# Patient Record
Sex: MALE | Race: WHITE | NOT HISPANIC OR LATINO | Employment: FULL TIME | URBAN - NONMETROPOLITAN AREA
[De-identification: names, ages, dates, MRNs, and addresses within clinical notes are randomized per-mention and may not be internally consistent; named-entity substitution may affect disease eponyms.]

---

## 2021-10-14 ENCOUNTER — HOSPITAL ENCOUNTER (EMERGENCY)
Facility: HOSPITAL | Age: 41
Discharge: HOME | End: 2021-10-14
Attending: EMERGENCY MEDICINE
Payer: COMMERCIAL

## 2021-10-14 ENCOUNTER — APPOINTMENT (EMERGENCY)
Dept: RADIOLOGY | Facility: HOSPITAL | Age: 41
End: 2021-10-14
Payer: COMMERCIAL

## 2021-10-14 VITALS
WEIGHT: 225 LBS | OXYGEN SATURATION: 98 % | HEIGHT: 73 IN | BODY MASS INDEX: 29.82 KG/M2 | DIASTOLIC BLOOD PRESSURE: 87 MMHG | SYSTOLIC BLOOD PRESSURE: 147 MMHG | RESPIRATION RATE: 16 BRPM | HEART RATE: 74 BPM | TEMPERATURE: 98.2 F

## 2021-10-14 DIAGNOSIS — M94.0 COSTOCHONDRITIS: Primary | ICD-10-CM

## 2021-10-14 PROCEDURE — 93005 ELECTROCARDIOGRAM TRACING: CPT

## 2021-10-14 PROCEDURE — 96372 THER/PROPH/DIAG INJ SC/IM: CPT

## 2021-10-14 PROCEDURE — 99284 EMERGENCY DEPT VISIT MOD MDM: CPT | Performed by: EMERGENCY MEDICINE

## 2021-10-14 PROCEDURE — 71046 X-RAY EXAM CHEST 2 VIEWS: CPT

## 2021-10-14 PROCEDURE — 99284 EMERGENCY DEPT VISIT MOD MDM: CPT

## 2021-10-14 RX ORDER — KETOROLAC TROMETHAMINE 30 MG/ML
30 INJECTION, SOLUTION INTRAMUSCULAR; INTRAVENOUS ONCE
Status: COMPLETED | OUTPATIENT
Start: 2021-10-14 | End: 2021-10-14

## 2021-10-14 RX ADMIN — KETOROLAC TROMETHAMINE 30 MG: 30 INJECTION, SOLUTION INTRAMUSCULAR at 14:38

## 2021-10-18 LAB
ATRIAL RATE: 50 BPM
P AXIS: 48 DEGREES
PR INTERVAL: 158 MS
QRS AXIS: 34 DEGREES
QRSD INTERVAL: 116 MS
QT INTERVAL: 428 MS
QTC INTERVAL: 390 MS
T WAVE AXIS: 21 DEGREES
VENTRICULAR RATE: 50 BPM

## 2023-05-17 DIAGNOSIS — R79.89 OTHER SPECIFIED ABNORMAL FINDINGS OF BLOOD CHEMISTRY: ICD-10-CM

## 2023-05-19 ENCOUNTER — HOSPITAL ENCOUNTER (OUTPATIENT)
Dept: ULTRASOUND IMAGING | Facility: HOSPITAL | Age: 43
End: 2023-05-19

## 2023-05-19 DIAGNOSIS — R79.89 OTHER SPECIFIED ABNORMAL FINDINGS OF BLOOD CHEMISTRY: ICD-10-CM

## 2023-06-30 ENCOUNTER — HOSPITAL ENCOUNTER (EMERGENCY)
Facility: HOSPITAL | Age: 43
Discharge: HOME | End: 2023-06-30
Attending: EMERGENCY MEDICINE | Admitting: EMERGENCY MEDICINE
Payer: COMMERCIAL

## 2023-06-30 VITALS
SYSTOLIC BLOOD PRESSURE: 135 MMHG | RESPIRATION RATE: 16 BRPM | BODY MASS INDEX: 28.65 KG/M2 | WEIGHT: 217.15 LBS | DIASTOLIC BLOOD PRESSURE: 82 MMHG | TEMPERATURE: 97.3 F | OXYGEN SATURATION: 99 % | HEART RATE: 70 BPM

## 2023-06-30 DIAGNOSIS — K60.2 ANAL FISSURE: Primary | ICD-10-CM

## 2023-06-30 PROCEDURE — 99283 EMERGENCY DEPT VISIT LOW MDM: CPT | Performed by: EMERGENCY MEDICINE

## 2023-06-30 PROCEDURE — 99282 EMERGENCY DEPT VISIT SF MDM: CPT

## 2023-06-30 RX ORDER — HYDROCORTISONE 25 MG/G
CREAM TOPICAL 2 TIMES DAILY
Qty: 28 G | Refills: 0 | Status: SHIPPED | OUTPATIENT
Start: 2023-06-30 | End: 2023-07-05

## 2023-06-30 RX ORDER — DOCUSATE SODIUM 100 MG/1
100 CAPSULE, LIQUID FILLED ORAL EVERY 12 HOURS
Qty: 60 CAPSULE | Refills: 0 | Status: SHIPPED | OUTPATIENT
Start: 2023-06-30

## 2023-06-30 NOTE — ED PROVIDER NOTES
History  Chief Complaint   Patient presents with   • Rectal Pain     Believes he has a hemorrhoid that is causing him pain. Or believes he was taking iron and it bound him up a little bit and had to push harder to have a bm and strained himself. Denies any bleeding last BM was this am     Patient complains of 1 week of anal pain, worse with stooling. Patient denies bloody stools. Patient states that he recently has had a hard stools with some constipation. Denies abdominal pain or nausea or vomiting. Denies fevers or chills. No other complaints. History provided by:  Patient   used: No    Medical Problem  Location:  Anus  Quality:  Sharp pain  Severity:  Moderate  Onset quality:  Gradual  Duration:  1 week  Timing:  Constant  Progression:  Unchanged  Chronicity:  New  Relieved by:  Nothing  Worsened by:  Stooling  Associated symptoms: no abdominal pain, no chest pain, no congestion, no cough, no diarrhea, no ear pain, no fever, no headaches, no loss of consciousness, no myalgias, no nausea, no rash, no rhinorrhea, no shortness of breath, no sore throat, no vomiting and no wheezing        None       History reviewed. No pertinent past medical history. Past Surgical History:   Procedure Laterality Date   • GASTRIC BYPASS  2012       History reviewed. No pertinent family history. I have reviewed and agree with the history as documented. E-Cigarette/Vaping   • E-Cigarette Use Never User      E-Cigarette/Vaping Substances     Social History     Tobacco Use   • Smoking status: Never   • Smokeless tobacco: Current   Vaping Use   • Vaping Use: Never used   Substance Use Topics   • Alcohol use: Never   • Drug use: Never       Review of Systems   Constitutional: Negative for chills and fever. HENT: Negative for congestion, ear pain, hearing loss, rhinorrhea, sore throat, trouble swallowing and voice change. Eyes: Negative for pain and discharge.    Respiratory: Negative for cough, shortness of breath and wheezing. Cardiovascular: Negative for chest pain and palpitations. Gastrointestinal: Positive for constipation. Negative for abdominal pain, blood in stool, diarrhea, nausea and vomiting. Genitourinary: Negative for dysuria, flank pain, frequency and hematuria. Musculoskeletal: Negative for joint swelling, myalgias, neck pain and neck stiffness. Skin: Negative for rash and wound. Neurological: Negative for dizziness, seizures, loss of consciousness, syncope, facial asymmetry and headaches. Psychiatric/Behavioral: Negative for hallucinations, self-injury and suicidal ideas. All other systems reviewed and are negative. Physical Exam  Physical Exam  Vitals and nursing note reviewed. Constitutional:       General: He is not in acute distress. Appearance: He is well-developed. HENT:      Head: Normocephalic and atraumatic. Right Ear: External ear normal.      Left Ear: External ear normal.   Eyes:      General: No scleral icterus. Right eye: No discharge. Left eye: No discharge. Extraocular Movements: Extraocular movements intact. Conjunctiva/sclera: Conjunctivae normal.   Cardiovascular:      Rate and Rhythm: Normal rate and regular rhythm. Heart sounds: Normal heart sounds. No murmur heard. Pulmonary:      Effort: Pulmonary effort is normal.      Breath sounds: Normal breath sounds. No wheezing or rales. Abdominal:      General: Bowel sounds are normal. There is no distension. Palpations: Abdomen is soft. Tenderness: There is no abdominal tenderness. There is no guarding or rebound. Genitourinary:     Comments: Patient noted to have small anal fissure on rectal examination. Tender with palpation. No external hemorrhoids noted. Musculoskeletal:         General: No deformity. Normal range of motion. Cervical back: Normal range of motion and neck supple. Skin:     General: Skin is warm and dry.       Findings: No rash. Neurological:      General: No focal deficit present. Mental Status: He is alert and oriented to person, place, and time. Cranial Nerves: No cranial nerve deficit. Psychiatric:         Mood and Affect: Mood normal.         Behavior: Behavior normal.         Thought Content: Thought content normal.         Judgment: Judgment normal.         Vital Signs  ED Triage Vitals [06/30/23 1803]   Temperature Pulse Respirations Blood Pressure SpO2   (!) 97.3 °F (36.3 °C) 70 16 135/82 99 %      Temp Source Heart Rate Source Patient Position - Orthostatic VS BP Location FiO2 (%)   Temporal Monitor Sitting Left arm --      Pain Score       5           Vitals:    06/30/23 1803   BP: 135/82   Pulse: 70   Patient Position - Orthostatic VS: Sitting         Visual Acuity      ED Medications  Medications - No data to display    Diagnostic Studies  Results Reviewed     None                 No orders to display              Procedures  Procedures         ED Course                               SBIRT 20yo+    Flowsheet Row Most Recent Value   Initial Alcohol Screen: US AUDIT-C     1. How often do you have a drink containing alcohol? 0 Filed at: 06/30/2023 1806   2. How many drinks containing alcohol do you have on a typical day you are drinking? 0 Filed at: 06/30/2023 1806   3a. Male UNDER 65: How often do you have five or more drinks on one occasion? 0 Filed at: 06/30/2023 1806   Audit-C Score 0 Filed at: 06/30/2023 1806   BREANN: How many times in the past year have you. .. Used an illegal drug or used a prescription medication for non-medical reasons? Never Filed at: 06/30/2023 1806                    Medical Decision Making  Based on the history and medical screening exam performed the diagnostic considerations include but are not limited to hemorrhoids, anal fissure, proctitis.     Based on the work-up performed in the emergency room which includes physical examination, and which may include laboratory studies and imaging as warranted including advanced imaging such as CT scan or ultrasound, the differential diagnosis is narrowed to exclude limb or life-threatening process. The patient is stable for discharge. Examination consistent with anal fissure. Patient stable. Amount and/or Complexity of Data Reviewed  Labs:      Details: Not indicated  Radiology:      Details: Not indicated          Disposition  Final diagnoses:   Anal fissure     Time reflects when diagnosis was documented in both MDM as applicable and the Disposition within this note     Time User Action Codes Description Comment    6/30/2023  7:25 PM Romaine Deleon Add [K60.2] Anal fissure       ED Disposition     ED Disposition   Discharge    Condition   Stable    Date/Time   Fri Jun 30, 2023  7:25 PM    [id number] [facility] discharge to home/self care. Follow-up Information     Follow up With Specialties Details Why Contact Info    Your primary care physician   As needed           Patient's Medications   Discharge Prescriptions    DOCUSATE SODIUM (COLACE) 100 MG CAPSULE    Take 1 capsule (100 mg total) by mouth every 12 (twelve) hours       Start Date: 6/30/2023 End Date: --       Order Dose: 100 mg       Quantity: 60 capsule    Refills: 0    HYDROCORTISONE (ANUSOL-HC) 2.5 % RECTAL CREAM    Apply topically 2 (two) times a day for 5 days       Start Date: 6/30/2023 End Date: 7/5/2023       Order Dose: --       Quantity: 28 g    Refills: 0       No discharge procedures on file.     PDMP Review     None          ED Provider  Electronically Signed by           Romaine Deleon MD  06/30/23 5531

## 2023-09-03 ENCOUNTER — HOSPITAL ENCOUNTER (OUTPATIENT)
Dept: ULTRASOUND IMAGING | Facility: HOSPITAL | Age: 43
Discharge: HOME | End: 2023-09-03
Payer: COMMERCIAL

## 2023-09-03 DIAGNOSIS — R74.8 ELEVATED LIVER ENZYMES: ICD-10-CM

## 2023-09-03 PROCEDURE — 76705 ECHO EXAM OF ABDOMEN: CPT

## 2024-01-01 ENCOUNTER — ANESTHESIA (INPATIENT)
Dept: RADIOLOGY | Facility: HOSPITAL | Age: 44
DRG: 025 | End: 2024-01-01
Payer: COMMERCIAL

## 2024-01-01 ENCOUNTER — APPOINTMENT (INPATIENT)
Dept: NON INVASIVE DIAGNOSTICS | Facility: HOSPITAL | Age: 44
DRG: 025 | End: 2024-01-01
Payer: COMMERCIAL

## 2024-01-01 ENCOUNTER — APPOINTMENT (INPATIENT)
Dept: RADIOLOGY | Facility: HOSPITAL | Age: 44
DRG: 025 | End: 2024-01-01
Payer: COMMERCIAL

## 2024-01-01 ENCOUNTER — ANESTHESIA EVENT (INPATIENT)
Dept: RADIOLOGY | Facility: HOSPITAL | Age: 44
DRG: 025 | End: 2024-01-01
Payer: COMMERCIAL

## 2024-01-01 ENCOUNTER — HOSPITAL ENCOUNTER (INPATIENT)
Facility: HOSPITAL | Age: 44
LOS: 18 days | DRG: 025 | End: 2024-10-31
Attending: STUDENT IN AN ORGANIZED HEALTH CARE EDUCATION/TRAINING PROGRAM | Admitting: ANESTHESIOLOGY
Payer: COMMERCIAL

## 2024-01-01 ENCOUNTER — APPOINTMENT (OUTPATIENT)
Dept: RADIOLOGY | Facility: HOSPITAL | Age: 44
DRG: 025 | End: 2024-01-01
Payer: COMMERCIAL

## 2024-01-01 ENCOUNTER — APPOINTMENT (INPATIENT)
Dept: NEUROLOGY | Facility: CLINIC | Age: 44
DRG: 025 | End: 2024-01-01
Payer: COMMERCIAL

## 2024-01-01 ENCOUNTER — APPOINTMENT (INPATIENT)
Dept: GASTROENTEROLOGY | Facility: HOSPITAL | Age: 44
DRG: 025 | End: 2024-01-01
Payer: COMMERCIAL

## 2024-01-01 DIAGNOSIS — I62.01: ICD-10-CM

## 2024-01-01 DIAGNOSIS — I95.9 HYPOTENSION: ICD-10-CM

## 2024-01-01 DIAGNOSIS — A41.9 SEPSIS (HCC): ICD-10-CM

## 2024-01-01 DIAGNOSIS — E87.0 HYPERNATREMIA: ICD-10-CM

## 2024-01-01 DIAGNOSIS — C79.9 METASTATIC DISEASE (HCC): Primary | ICD-10-CM

## 2024-01-01 DIAGNOSIS — L98.429 SACRAL ULCER (HCC): ICD-10-CM

## 2024-01-01 DIAGNOSIS — I63.9 STROKE (CEREBRUM) (HCC): ICD-10-CM

## 2024-01-01 LAB
ABO GROUP BLD BPU: NORMAL
ABO GROUP BLD: NORMAL
ACANTHOCYTES BLD QL SMEAR: PRESENT
AFP-TM SERPL-MCNC: 1.99 NG/ML (ref 0–9)
ALBUMIN FLD-MCNC: <1.5 G/DL
ALBUMIN SERPL BCG-MCNC: 2 G/DL (ref 3.5–5)
ALBUMIN SERPL BCG-MCNC: 2 G/DL (ref 3.5–5)
ALBUMIN SERPL BCG-MCNC: 2.1 G/DL (ref 3.5–5)
ALBUMIN SERPL BCG-MCNC: 2.1 G/DL (ref 3.5–5)
ALBUMIN SERPL BCG-MCNC: 2.3 G/DL (ref 3.5–5)
ALBUMIN SERPL BCG-MCNC: 2.3 G/DL (ref 3.5–5)
ALBUMIN SERPL BCG-MCNC: 2.4 G/DL (ref 3.5–5)
ALBUMIN SERPL BCG-MCNC: 2.5 G/DL (ref 3.5–5)
ALBUMIN SERPL BCG-MCNC: 2.6 G/DL (ref 3.5–5)
ALBUMIN SERPL BCG-MCNC: 2.7 G/DL (ref 3.5–5)
ALBUMIN SERPL BCG-MCNC: 2.8 G/DL (ref 3.5–5)
ALBUMIN SERPL ELPH-MCNC: 2.34 G/DL (ref 3.2–5.1)
ALBUMIN SERPL ELPH-MCNC: 66.8 % (ref 48–70)
ALP SERPL-CCNC: 1013 U/L (ref 34–104)
ALP SERPL-CCNC: 1097 U/L (ref 34–104)
ALP SERPL-CCNC: 1107 U/L (ref 34–104)
ALP SERPL-CCNC: 1176 U/L (ref 34–104)
ALP SERPL-CCNC: 1247 U/L (ref 34–104)
ALP SERPL-CCNC: 223 U/L (ref 34–104)
ALP SERPL-CCNC: 289 U/L (ref 34–104)
ALP SERPL-CCNC: 599 U/L (ref 34–104)
ALP SERPL-CCNC: 699 U/L (ref 34–104)
ALP SERPL-CCNC: 708 U/L (ref 34–104)
ALP SERPL-CCNC: 742 U/L (ref 34–104)
ALP SERPL-CCNC: 802 U/L (ref 34–104)
ALP SERPL-CCNC: 861 U/L (ref 34–104)
ALP SERPL-CCNC: 883 U/L (ref 34–104)
ALP SERPL-CCNC: 917 U/L (ref 34–104)
ALPHA1 GLOB SERPL ELPH-MCNC: 0.26 G/DL (ref 0.15–0.47)
ALPHA1 GLOB SERPL ELPH-MCNC: 7.4 % (ref 1.8–7)
ALPHA2 GLOB SERPL ELPH-MCNC: 0.25 G/DL (ref 0.42–1.04)
ALPHA2 GLOB SERPL ELPH-MCNC: 7 % (ref 5.9–14.9)
ALT SERPL W P-5'-P-CCNC: 103 U/L (ref 7–52)
ALT SERPL W P-5'-P-CCNC: 106 U/L (ref 7–52)
ALT SERPL W P-5'-P-CCNC: 108 U/L (ref 7–52)
ALT SERPL W P-5'-P-CCNC: 113 U/L (ref 7–52)
ALT SERPL W P-5'-P-CCNC: 118 U/L (ref 7–52)
ALT SERPL W P-5'-P-CCNC: 134 U/L (ref 7–52)
ALT SERPL W P-5'-P-CCNC: 135 U/L (ref 7–52)
ALT SERPL W P-5'-P-CCNC: 139 U/L (ref 7–52)
ALT SERPL W P-5'-P-CCNC: 146 U/L (ref 7–52)
ALT SERPL W P-5'-P-CCNC: 155 U/L (ref 7–52)
ALT SERPL W P-5'-P-CCNC: 170 U/L (ref 7–52)
ALT SERPL W P-5'-P-CCNC: 176 U/L (ref 7–52)
ALT SERPL W P-5'-P-CCNC: 189 U/L (ref 7–52)
ALT SERPL W P-5'-P-CCNC: 189 U/L (ref 7–52)
ALT SERPL W P-5'-P-CCNC: 211 U/L (ref 7–52)
AMMONIA PLAS-SCNC: 47 UMOL/L (ref 18–72)
ANA SER QL IA: NEGATIVE
ANION GAP SERPL CALCULATED.3IONS-SCNC: 10 MMOL/L (ref 4–13)
ANION GAP SERPL CALCULATED.3IONS-SCNC: 11 MMOL/L (ref 4–13)
ANION GAP SERPL CALCULATED.3IONS-SCNC: 6 MMOL/L (ref 4–13)
ANION GAP SERPL CALCULATED.3IONS-SCNC: 7 MMOL/L (ref 4–13)
ANION GAP SERPL CALCULATED.3IONS-SCNC: 8 MMOL/L (ref 4–13)
ANION GAP SERPL CALCULATED.3IONS-SCNC: 9 MMOL/L (ref 4–13)
ANISOCYTOSIS BLD QL SMEAR: PRESENT
AORTIC ROOT: 3.7 CM
APICAL FOUR CHAMBER EJECTION FRACTION: 72 %
APPEARANCE FLD: ABNORMAL
APTT HEX PL PPP: 5 SEC (ref 0–11)
APTT PPP: 43 SECONDS (ref 23–34)
APTT PPP: 47 SECONDS (ref 23–34)
APTT SCREEN TO CONFIRM RATIO: 1.01 RATIO (ref 0–1.34)
APTT-LA IMM 4:1 NP PPP: 45.4 SEC (ref 0–40.5)
ARTERIAL PATENCY WRIST A: NO
ARTERIAL PATENCY WRIST A: NO
ARTERIAL PATENCY WRIST A: YES
AST SERPL W P-5'-P-CCNC: 247 U/L (ref 13–39)
AST SERPL W P-5'-P-CCNC: 253 U/L (ref 13–39)
AST SERPL W P-5'-P-CCNC: 256 U/L (ref 13–39)
AST SERPL W P-5'-P-CCNC: 259 U/L (ref 13–39)
AST SERPL W P-5'-P-CCNC: 286 U/L (ref 13–39)
AST SERPL W P-5'-P-CCNC: 289 U/L (ref 13–39)
AST SERPL W P-5'-P-CCNC: 293 U/L (ref 13–39)
AST SERPL W P-5'-P-CCNC: 340 U/L (ref 13–39)
AST SERPL W P-5'-P-CCNC: 341 U/L (ref 13–39)
AST SERPL W P-5'-P-CCNC: 356 U/L (ref 13–39)
AST SERPL W P-5'-P-CCNC: 391 U/L (ref 13–39)
AST SERPL W P-5'-P-CCNC: 392 U/L (ref 13–39)
AST SERPL W P-5'-P-CCNC: 437 U/L (ref 13–39)
AST SERPL W P-5'-P-CCNC: 449 U/L (ref 13–39)
AST SERPL W P-5'-P-CCNC: 457 U/L (ref 13–39)
ATRIAL RATE: 105 BPM
ATRIAL RATE: 121 BPM
ATRIAL RATE: 123 BPM
ATRIAL RATE: 132 BPM
ATRIAL RATE: 132 BPM
ATRIAL RATE: 139 BPM
ATRIAL RATE: 143 BPM
ATRIAL RATE: 146 BPM
ATRIAL RATE: 154 BPM
ATRIAL RATE: 44 BPM
ATRIAL RATE: 59 BPM
B-HCG SERPL-ACNC: 7.5 MIU/ML (ref 0–0.6)
B2 GLYCOPROT1 IGA SERPL IA-ACNC: 1.8
B2 GLYCOPROT1 IGG SERPL IA-ACNC: <0.8
B2 GLYCOPROT1 IGM SERPL IA-ACNC: <2.4
B2 MICROGLOB SERPL-MCNC: 6.8 MG/L (ref 0.6–2.4)
BACTERIA BLD CULT: NORMAL
BACTERIA BRONCH AEROBE CULT: ABNORMAL
BACTERIA SPEC ANAEROBE CULT: NO GROWTH
BACTERIA SPEC BFLD CULT: NO GROWTH
BACTERIA SPT RESP CULT: ABNORMAL
BACTERIA SPT RESP CULT: ABNORMAL
BACTERIA TISS AEROBE CULT: NO GROWTH
BACTERIA UR QL AUTO: ABNORMAL /HPF
BACTERIA WND AEROBE CULT: ABNORMAL
BASE EXCESS BLDA CALC-SCNC: -0.8 MMOL/L
BASE EXCESS BLDA CALC-SCNC: -1.6 MMOL/L
BASE EXCESS BLDA CALC-SCNC: -2 MMOL/L
BASE EXCESS BLDA CALC-SCNC: -2 MMOL/L (ref -2–3)
BASE EXCESS BLDA CALC-SCNC: -4 MMOL/L (ref -2–3)
BASE EXCESS BLDA CALC-SCNC: -4.5 MMOL/L
BASE EXCESS BLDA CALC-SCNC: 0 MMOL/L
BASE EXCESS BLDA CALC-SCNC: 1.3 MMOL/L
BASOPHILS # BLD MANUAL: 0 THOUSAND/UL (ref 0–0.1)
BASOPHILS # BLD MANUAL: 0.08 THOUSAND/UL (ref 0–0.1)
BASOPHILS # BLD MANUAL: 0.09 THOUSAND/UL (ref 0–0.1)
BASOPHILS NFR FLD MANUAL: 4 %
BASOPHILS NFR MAR MANUAL: 0 % (ref 0–1)
BASOPHILS NFR MAR MANUAL: 1 % (ref 0–1)
BASOPHILS NFR MAR MANUAL: 1 % (ref 0–1)
BETA GLOB ABNORMAL SERPL ELPH-MCNC: 0.13 G/DL (ref 0.31–0.57)
BETA1 GLOB SERPL ELPH-MCNC: 3.7 % (ref 4.7–7.7)
BETA2 GLOB SERPL ELPH-MCNC: 5.1 % (ref 3.1–7.9)
BETA2+GAMMA GLOB SERPL ELPH-MCNC: 0.18 G/DL (ref 0.2–0.58)
BILIRUB DIRECT SERPL-MCNC: 1.69 MG/DL (ref 0–0.2)
BILIRUB DIRECT SERPL-MCNC: 3.86 MG/DL (ref 0–0.2)
BILIRUB SERPL-MCNC: 2.13 MG/DL (ref 0.2–1)
BILIRUB SERPL-MCNC: 2.23 MG/DL (ref 0.2–1)
BILIRUB SERPL-MCNC: 2.31 MG/DL (ref 0.2–1)
BILIRUB SERPL-MCNC: 2.45 MG/DL (ref 0.2–1)
BILIRUB SERPL-MCNC: 2.64 MG/DL (ref 0.2–1)
BILIRUB SERPL-MCNC: 2.67 MG/DL (ref 0.2–1)
BILIRUB SERPL-MCNC: 2.98 MG/DL (ref 0.2–1)
BILIRUB SERPL-MCNC: 3.42 MG/DL (ref 0.2–1)
BILIRUB SERPL-MCNC: 3.51 MG/DL (ref 0.2–1)
BILIRUB SERPL-MCNC: 4.09 MG/DL (ref 0.2–1)
BILIRUB SERPL-MCNC: 4.85 MG/DL (ref 0.2–1)
BILIRUB SERPL-MCNC: 4.9 MG/DL (ref 0.2–1)
BILIRUB SERPL-MCNC: 6.21 MG/DL (ref 0.2–1)
BILIRUB SERPL-MCNC: 6.44 MG/DL (ref 0.2–1)
BILIRUB SERPL-MCNC: 6.54 MG/DL (ref 0.2–1)
BILIRUB UR QL STRIP: NEGATIVE
BLD GP AB SCN SERPL QL: NEGATIVE
BLD SMEAR FINDING POSITIVE INTERP: NO
BLD SMEAR FINDING POSITIVE INTERP: NO
BODY TEMPERATURE: 96.3 DEGREES FEHRENHEIT
BODY TEMPERATURE: 97.9 DEGREES FEHRENHEIT
BPU ID: NORMAL
BSA FOR ECHO PROCEDURE: 2.28 M2
BUN SERPL-MCNC: 21 MG/DL (ref 5–25)
BUN SERPL-MCNC: 21 MG/DL (ref 5–25)
BUN SERPL-MCNC: 25 MG/DL (ref 5–25)
BUN SERPL-MCNC: 25 MG/DL (ref 5–25)
BUN SERPL-MCNC: 29 MG/DL (ref 5–25)
BUN SERPL-MCNC: 30 MG/DL (ref 5–25)
BUN SERPL-MCNC: 30 MG/DL (ref 5–25)
BUN SERPL-MCNC: 31 MG/DL (ref 5–25)
BUN SERPL-MCNC: 31 MG/DL (ref 5–25)
BUN SERPL-MCNC: 35 MG/DL (ref 5–25)
BUN SERPL-MCNC: 37 MG/DL (ref 5–25)
BUN SERPL-MCNC: 40 MG/DL (ref 5–25)
BUN SERPL-MCNC: 41 MG/DL (ref 5–25)
BUN SERPL-MCNC: 44 MG/DL (ref 5–25)
BUN SERPL-MCNC: 44 MG/DL (ref 5–25)
BUN SERPL-MCNC: 45 MG/DL (ref 5–25)
BUN SERPL-MCNC: 45 MG/DL (ref 5–25)
BUN SERPL-MCNC: 47 MG/DL (ref 5–25)
BUN SERPL-MCNC: 48 MG/DL (ref 5–25)
BUN SERPL-MCNC: 53 MG/DL (ref 5–25)
BUN SERPL-MCNC: 54 MG/DL (ref 5–25)
BUN SERPL-MCNC: 55 MG/DL (ref 5–25)
BUN SERPL-MCNC: 59 MG/DL (ref 5–25)
BURR CELLS BLD QL SMEAR: PRESENT
CA-I BLD-SCNC: 0.92 MMOL/L (ref 1.12–1.32)
CA-I BLD-SCNC: 1.04 MMOL/L (ref 1.12–1.32)
CA-I BLD-SCNC: 1.07 MMOL/L (ref 1.12–1.32)
CA-I BLD-SCNC: 1.09 MMOL/L (ref 1.12–1.32)
CA-I BLD-SCNC: 1.11 MMOL/L (ref 1.12–1.32)
CA-I BLD-SCNC: 1.11 MMOL/L (ref 1.12–1.32)
CA-I BLD-SCNC: 1.12 MMOL/L (ref 1.12–1.32)
CA-I BLD-SCNC: 1.12 MMOL/L (ref 1.12–1.32)
CA-I BLD-SCNC: 1.16 MMOL/L (ref 1.12–1.32)
CA-I BLD-SCNC: 1.18 MMOL/L (ref 1.12–1.32)
CALCIUM ALBUM COR SERPL-MCNC: 8.4 MG/DL (ref 8.3–10.1)
CALCIUM ALBUM COR SERPL-MCNC: 8.4 MG/DL (ref 8.3–10.1)
CALCIUM ALBUM COR SERPL-MCNC: 8.8 MG/DL (ref 8.3–10.1)
CALCIUM ALBUM COR SERPL-MCNC: 8.9 MG/DL (ref 8.3–10.1)
CALCIUM ALBUM COR SERPL-MCNC: 9.1 MG/DL (ref 8.3–10.1)
CALCIUM ALBUM COR SERPL-MCNC: 9.1 MG/DL (ref 8.3–10.1)
CALCIUM ALBUM COR SERPL-MCNC: 9.3 MG/DL (ref 8.3–10.1)
CALCIUM ALBUM COR SERPL-MCNC: 9.4 MG/DL (ref 8.3–10.1)
CALCIUM ALBUM COR SERPL-MCNC: 9.5 MG/DL (ref 8.3–10.1)
CALCIUM ALBUM COR SERPL-MCNC: 9.7 MG/DL (ref 8.3–10.1)
CALCIUM SERPL-MCNC: 6.9 MG/DL (ref 8.4–10.2)
CALCIUM SERPL-MCNC: 7.1 MG/DL (ref 8.4–10.2)
CALCIUM SERPL-MCNC: 7.1 MG/DL (ref 8.4–10.2)
CALCIUM SERPL-MCNC: 7.2 MG/DL (ref 8.4–10.2)
CALCIUM SERPL-MCNC: 7.4 MG/DL (ref 8.4–10.2)
CALCIUM SERPL-MCNC: 7.4 MG/DL (ref 8.4–10.2)
CALCIUM SERPL-MCNC: 7.5 MG/DL (ref 8.4–10.2)
CALCIUM SERPL-MCNC: 7.5 MG/DL (ref 8.4–10.2)
CALCIUM SERPL-MCNC: 7.6 MG/DL (ref 8.4–10.2)
CALCIUM SERPL-MCNC: 7.7 MG/DL (ref 8.4–10.2)
CALCIUM SERPL-MCNC: 7.9 MG/DL (ref 8.4–10.2)
CALCIUM SERPL-MCNC: 7.9 MG/DL (ref 8.4–10.2)
CALCIUM SERPL-MCNC: 8 MG/DL (ref 8.4–10.2)
CALCIUM SERPL-MCNC: 8.2 MG/DL (ref 8.4–10.2)
CALCIUM SERPL-MCNC: 8.3 MG/DL (ref 8.4–10.2)
CALCIUM SERPL-MCNC: 8.4 MG/DL (ref 8.4–10.2)
CALCIUM SERPL-MCNC: 8.5 MG/DL (ref 8.4–10.2)
CALCIUM SERPL-MCNC: 8.5 MG/DL (ref 8.4–10.2)
CANCER AG19-9 SERPL-ACNC: 697 U/ML (ref 0–35)
CARDIOLIPIN IGA SER IA-ACNC: 3
CARDIOLIPIN IGG SER IA-ACNC: 1.6
CARDIOLIPIN IGM SER IA-ACNC: <0.9
CEA SERPL-MCNC: 2.4 NG/ML (ref 0–3)
CEA SERPL-MCNC: 2.7 NG/ML (ref 0–3)
CFFMA (FUNCTIONAL FIBRINOGEN MAX AMPLITUDE): 4.1 MM (ref 15–32)
CFFMA (FUNCTIONAL FIBRINOGEN MAX AMPLITUDE): <4 MM (ref 15–32)
CHLORIDE SERPL-SCNC: 101 MMOL/L (ref 96–108)
CHLORIDE SERPL-SCNC: 102 MMOL/L (ref 96–108)
CHLORIDE SERPL-SCNC: 104 MMOL/L (ref 96–108)
CHLORIDE SERPL-SCNC: 105 MMOL/L (ref 96–108)
CHLORIDE SERPL-SCNC: 108 MMOL/L (ref 96–108)
CHLORIDE SERPL-SCNC: 109 MMOL/L (ref 96–108)
CHLORIDE SERPL-SCNC: 111 MMOL/L (ref 96–108)
CHLORIDE SERPL-SCNC: 112 MMOL/L (ref 96–108)
CHLORIDE SERPL-SCNC: 112 MMOL/L (ref 96–108)
CHLORIDE SERPL-SCNC: 113 MMOL/L (ref 96–108)
CHLORIDE SERPL-SCNC: 116 MMOL/L (ref 96–108)
CHLORIDE SERPL-SCNC: 117 MMOL/L (ref 96–108)
CHLORIDE SERPL-SCNC: 118 MMOL/L (ref 96–108)
CHLORIDE SERPL-SCNC: 119 MMOL/L (ref 96–108)
CKLY30: 0 % (ref 0–2.6)
CKLY30: 0.1 % (ref 0–2.6)
CKR(REACTION TIME): 8.3 MIN (ref 4.6–9.1)
CKR(REACTION TIME): 9.5 MIN (ref 4.6–9.1)
CLARITY UR: ABNORMAL
CO2 SERPL-SCNC: 19 MMOL/L (ref 21–32)
CO2 SERPL-SCNC: 21 MMOL/L (ref 21–32)
CO2 SERPL-SCNC: 22 MMOL/L (ref 21–32)
CO2 SERPL-SCNC: 22 MMOL/L (ref 21–32)
CO2 SERPL-SCNC: 23 MMOL/L (ref 21–32)
CO2 SERPL-SCNC: 24 MMOL/L (ref 21–32)
CO2 SERPL-SCNC: 25 MMOL/L (ref 21–32)
CO2 SERPL-SCNC: 25 MMOL/L (ref 21–32)
CO2 SERPL-SCNC: 26 MMOL/L (ref 21–32)
COLOR FLD: ABNORMAL
COLOR UR: YELLOW
CONFIRM APTT/NORMAL: 38 SEC (ref 0–47.6)
CREAT SERPL-MCNC: 0.56 MG/DL (ref 0.6–1.3)
CREAT SERPL-MCNC: 0.58 MG/DL (ref 0.6–1.3)
CREAT SERPL-MCNC: 0.6 MG/DL (ref 0.6–1.3)
CREAT SERPL-MCNC: 0.61 MG/DL (ref 0.6–1.3)
CREAT SERPL-MCNC: 0.62 MG/DL (ref 0.6–1.3)
CREAT SERPL-MCNC: 0.65 MG/DL (ref 0.6–1.3)
CREAT SERPL-MCNC: 0.69 MG/DL (ref 0.6–1.3)
CREAT SERPL-MCNC: 0.69 MG/DL (ref 0.6–1.3)
CREAT SERPL-MCNC: 0.7 MG/DL (ref 0.6–1.3)
CREAT SERPL-MCNC: 0.72 MG/DL (ref 0.6–1.3)
CREAT SERPL-MCNC: 0.74 MG/DL (ref 0.6–1.3)
CREAT SERPL-MCNC: 0.75 MG/DL (ref 0.6–1.3)
CREAT SERPL-MCNC: 0.77 MG/DL (ref 0.6–1.3)
CREAT SERPL-MCNC: 0.78 MG/DL (ref 0.6–1.3)
CREAT SERPL-MCNC: 0.78 MG/DL (ref 0.6–1.3)
CREAT SERPL-MCNC: 0.79 MG/DL (ref 0.6–1.3)
CREAT SERPL-MCNC: 0.79 MG/DL (ref 0.6–1.3)
CREAT SERPL-MCNC: 0.82 MG/DL (ref 0.6–1.3)
CREAT SERPL-MCNC: 0.88 MG/DL (ref 0.6–1.3)
CREAT SERPL-MCNC: 0.98 MG/DL (ref 0.6–1.3)
CREAT SERPL-MCNC: 1 MG/DL (ref 0.6–1.3)
CREAT SERPL-MCNC: 1.02 MG/DL (ref 0.6–1.3)
CREAT SERPL-MCNC: 1.16 MG/DL (ref 0.6–1.3)
CROSSMATCH: NORMAL
CRP SERPL QL: 82.3 MG/L
CRTMA(RAPIDTEG MAX AMPLITUDE): 42.6 MM (ref 52–70)
CRTMA(RAPIDTEG MAX AMPLITUDE): 46.6 MM (ref 52–70)
DEPRECATED AT III PPP: 46 % OF NORMAL (ref 92–136)
EOSINOPHIL # BLD AUTO: 0.11 THOUSAND/UL (ref 0–0.61)
EOSINOPHIL # BLD MANUAL: 0 THOUSAND/UL (ref 0–0.4)
EOSINOPHIL # BLD MANUAL: 0.08 THOUSAND/UL (ref 0–0.4)
EOSINOPHIL # BLD MANUAL: 0.09 THOUSAND/UL (ref 0–0.4)
EOSINOPHIL NFR BLD MANUAL: 0 % (ref 0–6)
EOSINOPHIL NFR BLD MANUAL: 1 % (ref 0–6)
ERYTHROCYTE [DISTWIDTH] IN BLOOD BY AUTOMATED COUNT: 17.3 % (ref 11.6–15.1)
ERYTHROCYTE [DISTWIDTH] IN BLOOD BY AUTOMATED COUNT: 18.1 % (ref 11.6–15.1)
ERYTHROCYTE [DISTWIDTH] IN BLOOD BY AUTOMATED COUNT: 18.3 % (ref 11.6–15.1)
ERYTHROCYTE [DISTWIDTH] IN BLOOD BY AUTOMATED COUNT: 18.6 % (ref 11.6–15.1)
ERYTHROCYTE [DISTWIDTH] IN BLOOD BY AUTOMATED COUNT: 19.1 % (ref 11.6–15.1)
ERYTHROCYTE [DISTWIDTH] IN BLOOD BY AUTOMATED COUNT: 19.2 % (ref 11.6–15.1)
ERYTHROCYTE [DISTWIDTH] IN BLOOD BY AUTOMATED COUNT: 19.5 % (ref 11.6–15.1)
ERYTHROCYTE [DISTWIDTH] IN BLOOD BY AUTOMATED COUNT: 19.5 % (ref 11.6–15.1)
ERYTHROCYTE [DISTWIDTH] IN BLOOD BY AUTOMATED COUNT: 19.9 % (ref 11.6–15.1)
ERYTHROCYTE [DISTWIDTH] IN BLOOD BY AUTOMATED COUNT: 20.4 % (ref 11.6–15.1)
ERYTHROCYTE [DISTWIDTH] IN BLOOD BY AUTOMATED COUNT: 20.4 % (ref 11.6–15.1)
ERYTHROCYTE [DISTWIDTH] IN BLOOD BY AUTOMATED COUNT: 21 % (ref 11.6–15.1)
ERYTHROCYTE [DISTWIDTH] IN BLOOD BY AUTOMATED COUNT: 25.1 % (ref 11.6–15.1)
ERYTHROCYTE [DISTWIDTH] IN BLOOD BY AUTOMATED COUNT: 26.5 % (ref 11.6–15.1)
ERYTHROCYTE [DISTWIDTH] IN BLOOD BY AUTOMATED COUNT: 27.2 % (ref 11.6–15.1)
ERYTHROCYTE [DISTWIDTH] IN BLOOD BY AUTOMATED COUNT: 28 % (ref 11.6–15.1)
ERYTHROCYTE [DISTWIDTH] IN BLOOD BY AUTOMATED COUNT: 28.1 % (ref 11.6–15.1)
ERYTHROCYTE [DISTWIDTH] IN BLOOD BY AUTOMATED COUNT: 28.3 % (ref 11.6–15.1)
ERYTHROCYTE [SEDIMENTATION RATE] IN BLOOD: <1 MM/HOUR (ref 0–14)
FDP BLD QL AGGL: <10
FERRITIN SERPL-MCNC: >7500 NG/ML (ref 24–336)
FIBRINOGEN PPP-MCNC: 113 MG/DL (ref 206–523)
FIBRINOGEN PPP-MCNC: 114 MG/DL (ref 206–523)
FIBRINOGEN PPP-MCNC: 122 MG/DL (ref 206–523)
FIBRINOGEN PPP-MCNC: 161 MG/DL (ref 206–523)
FIBRINOGEN PPP-MCNC: 78 MG/DL (ref 206–523)
FIBRINOGEN PPP-MCNC: 90 MG/DL (ref 206–523)
FLUAV RNA RESP QL NAA+PROBE: NEGATIVE
FLUBV RNA RESP QL NAA+PROBE: NEGATIVE
FOLATE SERPL-MCNC: 13.4 NG/ML
FOLATE SERPL-MCNC: 14.1 NG/ML
FRACTIONAL SHORTENING: 30 (ref 28–44)
FUNGAL BLOOD CULTURE: NORMAL
FUNGAL BLOOD CULTURE: NORMAL
FUNGUS SPEC CULT: NORMAL
FUNGUS SPEC CULT: NORMAL
GAMMA GLOB ABNORMAL SERPL ELPH-MCNC: 0.35 G/DL (ref 0.4–1.66)
GAMMA GLOB SERPL ELPH-MCNC: 10 % (ref 6.9–22.3)
GAMMA INTERFERON BACKGROUND BLD IA-ACNC: 2.93 IU/ML
GFR SERPL CREATININE-BSD FRML MDRD: 103 ML/MIN/1.73SQ M
GFR SERPL CREATININE-BSD FRML MDRD: 106 ML/MIN/1.73SQ M
GFR SERPL CREATININE-BSD FRML MDRD: 108 ML/MIN/1.73SQ M
GFR SERPL CREATININE-BSD FRML MDRD: 108 ML/MIN/1.73SQ M
GFR SERPL CREATININE-BSD FRML MDRD: 109 ML/MIN/1.73SQ M
GFR SERPL CREATININE-BSD FRML MDRD: 110 ML/MIN/1.73SQ M
GFR SERPL CREATININE-BSD FRML MDRD: 111 ML/MIN/1.73SQ M
GFR SERPL CREATININE-BSD FRML MDRD: 112 ML/MIN/1.73SQ M
GFR SERPL CREATININE-BSD FRML MDRD: 113 ML/MIN/1.73SQ M
GFR SERPL CREATININE-BSD FRML MDRD: 114 ML/MIN/1.73SQ M
GFR SERPL CREATININE-BSD FRML MDRD: 114 ML/MIN/1.73SQ M
GFR SERPL CREATININE-BSD FRML MDRD: 117 ML/MIN/1.73SQ M
GFR SERPL CREATININE-BSD FRML MDRD: 119 ML/MIN/1.73SQ M
GFR SERPL CREATININE-BSD FRML MDRD: 120 ML/MIN/1.73SQ M
GFR SERPL CREATININE-BSD FRML MDRD: 121 ML/MIN/1.73SQ M
GFR SERPL CREATININE-BSD FRML MDRD: 123 ML/MIN/1.73SQ M
GFR SERPL CREATININE-BSD FRML MDRD: 124 ML/MIN/1.73SQ M
GFR SERPL CREATININE-BSD FRML MDRD: 75 ML/MIN/1.73SQ M
GFR SERPL CREATININE-BSD FRML MDRD: 88 ML/MIN/1.73SQ M
GFR SERPL CREATININE-BSD FRML MDRD: 90 ML/MIN/1.73SQ M
GFR SERPL CREATININE-BSD FRML MDRD: 92 ML/MIN/1.73SQ M
GGT SERPL-CCNC: 114 U/L (ref 9–64)
GLUCOSE FLD-MCNC: 85 MG/DL
GLUCOSE SERPL-MCNC: 101 MG/DL (ref 65–140)
GLUCOSE SERPL-MCNC: 101 MG/DL (ref 65–140)
GLUCOSE SERPL-MCNC: 102 MG/DL (ref 65–140)
GLUCOSE SERPL-MCNC: 102 MG/DL (ref 65–140)
GLUCOSE SERPL-MCNC: 103 MG/DL (ref 65–140)
GLUCOSE SERPL-MCNC: 103 MG/DL (ref 65–140)
GLUCOSE SERPL-MCNC: 104 MG/DL (ref 65–140)
GLUCOSE SERPL-MCNC: 106 MG/DL (ref 65–140)
GLUCOSE SERPL-MCNC: 106 MG/DL (ref 65–140)
GLUCOSE SERPL-MCNC: 107 MG/DL (ref 65–140)
GLUCOSE SERPL-MCNC: 107 MG/DL (ref 65–140)
GLUCOSE SERPL-MCNC: 109 MG/DL (ref 65–140)
GLUCOSE SERPL-MCNC: 110 MG/DL (ref 65–140)
GLUCOSE SERPL-MCNC: 113 MG/DL (ref 65–140)
GLUCOSE SERPL-MCNC: 114 MG/DL (ref 65–140)
GLUCOSE SERPL-MCNC: 115 MG/DL (ref 65–140)
GLUCOSE SERPL-MCNC: 116 MG/DL (ref 65–140)
GLUCOSE SERPL-MCNC: 116 MG/DL (ref 65–140)
GLUCOSE SERPL-MCNC: 118 MG/DL (ref 65–140)
GLUCOSE SERPL-MCNC: 118 MG/DL (ref 65–140)
GLUCOSE SERPL-MCNC: 121 MG/DL (ref 65–140)
GLUCOSE SERPL-MCNC: 122 MG/DL (ref 65–140)
GLUCOSE SERPL-MCNC: 123 MG/DL (ref 65–140)
GLUCOSE SERPL-MCNC: 124 MG/DL (ref 65–140)
GLUCOSE SERPL-MCNC: 127 MG/DL (ref 65–140)
GLUCOSE SERPL-MCNC: 128 MG/DL (ref 65–140)
GLUCOSE SERPL-MCNC: 130 MG/DL (ref 65–140)
GLUCOSE SERPL-MCNC: 133 MG/DL (ref 65–140)
GLUCOSE SERPL-MCNC: 184 MG/DL (ref 65–140)
GLUCOSE SERPL-MCNC: 45 MG/DL (ref 65–140)
GLUCOSE SERPL-MCNC: 51 MG/DL (ref 65–140)
GLUCOSE SERPL-MCNC: 54 MG/DL (ref 65–140)
GLUCOSE SERPL-MCNC: 55 MG/DL (ref 65–140)
GLUCOSE SERPL-MCNC: 56 MG/DL (ref 65–140)
GLUCOSE SERPL-MCNC: 60 MG/DL (ref 65–140)
GLUCOSE SERPL-MCNC: 61 MG/DL (ref 65–140)
GLUCOSE SERPL-MCNC: 63 MG/DL (ref 65–140)
GLUCOSE SERPL-MCNC: 64 MG/DL (ref 65–140)
GLUCOSE SERPL-MCNC: 66 MG/DL (ref 65–140)
GLUCOSE SERPL-MCNC: 69 MG/DL (ref 65–140)
GLUCOSE SERPL-MCNC: 70 MG/DL (ref 65–140)
GLUCOSE SERPL-MCNC: 72 MG/DL (ref 65–140)
GLUCOSE SERPL-MCNC: 74 MG/DL (ref 65–140)
GLUCOSE SERPL-MCNC: 75 MG/DL (ref 65–140)
GLUCOSE SERPL-MCNC: 75 MG/DL (ref 65–140)
GLUCOSE SERPL-MCNC: 77 MG/DL (ref 65–140)
GLUCOSE SERPL-MCNC: 78 MG/DL (ref 65–140)
GLUCOSE SERPL-MCNC: 80 MG/DL (ref 65–140)
GLUCOSE SERPL-MCNC: 80 MG/DL (ref 65–140)
GLUCOSE SERPL-MCNC: 81 MG/DL (ref 65–140)
GLUCOSE SERPL-MCNC: 82 MG/DL (ref 65–140)
GLUCOSE SERPL-MCNC: 83 MG/DL (ref 65–140)
GLUCOSE SERPL-MCNC: 84 MG/DL (ref 65–140)
GLUCOSE SERPL-MCNC: 85 MG/DL (ref 65–140)
GLUCOSE SERPL-MCNC: 86 MG/DL (ref 65–140)
GLUCOSE SERPL-MCNC: 87 MG/DL (ref 65–140)
GLUCOSE SERPL-MCNC: 88 MG/DL (ref 65–140)
GLUCOSE SERPL-MCNC: 89 MG/DL (ref 65–140)
GLUCOSE SERPL-MCNC: 90 MG/DL (ref 65–140)
GLUCOSE SERPL-MCNC: 91 MG/DL (ref 65–140)
GLUCOSE SERPL-MCNC: 91 MG/DL (ref 65–140)
GLUCOSE SERPL-MCNC: 92 MG/DL (ref 65–140)
GLUCOSE SERPL-MCNC: 93 MG/DL (ref 65–140)
GLUCOSE SERPL-MCNC: 94 MG/DL (ref 65–140)
GLUCOSE SERPL-MCNC: 95 MG/DL (ref 65–140)
GLUCOSE SERPL-MCNC: 95 MG/DL (ref 65–140)
GLUCOSE SERPL-MCNC: 96 MG/DL (ref 65–140)
GLUCOSE SERPL-MCNC: 97 MG/DL (ref 65–140)
GLUCOSE SERPL-MCNC: 98 MG/DL (ref 65–140)
GLUCOSE UR STRIP-MCNC: NEGATIVE MG/DL
GRAM STN SPEC: ABNORMAL
GRAM STN SPEC: NORMAL
H CAPSUL AG UR IA-ACNC: NEGATIVE
H CAPSUL AG UR IA-ACNC: NEGATIVE
HCO3 BLDA-SCNC: 20.5 MMOL/L (ref 22–28)
HCO3 BLDA-SCNC: 20.7 MMOL/L (ref 22–28)
HCO3 BLDA-SCNC: 20.9 MMOL/L (ref 22–28)
HCO3 BLDA-SCNC: 21.8 MMOL/L (ref 22–28)
HCO3 BLDA-SCNC: 22.8 MMOL/L (ref 22–28)
HCO3 BLDA-SCNC: 23 MMOL/L (ref 22–28)
HCO3 BLDA-SCNC: 23.7 MMOL/L (ref 22–28)
HCO3 BLDA-SCNC: 24.1 MMOL/L (ref 22–28)
HCT VFR BLD AUTO: 21.1 % (ref 36.5–49.3)
HCT VFR BLD AUTO: 21.8 % (ref 36.5–49.3)
HCT VFR BLD AUTO: 22 % (ref 36.5–49.3)
HCT VFR BLD AUTO: 22.2 % (ref 36.5–49.3)
HCT VFR BLD AUTO: 22.4 % (ref 36.5–49.3)
HCT VFR BLD AUTO: 22.6 % (ref 36.5–49.3)
HCT VFR BLD AUTO: 24 % (ref 36.5–49.3)
HCT VFR BLD AUTO: 24.2 % (ref 36.5–49.3)
HCT VFR BLD AUTO: 24.4 % (ref 36.5–49.3)
HCT VFR BLD AUTO: 24.4 % (ref 36.5–49.3)
HCT VFR BLD AUTO: 24.8 % (ref 36.5–49.3)
HCT VFR BLD AUTO: 25 % (ref 36.5–49.3)
HCT VFR BLD AUTO: 25.1 % (ref 36.5–49.3)
HCT VFR BLD AUTO: 25.4 % (ref 36.5–49.3)
HCT VFR BLD AUTO: 25.4 % (ref 36.5–49.3)
HCT VFR BLD AUTO: 26.4 % (ref 36.5–49.3)
HCT VFR BLD AUTO: 26.8 % (ref 36.5–49.3)
HCT VFR BLD AUTO: 27.1 % (ref 36.5–49.3)
HCT VFR BLD AUTO: 27.2 % (ref 36.5–49.3)
HCT VFR BLD AUTO: 27.5 % (ref 36.5–49.3)
HCT VFR BLD AUTO: 28.1 % (ref 36.5–49.3)
HCT VFR BLD AUTO: 28.9 % (ref 36.5–49.3)
HCT VFR BLD AUTO: 29.2 % (ref 36.5–49.3)
HCT VFR BLD AUTO: 29.8 % (ref 36.5–49.3)
HCT VFR BLD AUTO: 29.9 % (ref 36.5–49.3)
HCT VFR BLD AUTO: 30 % (ref 36.5–49.3)
HCT VFR BLD AUTO: 31 % (ref 36.5–49.3)
HCT VFR BLD AUTO: 31.4 % (ref 36.5–49.3)
HCT VFR BLD AUTO: 31.5 % (ref 36.5–49.3)
HCT VFR BLD AUTO: 32 % (ref 36.5–49.3)
HCT VFR BLD CALC: 20 % (ref 36.5–49.3)
HCT VFR BLD CALC: 22 % (ref 36.5–49.3)
HFNC FLOW LPM: 50
HGB BLD-MCNC: 10 G/DL (ref 12–17)
HGB BLD-MCNC: 10.4 G/DL (ref 12–17)
HGB BLD-MCNC: 10.8 G/DL (ref 12–17)
HGB BLD-MCNC: 6.3 G/DL (ref 12–17)
HGB BLD-MCNC: 6.9 G/DL (ref 12–17)
HGB BLD-MCNC: 7.2 G/DL (ref 12–17)
HGB BLD-MCNC: 7.2 G/DL (ref 12–17)
HGB BLD-MCNC: 7.3 G/DL (ref 12–17)
HGB BLD-MCNC: 7.4 G/DL (ref 12–17)
HGB BLD-MCNC: 7.5 G/DL (ref 12–17)
HGB BLD-MCNC: 7.5 G/DL (ref 12–17)
HGB BLD-MCNC: 7.6 G/DL (ref 12–17)
HGB BLD-MCNC: 7.7 G/DL (ref 12–17)
HGB BLD-MCNC: 7.8 G/DL (ref 12–17)
HGB BLD-MCNC: 7.8 G/DL (ref 12–17)
HGB BLD-MCNC: 8.1 G/DL (ref 12–17)
HGB BLD-MCNC: 8.2 G/DL (ref 12–17)
HGB BLD-MCNC: 8.3 G/DL (ref 12–17)
HGB BLD-MCNC: 8.4 G/DL (ref 12–17)
HGB BLD-MCNC: 8.9 G/DL (ref 12–17)
HGB BLD-MCNC: 9.1 G/DL (ref 12–17)
HGB BLD-MCNC: 9.1 G/DL (ref 12–17)
HGB BLD-MCNC: 9.2 G/DL (ref 12–17)
HGB BLD-MCNC: 9.3 G/DL (ref 12–17)
HGB BLD-MCNC: 9.4 G/DL (ref 12–17)
HGB BLD-MCNC: 9.6 G/DL (ref 12–17)
HGB BLD-MCNC: 9.7 G/DL (ref 12–17)
HGB BLD-MCNC: 9.8 G/DL (ref 12–17)
HGB BLDA-MCNC: 6.8 G/DL (ref 12–17)
HGB BLDA-MCNC: 7.5 G/DL (ref 12–17)
HGB UR QL STRIP.AUTO: ABNORMAL
HISTIOCYTES NFR FLD: 22 %
HIV 1+2 AB+HIV1 P24 AG SERPL QL IA: NORMAL
HIV1 P24 AG SER QL: NORMAL
HOROWITZ INDEX BLDA+IHG-RTO: 50 MM[HG]
HYPERCHROMIA BLD QL SMEAR: PRESENT
IGG/ALB SER: 2.01 {RATIO} (ref 1.1–1.8)
INR PPP: 1.27 (ref 0.85–1.19)
INR PPP: 1.35 (ref 0.85–1.19)
INR PPP: 1.35 (ref 0.85–1.19)
INR PPP: 1.47 (ref 0.85–1.19)
INR PPP: 1.56 (ref 0.85–1.19)
INR PPP: 1.73 (ref 0.85–1.19)
INR PPP: 1.77 (ref 0.85–1.19)
INR PPP: 1.87 (ref 0.85–1.19)
INTERPRETATION UR IFE-IMP: NORMAL
INTERVENTRICULAR SEPTUM IN DIASTOLE (PARASTERNAL SHORT AXIS VIEW): 0.7 CM
INTERVENTRICULAR SEPTUM: 0.7 CM (ref 0.6–1.1)
IRON SERPL-MCNC: 67 UG/DL (ref 50–212)
KETONES UR STRIP-MCNC: NEGATIVE MG/DL
L PNEUMO1 AG UR QL IA.RAPID: NEGATIVE
LA PPP-IMP: ABNORMAL
LACTATE SERPL-SCNC: 3 MMOL/L (ref 0.5–2)
LACTATE SERPL-SCNC: 3.2 MMOL/L (ref 0.5–2)
LACTATE SERPL-SCNC: 3.5 MMOL/L (ref 0.5–2)
LDH FLD L TO P-CCNC: 222 U/L
LDH SERPL-CCNC: 556 U/L (ref 140–271)
LDH SERPL-CCNC: 638 U/L (ref 140–271)
LEFT ATRIUM SIZE: 3.6 CM
LEFT INTERNAL DIMENSION IN SYSTOLE: 3.7 CM (ref 2.1–4)
LEFT VENTRICULAR INTERNAL DIMENSION IN DIASTOLE: 5.3 CM (ref 3.5–6)
LEFT VENTRICULAR POSTERIOR WALL IN END DIASTOLE: 1.4 CM
LEFT VENTRICULAR STROKE VOLUME: 79 ML
LEUKOCYTE ESTERASE UR QL STRIP: NEGATIVE
LIPASE SERPL-CCNC: 31 U/L (ref 11–82)
LVSV (TEICH): 79 ML
LYMPHOCYTES # BLD AUTO: 0 % (ref 14–44)
LYMPHOCYTES # BLD AUTO: 0 THOUSAND/UL (ref 0.6–4.47)
LYMPHOCYTES # BLD AUTO: 0.08 THOUSAND/UL (ref 0.6–4.47)
LYMPHOCYTES # BLD AUTO: 0.17 THOUSAND/UL (ref 0.6–4.47)
LYMPHOCYTES # BLD AUTO: 0.18 THOUSAND/UL (ref 0.6–4.47)
LYMPHOCYTES # BLD AUTO: 0.53 THOUSAND/UL (ref 0.6–4.47)
LYMPHOCYTES # BLD AUTO: 1 % (ref 14–44)
LYMPHOCYTES # BLD AUTO: 1 % (ref 14–44)
LYMPHOCYTES # BLD AUTO: 4 %
LYMPHOCYTES NFR BLD AUTO: 62 %
M TB IFN-G BLD-IMP: ABNORMAL
M TB IFN-G CD4+ BCKGRND COR BLD-ACNC: 0.02 IU/ML
M TB IFN-G CD4+ BCKGRND COR BLD-ACNC: 0.05 IU/ML
MACROCYTES BLD QL AUTO: PRESENT
MAGNESIUM SERPL-MCNC: 1.7 MG/DL (ref 1.9–2.7)
MAGNESIUM SERPL-MCNC: 1.8 MG/DL (ref 1.9–2.7)
MAGNESIUM SERPL-MCNC: 1.9 MG/DL (ref 1.9–2.7)
MAGNESIUM SERPL-MCNC: 2 MG/DL (ref 1.9–2.7)
MAGNESIUM SERPL-MCNC: 2.2 MG/DL (ref 1.9–2.7)
MAGNESIUM SERPL-MCNC: 2.4 MG/DL (ref 1.9–2.7)
MAGNESIUM SERPL-MCNC: 2.7 MG/DL (ref 1.9–2.7)
MCH RBC QN AUTO: 29.1 PG (ref 26.8–34.3)
MCH RBC QN AUTO: 29.1 PG (ref 26.8–34.3)
MCH RBC QN AUTO: 29.2 PG (ref 26.8–34.3)
MCH RBC QN AUTO: 29.4 PG (ref 26.8–34.3)
MCH RBC QN AUTO: 29.5 PG (ref 26.8–34.3)
MCH RBC QN AUTO: 29.7 PG (ref 26.8–34.3)
MCH RBC QN AUTO: 29.8 PG (ref 26.8–34.3)
MCH RBC QN AUTO: 29.9 PG (ref 26.8–34.3)
MCH RBC QN AUTO: 30 PG (ref 26.8–34.3)
MCH RBC QN AUTO: 30.2 PG (ref 26.8–34.3)
MCH RBC QN AUTO: 30.2 PG (ref 26.8–34.3)
MCH RBC QN AUTO: 30.3 PG (ref 26.8–34.3)
MCH RBC QN AUTO: 30.6 PG (ref 26.8–34.3)
MCH RBC QN AUTO: 30.7 PG (ref 26.8–34.3)
MCH RBC QN AUTO: 31.2 PG (ref 26.8–34.3)
MCH RBC QN AUTO: 31.8 PG (ref 26.8–34.3)
MCHC RBC AUTO-ENTMCNC: 28.8 G/DL (ref 31.4–37.4)
MCHC RBC AUTO-ENTMCNC: 29 G/DL (ref 31.4–37.4)
MCHC RBC AUTO-ENTMCNC: 29.4 G/DL (ref 31.4–37.4)
MCHC RBC AUTO-ENTMCNC: 29.6 G/DL (ref 31.4–37.4)
MCHC RBC AUTO-ENTMCNC: 29.9 G/DL (ref 31.4–37.4)
MCHC RBC AUTO-ENTMCNC: 30.3 G/DL (ref 31.4–37.4)
MCHC RBC AUTO-ENTMCNC: 30.7 G/DL (ref 31.4–37.4)
MCHC RBC AUTO-ENTMCNC: 30.8 G/DL (ref 31.4–37.4)
MCHC RBC AUTO-ENTMCNC: 31 G/DL (ref 31.4–37.4)
MCHC RBC AUTO-ENTMCNC: 31 G/DL (ref 31.4–37.4)
MCHC RBC AUTO-ENTMCNC: 31.4 G/DL (ref 31.4–37.4)
MCHC RBC AUTO-ENTMCNC: 31.7 G/DL (ref 31.4–37.4)
MCHC RBC AUTO-ENTMCNC: 31.7 G/DL (ref 31.4–37.4)
MCHC RBC AUTO-ENTMCNC: 32 G/DL (ref 31.4–37.4)
MCHC RBC AUTO-ENTMCNC: 32.7 G/DL (ref 31.4–37.4)
MCHC RBC AUTO-ENTMCNC: 33.1 G/DL (ref 31.4–37.4)
MCHC RBC AUTO-ENTMCNC: 33.2 G/DL (ref 31.4–37.4)
MCHC RBC AUTO-ENTMCNC: 33.2 G/DL (ref 31.4–37.4)
MCV RBC AUTO: 100 FL (ref 82–98)
MCV RBC AUTO: 101 FL (ref 82–98)
MCV RBC AUTO: 89 FL (ref 82–98)
MCV RBC AUTO: 90 FL (ref 82–98)
MCV RBC AUTO: 90 FL (ref 82–98)
MCV RBC AUTO: 91 FL (ref 82–98)
MCV RBC AUTO: 94 FL (ref 82–98)
MCV RBC AUTO: 94 FL (ref 82–98)
MCV RBC AUTO: 95 FL (ref 82–98)
MCV RBC AUTO: 95 FL (ref 82–98)
MCV RBC AUTO: 96 FL (ref 82–98)
MCV RBC AUTO: 97 FL (ref 82–98)
MCV RBC AUTO: 98 FL (ref 82–98)
MCV RBC AUTO: 98 FL (ref 82–98)
MCV RBC AUTO: 99 FL (ref 82–98)
METAMYELOCYTE ABSOLUTE CT: 0.07 THOUSAND/UL (ref 0–0.1)
METAMYELOCYTE ABSOLUTE CT: 0.08 THOUSAND/UL (ref 0–0.1)
METAMYELOCYTE ABSOLUTE CT: 0.25 THOUSAND/UL (ref 0–0.1)
METAMYELOCYTE ABSOLUTE CT: 0.28 THOUSAND/UL (ref 0–0.1)
METAMYELOCYTES # BLD MANUAL: 0.11 THOUSAND/UL (ref 0–0.1)
METAMYELOCYTES NFR BLD MANUAL: 1 % (ref 0–1)
METAMYELOCYTES NFR BLD MANUAL: 3 % (ref 0–1)
METAMYELOCYTES NFR BLD MANUAL: 3 % (ref 0–1)
MICROCYTES BLD QL AUTO: PRESENT
MISCELLANEOUS LAB TEST RESULT: NORMAL
MITOGEN IGNF BCKGRD COR BLD-ACNC: 0.37 IU/ML
MONOCYTES # BLD AUTO: 0 THOUSAND/UL (ref 0–1.22)
MONOCYTES # BLD AUTO: 0 THOUSAND/UL (ref 0–1.22)
MONOCYTES # BLD AUTO: 0.08 THOUSAND/UL (ref 0–1.22)
MONOCYTES # BLD AUTO: 0.11 THOUSAND/UL (ref 0–1.22)
MONOCYTES # BLD AUTO: 0.34 THOUSAND/UL (ref 0–1.22)
MONOCYTES # BLD AUTO: 0.42 THOUSAND/UL (ref 0–1.22)
MONOCYTES # BLD AUTO: 0.46 THOUSAND/UL (ref 0–1.22)
MONOCYTES NFR BLD AUTO: 4 % (ref 4–12)
MONOCYTES NFR BLD: 0 % (ref 4–12)
MONOCYTES NFR BLD: 0 % (ref 4–12)
MONOCYTES NFR BLD: 1 % (ref 4–12)
MONOCYTES NFR BLD: 1 % (ref 4–12)
MONOCYTES NFR BLD: 4 % (ref 4–12)
MONOCYTES NFR BLD: 5 % (ref 4–12)
MRSA NOSE QL CULT: NORMAL
MUCOUS THREADS UR QL AUTO: ABNORMAL
MYCOBACTERIUM SPEC CULT: NORMAL
MYCOBACTERIUM SPEC CULT: NORMAL
MYELOCYTE ABSOLUTE CT: 0.08 THOUSAND/UL (ref 0–0.1)
MYELOCYTE ABSOLUTE CT: 0.08 THOUSAND/UL (ref 0–0.1)
MYELOCYTE ABSOLUTE CT: 0.09 THOUSAND/UL (ref 0–0.1)
MYELOCYTES # BLD MANUAL: 0.11 THOUSAND/UL (ref 0–0.1)
MYELOCYTES NFR BLD MANUAL: 1 % (ref 0–1)
NASAL CANNULA: 6
NEUTROPHILS # BLD MANUAL: 11.28 THOUSAND/UL (ref 1.85–7.62)
NEUTROPHILS # BLD MANUAL: 6.99 THOUSAND/UL (ref 1.85–7.62)
NEUTROPHILS # BLD MANUAL: 7.4 THOUSAND/UL (ref 1.85–7.62)
NEUTROPHILS # BLD MANUAL: 7.86 THOUSAND/UL (ref 1.85–7.62)
NEUTROPHILS # BLD MANUAL: 7.97 THOUSAND/UL (ref 1.85–7.62)
NEUTROPHILS # BLD MANUAL: 8.03 THOUSAND/UL (ref 1.85–7.62)
NEUTS BAND NFR BLD MANUAL: 10 % (ref 0–8)
NEUTS BAND NFR BLD MANUAL: 11 % (ref 0–8)
NEUTS BAND NFR BLD MANUAL: 11 % (ref 0–8)
NEUTS BAND NFR BLD MANUAL: 14 % (ref 0–8)
NEUTS BAND NFR BLD MANUAL: 17 % (ref 0–8)
NEUTS BAND NFR BLD MANUAL: 18 % (ref 0–8)
NEUTS BAND NFR BLD MANUAL: 7 % (ref 0–8)
NEUTS SEG # BLD: 9.25 THOUSAND/UL (ref 1.81–6.82)
NEUTS SEG NFR BLD AUTO: 12 %
NEUTS SEG NFR BLD AUTO: 71 %
NEUTS SEG NFR BLD AUTO: 76 % (ref 43–75)
NEUTS SEG NFR BLD AUTO: 80 % (ref 43–75)
NEUTS SEG NFR BLD AUTO: 81 % (ref 43–75)
NEUTS SEG NFR BLD AUTO: 83 % (ref 43–75)
NEUTS SEG NFR BLD AUTO: 89 % (ref 43–75)
NEUTS SEG NFR BLD AUTO: 89 % (ref 43–75)
NITRITE UR QL STRIP: NEGATIVE
NON VENT HFNC FIO2: 30
NON VENT TYPE HFNC: ABNORMAL
NON-SQ EPI CELLS URNS QL MICRO: ABNORMAL /HPF
NRBC BLD AUTO-RTO: 1 /100 WBC (ref 0–2)
NRBC BLD AUTO-RTO: 1 /100 WBC (ref 0–2)
NRBC BLD AUTO-RTO: 1 /100 WBCS
NRBC BLD AUTO-RTO: 2 /100 WBC (ref 0–2)
NRBC BLD AUTO-RTO: 2 /100 WBCS
NRBC BLD AUTO-RTO: 4 /100 WBC (ref 0–2)
NRBC BLD AUTO-RTO: 4 /100 WBC (ref 0–2)
NRBC BLD AUTO-RTO: 9 /100 WBC (ref 0–2)
O2 CT BLDA-SCNC: 10.3 ML/DL (ref 16–23)
O2 CT BLDA-SCNC: 10.7 ML/DL (ref 16–23)
O2 CT BLDA-SCNC: 11.8 ML/DL (ref 16–23)
O2 CT BLDA-SCNC: 12 ML/DL (ref 16–23)
O2 CT BLDA-SCNC: 13.8 ML/DL (ref 16–23)
O2 CT BLDA-SCNC: 9.7 ML/DL (ref 16–23)
OSMOLALITY UR/SERPL-RTO: 282 MMOL/KG (ref 282–298)
OSMOLALITY UR: 917 MMOL/KG
OVALOCYTES BLD QL SMEAR: PRESENT
OXYHGB MFR BLDA: 91 % (ref 94–97)
OXYHGB MFR BLDA: 95.4 % (ref 94–97)
OXYHGB MFR BLDA: 95.5 % (ref 94–97)
OXYHGB MFR BLDA: 95.9 % (ref 94–97)
OXYHGB MFR BLDA: 96.6 % (ref 94–97)
OXYHGB MFR BLDA: 96.8 % (ref 94–97)
P AXIS: 19 DEGREES
P AXIS: 23 DEGREES
P AXIS: 29 DEGREES
P AXIS: 31 DEGREES
P AXIS: 33 DEGREES
P AXIS: 40 DEGREES
P AXIS: 43 DEGREES
P AXIS: 44 DEGREES
P AXIS: 53 DEGREES
PARASITE BLD: NO
PARASITE INFECTED RBC BLD-NFR: 0 %
PARASITE INFECTED RBC BLD-NFR: 0 %
PATHOLOGY REVIEW: YES
PCO2 BLD: 22 MMOL/L (ref 21–32)
PCO2 BLD: 24 MMOL/L (ref 21–32)
PCO2 BLD: 35.7 MM HG (ref 36–44)
PCO2 BLD: 40.1 MM HG (ref 36–44)
PCO2 BLDA: 26.4 MM HG (ref 36–44)
PCO2 BLDA: 30.2 MM HG (ref 36–44)
PCO2 BLDA: 31.1 MM HG (ref 36–44)
PCO2 BLDA: 34.7 MM HG (ref 36–44)
PCO2 BLDA: 34.7 MM HG (ref 36–44)
PCO2 BLDA: 39.9 MM HG (ref 36–44)
PCO2 TEMP ADJ BLDA: 34.1 MM HG (ref 36–44)
PCO2 TEMP ADJ BLDA: 37.7 MM HG (ref 36–44)
PEEP RESPIRATORY: 6 CM[H2O]
PH BLD: 7.36 [PH] (ref 7.35–7.45)
PH BLD: 7.36 [PH] (ref 7.35–7.45)
PH BLD: 7.37 [PH] (ref 7.35–7.45)
PH BLD: 7.45 [PH] (ref 7.35–7.45)
PH BLDA: 7.34 [PH] (ref 7.35–7.45)
PH BLDA: 7.44 [PH] (ref 7.35–7.45)
PH BLDA: 7.45 [PH] (ref 7.35–7.45)
PH BLDA: 7.46 [PH] (ref 7.35–7.45)
PH BLDA: 7.51 [PH] (ref 7.35–7.45)
PH BLDA: 7.52 [PH] (ref 7.35–7.45)
PH UR STRIP.AUTO: 6 [PH]
PHOSPHATE SERPL-MCNC: 2.2 MG/DL (ref 2.7–4.5)
PHOSPHATE SERPL-MCNC: 2.3 MG/DL (ref 2.7–4.5)
PHOSPHATE SERPL-MCNC: 2.4 MG/DL (ref 2.7–4.5)
PHOSPHATE SERPL-MCNC: 2.5 MG/DL (ref 2.7–4.5)
PHOSPHATE SERPL-MCNC: 2.6 MG/DL (ref 2.7–4.5)
PHOSPHATE SERPL-MCNC: 2.7 MG/DL (ref 2.7–4.5)
PHOSPHATE SERPL-MCNC: 2.9 MG/DL (ref 2.7–4.5)
PHOSPHATE SERPL-MCNC: 3.2 MG/DL (ref 2.7–4.5)
PHOSPHATE SERPL-MCNC: 3.2 MG/DL (ref 2.7–4.5)
PLATELET # BLD AUTO: 102 THOUSANDS/UL (ref 149–390)
PLATELET # BLD AUTO: 113 THOUSANDS/UL (ref 149–390)
PLATELET # BLD AUTO: 116 THOUSANDS/UL (ref 149–390)
PLATELET # BLD AUTO: 120 THOUSANDS/UL (ref 149–390)
PLATELET # BLD AUTO: 120 THOUSANDS/UL (ref 149–390)
PLATELET # BLD AUTO: 123 THOUSANDS/UL (ref 149–390)
PLATELET # BLD AUTO: 126 THOUSANDS/UL (ref 149–390)
PLATELET # BLD AUTO: 127 THOUSANDS/UL (ref 149–390)
PLATELET # BLD AUTO: 135 THOUSANDS/UL (ref 149–390)
PLATELET # BLD AUTO: 137 THOUSANDS/UL (ref 149–390)
PLATELET # BLD AUTO: 140 THOUSANDS/UL (ref 149–390)
PLATELET # BLD AUTO: 142 THOUSANDS/UL (ref 149–390)
PLATELET # BLD AUTO: 145 THOUSANDS/UL (ref 149–390)
PLATELET # BLD AUTO: 149 THOUSANDS/UL (ref 149–390)
PLATELET # BLD AUTO: 149 THOUSANDS/UL (ref 149–390)
PLATELET # BLD AUTO: 156 THOUSANDS/UL (ref 149–390)
PLATELET # BLD AUTO: 164 THOUSANDS/UL (ref 149–390)
PLATELET # BLD AUTO: 178 THOUSANDS/UL (ref 149–390)
PLATELET # BLD AUTO: 215 THOUSANDS/UL (ref 149–390)
PLATELET # BLD AUTO: 224 THOUSANDS/UL (ref 149–390)
PLATELET BLD QL SMEAR: ABNORMAL
PLATELET BLD QL SMEAR: ADEQUATE
PMV BLD AUTO: 11.3 FL (ref 8.9–12.7)
PMV BLD AUTO: 11.7 FL (ref 8.9–12.7)
PMV BLD AUTO: 11.8 FL (ref 8.9–12.7)
PMV BLD AUTO: 11.9 FL (ref 8.9–12.7)
PMV BLD AUTO: 11.9 FL (ref 8.9–12.7)
PMV BLD AUTO: 12.1 FL (ref 8.9–12.7)
PMV BLD AUTO: 12.2 FL (ref 8.9–12.7)
PMV BLD AUTO: 12.3 FL (ref 8.9–12.7)
PMV BLD AUTO: 12.5 FL (ref 8.9–12.7)
PMV BLD AUTO: 12.6 FL (ref 8.9–12.7)
PMV BLD AUTO: 12.7 FL (ref 8.9–12.7)
PMV BLD AUTO: 12.8 FL (ref 8.9–12.7)
PMV BLD AUTO: 12.8 FL (ref 8.9–12.7)
PMV BLD AUTO: 12.9 FL (ref 8.9–12.7)
PMV BLD AUTO: 13 FL (ref 8.9–12.7)
PMV BLD AUTO: 13 FL (ref 8.9–12.7)
PMV BLD AUTO: 13.2 FL (ref 8.9–12.7)
PMV BLD AUTO: 13.5 FL (ref 8.9–12.7)
PO2 BLD: 116.3 MM HG (ref 75–129)
PO2 BLD: 149 MM HG (ref 75–129)
PO2 BLD: 200 MM HG (ref 75–129)
PO2 BLD: 262 MM HG (ref 75–129)
PO2 BLDA: 101.5 MM HG (ref 75–129)
PO2 BLDA: 103.7 MM HG (ref 75–129)
PO2 BLDA: 124.1 MM HG (ref 75–129)
PO2 BLDA: 148.7 MM HG (ref 75–129)
PO2 BLDA: 151.4 MM HG (ref 75–129)
PO2 BLDA: 63.1 MM HG (ref 75–129)
POIKILOCYTOSIS BLD QL SMEAR: PRESENT
POLYCHROMASIA BLD QL SMEAR: PRESENT
POTASSIUM BLD-SCNC: 3.9 MMOL/L (ref 3.5–5.3)
POTASSIUM BLD-SCNC: 4 MMOL/L (ref 3.5–5.3)
POTASSIUM SERPL-SCNC: 3.4 MMOL/L (ref 3.5–5.3)
POTASSIUM SERPL-SCNC: 3.6 MMOL/L (ref 3.5–5.3)
POTASSIUM SERPL-SCNC: 3.7 MMOL/L (ref 3.5–5.3)
POTASSIUM SERPL-SCNC: 3.8 MMOL/L (ref 3.5–5.3)
POTASSIUM SERPL-SCNC: 3.9 MMOL/L (ref 3.5–5.3)
POTASSIUM SERPL-SCNC: 4 MMOL/L (ref 3.5–5.3)
POTASSIUM SERPL-SCNC: 4.1 MMOL/L (ref 3.5–5.3)
POTASSIUM SERPL-SCNC: 4.2 MMOL/L (ref 3.5–5.3)
POTASSIUM SERPL-SCNC: 4.2 MMOL/L (ref 3.5–5.3)
POTASSIUM SERPL-SCNC: 4.3 MMOL/L (ref 3.5–5.3)
POTASSIUM SERPL-SCNC: 4.4 MMOL/L (ref 3.5–5.3)
PR INTERVAL: 0 MS
PR INTERVAL: 121 MS
PR INTERVAL: 129 MS
PR INTERVAL: 133 MS
PR INTERVAL: 133 MS
PR INTERVAL: 138 MS
PR INTERVAL: 138 MS
PR INTERVAL: 150 MS
PR INTERVAL: 167 MS
PR INTERVAL: 179 MS
PR INTERVAL: 70 MS
PROCALCITONIN SERPL-MCNC: 1.13 NG/ML
PROCALCITONIN SERPL-MCNC: 1.14 NG/ML
PROCALCITONIN SERPL-MCNC: 1.21 NG/ML
PROCALCITONIN SERPL-MCNC: 2.18 NG/ML
PROT C AG ACT/NOR PPP IA: 35 % OF NORMAL (ref 60–150)
PROT FLD-MCNC: <3 G/DL
PROT PATTERN SERPL ELPH-IMP: ABNORMAL
PROT S ACT/NOR PPP: 30 % (ref 71–117)
PROT S ACT/NOR PPP: 51 % (ref 61–136)
PROT S PPP-ACNC: 48 % (ref 60–150)
PROT SERPL-MCNC: 3.3 G/DL (ref 6.4–8.4)
PROT SERPL-MCNC: 3.4 G/DL (ref 6.4–8.4)
PROT SERPL-MCNC: 3.5 G/DL (ref 6.4–8.2)
PROT SERPL-MCNC: 3.5 G/DL (ref 6.4–8.4)
PROT SERPL-MCNC: 3.6 G/DL (ref 6.4–8.4)
PROT SERPL-MCNC: 3.7 G/DL (ref 6.4–8.4)
PROT SERPL-MCNC: 3.7 G/DL (ref 6.4–8.4)
PROT SERPL-MCNC: 4 G/DL (ref 6.4–8.4)
PROT UR STRIP-MCNC: ABNORMAL MG/DL
PROTHROMBIN TIME: 16.2 SECONDS (ref 12.3–15)
PROTHROMBIN TIME: 16.9 SECONDS (ref 12.3–15)
PROTHROMBIN TIME: 16.9 SECONDS (ref 12.3–15)
PROTHROMBIN TIME: 18 SECONDS (ref 12.3–15)
PROTHROMBIN TIME: 18.8 SECONDS (ref 12.3–15)
PROTHROMBIN TIME: 20.4 SECONDS (ref 12.3–15)
PROTHROMBIN TIME: 20.7 SECONDS (ref 12.3–15)
PROTHROMBIN TIME: 21.6 SECONDS (ref 12.3–15)
QRS AXIS: 1 DEGREES
QRS AXIS: 11 DEGREES
QRS AXIS: 20 DEGREES
QRS AXIS: 21 DEGREES
QRS AXIS: 36 DEGREES
QRS AXIS: 4 DEGREES
QRS AXIS: 41 DEGREES
QRS AXIS: 46 DEGREES
QRS AXIS: 47 DEGREES
QRS AXIS: 6 DEGREES
QRS AXIS: 9 DEGREES
QRSD INTERVAL: 104 MS
QRSD INTERVAL: 117 MS
QRSD INTERVAL: 75 MS
QRSD INTERVAL: 79 MS
QRSD INTERVAL: 83 MS
QRSD INTERVAL: 83 MS
QRSD INTERVAL: 88 MS
QRSD INTERVAL: 92 MS
QRSD INTERVAL: 96 MS
QT INTERVAL: 271 MS
QT INTERVAL: 279 MS
QT INTERVAL: 283 MS
QT INTERVAL: 283 MS
QT INTERVAL: 296 MS
QT INTERVAL: 308 MS
QT INTERVAL: 308 MS
QT INTERVAL: 429 MS
QT INTERVAL: 492 MS
QTC INTERVAL: 407 MS
QTC INTERVAL: 414 MS
QTC INTERVAL: 414 MS
QTC INTERVAL: 421 MS
QTC INTERVAL: 423 MS
QTC INTERVAL: 424 MS
QTC INTERVAL: 425 MS
QTC INTERVAL: 431 MS
QTC INTERVAL: 431 MS
QTC INTERVAL: 437 MS
QTC INTERVAL: 453 MS
RBC # BLD AUTO: 2.08 MILLION/UL (ref 3.88–5.62)
RBC # BLD AUTO: 2.17 MILLION/UL (ref 3.88–5.62)
RBC # BLD AUTO: 2.45 MILLION/UL (ref 3.88–5.62)
RBC # BLD AUTO: 2.47 MILLION/UL (ref 3.88–5.62)
RBC # BLD AUTO: 2.48 MILLION/UL (ref 3.88–5.62)
RBC # BLD AUTO: 2.48 MILLION/UL (ref 3.88–5.62)
RBC # BLD AUTO: 2.5 MILLION/UL (ref 3.88–5.62)
RBC # BLD AUTO: 2.52 MILLION/UL (ref 3.88–5.62)
RBC # BLD AUTO: 2.53 MILLION/UL (ref 3.88–5.62)
RBC # BLD AUTO: 2.61 MILLION/UL (ref 3.88–5.62)
RBC # BLD AUTO: 2.63 MILLION/UL (ref 3.88–5.62)
RBC # BLD AUTO: 2.76 MILLION/UL (ref 3.88–5.62)
RBC # BLD AUTO: 2.78 MILLION/UL (ref 3.88–5.62)
RBC # BLD AUTO: 3 MILLION/UL (ref 3.88–5.62)
RBC # BLD AUTO: 3.02 MILLION/UL (ref 3.88–5.62)
RBC # BLD AUTO: 3.14 MILLION/UL (ref 3.88–5.62)
RBC # BLD AUTO: 3.2 MILLION/UL (ref 3.88–5.62)
RBC # BLD AUTO: 3.23 MILLION/UL (ref 3.88–5.62)
RBC # BLD AUTO: 3.25 MILLION/UL (ref 3.88–5.62)
RBC # BLD AUTO: 3.33 MILLION/UL (ref 3.88–5.62)
RBC #/AREA URNS AUTO: ABNORMAL /HPF
RBC MORPH BLD: PRESENT
RETICS # AUTO: ABNORMAL 10*3/UL (ref 14356–105094)
RETICS # AUTO: ABNORMAL 10*3/UL (ref 14356–105094)
RETICS # CALC: 3.64 % (ref 0.37–1.87)
RETICS # CALC: 7.45 % (ref 0.37–1.87)
RH BLD: POSITIVE
RHODAMINE-AURAMINE STN SPEC: NORMAL
RIGHT VENTRICLE ID DIMENSION: 2.7 CM
RSV RNA RESP QL NAA+PROBE: NEGATIVE
SAO2 % BLD FROM PO2: 100 % (ref 60–85)
SAO2 % BLD FROM PO2: 100 % (ref 60–85)
SARS-COV-2 RNA RESP QL NAA+PROBE: NEGATIVE
SCAN RESULT: NORMAL
SCAN RESULT: NORMAL
SCREEN APTT: 54.1 SEC (ref 0–43.5)
SCREEN DRVVT: 42.8 SEC (ref 0–47)
SITE: ABNORMAL
SL CV LV EF: 60
SL CV PED ECHO LEFT VENTRICLE DIASTOLIC VOLUME (MOD BIPLANE) 2D: 137 ML
SL CV PED ECHO LEFT VENTRICLE SYSTOLIC VOLUME (MOD BIPLANE) 2D: 59 ML
SODIUM 24H UR-SCNC: 10 MOL/L
SODIUM BLD-SCNC: 128 MMOL/L (ref 136–145)
SODIUM BLD-SCNC: 128 MMOL/L (ref 136–145)
SODIUM SERPL-SCNC: 131 MMOL/L (ref 135–147)
SODIUM SERPL-SCNC: 131 MMOL/L (ref 135–147)
SODIUM SERPL-SCNC: 132 MMOL/L (ref 135–147)
SODIUM SERPL-SCNC: 134 MMOL/L (ref 135–147)
SODIUM SERPL-SCNC: 136 MMOL/L (ref 135–147)
SODIUM SERPL-SCNC: 137 MMOL/L (ref 135–147)
SODIUM SERPL-SCNC: 139 MMOL/L (ref 135–147)
SODIUM SERPL-SCNC: 140 MMOL/L (ref 135–147)
SODIUM SERPL-SCNC: 143 MMOL/L (ref 135–147)
SODIUM SERPL-SCNC: 144 MMOL/L (ref 135–147)
SODIUM SERPL-SCNC: 144 MMOL/L (ref 135–147)
SODIUM SERPL-SCNC: 146 MMOL/L (ref 135–147)
SODIUM SERPL-SCNC: 146 MMOL/L (ref 135–147)
SODIUM SERPL-SCNC: 148 MMOL/L (ref 135–147)
SODIUM SERPL-SCNC: 148 MMOL/L (ref 135–147)
SODIUM SERPL-SCNC: 149 MMOL/L (ref 135–147)
SODIUM SERPL-SCNC: 150 MMOL/L (ref 135–147)
SODIUM SERPL-SCNC: 151 MMOL/L (ref 135–147)
SODIUM SERPL-SCNC: 151 MMOL/L (ref 135–147)
SP GR UR STRIP.AUTO: 1.03 (ref 1–1.03)
SPECIMEN EXPIRATION DATE: NORMAL
SPECIMEN SOURCE: ABNORMAL
T WAVE AXIS: -7 DEGREES
T WAVE AXIS: 16 DEGREES
T WAVE AXIS: 169 DEGREES
T WAVE AXIS: 24 DEGREES
T WAVE AXIS: 3 DEGREES
T WAVE AXIS: 33 DEGREES
T WAVE AXIS: 49 DEGREES
T WAVE AXIS: 59 DEGREES
T WAVE AXIS: 81 DEGREES
T WAVE AXIS: 83 DEGREES
T WAVE AXIS: 88 DEGREES
TARGETS BLD QL SMEAR: PRESENT
THROMBIN TIME: 28.1 SEC (ref 0–23)
TOTAL CELLS COUNTED SPEC: 100
TOTAL CELLS COUNTED SPEC: 100
TOTAL PROTEIN FLUID: 1.2 G/DL
UIBC SERPL-MCNC: <55 UG/DL (ref 155–355)
UNIT DISPENSE STATUS: NORMAL
UNIT PRODUCT CODE: NORMAL
UNIT PRODUCT VOLUME: 125 ML
UNIT PRODUCT VOLUME: 250 ML
UNIT PRODUCT VOLUME: 280 ML
UNIT PRODUCT VOLUME: 300 ML
UNIT PRODUCT VOLUME: 350 ML
UNIT RH: NORMAL
URATE SERPL-MCNC: 3.9 MG/DL (ref 3.5–8.5)
UROBILINOGEN UR STRIP-ACNC: <2 MG/DL
VANCOMYCIN SERPL-MCNC: 11.5 UG/ML (ref 10–20)
VANCOMYCIN SERPL-MCNC: 12.9 UG/ML (ref 10–20)
VARIANT LYMPHS # BLD AUTO: 1 %
VARIANT LYMPHS # BLD AUTO: 1 % (ref 0–0)
VARIANT LYMPHS # BLD AUTO: 2 %
VENT AC: 14
VENT- AC: AC
VENTRICULAR RATE: 105 BPM
VENTRICULAR RATE: 121 BPM
VENTRICULAR RATE: 123 BPM
VENTRICULAR RATE: 132 BPM
VENTRICULAR RATE: 132 BPM
VENTRICULAR RATE: 139 BPM
VENTRICULAR RATE: 143 BPM
VENTRICULAR RATE: 146 BPM
VENTRICULAR RATE: 154 BPM
VENTRICULAR RATE: 44 BPM
VENTRICULAR RATE: 59 BPM
VIT B12 SERPL-MCNC: 2907 PG/ML (ref 180–914)
VIT B12 SERPL-MCNC: 4085 PG/ML (ref 180–914)
VT SETTING VENT: 550 ML
WBC # BLD AUTO: 10.51 THOUSAND/UL (ref 4.31–10.16)
WBC # BLD AUTO: 11.09 THOUSAND/UL (ref 4.31–10.16)
WBC # BLD AUTO: 11.39 THOUSAND/UL (ref 4.31–10.16)
WBC # BLD AUTO: 13.36 THOUSAND/UL (ref 4.31–10.16)
WBC # BLD AUTO: 13.82 THOUSAND/UL (ref 4.31–10.16)
WBC # BLD AUTO: 13.97 THOUSAND/UL (ref 4.31–10.16)
WBC # BLD AUTO: 6.33 THOUSAND/UL (ref 4.31–10.16)
WBC # BLD AUTO: 7.06 THOUSAND/UL (ref 4.31–10.16)
WBC # BLD AUTO: 7.2 THOUSAND/UL (ref 4.31–10.16)
WBC # BLD AUTO: 7.61 THOUSAND/UL (ref 4.31–10.16)
WBC # BLD AUTO: 7.71 THOUSAND/UL (ref 4.31–10.16)
WBC # BLD AUTO: 7.92 THOUSAND/UL (ref 4.31–10.16)
WBC # BLD AUTO: 8.08 THOUSAND/UL (ref 4.31–10.16)
WBC # BLD AUTO: 8.36 THOUSAND/UL (ref 4.31–10.16)
WBC # BLD AUTO: 8.48 THOUSAND/UL (ref 4.31–10.16)
WBC # BLD AUTO: 9.03 THOUSAND/UL (ref 4.31–10.16)
WBC # BLD AUTO: 9.22 THOUSAND/UL (ref 4.31–10.16)
WBC # BLD AUTO: 9.23 THOUSAND/UL (ref 4.31–10.16)
WBC # BLD AUTO: 9.94 THOUSAND/UL (ref 4.31–10.16)
WBC # BLD AUTO: 9.96 THOUSAND/UL (ref 4.31–10.16)
WBC # FLD MANUAL: 112 /UL
WBC #/AREA URNS AUTO: ABNORMAL /HPF

## 2024-01-01 PROCEDURE — 94760 N-INVAS EAR/PLS OXIMETRY 1: CPT

## 2024-01-01 PROCEDURE — 88112 CYTOPATH CELL ENHANCE TECH: CPT | Performed by: STUDENT IN AN ORGANIZED HEALTH CARE EDUCATION/TRAINING PROGRAM

## 2024-01-01 PROCEDURE — 70553 MRI BRAIN STEM W/O & W/DYE: CPT

## 2024-01-01 PROCEDURE — 99291 CRITICAL CARE FIRST HOUR: CPT | Performed by: INTERNAL MEDICINE

## 2024-01-01 PROCEDURE — 99232 SBSQ HOSP IP/OBS MODERATE 35: CPT | Performed by: NURSE PRACTITIONER

## 2024-01-01 PROCEDURE — 84100 ASSAY OF PHOSPHORUS: CPT | Performed by: PHYSICIAN ASSISTANT

## 2024-01-01 PROCEDURE — 80053 COMPREHEN METABOLIC PANEL: CPT

## 2024-01-01 PROCEDURE — 87116 MYCOBACTERIA CULTURE: CPT

## 2024-01-01 PROCEDURE — 85384 FIBRINOGEN ACTIVITY: CPT

## 2024-01-01 PROCEDURE — 83935 ASSAY OF URINE OSMOLALITY: CPT

## 2024-01-01 PROCEDURE — 85018 HEMOGLOBIN: CPT

## 2024-01-01 PROCEDURE — 85014 HEMATOCRIT: CPT

## 2024-01-01 PROCEDURE — 99291 CRITICAL CARE FIRST HOUR: CPT | Performed by: EMERGENCY MEDICINE

## 2024-01-01 PROCEDURE — 87806 HIV AG W/HIV1&2 ANTB W/OPTIC: CPT | Performed by: EMERGENCY MEDICINE

## 2024-01-01 PROCEDURE — 0B958ZX DRAINAGE OF RIGHT MIDDLE LOBE BRONCHUS, VIA NATURAL OR ARTIFICIAL OPENING ENDOSCOPIC, DIAGNOSTIC: ICD-10-PCS

## 2024-01-01 PROCEDURE — 87207 SMEAR SPECIAL STAIN: CPT | Performed by: INTERNAL MEDICINE

## 2024-01-01 PROCEDURE — 82805 BLOOD GASES W/O2 SATURATION: CPT

## 2024-01-01 PROCEDURE — 85027 COMPLETE CBC AUTOMATED: CPT | Performed by: REGISTERED NURSE

## 2024-01-01 PROCEDURE — 84132 ASSAY OF SERUM POTASSIUM: CPT

## 2024-01-01 PROCEDURE — P9016 RBC LEUKOCYTES REDUCED: HCPCS

## 2024-01-01 PROCEDURE — 83550 IRON BINDING TEST: CPT

## 2024-01-01 PROCEDURE — 86140 C-REACTIVE PROTEIN: CPT | Performed by: EMERGENCY MEDICINE

## 2024-01-01 PROCEDURE — 87070 CULTURE OTHR SPECIMN AEROBIC: CPT | Performed by: INTERNAL MEDICINE

## 2024-01-01 PROCEDURE — 85027 COMPLETE CBC AUTOMATED: CPT

## 2024-01-01 PROCEDURE — 87205 SMEAR GRAM STAIN: CPT | Performed by: INTERNAL MEDICINE

## 2024-01-01 PROCEDURE — 85730 THROMBOPLASTIN TIME PARTIAL: CPT

## 2024-01-01 PROCEDURE — 84100 ASSAY OF PHOSPHORUS: CPT

## 2024-01-01 PROCEDURE — 85397 CLOTTING FUNCT ACTIVITY: CPT | Performed by: STUDENT IN AN ORGANIZED HEALTH CARE EDUCATION/TRAINING PROGRAM

## 2024-01-01 PROCEDURE — 85027 COMPLETE CBC AUTOMATED: CPT | Performed by: PHYSICIAN ASSISTANT

## 2024-01-01 PROCEDURE — 84550 ASSAY OF BLOOD/URIC ACID: CPT | Performed by: PSYCHIATRY & NEUROLOGY

## 2024-01-01 PROCEDURE — 82948 REAGENT STRIP/BLOOD GLUCOSE: CPT

## 2024-01-01 PROCEDURE — 94640 AIRWAY INHALATION TREATMENT: CPT

## 2024-01-01 PROCEDURE — 87070 CULTURE OTHR SPECIMN AEROBIC: CPT

## 2024-01-01 PROCEDURE — 84145 PROCALCITONIN (PCT): CPT

## 2024-01-01 PROCEDURE — 83735 ASSAY OF MAGNESIUM: CPT

## 2024-01-01 PROCEDURE — 99223 1ST HOSP IP/OBS HIGH 75: CPT | Performed by: INTERNAL MEDICINE

## 2024-01-01 PROCEDURE — 99223 1ST HOSP IP/OBS HIGH 75: CPT | Performed by: PHYSICIAN ASSISTANT

## 2024-01-01 PROCEDURE — 87102 FUNGUS ISOLATION CULTURE: CPT

## 2024-01-01 PROCEDURE — 93325 DOPPLER ECHO COLOR FLOW MAPG: CPT

## 2024-01-01 PROCEDURE — 94003 VENT MGMT INPAT SUBQ DAY: CPT

## 2024-01-01 PROCEDURE — 99233 SBSQ HOSP IP/OBS HIGH 50: CPT | Performed by: INTERNAL MEDICINE

## 2024-01-01 PROCEDURE — 85027 COMPLETE CBC AUTOMATED: CPT | Performed by: NURSE PRACTITIONER

## 2024-01-01 PROCEDURE — 94664 DEMO&/EVAL PT USE INHALER: CPT

## 2024-01-01 PROCEDURE — 99232 SBSQ HOSP IP/OBS MODERATE 35: CPT | Performed by: INTERNAL MEDICINE

## 2024-01-01 PROCEDURE — 49083 ABD PARACENTESIS W/IMAGING: CPT

## 2024-01-01 PROCEDURE — 92610 EVALUATE SWALLOWING FUNCTION: CPT

## 2024-01-01 PROCEDURE — 85060 BLOOD SMEAR INTERPRETATION: CPT | Performed by: STUDENT IN AN ORGANIZED HEALTH CARE EDUCATION/TRAINING PROGRAM

## 2024-01-01 PROCEDURE — 86901 BLOOD TYPING SEROLOGIC RH(D): CPT | Performed by: STUDENT IN AN ORGANIZED HEALTH CARE EDUCATION/TRAINING PROGRAM

## 2024-01-01 PROCEDURE — 86901 BLOOD TYPING SEROLOGIC RH(D): CPT

## 2024-01-01 PROCEDURE — 83605 ASSAY OF LACTIC ACID: CPT | Performed by: PHYSICIAN ASSISTANT

## 2024-01-01 PROCEDURE — 84165 PROTEIN E-PHORESIS SERUM: CPT

## 2024-01-01 PROCEDURE — 83615 LACTATE (LD) (LDH) ENZYME: CPT | Performed by: EMERGENCY MEDICINE

## 2024-01-01 PROCEDURE — 82607 VITAMIN B-12: CPT | Performed by: INTERNAL MEDICINE

## 2024-01-01 PROCEDURE — 85306 CLOT INHIBIT PROT S FREE: CPT

## 2024-01-01 PROCEDURE — 03HY32Z INSERTION OF MONITORING DEVICE INTO UPPER ARTERY, PERCUTANEOUS APPROACH: ICD-10-PCS | Performed by: EMERGENCY MEDICINE

## 2024-01-01 PROCEDURE — 71045 X-RAY EXAM CHEST 1 VIEW: CPT

## 2024-01-01 PROCEDURE — 80048 BASIC METABOLIC PNL TOTAL CA: CPT | Performed by: PHYSICIAN ASSISTANT

## 2024-01-01 PROCEDURE — 70450 CT HEAD/BRAIN W/O DYE: CPT

## 2024-01-01 PROCEDURE — 0KBN0ZZ EXCISION OF RIGHT HIP MUSCLE, OPEN APPROACH: ICD-10-PCS | Performed by: SURGERY

## 2024-01-01 PROCEDURE — 30233M1 TRANSFUSION OF NONAUTOLOGOUS PLASMA CRYOPRECIPITATE INTO PERIPHERAL VEIN, PERCUTANEOUS APPROACH: ICD-10-PCS | Performed by: STUDENT IN AN ORGANIZED HEALTH CARE EDUCATION/TRAINING PROGRAM

## 2024-01-01 PROCEDURE — 80202 ASSAY OF VANCOMYCIN: CPT | Performed by: PSYCHIATRY & NEUROLOGY

## 2024-01-01 PROCEDURE — 94669 MECHANICAL CHEST WALL OSCILL: CPT

## 2024-01-01 PROCEDURE — 99024 POSTOP FOLLOW-UP VISIT: CPT | Performed by: STUDENT IN AN ORGANIZED HEALTH CARE EDUCATION/TRAINING PROGRAM

## 2024-01-01 PROCEDURE — 86920 COMPATIBILITY TEST SPIN: CPT

## 2024-01-01 PROCEDURE — 4A133B1 MONITORING OF ARTERIAL PRESSURE, PERIPHERAL, PERCUTANEOUS APPROACH: ICD-10-PCS | Performed by: EMERGENCY MEDICINE

## 2024-01-01 PROCEDURE — 30233K1 TRANSFUSION OF NONAUTOLOGOUS FROZEN PLASMA INTO PERIPHERAL VEIN, PERCUTANEOUS APPROACH: ICD-10-PCS | Performed by: STUDENT IN AN ORGANIZED HEALTH CARE EDUCATION/TRAINING PROGRAM

## 2024-01-01 PROCEDURE — 87070 CULTURE OTHR SPECIMN AEROBIC: CPT | Performed by: SURGERY

## 2024-01-01 PROCEDURE — 83930 ASSAY OF BLOOD OSMOLALITY: CPT

## 2024-01-01 PROCEDURE — 87040 BLOOD CULTURE FOR BACTERIA: CPT | Performed by: PHYSICIAN ASSISTANT

## 2024-01-01 PROCEDURE — 93010 ELECTROCARDIOGRAM REPORT: CPT | Performed by: INTERNAL MEDICINE

## 2024-01-01 PROCEDURE — 82803 BLOOD GASES ANY COMBINATION: CPT

## 2024-01-01 PROCEDURE — 99233 SBSQ HOSP IP/OBS HIGH 50: CPT

## 2024-01-01 PROCEDURE — 0W3L3ZZ CONTROL BLEEDING IN LOWER BACK, PERCUTANEOUS APPROACH: ICD-10-PCS | Performed by: SURGERY

## 2024-01-01 PROCEDURE — 87385 HISTOPLASMA CAPSUL AG IA: CPT | Performed by: INTERNAL MEDICINE

## 2024-01-01 PROCEDURE — 99223 1ST HOSP IP/OBS HIGH 75: CPT | Performed by: STUDENT IN AN ORGANIZED HEALTH CARE EDUCATION/TRAINING PROGRAM

## 2024-01-01 PROCEDURE — 80048 BASIC METABOLIC PNL TOTAL CA: CPT | Performed by: NURSE PRACTITIONER

## 2024-01-01 PROCEDURE — 36569 INSJ PICC 5 YR+ W/O IMAGING: CPT

## 2024-01-01 PROCEDURE — 85705 THROMBOPLASTIN INHIBITION: CPT

## 2024-01-01 PROCEDURE — 93005 ELECTROCARDIOGRAM TRACING: CPT

## 2024-01-01 PROCEDURE — 85732 THROMBOPLASTIN TIME PARTIAL: CPT

## 2024-01-01 PROCEDURE — 99291 CRITICAL CARE FIRST HOUR: CPT | Performed by: ANESTHESIOLOGY

## 2024-01-01 PROCEDURE — 4A133J1 MONITORING OF ARTERIAL PULSE, PERIPHERAL, PERCUTANEOUS APPROACH: ICD-10-PCS | Performed by: STUDENT IN AN ORGANIZED HEALTH CARE EDUCATION/TRAINING PROGRAM

## 2024-01-01 PROCEDURE — NC001 PR NO CHARGE: Performed by: INTERNAL MEDICINE

## 2024-01-01 PROCEDURE — NC001 PR NO CHARGE: Performed by: EMERGENCY MEDICINE

## 2024-01-01 PROCEDURE — 95700 EEG CONT REC W/VID EEG TECH: CPT

## 2024-01-01 PROCEDURE — 82805 BLOOD GASES W/O2 SATURATION: CPT | Performed by: NURSE PRACTITIONER

## 2024-01-01 PROCEDURE — 82945 GLUCOSE OTHER FLUID: CPT

## 2024-01-01 PROCEDURE — 85007 BL SMEAR W/DIFF WBC COUNT: CPT | Performed by: NURSE PRACTITIONER

## 2024-01-01 PROCEDURE — 93308 TTE F-UP OR LMTD: CPT | Performed by: INTERNAL MEDICINE

## 2024-01-01 PROCEDURE — 99233 SBSQ HOSP IP/OBS HIGH 50: CPT | Performed by: STUDENT IN AN ORGANIZED HEALTH CARE EDUCATION/TRAINING PROGRAM

## 2024-01-01 PROCEDURE — 80053 COMPREHEN METABOLIC PANEL: CPT | Performed by: PHYSICIAN ASSISTANT

## 2024-01-01 PROCEDURE — 93971 EXTREMITY STUDY: CPT | Performed by: SURGERY

## 2024-01-01 PROCEDURE — 85610 PROTHROMBIN TIME: CPT

## 2024-01-01 PROCEDURE — 85670 THROMBIN TIME PLASMA: CPT

## 2024-01-01 PROCEDURE — 82248 BILIRUBIN DIRECT: CPT

## 2024-01-01 PROCEDURE — 80053 COMPREHEN METABOLIC PANEL: CPT | Performed by: NURSE PRACTITIONER

## 2024-01-01 PROCEDURE — 87176 TISSUE HOMOGENIZATION CULTR: CPT | Performed by: INTERNAL MEDICINE

## 2024-01-01 PROCEDURE — 74177 CT ABD & PELVIS W/CONTRAST: CPT

## 2024-01-01 PROCEDURE — 93308 TTE F-UP OR LMTD: CPT

## 2024-01-01 PROCEDURE — 99291 CRITICAL CARE FIRST HOUR: CPT | Performed by: PSYCHIATRY & NEUROLOGY

## 2024-01-01 PROCEDURE — 99232 SBSQ HOSP IP/OBS MODERATE 35: CPT

## 2024-01-01 PROCEDURE — NC001 PR NO CHARGE: Performed by: SURGERY

## 2024-01-01 PROCEDURE — 85305 CLOT INHIBIT PROT S TOTAL: CPT

## 2024-01-01 PROCEDURE — 0QB33ZX EXCISION OF LEFT PELVIC BONE, PERCUTANEOUS APPROACH, DIAGNOSTIC: ICD-10-PCS | Performed by: RADIOLOGY

## 2024-01-01 PROCEDURE — 36620 INSERTION CATHETER ARTERY: CPT | Performed by: NURSE PRACTITIONER

## 2024-01-01 PROCEDURE — 82378 CARCINOEMBRYONIC ANTIGEN: CPT | Performed by: PHYSICIAN ASSISTANT

## 2024-01-01 PROCEDURE — 82947 ASSAY GLUCOSE BLOOD QUANT: CPT

## 2024-01-01 PROCEDURE — 11046 DBRDMT MUSC&/FSCA EA ADDL: CPT | Performed by: PHYSICIAN ASSISTANT

## 2024-01-01 PROCEDURE — 85576 BLOOD PLATELET AGGREGATION: CPT | Performed by: STUDENT IN AN ORGANIZED HEALTH CARE EDUCATION/TRAINING PROGRAM

## 2024-01-01 PROCEDURE — 82140 ASSAY OF AMMONIA: CPT | Performed by: PHYSICIAN ASSISTANT

## 2024-01-01 PROCEDURE — 97167 OT EVAL HIGH COMPLEX 60 MIN: CPT

## 2024-01-01 PROCEDURE — 30233N1 TRANSFUSION OF NONAUTOLOGOUS RED BLOOD CELLS INTO PERIPHERAL VEIN, PERCUTANEOUS APPROACH: ICD-10-PCS | Performed by: STUDENT IN AN ORGANIZED HEALTH CARE EDUCATION/TRAINING PROGRAM

## 2024-01-01 PROCEDURE — 82330 ASSAY OF CALCIUM: CPT

## 2024-01-01 PROCEDURE — 84145 PROCALCITONIN (PCT): CPT | Performed by: PHYSICIAN ASSISTANT

## 2024-01-01 PROCEDURE — 93970 EXTREMITY STUDY: CPT

## 2024-01-01 PROCEDURE — 83615 LACTATE (LD) (LDH) ENZYME: CPT

## 2024-01-01 PROCEDURE — 86850 RBC ANTIBODY SCREEN: CPT | Performed by: STUDENT IN AN ORGANIZED HEALTH CARE EDUCATION/TRAINING PROGRAM

## 2024-01-01 PROCEDURE — 80048 BASIC METABOLIC PNL TOTAL CA: CPT

## 2024-01-01 PROCEDURE — 86923 COMPATIBILITY TEST ELECTRIC: CPT

## 2024-01-01 PROCEDURE — 87449 NOS EACH ORGANISM AG IA: CPT | Performed by: PSYCHIATRY & NEUROLOGY

## 2024-01-01 PROCEDURE — 93321 DOPPLER ECHO F-UP/LMTD STD: CPT | Performed by: INTERNAL MEDICINE

## 2024-01-01 PROCEDURE — 83735 ASSAY OF MAGNESIUM: CPT | Performed by: REGISTERED NURSE

## 2024-01-01 PROCEDURE — 85303 CLOT INHIBIT PROT C ACTIVITY: CPT

## 2024-01-01 PROCEDURE — 85362 FIBRIN DEGRADATION PRODUCTS: CPT

## 2024-01-01 PROCEDURE — 99222 1ST HOSP IP/OBS MODERATE 55: CPT | Performed by: INTERNAL MEDICINE

## 2024-01-01 PROCEDURE — 94002 VENT MGMT INPAT INIT DAY: CPT

## 2024-01-01 PROCEDURE — 0B938ZX DRAINAGE OF RIGHT MAIN BRONCHUS, VIA NATURAL OR ARTIFICIAL OPENING ENDOSCOPIC, DIAGNOSTIC: ICD-10-PCS

## 2024-01-01 PROCEDURE — 74018 RADEX ABDOMEN 1 VIEW: CPT

## 2024-01-01 PROCEDURE — 11043 DBRDMT MUSC&/FSCA 1ST 20/<: CPT | Performed by: PHYSICIAN ASSISTANT

## 2024-01-01 PROCEDURE — 0W9G3ZX DRAINAGE OF PERITONEAL CAVITY, PERCUTANEOUS APPROACH, DIAGNOSTIC: ICD-10-PCS | Performed by: RADIOLOGY

## 2024-01-01 PROCEDURE — 93321 DOPPLER ECHO F-UP/LMTD STD: CPT

## 2024-01-01 PROCEDURE — 83540 ASSAY OF IRON: CPT

## 2024-01-01 PROCEDURE — 97163 PT EVAL HIGH COMPLEX 45 MIN: CPT

## 2024-01-01 PROCEDURE — 86900 BLOOD TYPING SEROLOGIC ABO: CPT | Performed by: PHYSICIAN ASSISTANT

## 2024-01-01 PROCEDURE — 85598 HEXAGNAL PHOSPH PLTLT NEUTRL: CPT

## 2024-01-01 PROCEDURE — 0B968ZX DRAINAGE OF RIGHT LOWER LOBE BRONCHUS, VIA NATURAL OR ARTIFICIAL OPENING ENDOSCOPIC, DIAGNOSTIC: ICD-10-PCS

## 2024-01-01 PROCEDURE — 86901 BLOOD TYPING SEROLOGIC RH(D): CPT | Performed by: PHYSICIAN ASSISTANT

## 2024-01-01 PROCEDURE — 87205 SMEAR GRAM STAIN: CPT | Performed by: SURGERY

## 2024-01-01 PROCEDURE — 87103 BLOOD FUNGUS CULTURE: CPT

## 2024-01-01 PROCEDURE — 86850 RBC ANTIBODY SCREEN: CPT | Performed by: PHYSICIAN ASSISTANT

## 2024-01-01 PROCEDURE — 85347 COAGULATION TIME ACTIVATED: CPT | Performed by: STUDENT IN AN ORGANIZED HEALTH CARE EDUCATION/TRAINING PROGRAM

## 2024-01-01 PROCEDURE — 85347 COAGULATION TIME ACTIVATED: CPT

## 2024-01-01 PROCEDURE — 85018 HEMOGLOBIN: CPT | Performed by: PHYSICIAN ASSISTANT

## 2024-01-01 PROCEDURE — 93880 EXTRACRANIAL BILAT STUDY: CPT | Performed by: STUDENT IN AN ORGANIZED HEALTH CARE EDUCATION/TRAINING PROGRAM

## 2024-01-01 PROCEDURE — 0J9B3ZZ DRAINAGE OF PERINEUM SUBCUTANEOUS TISSUE AND FASCIA, PERCUTANEOUS APPROACH: ICD-10-PCS | Performed by: SURGERY

## 2024-01-01 PROCEDURE — 5A1945Z RESPIRATORY VENTILATION, 24-96 CONSECUTIVE HOURS: ICD-10-PCS | Performed by: EMERGENCY MEDICINE

## 2024-01-01 PROCEDURE — P9037 PLATE PHERES LEUKOREDU IRRAD: HCPCS

## 2024-01-01 PROCEDURE — 4A133B1 MONITORING OF ARTERIAL PRESSURE, PERIPHERAL, PERCUTANEOUS APPROACH: ICD-10-PCS | Performed by: STUDENT IN AN ORGANIZED HEALTH CARE EDUCATION/TRAINING PROGRAM

## 2024-01-01 PROCEDURE — 99232 SBSQ HOSP IP/OBS MODERATE 35: CPT | Performed by: RADIOLOGY

## 2024-01-01 PROCEDURE — NC001 PR NO CHARGE

## 2024-01-01 PROCEDURE — 77012 CT SCAN FOR NEEDLE BIOPSY: CPT

## 2024-01-01 PROCEDURE — 85014 HEMATOCRIT: CPT | Performed by: NURSE PRACTITIONER

## 2024-01-01 PROCEDURE — 82378 CARCINOEMBRYONIC ANTIGEN: CPT | Performed by: INTERNAL MEDICINE

## 2024-01-01 PROCEDURE — 93971 EXTREMITY STUDY: CPT

## 2024-01-01 PROCEDURE — 87116 MYCOBACTERIA CULTURE: CPT | Performed by: RADIOLOGY

## 2024-01-01 PROCEDURE — 86146 BETA-2 GLYCOPROTEIN ANTIBODY: CPT

## 2024-01-01 PROCEDURE — C1781 MESH (IMPLANTABLE): HCPCS | Performed by: STUDENT IN AN ORGANIZED HEALTH CARE EDUCATION/TRAINING PROGRAM

## 2024-01-01 PROCEDURE — 86334 IMMUNOFIX E-PHORESIS SERUM: CPT

## 2024-01-01 PROCEDURE — 82805 BLOOD GASES W/O2 SATURATION: CPT | Performed by: INTERNAL MEDICINE

## 2024-01-01 PROCEDURE — 93325 DOPPLER ECHO COLOR FLOW MAPG: CPT | Performed by: INTERNAL MEDICINE

## 2024-01-01 PROCEDURE — 82042 OTHER SOURCE ALBUMIN QUAN EA: CPT

## 2024-01-01 PROCEDURE — C1830 POWER BONE MARROW BX NEEDLE: HCPCS

## 2024-01-01 PROCEDURE — 85610 PROTHROMBIN TIME: CPT | Performed by: STUDENT IN AN ORGANIZED HEALTH CARE EDUCATION/TRAINING PROGRAM

## 2024-01-01 PROCEDURE — 93010 ELECTROCARDIOGRAM REPORT: CPT | Performed by: STUDENT IN AN ORGANIZED HEALTH CARE EDUCATION/TRAINING PROGRAM

## 2024-01-01 PROCEDURE — 0241U HB NFCT DS VIR RESP RNA 4 TRGT: CPT

## 2024-01-01 PROCEDURE — 84165 PROTEIN E-PHORESIS SERUM: CPT | Performed by: STUDENT IN AN ORGANIZED HEALTH CARE EDUCATION/TRAINING PROGRAM

## 2024-01-01 PROCEDURE — 95720 EEG PHY/QHP EA INCR W/VEEG: CPT | Performed by: PSYCHIATRY & NEUROLOGY

## 2024-01-01 PROCEDURE — 85384 FIBRINOGEN ACTIVITY: CPT | Performed by: STUDENT IN AN ORGANIZED HEALTH CARE EDUCATION/TRAINING PROGRAM

## 2024-01-01 PROCEDURE — 87385 HISTOPLASMA CAPSUL AG IA: CPT | Performed by: EMERGENCY MEDICINE

## 2024-01-01 PROCEDURE — 85025 COMPLETE CBC W/AUTO DIFF WBC: CPT

## 2024-01-01 PROCEDURE — 99239 HOSP IP/OBS DSCHRG MGMT >30: CPT | Performed by: INTERNAL MEDICINE

## 2024-01-01 PROCEDURE — 83735 ASSAY OF MAGNESIUM: CPT | Performed by: NURSE PRACTITIONER

## 2024-01-01 PROCEDURE — 85007 BL SMEAR W/DIFF WBC COUNT: CPT

## 2024-01-01 PROCEDURE — 99233 SBSQ HOSP IP/OBS HIGH 50: CPT | Performed by: EMERGENCY MEDICINE

## 2024-01-01 PROCEDURE — 00C40ZZ EXTIRPATION OF MATTER FROM INTRACRANIAL SUBDURAL SPACE, OPEN APPROACH: ICD-10-PCS | Performed by: STUDENT IN AN ORGANIZED HEALTH CARE EDUCATION/TRAINING PROGRAM

## 2024-01-01 PROCEDURE — 84300 ASSAY OF URINE SODIUM: CPT

## 2024-01-01 PROCEDURE — P9012 CRYOPRECIPITATE EACH UNIT: HCPCS

## 2024-01-01 PROCEDURE — 83615 LACTATE (LD) (LDH) ENZYME: CPT | Performed by: INTERNAL MEDICINE

## 2024-01-01 PROCEDURE — 82728 ASSAY OF FERRITIN: CPT

## 2024-01-01 PROCEDURE — 82977 ASSAY OF GGT: CPT

## 2024-01-01 PROCEDURE — 86301 IMMUNOASSAY TUMOR CA 19-9: CPT | Performed by: INTERNAL MEDICINE

## 2024-01-01 PROCEDURE — 03HY32Z INSERTION OF MONITORING DEVICE INTO UPPER ARTERY, PERCUTANEOUS APPROACH: ICD-10-PCS | Performed by: STUDENT IN AN ORGANIZED HEALTH CARE EDUCATION/TRAINING PROGRAM

## 2024-01-01 PROCEDURE — 85007 BL SMEAR W/DIFF WBC COUNT: CPT | Performed by: PSYCHIATRY & NEUROLOGY

## 2024-01-01 PROCEDURE — C1751 CATH, INF, PER/CENT/MIDLINE: HCPCS

## 2024-01-01 PROCEDURE — 88305 TISSUE EXAM BY PATHOLOGIST: CPT | Performed by: STUDENT IN AN ORGANIZED HEALTH CARE EDUCATION/TRAINING PROGRAM

## 2024-01-01 PROCEDURE — P9040 RBC LEUKOREDUCED IRRADIATED: HCPCS

## 2024-01-01 PROCEDURE — 87207 SMEAR SPECIAL STAIN: CPT | Performed by: STUDENT IN AN ORGANIZED HEALTH CARE EDUCATION/TRAINING PROGRAM

## 2024-01-01 PROCEDURE — 0HQ0XZZ REPAIR SCALP SKIN, EXTERNAL APPROACH: ICD-10-PCS | Performed by: PSYCHIATRY & NEUROLOGY

## 2024-01-01 PROCEDURE — 82746 ASSAY OF FOLIC ACID SERUM: CPT | Performed by: INTERNAL MEDICINE

## 2024-01-01 PROCEDURE — 86147 CARDIOLIPIN ANTIBODY EA IG: CPT

## 2024-01-01 PROCEDURE — 82330 ASSAY OF CALCIUM: CPT | Performed by: REGISTERED NURSE

## 2024-01-01 PROCEDURE — 85018 HEMOGLOBIN: CPT | Performed by: NURSE PRACTITIONER

## 2024-01-01 PROCEDURE — 87040 BLOOD CULTURE FOR BACTERIA: CPT

## 2024-01-01 PROCEDURE — 61312 CRNEC/CRNOT STTL XDRL/SDRL: CPT | Performed by: STUDENT IN AN ORGANIZED HEALTH CARE EDUCATION/TRAINING PROGRAM

## 2024-01-01 PROCEDURE — 87206 SMEAR FLUORESCENT/ACID STAI: CPT

## 2024-01-01 PROCEDURE — 71275 CT ANGIOGRAPHY CHEST: CPT

## 2024-01-01 PROCEDURE — 84295 ASSAY OF SERUM SODIUM: CPT

## 2024-01-01 PROCEDURE — 93880 EXTRACRANIAL BILAT STUDY: CPT

## 2024-01-01 PROCEDURE — 85613 RUSSELL VIPER VENOM DILUTED: CPT

## 2024-01-01 PROCEDURE — A9585 GADOBUTROL INJECTION: HCPCS | Performed by: PSYCHIATRY & NEUROLOGY

## 2024-01-01 PROCEDURE — 87252 VIRUS INOCULATION TISSUE: CPT

## 2024-01-01 PROCEDURE — 87205 SMEAR GRAM STAIN: CPT

## 2024-01-01 PROCEDURE — 87798 DETECT AGENT NOS DNA AMP: CPT | Performed by: INTERNAL MEDICINE

## 2024-01-01 PROCEDURE — 80048 BASIC METABOLIC PNL TOTAL CA: CPT | Performed by: REGISTERED NURSE

## 2024-01-01 PROCEDURE — 85397 CLOTTING FUNCT ACTIVITY: CPT

## 2024-01-01 PROCEDURE — 85014 HEMATOCRIT: CPT | Performed by: PHYSICIAN ASSISTANT

## 2024-01-01 PROCEDURE — 82330 ASSAY OF CALCIUM: CPT | Performed by: NURSE PRACTITIONER

## 2024-01-01 PROCEDURE — 87186 SC STD MICRODIL/AGAR DIL: CPT | Performed by: SURGERY

## 2024-01-01 PROCEDURE — 85576 BLOOD PLATELET AGGREGATION: CPT

## 2024-01-01 PROCEDURE — 86900 BLOOD TYPING SEROLOGIC ABO: CPT | Performed by: STUDENT IN AN ORGANIZED HEALTH CARE EDUCATION/TRAINING PROGRAM

## 2024-01-01 PROCEDURE — 36600 WITHDRAWAL OF ARTERIAL BLOOD: CPT

## 2024-01-01 PROCEDURE — 84157 ASSAY OF PROTEIN OTHER: CPT

## 2024-01-01 PROCEDURE — 02HV33Z INSERTION OF INFUSION DEVICE INTO SUPERIOR VENA CAVA, PERCUTANEOUS APPROACH: ICD-10-PCS

## 2024-01-01 PROCEDURE — 84100 ASSAY OF PHOSPHORUS: CPT | Performed by: REGISTERED NURSE

## 2024-01-01 PROCEDURE — 92526 ORAL FUNCTION THERAPY: CPT

## 2024-01-01 PROCEDURE — 86038 ANTINUCLEAR ANTIBODIES: CPT | Performed by: EMERGENCY MEDICINE

## 2024-01-01 PROCEDURE — 87102 FUNGUS ISOLATION CULTURE: CPT | Performed by: RADIOLOGY

## 2024-01-01 PROCEDURE — 4A133J1 MONITORING OF ARTERIAL PULSE, PERIPHERAL, PERCUTANEOUS APPROACH: ICD-10-PCS | Performed by: EMERGENCY MEDICINE

## 2024-01-01 PROCEDURE — 86900 BLOOD TYPING SEROLOGIC ABO: CPT

## 2024-01-01 PROCEDURE — 85007 BL SMEAR W/DIFF WBC COUNT: CPT | Performed by: REGISTERED NURSE

## 2024-01-01 PROCEDURE — 02HV33Z INSERTION OF INFUSION DEVICE INTO SUPERIOR VENA CAVA, PERCUTANEOUS APPROACH: ICD-10-PCS | Performed by: RADIOLOGY

## 2024-01-01 PROCEDURE — NC001 PR NO CHARGE: Performed by: PSYCHIATRY & NEUROLOGY

## 2024-01-01 PROCEDURE — 85384 FIBRINOGEN ACTIVITY: CPT | Performed by: PHYSICIAN ASSISTANT

## 2024-01-01 PROCEDURE — 20225 BONE BIOPSY TROCAR/NDL DEEP: CPT

## 2024-01-01 PROCEDURE — 85027 COMPLETE CBC AUTOMATED: CPT | Performed by: PSYCHIATRY & NEUROLOGY

## 2024-01-01 PROCEDURE — 5A1945Z RESPIRATORY VENTILATION, 24-96 CONSECUTIVE HOURS: ICD-10-PCS | Performed by: ANESTHESIOLOGY

## 2024-01-01 PROCEDURE — 93970 EXTREMITY STUDY: CPT | Performed by: SURGERY

## 2024-01-01 PROCEDURE — 85045 AUTOMATED RETICULOCYTE COUNT: CPT

## 2024-01-01 PROCEDURE — 85027 COMPLETE CBC AUTOMATED: CPT | Performed by: STUDENT IN AN ORGANIZED HEALTH CARE EDUCATION/TRAINING PROGRAM

## 2024-01-01 PROCEDURE — 87206 SMEAR FLUORESCENT/ACID STAI: CPT | Performed by: RADIOLOGY

## 2024-01-01 PROCEDURE — P9017 PLASMA 1 DONOR FRZ W/IN 8 HR: HCPCS

## 2024-01-01 PROCEDURE — 82105 ALPHA-FETOPROTEIN SERUM: CPT | Performed by: PHYSICIAN ASSISTANT

## 2024-01-01 PROCEDURE — 86334 IMMUNOFIX E-PHORESIS SERUM: CPT | Performed by: STUDENT IN AN ORGANIZED HEALTH CARE EDUCATION/TRAINING PROGRAM

## 2024-01-01 PROCEDURE — 85300 ANTITHROMBIN III ACTIVITY: CPT

## 2024-01-01 PROCEDURE — 85610 PROTHROMBIN TIME: CPT | Performed by: PHYSICIAN ASSISTANT

## 2024-01-01 PROCEDURE — 30233R1 TRANSFUSION OF NONAUTOLOGOUS PLATELETS INTO PERIPHERAL VEIN, PERCUTANEOUS APPROACH: ICD-10-PCS | Performed by: STUDENT IN AN ORGANIZED HEALTH CARE EDUCATION/TRAINING PROGRAM

## 2024-01-01 PROCEDURE — 86480 TB TEST CELL IMMUN MEASURE: CPT

## 2024-01-01 PROCEDURE — 99497 ADVNCD CARE PLAN 30 MIN: CPT

## 2024-01-01 PROCEDURE — 84702 CHORIONIC GONADOTROPIN TEST: CPT | Performed by: INTERNAL MEDICINE

## 2024-01-01 PROCEDURE — 84100 ASSAY OF PHOSPHORUS: CPT | Performed by: NURSE PRACTITIONER

## 2024-01-01 PROCEDURE — 87207 SMEAR SPECIAL STAIN: CPT

## 2024-01-01 PROCEDURE — 70551 MRI BRAIN STEM W/O DYE: CPT

## 2024-01-01 PROCEDURE — 82746 ASSAY OF FOLIC ACID SERUM: CPT | Performed by: EMERGENCY MEDICINE

## 2024-01-01 PROCEDURE — 83735 ASSAY OF MAGNESIUM: CPT | Performed by: PHYSICIAN ASSISTANT

## 2024-01-01 PROCEDURE — 87081 CULTURE SCREEN ONLY: CPT | Performed by: PSYCHIATRY & NEUROLOGY

## 2024-01-01 PROCEDURE — 81001 URINALYSIS AUTO W/SCOPE: CPT | Performed by: PHYSICIAN ASSISTANT

## 2024-01-01 PROCEDURE — 85652 RBC SED RATE AUTOMATED: CPT | Performed by: EMERGENCY MEDICINE

## 2024-01-01 PROCEDURE — 89051 BODY FLUID CELL COUNT: CPT

## 2024-01-01 PROCEDURE — 82607 VITAMIN B-12: CPT | Performed by: EMERGENCY MEDICINE

## 2024-01-01 PROCEDURE — 82330 ASSAY OF CALCIUM: CPT | Performed by: PHYSICIAN ASSISTANT

## 2024-01-01 PROCEDURE — 86850 RBC ANTIBODY SCREEN: CPT

## 2024-01-01 PROCEDURE — 99232 SBSQ HOSP IP/OBS MODERATE 35: CPT | Performed by: SURGERY

## 2024-01-01 PROCEDURE — 85045 AUTOMATED RETICULOCYTE COUNT: CPT | Performed by: INTERNAL MEDICINE

## 2024-01-01 PROCEDURE — 80202 ASSAY OF VANCOMYCIN: CPT | Performed by: INTERNAL MEDICINE

## 2024-01-01 PROCEDURE — NC001 PR NO CHARGE: Performed by: NURSE PRACTITIONER

## 2024-01-01 PROCEDURE — 87075 CULTR BACTERIA EXCEPT BLOOD: CPT | Performed by: RADIOLOGY

## 2024-01-01 PROCEDURE — 83690 ASSAY OF LIPASE: CPT | Performed by: PHYSICIAN ASSISTANT

## 2024-01-01 PROCEDURE — 0QB10ZZ EXCISION OF SACRUM, OPEN APPROACH: ICD-10-PCS | Performed by: SURGERY

## 2024-01-01 PROCEDURE — 95715 VEEG EA 12-26HR INTMT MNTR: CPT

## 2024-01-01 PROCEDURE — 82232 ASSAY OF BETA-2 PROTEIN: CPT | Performed by: EMERGENCY MEDICINE

## 2024-01-01 PROCEDURE — 0KBP0ZZ EXCISION OF LEFT HIP MUSCLE, OPEN APPROACH: ICD-10-PCS | Performed by: SURGERY

## 2024-01-01 DEVICE — DURAGEN® PLUS DURAL REGENERATION MATRIX, 5 IN X 7 IN (12.5 CM X 17.5 CM)
Type: IMPLANTABLE DEVICE | Site: CRANIAL | Status: FUNCTIONAL
Brand: DURAGEN® PLUS

## 2024-01-01 RX ORDER — SODIUM CHLORIDE FOR INHALATION 3 %
4 VIAL, NEBULIZER (ML) INHALATION
Status: DISCONTINUED | OUTPATIENT
Start: 2024-01-01 | End: 2024-01-01

## 2024-01-01 RX ORDER — MAGNESIUM SULFATE HEPTAHYDRATE 40 MG/ML
2 INJECTION, SOLUTION INTRAVENOUS ONCE
Status: COMPLETED | OUTPATIENT
Start: 2024-01-01 | End: 2024-01-01

## 2024-01-01 RX ORDER — ACETYLCYSTEINE 200 MG/ML
3 SOLUTION ORAL; RESPIRATORY (INHALATION)
Status: DISCONTINUED | OUTPATIENT
Start: 2024-01-01 | End: 2024-01-01

## 2024-01-01 RX ORDER — CALCIUM GLUCONATE 20 MG/ML
2 INJECTION, SOLUTION INTRAVENOUS ONCE
Status: COMPLETED | OUTPATIENT
Start: 2024-01-01 | End: 2024-01-01

## 2024-01-01 RX ORDER — MAGNESIUM SULFATE 1 G/100ML
1 INJECTION INTRAVENOUS ONCE
Status: COMPLETED | OUTPATIENT
Start: 2024-01-01 | End: 2024-01-01

## 2024-01-01 RX ORDER — DEXTROSE MONOHYDRATE 25 G/50ML
50 INJECTION, SOLUTION INTRAVENOUS ONCE
Status: COMPLETED | OUTPATIENT
Start: 2024-01-01 | End: 2024-01-01

## 2024-01-01 RX ORDER — NOREPINEPHRINE BITARTRATE 1 MG/ML
INJECTION, SOLUTION INTRAVENOUS
Status: DISPENSED
Start: 2024-01-01 | End: 2024-01-01

## 2024-01-01 RX ORDER — FENTANYL CITRATE 50 UG/ML
50 INJECTION, SOLUTION INTRAMUSCULAR; INTRAVENOUS ONCE
Status: COMPLETED | OUTPATIENT
Start: 2024-01-01 | End: 2024-01-01

## 2024-01-01 RX ORDER — GLYCOPYRROLATE 0.2 MG/ML
0.2 INJECTION INTRAMUSCULAR; INTRAVENOUS EVERY 4 HOURS PRN
Status: DISCONTINUED | OUTPATIENT
Start: 2024-01-01 | End: 2024-01-01 | Stop reason: HOSPADM

## 2024-01-01 RX ORDER — POTASSIUM CHLORIDE 29.8 MG/ML
40 INJECTION INTRAVENOUS ONCE
Status: COMPLETED | OUTPATIENT
Start: 2024-01-01 | End: 2024-01-01

## 2024-01-01 RX ORDER — LIDOCAINE HYDROCHLORIDE 10 MG/ML
INJECTION, SOLUTION EPIDURAL; INFILTRATION; INTRACAUDAL; PERINEURAL
Status: DISCONTINUED
Start: 2024-01-01 | End: 2024-01-01 | Stop reason: WASHOUT

## 2024-01-01 RX ORDER — DEXTROSE MONOHYDRATE 25 G/50ML
INJECTION, SOLUTION INTRAVENOUS
Status: COMPLETED
Start: 2024-01-01 | End: 2024-01-01

## 2024-01-01 RX ORDER — PHYTONADIONE 5 MG/1
10 TABLET ORAL ONCE
Status: COMPLETED | OUTPATIENT
Start: 2024-01-01 | End: 2024-01-01

## 2024-01-01 RX ORDER — DEXTROSE MONOHYDRATE 25 G/50ML
25 INJECTION, SOLUTION INTRAVENOUS ONCE
Status: COMPLETED | OUTPATIENT
Start: 2024-01-01 | End: 2024-01-01

## 2024-01-01 RX ORDER — CHLORHEXIDINE GLUCONATE ORAL RINSE 1.2 MG/ML
15 SOLUTION DENTAL EVERY 12 HOURS SCHEDULED
Status: DISCONTINUED | OUTPATIENT
Start: 2024-01-01 | End: 2024-01-01

## 2024-01-01 RX ORDER — SODIUM CHLORIDE, SODIUM LACTATE, POTASSIUM CHLORIDE, CALCIUM CHLORIDE 600; 310; 30; 20 MG/100ML; MG/100ML; MG/100ML; MG/100ML
100 INJECTION, SOLUTION INTRAVENOUS CONTINUOUS
Status: DISCONTINUED | OUTPATIENT
Start: 2024-01-01 | End: 2024-01-01

## 2024-01-01 RX ORDER — LIDOCAINE WITH 8.4% SOD BICARB 0.9%(10ML)
SYRINGE (ML) INJECTION AS NEEDED
Status: COMPLETED | OUTPATIENT
Start: 2024-01-01 | End: 2024-01-01

## 2024-01-01 RX ORDER — SODIUM CHLORIDE, SODIUM GLUCONATE, SODIUM ACETATE, POTASSIUM CHLORIDE, MAGNESIUM CHLORIDE, SODIUM PHOSPHATE, DIBASIC, AND POTASSIUM PHOSPHATE .53; .5; .37; .037; .03; .012; .00082 G/100ML; G/100ML; G/100ML; G/100ML; G/100ML; G/100ML; G/100ML
1000 INJECTION, SOLUTION INTRAVENOUS ONCE
Status: COMPLETED | OUTPATIENT
Start: 2024-01-01 | End: 2024-01-01

## 2024-01-01 RX ORDER — SODIUM CHLORIDE 9 MG/ML
100 INJECTION, SOLUTION INTRAVENOUS CONTINUOUS
Status: DISCONTINUED | OUTPATIENT
Start: 2024-01-01 | End: 2024-01-01

## 2024-01-01 RX ORDER — HEPARIN SODIUM 5000 [USP'U]/ML
5000 INJECTION, SOLUTION INTRAVENOUS; SUBCUTANEOUS EVERY 8 HOURS SCHEDULED
Status: DISCONTINUED | OUTPATIENT
Start: 2024-01-01 | End: 2024-01-01

## 2024-01-01 RX ORDER — FENTANYL CITRATE 50 UG/ML
INJECTION, SOLUTION INTRAMUSCULAR; INTRAVENOUS AS NEEDED
Status: DISCONTINUED | OUTPATIENT
Start: 2024-01-01 | End: 2024-01-01

## 2024-01-01 RX ORDER — SODIUM CHLORIDE, SODIUM GLUCONATE, SODIUM ACETATE, POTASSIUM CHLORIDE, MAGNESIUM CHLORIDE, SODIUM PHOSPHATE, DIBASIC, AND POTASSIUM PHOSPHATE .53; .5; .37; .037; .03; .012; .00082 G/100ML; G/100ML; G/100ML; G/100ML; G/100ML; G/100ML; G/100ML
500 INJECTION, SOLUTION INTRAVENOUS ONCE
Status: COMPLETED | OUTPATIENT
Start: 2024-01-01 | End: 2024-01-01

## 2024-01-01 RX ORDER — LIDOCAINE HYDROCHLORIDE 10 MG/ML
20 INJECTION, SOLUTION EPIDURAL; INFILTRATION; INTRACAUDAL; PERINEURAL ONCE
Status: COMPLETED | OUTPATIENT
Start: 2024-01-01 | End: 2024-01-01

## 2024-01-01 RX ORDER — ACETAMINOPHEN 10 MG/ML
1000 INJECTION, SOLUTION INTRAVENOUS EVERY 6 HOURS SCHEDULED
Status: DISPENSED | OUTPATIENT
Start: 2024-01-01 | End: 2024-01-01

## 2024-01-01 RX ORDER — ACETAMINOPHEN 325 MG/1
650 TABLET ORAL EVERY 8 HOURS PRN
Status: DISCONTINUED | OUTPATIENT
Start: 2024-01-01 | End: 2024-01-01

## 2024-01-01 RX ORDER — HYDRALAZINE HYDROCHLORIDE 20 MG/ML
10 INJECTION INTRAMUSCULAR; INTRAVENOUS EVERY 6 HOURS PRN
Status: DISCONTINUED | OUTPATIENT
Start: 2024-01-01 | End: 2024-01-01

## 2024-01-01 RX ORDER — ACETAMINOPHEN 10 MG/ML
1000 INJECTION, SOLUTION INTRAVENOUS EVERY 6 HOURS PRN
Status: DISCONTINUED | OUTPATIENT
Start: 2024-01-01 | End: 2024-01-01

## 2024-01-01 RX ORDER — LABETALOL HYDROCHLORIDE 5 MG/ML
INJECTION, SOLUTION INTRAVENOUS
Status: DISPENSED
Start: 2024-01-01 | End: 2024-01-01

## 2024-01-01 RX ORDER — HYDROMORPHONE HCL/PF 1 MG/ML
1 SYRINGE (ML) INJECTION
Status: DISCONTINUED | OUTPATIENT
Start: 2024-01-01 | End: 2024-01-01 | Stop reason: HOSPADM

## 2024-01-01 RX ORDER — PROPOFOL 10 MG/ML
INJECTION, EMULSION INTRAVENOUS AS NEEDED
Status: DISCONTINUED | OUTPATIENT
Start: 2024-01-01 | End: 2024-01-01

## 2024-01-01 RX ORDER — HYDRALAZINE HYDROCHLORIDE 20 MG/ML
INJECTION INTRAMUSCULAR; INTRAVENOUS
Status: DISPENSED
Start: 2024-01-01 | End: 2024-01-01

## 2024-01-01 RX ORDER — HYDRALAZINE HYDROCHLORIDE 20 MG/ML
10 INJECTION INTRAMUSCULAR; INTRAVENOUS EVERY 4 HOURS PRN
Status: DISCONTINUED | OUTPATIENT
Start: 2024-01-01 | End: 2024-01-01

## 2024-01-01 RX ORDER — POTASSIUM CHLORIDE 14.9 MG/ML
20 INJECTION INTRAVENOUS ONCE
Status: COMPLETED | OUTPATIENT
Start: 2024-01-01 | End: 2024-01-01

## 2024-01-01 RX ORDER — GADOBUTROL 604.72 MG/ML
10 INJECTION INTRAVENOUS
Status: COMPLETED | OUTPATIENT
Start: 2024-01-01 | End: 2024-01-01

## 2024-01-01 RX ORDER — SODIUM CHLORIDE 9 MG/ML
75 INJECTION, SOLUTION INTRAVENOUS CONTINUOUS
Status: DISCONTINUED | OUTPATIENT
Start: 2024-01-01 | End: 2024-01-01

## 2024-01-01 RX ORDER — SODIUM CHLORIDE 9 MG/ML
INJECTION, SOLUTION INTRAVENOUS CONTINUOUS PRN
Status: DISCONTINUED | OUTPATIENT
Start: 2024-01-01 | End: 2024-01-01

## 2024-01-01 RX ORDER — DEXMEDETOMIDINE HYDROCHLORIDE 4 UG/ML
.1-.7 INJECTION, SOLUTION INTRAVENOUS
Status: DISCONTINUED | OUTPATIENT
Start: 2024-01-01 | End: 2024-01-01

## 2024-01-01 RX ORDER — ALBUMIN, HUMAN INJ 5% 5 %
25 SOLUTION INTRAVENOUS ONCE
Status: COMPLETED | OUTPATIENT
Start: 2024-01-01 | End: 2024-01-01

## 2024-01-01 RX ORDER — LEVETIRACETAM 500 MG/5ML
500 INJECTION, SOLUTION, CONCENTRATE INTRAVENOUS EVERY 12 HOURS SCHEDULED
Status: DISCONTINUED | OUTPATIENT
Start: 2024-01-01 | End: 2024-01-01

## 2024-01-01 RX ORDER — ALBUMIN (HUMAN) 12.5 G/50ML
25 SOLUTION INTRAVENOUS ONCE
Status: COMPLETED | OUTPATIENT
Start: 2024-01-01 | End: 2024-01-01

## 2024-01-01 RX ORDER — ATOVAQUONE 750 MG/5ML
750 SUSPENSION ORAL 2 TIMES DAILY
Status: DISCONTINUED | OUTPATIENT
Start: 2024-01-01 | End: 2024-01-01

## 2024-01-01 RX ORDER — ACETAMINOPHEN 10 MG/ML
1000 INJECTION, SOLUTION INTRAVENOUS EVERY 6 HOURS SCHEDULED
Status: DISCONTINUED | OUTPATIENT
Start: 2024-01-01 | End: 2024-01-01

## 2024-01-01 RX ORDER — ALBUMIN (HUMAN) 12.5 G/50ML
25 SOLUTION INTRAVENOUS 3 TIMES DAILY
Status: COMPLETED | OUTPATIENT
Start: 2024-01-01 | End: 2024-01-01

## 2024-01-01 RX ORDER — DEXTROSE MONOHYDRATE 50 MG/ML
100 INJECTION, SOLUTION INTRAVENOUS CONTINUOUS
Status: DISCONTINUED | OUTPATIENT
Start: 2024-01-01 | End: 2024-01-01

## 2024-01-01 RX ORDER — ALBUMIN (HUMAN) 12.5 G/50ML
12.5 SOLUTION INTRAVENOUS ONCE
Status: DISCONTINUED | OUTPATIENT
Start: 2024-01-01 | End: 2024-01-01

## 2024-01-01 RX ORDER — LANOLIN ALCOHOL/MO/W.PET/CERES
100 CREAM (GRAM) TOPICAL DAILY
Status: DISCONTINUED | OUTPATIENT
Start: 2024-01-01 | End: 2024-01-01

## 2024-01-01 RX ORDER — LIDOCAINE HYDROCHLORIDE 10 MG/ML
INJECTION, SOLUTION EPIDURAL; INFILTRATION; INTRACAUDAL; PERINEURAL
Status: DISPENSED
Start: 2024-01-01 | End: 2024-01-01

## 2024-01-01 RX ORDER — LORAZEPAM 2 MG/ML
1 INJECTION INTRAMUSCULAR EVERY 2 HOUR PRN
Status: DISCONTINUED | OUTPATIENT
Start: 2024-01-01 | End: 2024-01-01

## 2024-01-01 RX ORDER — FAMOTIDINE 20 MG/1
20 TABLET, FILM COATED ORAL 2 TIMES DAILY
Status: DISCONTINUED | OUTPATIENT
Start: 2024-01-01 | End: 2024-01-01

## 2024-01-01 RX ORDER — MAGNESIUM SULFATE HEPTAHYDRATE 40 MG/ML
2 INJECTION, SOLUTION INTRAVENOUS
Status: DISCONTINUED | OUTPATIENT
Start: 2024-01-01 | End: 2024-01-01

## 2024-01-01 RX ORDER — LIDOCAINE HYDROCHLORIDE AND EPINEPHRINE 10; 10 MG/ML; UG/ML
INJECTION, SOLUTION INFILTRATION; PERINEURAL AS NEEDED
Status: DISCONTINUED | OUTPATIENT
Start: 2024-01-01 | End: 2024-01-01 | Stop reason: HOSPADM

## 2024-01-01 RX ORDER — AZITHROMYCIN 200 MG/5ML
500 POWDER, FOR SUSPENSION ORAL EVERY 24 HOURS
Status: DISCONTINUED | OUTPATIENT
Start: 2024-01-01 | End: 2024-01-01

## 2024-01-01 RX ORDER — MAGNESIUM SULFATE HEPTAHYDRATE 40 MG/ML
4 INJECTION, SOLUTION INTRAVENOUS ONCE
Status: COMPLETED | OUTPATIENT
Start: 2024-01-01 | End: 2024-01-01

## 2024-01-01 RX ORDER — CALCIUM GLUCONATE 20 MG/ML
1 INJECTION, SOLUTION INTRAVENOUS ONCE
Status: COMPLETED | OUTPATIENT
Start: 2024-01-01 | End: 2024-01-01

## 2024-01-01 RX ORDER — CEFAZOLIN SODIUM 1 G/50ML
1000 SOLUTION INTRAVENOUS EVERY 8 HOURS
Status: COMPLETED | OUTPATIENT
Start: 2024-01-01 | End: 2024-01-01

## 2024-01-01 RX ORDER — ACETAMINOPHEN 10 MG/ML
1000 INJECTION, SOLUTION INTRAVENOUS ONCE
Status: COMPLETED | OUTPATIENT
Start: 2024-01-01 | End: 2024-01-01

## 2024-01-01 RX ORDER — FERROUS SULFATE 325(65) MG
325 TABLET ORAL
Status: DISCONTINUED | OUTPATIENT
Start: 2024-01-01 | End: 2024-01-01

## 2024-01-01 RX ORDER — FENTANYL CITRATE 50 UG/ML
100 INJECTION, SOLUTION INTRAMUSCULAR; INTRAVENOUS ONCE
Status: COMPLETED | OUTPATIENT
Start: 2024-01-01 | End: 2024-01-01

## 2024-01-01 RX ORDER — HYDROMORPHONE HCL/PF 1 MG/ML
1 SYRINGE (ML) INJECTION
Status: DISCONTINUED | OUTPATIENT
Start: 2024-01-01 | End: 2024-01-01

## 2024-01-01 RX ORDER — ROCURONIUM BROMIDE 10 MG/ML
INJECTION, SOLUTION INTRAVENOUS AS NEEDED
Status: DISCONTINUED | OUTPATIENT
Start: 2024-01-01 | End: 2024-01-01

## 2024-01-01 RX ORDER — ACETAMINOPHEN 10 MG/ML
1000 INJECTION, SOLUTION INTRAVENOUS EVERY 12 HOURS
Status: DISCONTINUED | OUTPATIENT
Start: 2024-01-01 | End: 2024-01-01

## 2024-01-01 RX ORDER — ALBUMIN, HUMAN INJ 5% 5 %
12.5 SOLUTION INTRAVENOUS ONCE
Status: COMPLETED | OUTPATIENT
Start: 2024-01-01 | End: 2024-01-01

## 2024-01-01 RX ORDER — LABETALOL HYDROCHLORIDE 5 MG/ML
10 INJECTION, SOLUTION INTRAVENOUS EVERY 6 HOURS PRN
Status: DISCONTINUED | OUTPATIENT
Start: 2024-01-01 | End: 2024-01-01

## 2024-01-01 RX ORDER — PHYTONADIONE 5 MG/1
10 TABLET ORAL DAILY
Status: COMPLETED | OUTPATIENT
Start: 2024-01-01 | End: 2024-01-01

## 2024-01-01 RX ORDER — ACETAMINOPHEN 10 MG/ML
1000 INJECTION, SOLUTION INTRAVENOUS EVERY 8 HOURS
Status: DISCONTINUED | OUTPATIENT
Start: 2024-01-01 | End: 2024-01-01

## 2024-01-01 RX ORDER — FUROSEMIDE 10 MG/ML
20 INJECTION INTRAMUSCULAR; INTRAVENOUS ONCE
Status: COMPLETED | OUTPATIENT
Start: 2024-01-01 | End: 2024-01-01

## 2024-01-01 RX ORDER — FENTANYL CITRATE 50 UG/ML
50 INJECTION, SOLUTION INTRAMUSCULAR; INTRAVENOUS
Status: DISCONTINUED | OUTPATIENT
Start: 2024-01-01 | End: 2024-01-01

## 2024-01-01 RX ORDER — POTASSIUM CHLORIDE 20MEQ/15ML
40 LIQUID (ML) ORAL ONCE
Status: COMPLETED | OUTPATIENT
Start: 2024-01-01 | End: 2024-01-01

## 2024-01-01 RX ORDER — LORAZEPAM 2 MG/ML
0.5 INJECTION INTRAMUSCULAR EVERY 2 HOUR PRN
Status: DISCONTINUED | OUTPATIENT
Start: 2024-01-01 | End: 2024-01-01

## 2024-01-01 RX ORDER — ONDANSETRON 2 MG/ML
4 INJECTION INTRAMUSCULAR; INTRAVENOUS EVERY 8 HOURS PRN
Status: DISCONTINUED | OUTPATIENT
Start: 2024-01-01 | End: 2024-01-01 | Stop reason: HOSPADM

## 2024-01-01 RX ORDER — HALOPERIDOL 5 MG/ML
0.5 INJECTION INTRAMUSCULAR EVERY 2 HOUR PRN
Status: DISCONTINUED | OUTPATIENT
Start: 2024-01-01 | End: 2024-01-01 | Stop reason: HOSPADM

## 2024-01-01 RX ORDER — ALBUMIN, HUMAN INJ 5% 5 %
SOLUTION INTRAVENOUS CONTINUOUS PRN
Status: DISCONTINUED | OUTPATIENT
Start: 2024-01-01 | End: 2024-01-01

## 2024-01-01 RX ORDER — PROPOFOL 10 MG/ML
5-50 INJECTION, EMULSION INTRAVENOUS
Status: DISCONTINUED | OUTPATIENT
Start: 2024-01-01 | End: 2024-01-01

## 2024-01-01 RX ORDER — LEVALBUTEROL INHALATION SOLUTION 1.25 MG/3ML
1.25 SOLUTION RESPIRATORY (INHALATION)
Status: DISCONTINUED | OUTPATIENT
Start: 2024-01-01 | End: 2024-01-01

## 2024-01-01 RX ORDER — LEVETIRACETAM 500 MG/5ML
750 INJECTION, SOLUTION, CONCENTRATE INTRAVENOUS EVERY 12 HOURS SCHEDULED
Status: COMPLETED | OUTPATIENT
Start: 2024-01-01 | End: 2024-01-01

## 2024-01-01 RX ORDER — IPRATROPIUM BROMIDE AND ALBUTEROL SULFATE 2.5; .5 MG/3ML; MG/3ML
3 SOLUTION RESPIRATORY (INHALATION)
Status: DISCONTINUED | OUTPATIENT
Start: 2024-01-01 | End: 2024-01-01

## 2024-01-01 RX ORDER — LIDOCAINE HYDROCHLORIDE 10 MG/ML
INJECTION, SOLUTION EPIDURAL; INFILTRATION; INTRACAUDAL; PERINEURAL AS NEEDED
Status: COMPLETED | OUTPATIENT
Start: 2024-01-01 | End: 2024-01-01

## 2024-01-01 RX ORDER — ALBUMIN, HUMAN INJ 5% 5 %
SOLUTION INTRAVENOUS
Status: DISPENSED
Start: 2024-01-01 | End: 2024-01-01

## 2024-01-01 RX ORDER — IBUPROFEN 100 MG/5ML
400 SUSPENSION ORAL ONCE
Status: COMPLETED | OUTPATIENT
Start: 2024-01-01 | End: 2024-01-01

## 2024-01-01 RX ORDER — ACETAMINOPHEN 10 MG/ML
1000 INJECTION, SOLUTION INTRAVENOUS EVERY 6 HOURS PRN
Status: DISCONTINUED | OUTPATIENT
Start: 2024-01-01 | End: 2024-01-01 | Stop reason: HOSPADM

## 2024-01-01 RX ORDER — LEVALBUTEROL INHALATION SOLUTION 1.25 MG/3ML
SOLUTION RESPIRATORY (INHALATION)
Status: COMPLETED
Start: 2024-01-01 | End: 2024-01-01

## 2024-01-01 RX ORDER — LORAZEPAM 2 MG/ML
1 INJECTION INTRAMUSCULAR
Status: DISCONTINUED | OUTPATIENT
Start: 2024-01-01 | End: 2024-01-01 | Stop reason: HOSPADM

## 2024-01-01 RX ORDER — ALBUMIN (HUMAN) 12.5 G/50ML
25 SOLUTION INTRAVENOUS EVERY 8 HOURS
Status: DISCONTINUED | OUTPATIENT
Start: 2024-01-01 | End: 2024-01-01

## 2024-01-01 RX ORDER — ALBUMIN (HUMAN) 12.5 G/50ML
50 SOLUTION INTRAVENOUS ONCE
Status: COMPLETED | OUTPATIENT
Start: 2024-01-01 | End: 2024-01-01

## 2024-01-01 RX ORDER — FOLIC ACID 1 MG/1
1 TABLET ORAL DAILY
Status: DISCONTINUED | OUTPATIENT
Start: 2024-01-01 | End: 2024-01-01

## 2024-01-01 RX ORDER — MODAFINIL 100 MG/1
200 TABLET ORAL DAILY
Status: DISCONTINUED | OUTPATIENT
Start: 2024-01-01 | End: 2024-01-01

## 2024-01-01 RX ORDER — FUROSEMIDE 10 MG/ML
40 INJECTION INTRAMUSCULAR; INTRAVENOUS ONCE
Status: COMPLETED | OUTPATIENT
Start: 2024-01-01 | End: 2024-01-01

## 2024-01-01 RX ORDER — BENZONATATE 100 MG/1
100 CAPSULE ORAL 3 TIMES DAILY PRN
Status: DISCONTINUED | OUTPATIENT
Start: 2024-01-01 | End: 2024-01-01

## 2024-01-01 RX ORDER — IBUPROFEN 100 MG/5ML
200 SUSPENSION ORAL ONCE
Status: COMPLETED | OUTPATIENT
Start: 2024-01-01 | End: 2024-01-01

## 2024-01-01 RX ORDER — LIDOCAINE HYDROCHLORIDE 20 MG/ML
5 INJECTION, SOLUTION EPIDURAL; INFILTRATION; INTRACAUDAL; PERINEURAL ONCE
Status: COMPLETED | OUTPATIENT
Start: 2024-01-01 | End: 2024-01-01

## 2024-01-01 RX ORDER — FUROSEMIDE 10 MG/ML
INJECTION INTRAMUSCULAR; INTRAVENOUS
Status: COMPLETED
Start: 2024-01-01 | End: 2024-01-01

## 2024-01-01 RX ORDER — HYDROMORPHONE HCL/PF 1 MG/ML
1 SYRINGE (ML) INJECTION EVERY 2 HOUR PRN
Status: DISCONTINUED | OUTPATIENT
Start: 2024-01-01 | End: 2024-01-01

## 2024-01-01 RX ORDER — FENTANYL CITRATE/PF 50 MCG/ML
SYRINGE (ML) INJECTION
Status: COMPLETED
Start: 2024-01-01 | End: 2024-01-01

## 2024-01-01 RX ORDER — HYDROMORPHONE HCL/PF 1 MG/ML
0.5 SYRINGE (ML) INJECTION EVERY 2 HOUR PRN
Status: DISCONTINUED | OUTPATIENT
Start: 2024-01-01 | End: 2024-01-01

## 2024-01-01 RX ORDER — POLYETHYLENE GLYCOL 3350 17 G/17G
17 POWDER, FOR SOLUTION ORAL DAILY PRN
Status: DISCONTINUED | OUTPATIENT
Start: 2024-01-01 | End: 2024-01-01 | Stop reason: HOSPADM

## 2024-01-01 RX ORDER — HEPARIN SODIUM 5000 [USP'U]/ML
5000 INJECTION, SOLUTION INTRAVENOUS; SUBCUTANEOUS EVERY 8 HOURS SCHEDULED
Status: COMPLETED | OUTPATIENT
Start: 2024-01-01 | End: 2024-01-01

## 2024-01-01 RX ORDER — ACETAMINOPHEN 325 MG/1
325 TABLET ORAL ONCE
Status: COMPLETED | OUTPATIENT
Start: 2024-01-01 | End: 2024-01-01

## 2024-01-01 RX ORDER — LEVOTHYROXINE SODIUM 100 UG/1
200 TABLET ORAL
Status: DISCONTINUED | OUTPATIENT
Start: 2024-01-01 | End: 2024-01-01

## 2024-01-01 RX ORDER — LIDOCAINE HYDROCHLORIDE 10 MG/ML
INJECTION, SOLUTION EPIDURAL; INFILTRATION; INTRACAUDAL; PERINEURAL
Status: COMPLETED
Start: 2024-01-01 | End: 2024-01-01

## 2024-01-01 RX ADMIN — SODIUM CHLORIDE SOLN NEBU 3% 4 ML: 3 NEBU SOLN at 13:40

## 2024-01-01 RX ADMIN — HYDROMORPHONE HYDROCHLORIDE 0.5 MG: 1 INJECTION, SOLUTION INTRAMUSCULAR; INTRAVENOUS; SUBCUTANEOUS at 15:37

## 2024-01-01 RX ADMIN — FOLIC ACID 1 MG: 1 TABLET ORAL at 08:25

## 2024-01-01 RX ADMIN — FOLIC ACID 1 MG: 1 TABLET ORAL at 08:19

## 2024-01-01 RX ADMIN — DEXTROSE MONOHYDRATE 25 ML: 25 INJECTION, SOLUTION INTRAVENOUS at 00:15

## 2024-01-01 RX ADMIN — LABETALOL HYDROCHLORIDE 10 MG: 5 INJECTION, SOLUTION INTRAVENOUS at 12:57

## 2024-01-01 RX ADMIN — LEVOTHYROXINE SODIUM 200 MCG: 100 TABLET ORAL at 05:44

## 2024-01-01 RX ADMIN — CHLORHEXIDINE GLUCONATE 0.12% ORAL RINSE 15 ML: 1.2 LIQUID ORAL at 21:25

## 2024-01-01 RX ADMIN — PIPERACILLIN SODIUM AND TAZOBACTAM SODIUM 4.5 G: 36; 4.5 INJECTION, POWDER, LYOPHILIZED, FOR SOLUTION INTRAVENOUS at 05:42

## 2024-01-01 RX ADMIN — POTASSIUM PHOSPHATE, MONOBASIC POTASSIUM PHOSPHATE, DIBASIC 30 MMOL: 224; 236 INJECTION, SOLUTION, CONCENTRATE INTRAVENOUS at 07:50

## 2024-01-01 RX ADMIN — PIPERACILLIN SODIUM AND TAZOBACTAM SODIUM 4.5 G: 36; 4.5 INJECTION, POWDER, LYOPHILIZED, FOR SOLUTION INTRAVENOUS at 13:02

## 2024-01-01 RX ADMIN — Medication 5000 UNITS: at 08:29

## 2024-01-01 RX ADMIN — PIPERACILLIN SODIUM AND TAZOBACTAM SODIUM 4.5 G: 36; 4.5 INJECTION, POWDER, LYOPHILIZED, FOR SOLUTION INTRAVENOUS at 05:14

## 2024-01-01 RX ADMIN — HEPARIN SODIUM 5000 UNITS: 5000 INJECTION INTRAVENOUS; SUBCUTANEOUS at 05:21

## 2024-01-01 RX ADMIN — ACETYLCYSTEINE 600 MG: 200 SOLUTION ORAL; RESPIRATORY (INHALATION) at 02:00

## 2024-01-01 RX ADMIN — SODIUM CHLORIDE, SODIUM LACTATE, POTASSIUM CHLORIDE, AND CALCIUM CHLORIDE 100 ML/HR: .6; .31; .03; .02 INJECTION, SOLUTION INTRAVENOUS at 21:28

## 2024-01-01 RX ADMIN — FOLIC ACID 1 MG: 1 TABLET ORAL at 08:38

## 2024-01-01 RX ADMIN — LIDOCAINE HYDROCHLORIDE 10 ML: 10 INJECTION, SOLUTION EPIDURAL; INFILTRATION; INTRACAUDAL; PERINEURAL at 14:16

## 2024-01-01 RX ADMIN — DEXTROSE MONOHYDRATE 50 ML: 25 INJECTION, SOLUTION INTRAVENOUS at 00:13

## 2024-01-01 RX ADMIN — Medication 5000 UNITS: at 08:37

## 2024-01-01 RX ADMIN — VANCOMYCIN HYDROCHLORIDE 1750 MG: 1 INJECTION, POWDER, LYOPHILIZED, FOR SOLUTION INTRAVENOUS at 20:57

## 2024-01-01 RX ADMIN — Medication 5000 UNITS: at 09:05

## 2024-01-01 RX ADMIN — LEVETIRACETAM 500 MG: 100 INJECTION, SOLUTION INTRAVENOUS at 20:36

## 2024-01-01 RX ADMIN — LEVOTHYROXINE SODIUM 200 MCG: 100 TABLET ORAL at 05:21

## 2024-01-01 RX ADMIN — ACETYLCYSTEINE 600 MG: 200 SOLUTION ORAL; RESPIRATORY (INHALATION) at 13:38

## 2024-01-01 RX ADMIN — ACETAMINOPHEN 650 MG: 325 TABLET, FILM COATED ORAL at 12:19

## 2024-01-01 RX ADMIN — IPRATROPIUM BROMIDE 0.5 MG: 0.5 SOLUTION RESPIRATORY (INHALATION) at 02:27

## 2024-01-01 RX ADMIN — SODIUM CHLORIDE SOLN NEBU 3% 4 ML: 3 NEBU SOLN at 19:31

## 2024-01-01 RX ADMIN — THIAMINE HCL TAB 100 MG 100 MG: 100 TAB at 09:37

## 2024-01-01 RX ADMIN — THIAMINE HCL TAB 100 MG 100 MG: 100 TAB at 08:38

## 2024-01-01 RX ADMIN — SODIUM CHLORIDE 75 ML/HR: 0.9 INJECTION, SOLUTION INTRAVENOUS at 05:13

## 2024-01-01 RX ADMIN — LIDOCAINE HYDROCHLORIDE 20 ML: 10 INJECTION, SOLUTION EPIDURAL; INFILTRATION; INTRACAUDAL; PERINEURAL at 11:14

## 2024-01-01 RX ADMIN — ACETYLCYSTEINE 600 MG: 200 SOLUTION ORAL; RESPIRATORY (INHALATION) at 07:22

## 2024-01-01 RX ADMIN — LEVETIRACETAM 750 MG: 100 INJECTION, SOLUTION INTRAVENOUS at 08:00

## 2024-01-01 RX ADMIN — HEPARIN SODIUM 5000 UNITS: 5000 INJECTION INTRAVENOUS; SUBCUTANEOUS at 05:52

## 2024-01-01 RX ADMIN — CHLORHEXIDINE GLUCONATE 0.12% ORAL RINSE 15 ML: 1.2 LIQUID ORAL at 08:09

## 2024-01-01 RX ADMIN — SODIUM CHLORIDE 500 ML: 0.9 INJECTION, SOLUTION INTRAVENOUS at 19:39

## 2024-01-01 RX ADMIN — VANCOMYCIN HYDROCHLORIDE 1750 MG: 1 INJECTION, POWDER, LYOPHILIZED, FOR SOLUTION INTRAVENOUS at 08:14

## 2024-01-01 RX ADMIN — HEPARIN SODIUM 5000 UNITS: 5000 INJECTION INTRAVENOUS; SUBCUTANEOUS at 21:30

## 2024-01-01 RX ADMIN — ACETYLCYSTEINE 600 MG: 200 SOLUTION ORAL; RESPIRATORY (INHALATION) at 08:00

## 2024-01-01 RX ADMIN — CHLORHEXIDINE GLUCONATE 0.12% ORAL RINSE 15 ML: 1.2 LIQUID ORAL at 08:38

## 2024-01-01 RX ADMIN — CEFEPIME 2000 MG: 2 INJECTION, POWDER, FOR SOLUTION INTRAVENOUS at 02:01

## 2024-01-01 RX ADMIN — VANCOMYCIN HYDROCHLORIDE 1250 MG: 10 INJECTION, POWDER, LYOPHILIZED, FOR SOLUTION INTRAVENOUS at 10:16

## 2024-01-01 RX ADMIN — FENTANYL CITRATE 50 MCG: 50 INJECTION INTRAMUSCULAR; INTRAVENOUS at 18:07

## 2024-01-01 RX ADMIN — PIPERACILLIN SODIUM AND TAZOBACTAM SODIUM 4.5 G: 36; 4.5 INJECTION, POWDER, LYOPHILIZED, FOR SOLUTION INTRAVENOUS at 04:17

## 2024-01-01 RX ADMIN — CHLORHEXIDINE GLUCONATE 0.12% ORAL RINSE 15 ML: 1.2 LIQUID ORAL at 20:46

## 2024-01-01 RX ADMIN — HYDROMORPHONE HYDROCHLORIDE 1 MG: 1 INJECTION, SOLUTION INTRAMUSCULAR; INTRAVENOUS; SUBCUTANEOUS at 15:25

## 2024-01-01 RX ADMIN — CEFTRIAXONE SODIUM 2000 MG: 10 INJECTION, POWDER, FOR SOLUTION INTRAVENOUS at 05:18

## 2024-01-01 RX ADMIN — ACETYLCYSTEINE 600 MG: 200 SOLUTION ORAL; RESPIRATORY (INHALATION) at 13:35

## 2024-01-01 RX ADMIN — ALBUMIN (HUMAN) 25 G: 0.25 INJECTION, SOLUTION INTRAVENOUS at 08:10

## 2024-01-01 RX ADMIN — CEFAZOLIN SODIUM 1000 MG: 1 SOLUTION INTRAVENOUS at 06:01

## 2024-01-01 RX ADMIN — ACETAMINOPHEN 650 MG: 325 TABLET, FILM COATED ORAL at 23:26

## 2024-01-01 RX ADMIN — IPRATROPIUM BROMIDE 0.5 MG: 0.5 SOLUTION RESPIRATORY (INHALATION) at 13:13

## 2024-01-01 RX ADMIN — ACETAMINOPHEN 650 MG: 325 TABLET, FILM COATED ORAL at 12:40

## 2024-01-01 RX ADMIN — ATOVAQUONE 750 MG: 750 SUSPENSION ORAL at 12:36

## 2024-01-01 RX ADMIN — SODIUM PHOSPHATE, MONOBASIC, MONOHYDRATE AND SODIUM PHOSPHATE, DIBASIC, ANHYDROUS 30 MMOL: 142; 276 INJECTION, SOLUTION INTRAVENOUS at 08:01

## 2024-01-01 RX ADMIN — ACETAMINOPHEN 1000 MG: 10 INJECTION INTRAVENOUS at 00:57

## 2024-01-01 RX ADMIN — FUROSEMIDE 20 MG: 10 INJECTION, SOLUTION INTRAMUSCULAR; INTRAVENOUS at 11:43

## 2024-01-01 RX ADMIN — SODIUM CHLORIDE SOLN NEBU 3% 4 ML: 3 NEBU SOLN at 07:43

## 2024-01-01 RX ADMIN — ALBUMIN (HUMAN): 12.5 INJECTION, SOLUTION INTRAVENOUS at 11:21

## 2024-01-01 RX ADMIN — CHLORHEXIDINE GLUCONATE 0.12% ORAL RINSE 15 ML: 1.2 LIQUID ORAL at 08:45

## 2024-01-01 RX ADMIN — HYDROMORPHONE HYDROCHLORIDE 1 MG: 1 INJECTION, SOLUTION INTRAMUSCULAR; INTRAVENOUS; SUBCUTANEOUS at 12:52

## 2024-01-01 RX ADMIN — HEPARIN SODIUM 5000 UNITS: 5000 INJECTION INTRAVENOUS; SUBCUTANEOUS at 13:26

## 2024-01-01 RX ADMIN — FOLIC ACID 1 MG: 1 TABLET ORAL at 12:57

## 2024-01-01 RX ADMIN — SODIUM CHLORIDE SOLN NEBU 3% 4 ML: 3 NEBU SOLN at 19:17

## 2024-01-01 RX ADMIN — ACETYLCYSTEINE 600 MG: 200 SOLUTION ORAL; RESPIRATORY (INHALATION) at 01:50

## 2024-01-01 RX ADMIN — ALBUMIN (HUMAN) 25 G: 0.25 INJECTION, SOLUTION INTRAVENOUS at 10:26

## 2024-01-01 RX ADMIN — PIPERACILLIN SODIUM AND TAZOBACTAM SODIUM 4.5 G: 36; 4.5 INJECTION, POWDER, LYOPHILIZED, FOR SOLUTION INTRAVENOUS at 22:03

## 2024-01-01 RX ADMIN — LEVOTHYROXINE SODIUM 200 MCG: 100 TABLET ORAL at 05:39

## 2024-01-01 RX ADMIN — LEVOTHYROXINE SODIUM 200 MCG: 100 TABLET ORAL at 06:08

## 2024-01-01 RX ADMIN — VANCOMYCIN HYDROCHLORIDE 1500 MG: 10 INJECTION, POWDER, LYOPHILIZED, FOR SOLUTION INTRAVENOUS at 21:22

## 2024-01-01 RX ADMIN — PIPERACILLIN SODIUM AND TAZOBACTAM SODIUM 4.5 G: 36; 4.5 INJECTION, POWDER, LYOPHILIZED, FOR SOLUTION INTRAVENOUS at 05:22

## 2024-01-01 RX ADMIN — THIAMINE HCL TAB 100 MG 100 MG: 100 TAB at 08:25

## 2024-01-01 RX ADMIN — CHLORHEXIDINE GLUCONATE 0.12% ORAL RINSE 15 ML: 1.2 LIQUID ORAL at 20:17

## 2024-01-01 RX ADMIN — ALBUMIN (HUMAN) 25 G: 12.5 INJECTION, SOLUTION INTRAVENOUS at 12:29

## 2024-01-01 RX ADMIN — HEPARIN SODIUM 5000 UNITS: 5000 INJECTION INTRAVENOUS; SUBCUTANEOUS at 14:09

## 2024-01-01 RX ADMIN — ACETAMINOPHEN 1000 MG: 10 INJECTION INTRAVENOUS at 14:39

## 2024-01-01 RX ADMIN — ALBUMIN (HUMAN) 25 G: 0.25 INJECTION, SOLUTION INTRAVENOUS at 13:20

## 2024-01-01 RX ADMIN — HEPARIN SODIUM 5000 UNITS: 5000 INJECTION INTRAVENOUS; SUBCUTANEOUS at 22:20

## 2024-01-01 RX ADMIN — FAMOTIDINE 20 MG: 20 TABLET, FILM COATED ORAL at 08:25

## 2024-01-01 RX ADMIN — ACETYLCYSTEINE 600 MG: 200 SOLUTION ORAL; RESPIRATORY (INHALATION) at 02:27

## 2024-01-01 RX ADMIN — SODIUM CHLORIDE SOLN NEBU 3% 4 ML: 3 NEBU SOLN at 07:12

## 2024-01-01 RX ADMIN — LEVETIRACETAM 750 MG: 100 INJECTION, SOLUTION INTRAVENOUS at 08:30

## 2024-01-01 RX ADMIN — HYDROMORPHONE HYDROCHLORIDE 1 MG: 1 INJECTION, SOLUTION INTRAMUSCULAR; INTRAVENOUS; SUBCUTANEOUS at 19:25

## 2024-01-01 RX ADMIN — FAMOTIDINE 20 MG: 20 TABLET, FILM COATED ORAL at 17:36

## 2024-01-01 RX ADMIN — DEXTROSE MONOHYDRATE 25 ML: 25 INJECTION, SOLUTION INTRAVENOUS at 17:50

## 2024-01-01 RX ADMIN — CHLORHEXIDINE GLUCONATE 0.12% ORAL RINSE 15 ML: 1.2 LIQUID ORAL at 22:16

## 2024-01-01 RX ADMIN — DEXTROSE 100 ML/HR: 5 SOLUTION INTRAVENOUS at 10:49

## 2024-01-01 RX ADMIN — MAGNESIUM SULFATE HEPTAHYDRATE 2 G: 40 INJECTION, SOLUTION INTRAVENOUS at 09:33

## 2024-01-01 RX ADMIN — CALCIUM GLUCONATE 2 G: 20 INJECTION, SOLUTION INTRAVENOUS at 08:23

## 2024-01-01 RX ADMIN — CHLORHEXIDINE GLUCONATE 0.12% ORAL RINSE 15 ML: 1.2 LIQUID ORAL at 08:22

## 2024-01-01 RX ADMIN — CHLORHEXIDINE GLUCONATE 0.12% ORAL RINSE 15 ML: 1.2 LIQUID ORAL at 08:14

## 2024-01-01 RX ADMIN — LEVALBUTEROL HYDROCHLORIDE 1.25 MG: 1.25 SOLUTION RESPIRATORY (INHALATION) at 02:27

## 2024-01-01 RX ADMIN — HEPARIN SODIUM 5000 UNITS: 5000 INJECTION INTRAVENOUS; SUBCUTANEOUS at 13:27

## 2024-01-01 RX ADMIN — ACETAMINOPHEN 1000 MG: 10 INJECTION INTRAVENOUS at 05:40

## 2024-01-01 RX ADMIN — LEVOTHYROXINE SODIUM 200 MCG: 100 TABLET ORAL at 05:34

## 2024-01-01 RX ADMIN — PIPERACILLIN SODIUM AND TAZOBACTAM SODIUM 4.5 G: 36; 4.5 INJECTION, POWDER, LYOPHILIZED, FOR SOLUTION INTRAVENOUS at 20:22

## 2024-01-01 RX ADMIN — DEXTROSE MONOHYDRATE 50 ML: 25 INJECTION, SOLUTION INTRAVENOUS at 18:58

## 2024-01-01 RX ADMIN — FOLIC ACID 1 MG: 1 TABLET ORAL at 08:29

## 2024-01-01 RX ADMIN — HEPARIN SODIUM 5000 UNITS: 5000 INJECTION INTRAVENOUS; SUBCUTANEOUS at 05:42

## 2024-01-01 RX ADMIN — SODIUM PHOSPHATE, MONOBASIC, MONOHYDRATE AND SODIUM PHOSPHATE, DIBASIC, ANHYDROUS 15 MMOL: 142; 276 INJECTION, SOLUTION INTRAVENOUS at 12:34

## 2024-01-01 RX ADMIN — LEVALBUTEROL HYDROCHLORIDE 1.25 MG: 1.25 SOLUTION RESPIRATORY (INHALATION) at 19:34

## 2024-01-01 RX ADMIN — PIPERACILLIN SODIUM AND TAZOBACTAM SODIUM 4.5 G: 36; 4.5 INJECTION, POWDER, LYOPHILIZED, FOR SOLUTION INTRAVENOUS at 12:48

## 2024-01-01 RX ADMIN — FAMOTIDINE 20 MG: 20 TABLET, FILM COATED ORAL at 18:13

## 2024-01-01 RX ADMIN — HEPARIN SODIUM 5000 UNITS: 5000 INJECTION INTRAVENOUS; SUBCUTANEOUS at 14:22

## 2024-01-01 RX ADMIN — ACETAMINOPHEN 1000 MG: 10 INJECTION INTRAVENOUS at 17:50

## 2024-01-01 RX ADMIN — VANCOMYCIN HYDROCHLORIDE 1750 MG: 1 INJECTION, POWDER, LYOPHILIZED, FOR SOLUTION INTRAVENOUS at 21:56

## 2024-01-01 RX ADMIN — FOLIC ACID 1 MG: 1 TABLET ORAL at 08:10

## 2024-01-01 RX ADMIN — LEVOTHYROXINE SODIUM 200 MCG: 100 TABLET ORAL at 05:06

## 2024-01-01 RX ADMIN — ALBUMIN (HUMAN) 25 G: 0.25 INJECTION, SOLUTION INTRAVENOUS at 21:59

## 2024-01-01 RX ADMIN — CHLORHEXIDINE GLUCONATE 0.12% ORAL RINSE 15 ML: 1.2 LIQUID ORAL at 08:06

## 2024-01-01 RX ADMIN — ALBUMIN (HUMAN) 25 G: 0.25 INJECTION, SOLUTION INTRAVENOUS at 21:57

## 2024-01-01 RX ADMIN — ACETAMINOPHEN 1000 MG: 10 INJECTION INTRAVENOUS at 01:38

## 2024-01-01 RX ADMIN — CHLORHEXIDINE GLUCONATE 0.12% ORAL RINSE 15 ML: 1.2 LIQUID ORAL at 09:37

## 2024-01-01 RX ADMIN — CHLORHEXIDINE GLUCONATE 0.12% ORAL RINSE 15 ML: 1.2 LIQUID ORAL at 20:36

## 2024-01-01 RX ADMIN — FAMOTIDINE 20 MG: 20 TABLET, FILM COATED ORAL at 17:13

## 2024-01-01 RX ADMIN — IPRATROPIUM BROMIDE 0.5 MG: 0.5 SOLUTION RESPIRATORY (INHALATION) at 07:48

## 2024-01-01 RX ADMIN — SODIUM CHLORIDE SOLN NEBU 3% 4 ML: 3 NEBU SOLN at 14:49

## 2024-01-01 RX ADMIN — HEPARIN SODIUM 5000 UNITS: 5000 INJECTION INTRAVENOUS; SUBCUTANEOUS at 14:00

## 2024-01-01 RX ADMIN — PHYTONADIONE 10 MG: 5 TABLET ORAL at 10:25

## 2024-01-01 RX ADMIN — ACETAMINOPHEN 1000 MG: 10 INJECTION INTRAVENOUS at 08:03

## 2024-01-01 RX ADMIN — POTASSIUM PHOSPHATE, MONOBASIC POTASSIUM PHOSPHATE, DIBASIC 12 MMOL: 224; 236 INJECTION, SOLUTION, CONCENTRATE INTRAVENOUS at 18:10

## 2024-01-01 RX ADMIN — IPRATROPIUM BROMIDE 0.5 MG: 0.5 SOLUTION RESPIRATORY (INHALATION) at 13:38

## 2024-01-01 RX ADMIN — THIAMINE HCL TAB 100 MG 100 MG: 100 TAB at 08:10

## 2024-01-01 RX ADMIN — NOREPINEPHRINE BITARTRATE 5 MCG/MIN: 1 SOLUTION INTRAVENOUS at 14:50

## 2024-01-01 RX ADMIN — FOLIC ACID 1 MG: 1 TABLET ORAL at 08:45

## 2024-01-01 RX ADMIN — ACETAMINOPHEN 1000 MG: 10 INJECTION INTRAVENOUS at 09:30

## 2024-01-01 RX ADMIN — LORAZEPAM 1 MG: 2 INJECTION INTRAMUSCULAR; INTRAVENOUS at 18:56

## 2024-01-01 RX ADMIN — FAMOTIDINE 20 MG: 20 TABLET, FILM COATED ORAL at 08:06

## 2024-01-01 RX ADMIN — FOLIC ACID 1 MG: 1 TABLET ORAL at 08:06

## 2024-01-01 RX ADMIN — MAGNESIUM SULFATE HEPTAHYDRATE 2 G: 40 INJECTION, SOLUTION INTRAVENOUS at 08:15

## 2024-01-01 RX ADMIN — PIPERACILLIN SODIUM AND TAZOBACTAM SODIUM 4.5 G: 36; 4.5 INJECTION, POWDER, LYOPHILIZED, FOR SOLUTION INTRAVENOUS at 21:07

## 2024-01-01 RX ADMIN — LEVALBUTEROL HYDROCHLORIDE 1.25 MG: 1.25 SOLUTION RESPIRATORY (INHALATION) at 01:50

## 2024-01-01 RX ADMIN — LEVETIRACETAM 500 MG: 100 INJECTION, SOLUTION INTRAVENOUS at 13:02

## 2024-01-01 RX ADMIN — Medication 5000 UNITS: at 08:10

## 2024-01-01 RX ADMIN — FENTANYL CITRATE 50 MCG: 50 INJECTION INTRAMUSCULAR; INTRAVENOUS at 03:39

## 2024-01-01 RX ADMIN — CHLORHEXIDINE GLUCONATE 0.12% ORAL RINSE 15 ML: 1.2 LIQUID ORAL at 20:22

## 2024-01-01 RX ADMIN — SODIUM CHLORIDE 125 ML/HR: 0.9 INJECTION, SOLUTION INTRAVENOUS at 12:23

## 2024-01-01 RX ADMIN — LEVALBUTEROL HYDROCHLORIDE 1.25 MG: 1.25 SOLUTION RESPIRATORY (INHALATION) at 19:14

## 2024-01-01 RX ADMIN — MAGNESIUM SULFATE HEPTAHYDRATE 2 G: 40 INJECTION, SOLUTION INTRAVENOUS at 08:05

## 2024-01-01 RX ADMIN — SODIUM CHLORIDE SOLN NEBU 3% 4 ML: 3 NEBU SOLN at 19:04

## 2024-01-01 RX ADMIN — MAGNESIUM SULFATE HEPTAHYDRATE 2 G: 40 INJECTION, SOLUTION INTRAVENOUS at 08:46

## 2024-01-01 RX ADMIN — THIAMINE HCL TAB 100 MG 100 MG: 100 TAB at 09:35

## 2024-01-01 RX ADMIN — LEVOTHYROXINE SODIUM 200 MCG: 100 TABLET ORAL at 05:07

## 2024-01-01 RX ADMIN — Medication 5000 UNITS: at 09:34

## 2024-01-01 RX ADMIN — DEXMEDETOMIDINE HYDROCHLORIDE 0.2 MCG/KG/HR: 400 INJECTION INTRAVENOUS at 22:09

## 2024-01-01 RX ADMIN — LEVETIRACETAM 500 MG: 100 INJECTION, SOLUTION INTRAVENOUS at 21:41

## 2024-01-01 RX ADMIN — MAGNESIUM SULFATE HEPTAHYDRATE 2 G: 2 INJECTION, SOLUTION INTRAVENOUS at 13:11

## 2024-01-01 RX ADMIN — HYDROMORPHONE HYDROCHLORIDE 1 MG: 1 INJECTION, SOLUTION INTRAMUSCULAR; INTRAVENOUS; SUBCUTANEOUS at 07:24

## 2024-01-01 RX ADMIN — SODIUM CHLORIDE 500 ML: 0.9 INJECTION, SOLUTION INTRAVENOUS at 02:48

## 2024-01-01 RX ADMIN — ROCURONIUM BROMIDE 50 MG: 10 INJECTION, SOLUTION INTRAVENOUS at 10:28

## 2024-01-01 RX ADMIN — HEPARIN SODIUM 5000 UNITS: 5000 INJECTION INTRAVENOUS; SUBCUTANEOUS at 21:10

## 2024-01-01 RX ADMIN — PHYTONADIONE 10 MG: 5 TABLET ORAL at 20:21

## 2024-01-01 RX ADMIN — IPRATROPIUM BROMIDE 0.5 MG: 0.5 SOLUTION RESPIRATORY (INHALATION) at 01:50

## 2024-01-01 RX ADMIN — HEPARIN SODIUM 5000 UNITS: 5000 INJECTION INTRAVENOUS; SUBCUTANEOUS at 22:16

## 2024-01-01 RX ADMIN — ACETYLCYSTEINE 600 MG: 200 SOLUTION ORAL; RESPIRATORY (INHALATION) at 19:58

## 2024-01-01 RX ADMIN — NOREPINEPHRINE BITARTRATE 4 MCG/MIN: 1 INJECTION, SOLUTION, CONCENTRATE INTRAVENOUS at 10:33

## 2024-01-01 RX ADMIN — LEVALBUTEROL HYDROCHLORIDE 1.25 MG: 1.25 SOLUTION RESPIRATORY (INHALATION) at 13:57

## 2024-01-01 RX ADMIN — ACETAMINOPHEN 1000 MG: 10 INJECTION INTRAVENOUS at 02:29

## 2024-01-01 RX ADMIN — Medication 5000 UNITS: at 08:00

## 2024-01-01 RX ADMIN — THIAMINE HCL TAB 100 MG 100 MG: 100 TAB at 08:29

## 2024-01-01 RX ADMIN — Medication 5000 UNITS: at 08:25

## 2024-01-01 RX ADMIN — CHLORHEXIDINE GLUCONATE 0.12% ORAL RINSE 15 ML: 1.2 LIQUID ORAL at 11:23

## 2024-01-01 RX ADMIN — MAGNESIUM SULFATE HEPTAHYDRATE 2 G: 2 INJECTION, SOLUTION INTRAVENOUS at 07:36

## 2024-01-01 RX ADMIN — CHLORHEXIDINE GLUCONATE 0.12% ORAL RINSE 15 ML: 1.2 LIQUID ORAL at 21:18

## 2024-01-01 RX ADMIN — LORAZEPAM 0.5 MG: 2 INJECTION INTRAMUSCULAR; INTRAVENOUS at 14:09

## 2024-01-01 RX ADMIN — LEVETIRACETAM 750 MG: 100 INJECTION, SOLUTION INTRAVENOUS at 21:36

## 2024-01-01 RX ADMIN — IPRATROPIUM BROMIDE 0.5 MG: 0.5 SOLUTION RESPIRATORY (INHALATION) at 19:20

## 2024-01-01 RX ADMIN — HYDROMORPHONE HYDROCHLORIDE 1 MG: 1 INJECTION, SOLUTION INTRAMUSCULAR; INTRAVENOUS; SUBCUTANEOUS at 22:01

## 2024-01-01 RX ADMIN — CHLORHEXIDINE GLUCONATE 0.12% ORAL RINSE 15 ML: 1.2 LIQUID ORAL at 09:36

## 2024-01-01 RX ADMIN — SODIUM CHLORIDE, SODIUM GLUCONATE, SODIUM ACETATE, POTASSIUM CHLORIDE, MAGNESIUM CHLORIDE, SODIUM PHOSPHATE, DIBASIC, AND POTASSIUM PHOSPHATE 1000 ML: .53; .5; .37; .037; .03; .012; .00082 INJECTION, SOLUTION INTRAVENOUS at 14:26

## 2024-01-01 RX ADMIN — NOREPINEPHRINE BITARTRATE 16 MCG: 1 INJECTION, SOLUTION, CONCENTRATE INTRAVENOUS at 11:45

## 2024-01-01 RX ADMIN — Medication 5000 UNITS: at 09:37

## 2024-01-01 RX ADMIN — PIPERACILLIN SODIUM AND TAZOBACTAM SODIUM 4.5 G: 36; 4.5 INJECTION, POWDER, LYOPHILIZED, FOR SOLUTION INTRAVENOUS at 05:44

## 2024-01-01 RX ADMIN — LEVALBUTEROL HYDROCHLORIDE 1.25 MG: 1.25 SOLUTION RESPIRATORY (INHALATION) at 16:04

## 2024-01-01 RX ADMIN — LEVETIRACETAM 750 MG: 100 INJECTION, SOLUTION INTRAVENOUS at 20:46

## 2024-01-01 RX ADMIN — SILVER NITRATE APPLICATORS 1 APPLICATOR: 25; 75 STICK TOPICAL at 23:45

## 2024-01-01 RX ADMIN — ACETAMINOPHEN 650 MG: 325 TABLET, FILM COATED ORAL at 18:31

## 2024-01-01 RX ADMIN — IOHEXOL 75 ML: 350 INJECTION, SOLUTION INTRAVENOUS at 13:40

## 2024-01-01 RX ADMIN — PIPERACILLIN SODIUM AND TAZOBACTAM SODIUM 4.5 G: 36; 4.5 INJECTION, POWDER, LYOPHILIZED, FOR SOLUTION INTRAVENOUS at 05:06

## 2024-01-01 RX ADMIN — SODIUM CHLORIDE, SODIUM LACTATE, POTASSIUM CHLORIDE, AND CALCIUM CHLORIDE 1000 ML: .6; .31; .03; .02 INJECTION, SOLUTION INTRAVENOUS at 02:02

## 2024-01-01 RX ADMIN — DESMOPRESSIN ACETATE 36 MCG: 4 SOLUTION INTRAVENOUS at 13:01

## 2024-01-01 RX ADMIN — MAGNESIUM SULFATE HEPTAHYDRATE 4 G: 40 INJECTION, SOLUTION INTRAVENOUS at 11:42

## 2024-01-01 RX ADMIN — CHLORHEXIDINE GLUCONATE 0.12% ORAL RINSE 15 ML: 1.2 LIQUID ORAL at 21:56

## 2024-01-01 RX ADMIN — DEXTROSE MONOHYDRATE 50 ML: 25 INJECTION, SOLUTION INTRAVENOUS at 11:41

## 2024-01-01 RX ADMIN — LEVALBUTEROL HYDROCHLORIDE 1.25 MG: 1.25 SOLUTION RESPIRATORY (INHALATION) at 07:17

## 2024-01-01 RX ADMIN — CALCIUM GLUCONATE 2 G: 20 INJECTION, SOLUTION INTRAVENOUS at 07:51

## 2024-01-01 RX ADMIN — VANCOMYCIN HYDROCHLORIDE 1500 MG: 10 INJECTION, POWDER, LYOPHILIZED, FOR SOLUTION INTRAVENOUS at 08:00

## 2024-01-01 RX ADMIN — ROCURONIUM BROMIDE 20 MG: 10 INJECTION, SOLUTION INTRAVENOUS at 11:19

## 2024-01-01 RX ADMIN — CEFTRIAXONE SODIUM 2000 MG: 10 INJECTION, POWDER, FOR SOLUTION INTRAVENOUS at 15:02

## 2024-01-01 RX ADMIN — MODAFINIL 200 MG: 100 TABLET ORAL at 08:46

## 2024-01-01 RX ADMIN — ACETAMINOPHEN 1000 MG: 10 INJECTION INTRAVENOUS at 08:36

## 2024-01-01 RX ADMIN — CALCIUM GLUCONATE 2 G: 20 INJECTION, SOLUTION INTRAVENOUS at 08:29

## 2024-01-01 RX ADMIN — IBUPROFEN 200 MG: 100 SUSPENSION ORAL at 00:55

## 2024-01-01 RX ADMIN — IPRATROPIUM BROMIDE 0.5 MG: 0.5 SOLUTION RESPIRATORY (INHALATION) at 16:03

## 2024-01-01 RX ADMIN — HEPARIN SODIUM 5000 UNITS: 5000 INJECTION INTRAVENOUS; SUBCUTANEOUS at 21:25

## 2024-01-01 RX ADMIN — ACETYLCYSTEINE 600 MG: 200 SOLUTION ORAL; RESPIRATORY (INHALATION) at 19:20

## 2024-01-01 RX ADMIN — LEVETIRACETAM 500 MG: 100 INJECTION, SOLUTION INTRAVENOUS at 08:06

## 2024-01-01 RX ADMIN — FAMOTIDINE 20 MG: 20 TABLET, FILM COATED ORAL at 18:19

## 2024-01-01 RX ADMIN — LEVOTHYROXINE SODIUM 200 MCG: 100 TABLET ORAL at 05:15

## 2024-01-01 RX ADMIN — CHLORHEXIDINE GLUCONATE 0.12% ORAL RINSE 15 ML: 1.2 LIQUID ORAL at 08:28

## 2024-01-01 RX ADMIN — LEVALBUTEROL HYDROCHLORIDE 1.25 MG: 1.25 SOLUTION RESPIRATORY (INHALATION) at 13:35

## 2024-01-01 RX ADMIN — THIAMINE HCL TAB 100 MG 100 MG: 100 TAB at 08:15

## 2024-01-01 RX ADMIN — MODAFINIL 200 MG: 100 TABLET ORAL at 09:35

## 2024-01-01 RX ADMIN — MAGNESIUM SULFATE HEPTAHYDRATE 1 G: 1 INJECTION, SOLUTION INTRAVENOUS at 08:30

## 2024-01-01 RX ADMIN — CALCIUM GLUCONATE 2 G: 20 INJECTION, SOLUTION INTRAVENOUS at 07:52

## 2024-01-01 RX ADMIN — ACETAMINOPHEN 1000 MG: 10 INJECTION INTRAVENOUS at 02:13

## 2024-01-01 RX ADMIN — FENTANYL CITRATE 50 MCG: 50 INJECTION, SOLUTION INTRAMUSCULAR; INTRAVENOUS at 22:53

## 2024-01-01 RX ADMIN — CEFTRIAXONE SODIUM 1000 MG: 10 INJECTION, POWDER, FOR SOLUTION INTRAVENOUS at 14:04

## 2024-01-01 RX ADMIN — ATOVAQUONE 750 MG: 750 SUSPENSION ORAL at 10:19

## 2024-01-01 RX ADMIN — IPRATROPIUM BROMIDE 0.5 MG: 0.5 SOLUTION RESPIRATORY (INHALATION) at 02:00

## 2024-01-01 RX ADMIN — HEPARIN SODIUM 5000 UNITS: 5000 INJECTION INTRAVENOUS; SUBCUTANEOUS at 21:55

## 2024-01-01 RX ADMIN — ACETAMINOPHEN 325 MG: 325 TABLET, FILM COATED ORAL at 23:01

## 2024-01-01 RX ADMIN — VANCOMYCIN HYDROCHLORIDE 1250 MG: 10 INJECTION, POWDER, LYOPHILIZED, FOR SOLUTION INTRAVENOUS at 16:39

## 2024-01-01 RX ADMIN — HEPARIN SODIUM 5000 UNITS: 5000 INJECTION INTRAVENOUS; SUBCUTANEOUS at 14:33

## 2024-01-01 RX ADMIN — POTASSIUM CHLORIDE 20 MEQ: 14.9 INJECTION, SOLUTION INTRAVENOUS at 08:06

## 2024-01-01 RX ADMIN — FAMOTIDINE 20 MG: 20 TABLET, FILM COATED ORAL at 09:05

## 2024-01-01 RX ADMIN — SODIUM CHLORIDE 500 ML: 0.9 INJECTION, SOLUTION INTRAVENOUS at 22:56

## 2024-01-01 RX ADMIN — THIAMINE HCL TAB 100 MG 100 MG: 100 TAB at 08:36

## 2024-01-01 RX ADMIN — THIAMINE HCL TAB 100 MG 100 MG: 100 TAB at 08:46

## 2024-01-01 RX ADMIN — HEPARIN SODIUM 5000 UNITS: 5000 INJECTION INTRAVENOUS; SUBCUTANEOUS at 05:44

## 2024-01-01 RX ADMIN — FAMOTIDINE 20 MG: 20 TABLET, FILM COATED ORAL at 18:07

## 2024-01-01 RX ADMIN — ALBUMIN (HUMAN) 25 G: 0.25 INJECTION, SOLUTION INTRAVENOUS at 16:46

## 2024-01-01 RX ADMIN — PIPERACILLIN SODIUM AND TAZOBACTAM SODIUM 4.5 G: 36; 4.5 INJECTION, POWDER, LYOPHILIZED, FOR SOLUTION INTRAVENOUS at 22:14

## 2024-01-01 RX ADMIN — LEVALBUTEROL HYDROCHLORIDE 1.25 MG: 1.25 SOLUTION RESPIRATORY (INHALATION) at 01:45

## 2024-01-01 RX ADMIN — HYDROMORPHONE HYDROCHLORIDE 1 MG: 1 INJECTION, SOLUTION INTRAMUSCULAR; INTRAVENOUS; SUBCUTANEOUS at 01:55

## 2024-01-01 RX ADMIN — AZITHROMYCIN 500 MG: 200 POWDER, FOR SUSPENSION PARENTERAL at 12:05

## 2024-01-01 RX ADMIN — POTASSIUM CHLORIDE 40 MEQ: 1.5 SOLUTION ORAL at 08:18

## 2024-01-01 RX ADMIN — CHLORHEXIDINE GLUCONATE 0.12% ORAL RINSE 15 ML: 1.2 LIQUID ORAL at 21:10

## 2024-01-01 RX ADMIN — SODIUM CHLORIDE SOLN NEBU 3% 4 ML: 3 NEBU SOLN at 13:41

## 2024-01-01 RX ADMIN — HYDROMORPHONE HYDROCHLORIDE 1 MG: 1 INJECTION, SOLUTION INTRAMUSCULAR; INTRAVENOUS; SUBCUTANEOUS at 00:47

## 2024-01-01 RX ADMIN — FAMOTIDINE 20 MG: 20 TABLET, FILM COATED ORAL at 08:10

## 2024-01-01 RX ADMIN — IPRATROPIUM BROMIDE 0.5 MG: 0.5 SOLUTION RESPIRATORY (INHALATION) at 08:00

## 2024-01-01 RX ADMIN — SODIUM CHLORIDE, SODIUM LACTATE, POTASSIUM CHLORIDE, AND CALCIUM CHLORIDE 500 ML: .6; .31; .03; .02 INJECTION, SOLUTION INTRAVENOUS at 12:13

## 2024-01-01 RX ADMIN — AZITHROMYCIN 500 MG: 200 POWDER, FOR SUSPENSION PARENTERAL at 12:36

## 2024-01-01 RX ADMIN — VANCOMYCIN HYDROCHLORIDE 2000 MG: 10 INJECTION, POWDER, LYOPHILIZED, FOR SOLUTION INTRAVENOUS at 12:26

## 2024-01-01 RX ADMIN — CHLORHEXIDINE GLUCONATE 0.12% ORAL RINSE 15 ML: 1.2 LIQUID ORAL at 21:26

## 2024-01-01 RX ADMIN — NOREPINEPHRINE BITARTRATE 16 MCG: 1 INJECTION, SOLUTION, CONCENTRATE INTRAVENOUS at 10:28

## 2024-01-01 RX ADMIN — FOLIC ACID 1 MG: 1 TABLET ORAL at 09:35

## 2024-01-01 RX ADMIN — CHLORHEXIDINE GLUCONATE 0.12% ORAL RINSE 15 ML: 1.2 LIQUID ORAL at 09:05

## 2024-01-01 RX ADMIN — FOLIC ACID 1 MG: 1 TABLET ORAL at 08:15

## 2024-01-01 RX ADMIN — LEVOTHYROXINE SODIUM 200 MCG: 100 TABLET ORAL at 05:26

## 2024-01-01 RX ADMIN — IPRATROPIUM BROMIDE 0.5 MG: 0.5 SOLUTION RESPIRATORY (INHALATION) at 07:22

## 2024-01-01 RX ADMIN — ALBUMIN (HUMAN) 50 G: 0.25 INJECTION, SOLUTION INTRAVENOUS at 12:39

## 2024-01-01 RX ADMIN — CEFAZOLIN SODIUM 1000 MG: 1 SOLUTION INTRAVENOUS at 21:42

## 2024-01-01 RX ADMIN — Medication 5000 UNITS: at 08:45

## 2024-01-01 RX ADMIN — LORAZEPAM 0.5 MG: 2 INJECTION INTRAMUSCULAR; INTRAVENOUS at 17:05

## 2024-01-01 RX ADMIN — Medication 5000 UNITS: at 08:38

## 2024-01-01 RX ADMIN — LEVOTHYROXINE SODIUM 200 MCG: 100 TABLET ORAL at 05:43

## 2024-01-01 RX ADMIN — FAMOTIDINE 20 MG: 20 TABLET, FILM COATED ORAL at 08:29

## 2024-01-01 RX ADMIN — Medication 5000 UNITS: at 08:06

## 2024-01-01 RX ADMIN — ACETAMINOPHEN 1000 MG: 10 INJECTION INTRAVENOUS at 17:07

## 2024-01-01 RX ADMIN — ACETAMINOPHEN 1000 MG: 10 INJECTION INTRAVENOUS at 09:46

## 2024-01-01 RX ADMIN — LEVETIRACETAM 750 MG: 100 INJECTION, SOLUTION INTRAVENOUS at 22:16

## 2024-01-01 RX ADMIN — SODIUM CHLORIDE, SODIUM LACTATE, POTASSIUM CHLORIDE, AND CALCIUM CHLORIDE 100 ML/HR: .6; .31; .03; .02 INJECTION, SOLUTION INTRAVENOUS at 00:22

## 2024-01-01 RX ADMIN — PIPERACILLIN SODIUM AND TAZOBACTAM SODIUM 4.5 G: 36; 4.5 INJECTION, POWDER, LYOPHILIZED, FOR SOLUTION INTRAVENOUS at 21:25

## 2024-01-01 RX ADMIN — PIPERACILLIN SODIUM AND TAZOBACTAM SODIUM 4.5 G: 36; 4.5 INJECTION, POWDER, LYOPHILIZED, FOR SOLUTION INTRAVENOUS at 21:10

## 2024-01-01 RX ADMIN — LEVETIRACETAM 750 MG: 100 INJECTION, SOLUTION INTRAVENOUS at 21:10

## 2024-01-01 RX ADMIN — CHLORHEXIDINE GLUCONATE 0.12% ORAL RINSE 15 ML: 1.2 LIQUID ORAL at 21:09

## 2024-01-01 RX ADMIN — HEPARIN SODIUM 5000 UNITS: 5000 INJECTION INTRAVENOUS; SUBCUTANEOUS at 13:02

## 2024-01-01 RX ADMIN — MODAFINIL 200 MG: 100 TABLET ORAL at 08:15

## 2024-01-01 RX ADMIN — LEVOTHYROXINE SODIUM 200 MCG: 100 TABLET ORAL at 06:01

## 2024-01-01 RX ADMIN — LEVETIRACETAM 500 MG: 100 INJECTION, SOLUTION INTRAVENOUS at 09:05

## 2024-01-01 RX ADMIN — LEVOTHYROXINE SODIUM 200 MCG: 100 TABLET ORAL at 05:52

## 2024-01-01 RX ADMIN — SODIUM CHLORIDE: 0.9 INJECTION, SOLUTION INTRAVENOUS at 10:10

## 2024-01-01 RX ADMIN — ALBUMIN (HUMAN): 12.5 INJECTION, SOLUTION INTRAVENOUS at 10:19

## 2024-01-01 RX ADMIN — ACETYLCYSTEINE 600 MG: 200 SOLUTION ORAL; RESPIRATORY (INHALATION) at 19:33

## 2024-01-01 RX ADMIN — SODIUM CHLORIDE, SODIUM LACTATE, POTASSIUM CHLORIDE, AND CALCIUM CHLORIDE 1000 ML: .6; .31; .03; .02 INJECTION, SOLUTION INTRAVENOUS at 02:39

## 2024-01-01 RX ADMIN — DEXTROSE MONOHYDRATE 25 ML: 25 INJECTION, SOLUTION INTRAVENOUS at 14:43

## 2024-01-01 RX ADMIN — FENTANYL CITRATE 50 MCG: 50 INJECTION INTRAMUSCULAR; INTRAVENOUS at 22:53

## 2024-01-01 RX ADMIN — FAMOTIDINE 20 MG: 20 TABLET, FILM COATED ORAL at 08:00

## 2024-01-01 RX ADMIN — FAMOTIDINE 20 MG: 20 TABLET, FILM COATED ORAL at 09:37

## 2024-01-01 RX ADMIN — ACETAMINOPHEN 1000 MG: 10 INJECTION INTRAVENOUS at 09:11

## 2024-01-01 RX ADMIN — SILVER NITRATE APPLICATORS 1 APPLICATOR: 25; 75 STICK TOPICAL at 10:20

## 2024-01-01 RX ADMIN — HYDROMORPHONE HYDROCHLORIDE 1 MG: 1 INJECTION, SOLUTION INTRAMUSCULAR; INTRAVENOUS; SUBCUTANEOUS at 03:06

## 2024-01-01 RX ADMIN — CHLORHEXIDINE GLUCONATE 0.12% ORAL RINSE 15 ML: 1.2 LIQUID ORAL at 08:29

## 2024-01-01 RX ADMIN — FAMOTIDINE 20 MG: 20 TABLET, FILM COATED ORAL at 08:38

## 2024-01-01 RX ADMIN — PIPERACILLIN SODIUM AND TAZOBACTAM SODIUM 4.5 G: 36; 4.5 INJECTION, POWDER, LYOPHILIZED, FOR SOLUTION INTRAVENOUS at 01:47

## 2024-01-01 RX ADMIN — SODIUM CHLORIDE, SODIUM GLUCONATE, SODIUM ACETATE, POTASSIUM CHLORIDE, MAGNESIUM CHLORIDE, SODIUM PHOSPHATE, DIBASIC, AND POTASSIUM PHOSPHATE 1000 ML: .53; .5; .37; .037; .03; .012; .00082 INJECTION, SOLUTION INTRAVENOUS at 05:38

## 2024-01-01 RX ADMIN — FENTANYL CITRATE 50 MCG: 50 INJECTION INTRAMUSCULAR; INTRAVENOUS at 09:30

## 2024-01-01 RX ADMIN — ACETAMINOPHEN 650 MG: 325 TABLET, FILM COATED ORAL at 21:13

## 2024-01-01 RX ADMIN — HEPARIN SODIUM 5000 UNITS: 5000 INJECTION INTRAVENOUS; SUBCUTANEOUS at 05:40

## 2024-01-01 RX ADMIN — LEVALBUTEROL HYDROCHLORIDE 1.25 MG: 1.25 SOLUTION RESPIRATORY (INHALATION) at 08:00

## 2024-01-01 RX ADMIN — FERROUS SULFATE TAB 325 MG (65 MG ELEMENTAL FE) 325 MG: 325 (65 FE) TAB at 08:06

## 2024-01-01 RX ADMIN — PIPERACILLIN SODIUM AND TAZOBACTAM SODIUM 4.5 G: 36; 4.5 INJECTION, POWDER, LYOPHILIZED, FOR SOLUTION INTRAVENOUS at 14:25

## 2024-01-01 RX ADMIN — Medication 5000 UNITS: at 08:15

## 2024-01-01 RX ADMIN — LIDOCAINE HYDROCHLORIDE 5 ML: 20 INJECTION, SOLUTION EPIDURAL; INFILTRATION; INTRACAUDAL at 16:35

## 2024-01-01 RX ADMIN — LORAZEPAM 1 MG: 2 INJECTION INTRAMUSCULAR; INTRAVENOUS at 05:24

## 2024-01-01 RX ADMIN — CHLORHEXIDINE GLUCONATE 0.12% ORAL RINSE 15 ML: 1.2 LIQUID ORAL at 08:37

## 2024-01-01 RX ADMIN — ACETAMINOPHEN 1000 MG: 10 INJECTION INTRAVENOUS at 12:31

## 2024-01-01 RX ADMIN — ALBUMIN (HUMAN) 12.5 G: 12.5 INJECTION, SOLUTION INTRAVENOUS at 23:28

## 2024-01-01 RX ADMIN — CHLORHEXIDINE GLUCONATE 0.12% ORAL RINSE 15 ML: 1.2 LIQUID ORAL at 08:15

## 2024-01-01 RX ADMIN — HEPARIN SODIUM 5000 UNITS: 5000 INJECTION INTRAVENOUS; SUBCUTANEOUS at 14:26

## 2024-01-01 RX ADMIN — Medication 5000 UNITS: at 08:19

## 2024-01-01 RX ADMIN — FOLIC ACID 1 MG: 1 TABLET ORAL at 09:37

## 2024-01-01 RX ADMIN — MAGNESIUM SULFATE HEPTAHYDRATE 2 G: 40 INJECTION, SOLUTION INTRAVENOUS at 21:30

## 2024-01-01 RX ADMIN — IPRATROPIUM BROMIDE 0.5 MG: 0.5 SOLUTION RESPIRATORY (INHALATION) at 07:17

## 2024-01-01 RX ADMIN — LEVALBUTEROL HYDROCHLORIDE 1.25 MG: 1.25 SOLUTION RESPIRATORY (INHALATION) at 02:00

## 2024-01-01 RX ADMIN — HEPARIN SODIUM 5000 UNITS: 5000 INJECTION INTRAVENOUS; SUBCUTANEOUS at 22:14

## 2024-01-01 RX ADMIN — ALBUMIN (HUMAN) 25 G: 12.5 INJECTION, SOLUTION INTRAVENOUS at 03:11

## 2024-01-01 RX ADMIN — HEPARIN SODIUM 5000 UNITS: 5000 INJECTION INTRAVENOUS; SUBCUTANEOUS at 13:39

## 2024-01-01 RX ADMIN — HEPARIN SODIUM 5000 UNITS: 5000 INJECTION INTRAVENOUS; SUBCUTANEOUS at 15:57

## 2024-01-01 RX ADMIN — LEVALBUTEROL HYDROCHLORIDE 1.25 MG: 1.25 SOLUTION RESPIRATORY (INHALATION) at 07:48

## 2024-01-01 RX ADMIN — ACETAMINOPHEN 1000 MG: 10 INJECTION INTRAVENOUS at 17:13

## 2024-01-01 RX ADMIN — ACETYLCYSTEINE 600 MG: 200 SOLUTION ORAL; RESPIRATORY (INHALATION) at 19:14

## 2024-01-01 RX ADMIN — CHLORHEXIDINE GLUCONATE 0.12% ORAL RINSE 15 ML: 1.2 LIQUID ORAL at 21:21

## 2024-01-01 RX ADMIN — FENTANYL CITRATE 50 MCG: 50 INJECTION INTRAMUSCULAR; INTRAVENOUS at 00:16

## 2024-01-01 RX ADMIN — VANCOMYCIN HYDROCHLORIDE 1750 MG: 1 INJECTION, POWDER, LYOPHILIZED, FOR SOLUTION INTRAVENOUS at 09:26

## 2024-01-01 RX ADMIN — PHYTONADIONE 10 MG: 5 TABLET ORAL at 08:10

## 2024-01-01 RX ADMIN — IPRATROPIUM BROMIDE 0.5 MG: 0.5 SOLUTION RESPIRATORY (INHALATION) at 01:45

## 2024-01-01 RX ADMIN — ACETAMINOPHEN 1000 MG: 10 INJECTION INTRAVENOUS at 11:21

## 2024-01-01 RX ADMIN — LEVALBUTEROL HYDROCHLORIDE 1.25 MG: 1.25 SOLUTION RESPIRATORY (INHALATION) at 07:22

## 2024-01-01 RX ADMIN — FENTANYL CITRATE 50 MCG: 50 INJECTION INTRAMUSCULAR; INTRAVENOUS at 11:58

## 2024-01-01 RX ADMIN — IBUPROFEN 400 MG: 100 SUSPENSION ORAL at 15:19

## 2024-01-01 RX ADMIN — LEVETIRACETAM 750 MG: 100 INJECTION, SOLUTION INTRAVENOUS at 08:21

## 2024-01-01 RX ADMIN — LEVALBUTEROL HYDROCHLORIDE 1.25 MG: 1.25 SOLUTION RESPIRATORY (INHALATION) at 13:38

## 2024-01-01 RX ADMIN — CEFTRIAXONE SODIUM 1000 MG: 10 INJECTION, POWDER, FOR SOLUTION INTRAVENOUS at 13:45

## 2024-01-01 RX ADMIN — FAMOTIDINE 20 MG: 20 TABLET, FILM COATED ORAL at 13:01

## 2024-01-01 RX ADMIN — LORAZEPAM 1 MG: 2 INJECTION INTRAMUSCULAR; INTRAVENOUS at 23:58

## 2024-01-01 RX ADMIN — SODIUM CHLORIDE, SODIUM GLUCONATE, SODIUM ACETATE, POTASSIUM CHLORIDE, MAGNESIUM CHLORIDE, SODIUM PHOSPHATE, DIBASIC, AND POTASSIUM PHOSPHATE 500 ML: .53; .5; .37; .037; .03; .012; .00082 INJECTION, SOLUTION INTRAVENOUS at 20:44

## 2024-01-01 RX ADMIN — CHLORHEXIDINE GLUCONATE 0.12% ORAL RINSE 15 ML: 1.2 LIQUID ORAL at 20:57

## 2024-01-01 RX ADMIN — ACETAMINOPHEN 650 MG: 325 TABLET, FILM COATED ORAL at 02:12

## 2024-01-01 RX ADMIN — SODIUM CHLORIDE, SODIUM GLUCONATE, SODIUM ACETATE, POTASSIUM CHLORIDE, MAGNESIUM CHLORIDE, SODIUM PHOSPHATE, DIBASIC, AND POTASSIUM PHOSPHATE 500 ML: .53; .5; .37; .037; .03; .012; .00082 INJECTION, SOLUTION INTRAVENOUS at 02:28

## 2024-01-01 RX ADMIN — HEPARIN SODIUM 5000 UNITS: 5000 INJECTION INTRAVENOUS; SUBCUTANEOUS at 21:26

## 2024-01-01 RX ADMIN — FOLIC ACID 1 MG: 1 TABLET ORAL at 08:36

## 2024-01-01 RX ADMIN — LEVOTHYROXINE SODIUM 200 MCG: 100 TABLET ORAL at 05:42

## 2024-01-01 RX ADMIN — LEVOTHYROXINE SODIUM 200 MCG: 100 TABLET ORAL at 05:22

## 2024-01-01 RX ADMIN — PIPERACILLIN SODIUM AND TAZOBACTAM SODIUM 4.5 G: 36; 4.5 INJECTION, POWDER, LYOPHILIZED, FOR SOLUTION INTRAVENOUS at 20:40

## 2024-01-01 RX ADMIN — FAMOTIDINE 20 MG: 20 TABLET, FILM COATED ORAL at 18:33

## 2024-01-01 RX ADMIN — ACETYLCYSTEINE 600 MG: 200 SOLUTION ORAL; RESPIRATORY (INHALATION) at 13:13

## 2024-01-01 RX ADMIN — LEVETIRACETAM 750 MG: 100 INJECTION, SOLUTION INTRAVENOUS at 08:25

## 2024-01-01 RX ADMIN — MAGNESIUM SULFATE HEPTAHYDRATE 2 G: 40 INJECTION, SOLUTION INTRAVENOUS at 11:15

## 2024-01-01 RX ADMIN — PROPOFOL 50 MCG/KG/MIN: 10 INJECTION, EMULSION INTRAVENOUS at 13:03

## 2024-01-01 RX ADMIN — IPRATROPIUM BROMIDE 0.5 MG: 0.5 SOLUTION RESPIRATORY (INHALATION) at 19:58

## 2024-01-01 RX ADMIN — HEPARIN SODIUM 5000 UNITS: 5000 INJECTION INTRAVENOUS; SUBCUTANEOUS at 05:26

## 2024-01-01 RX ADMIN — CHLORHEXIDINE GLUCONATE 0.12% ORAL RINSE 15 ML: 1.2 LIQUID ORAL at 21:55

## 2024-01-01 RX ADMIN — PIPERACILLIN SODIUM AND TAZOBACTAM SODIUM 4.5 G: 36; 4.5 INJECTION, POWDER, LYOPHILIZED, FOR SOLUTION INTRAVENOUS at 12:57

## 2024-01-01 RX ADMIN — FENTANYL CITRATE 50 MCG: 50 INJECTION INTRAMUSCULAR; INTRAVENOUS at 15:23

## 2024-01-01 RX ADMIN — ACETAMINOPHEN 650 MG: 325 TABLET, FILM COATED ORAL at 10:23

## 2024-01-01 RX ADMIN — VANCOMYCIN HYDROCHLORIDE 2000 MG: 10 INJECTION, POWDER, LYOPHILIZED, FOR SOLUTION INTRAVENOUS at 03:05

## 2024-01-01 RX ADMIN — ALBUMIN (HUMAN) 25 G: 0.25 INJECTION, SOLUTION INTRAVENOUS at 16:07

## 2024-01-01 RX ADMIN — HEPARIN SODIUM 5000 UNITS: 5000 INJECTION INTRAVENOUS; SUBCUTANEOUS at 13:16

## 2024-01-01 RX ADMIN — FAMOTIDINE 20 MG: 20 TABLET, FILM COATED ORAL at 08:36

## 2024-01-01 RX ADMIN — PROPOFOL 30 MCG/KG/MIN: 10 INJECTION, EMULSION INTRAVENOUS at 00:17

## 2024-01-01 RX ADMIN — SODIUM CHLORIDE SOLN NEBU 3% 4 ML: 3 NEBU SOLN at 01:19

## 2024-01-01 RX ADMIN — HEPARIN SODIUM 5000 UNITS: 5000 INJECTION INTRAVENOUS; SUBCUTANEOUS at 21:09

## 2024-01-01 RX ADMIN — SODIUM CHLORIDE SOLN NEBU 3% 4 ML: 3 NEBU SOLN at 07:32

## 2024-01-01 RX ADMIN — THIAMINE HCL TAB 100 MG 100 MG: 100 TAB at 12:57

## 2024-01-01 RX ADMIN — HYDROMORPHONE HYDROCHLORIDE 1 MG: 1 INJECTION, SOLUTION INTRAMUSCULAR; INTRAVENOUS; SUBCUTANEOUS at 10:08

## 2024-01-01 RX ADMIN — ACETAMINOPHEN 650 MG: 325 TABLET, FILM COATED ORAL at 12:18

## 2024-01-01 RX ADMIN — SODIUM CHLORIDE SOLN NEBU 3% 4 ML: 3 NEBU SOLN at 19:38

## 2024-01-01 RX ADMIN — LEVETIRACETAM 750 MG: 100 INJECTION, SOLUTION INTRAVENOUS at 21:55

## 2024-01-01 RX ADMIN — IPRATROPIUM BROMIDE 0.5 MG: 0.5 SOLUTION RESPIRATORY (INHALATION) at 13:35

## 2024-01-01 RX ADMIN — CHLORHEXIDINE GLUCONATE 0.12% ORAL RINSE 15 ML: 1.2 LIQUID ORAL at 08:00

## 2024-01-01 RX ADMIN — CHLORHEXIDINE GLUCONATE 0.12% ORAL RINSE 15 ML: 1.2 LIQUID ORAL at 21:07

## 2024-01-01 RX ADMIN — FENTANYL CITRATE 100 MCG: 50 INJECTION INTRAMUSCULAR; INTRAVENOUS at 10:28

## 2024-01-01 RX ADMIN — LEVALBUTEROL HYDROCHLORIDE 1.25 MG: 1.25 SOLUTION RESPIRATORY (INHALATION) at 19:20

## 2024-01-01 RX ADMIN — POTASSIUM CHLORIDE 40 MEQ: 29.8 INJECTION, SOLUTION INTRAVENOUS at 08:36

## 2024-01-01 RX ADMIN — SODIUM CHLORIDE 75 ML/HR: 0.9 INJECTION, SOLUTION INTRAVENOUS at 00:09

## 2024-01-01 RX ADMIN — VANCOMYCIN HYDROCHLORIDE 2000 MG: 10 INJECTION, POWDER, LYOPHILIZED, FOR SOLUTION INTRAVENOUS at 10:19

## 2024-01-01 RX ADMIN — FAMOTIDINE 20 MG: 20 TABLET, FILM COATED ORAL at 08:19

## 2024-01-01 RX ADMIN — ACETAMINOPHEN 1000 MG: 10 INJECTION INTRAVENOUS at 20:42

## 2024-01-01 RX ADMIN — ACETAMINOPHEN 1000 MG: 10 INJECTION INTRAVENOUS at 11:57

## 2024-01-01 RX ADMIN — HEPARIN SODIUM 5000 UNITS: 5000 INJECTION INTRAVENOUS; SUBCUTANEOUS at 06:08

## 2024-01-01 RX ADMIN — PIPERACILLIN SODIUM AND TAZOBACTAM SODIUM 4.5 G: 36; 4.5 INJECTION, POWDER, LYOPHILIZED, FOR SOLUTION INTRAVENOUS at 05:39

## 2024-01-01 RX ADMIN — HEPARIN SODIUM 5000 UNITS: 5000 INJECTION INTRAVENOUS; SUBCUTANEOUS at 21:07

## 2024-01-01 RX ADMIN — CHLORHEXIDINE GLUCONATE 0.12% ORAL RINSE 15 ML: 1.2 LIQUID ORAL at 08:32

## 2024-01-01 RX ADMIN — Medication 5000 UNITS: at 08:14

## 2024-01-01 RX ADMIN — THIAMINE HCL TAB 100 MG 100 MG: 100 TAB at 08:19

## 2024-01-01 RX ADMIN — THIAMINE HCL TAB 100 MG 100 MG: 100 TAB at 08:14

## 2024-01-01 RX ADMIN — ALBUMIN (HUMAN) 25 G: 0.25 INJECTION, SOLUTION INTRAVENOUS at 17:22

## 2024-01-01 RX ADMIN — FAMOTIDINE 20 MG: 20 TABLET, FILM COATED ORAL at 17:34

## 2024-01-01 RX ADMIN — LEVOTHYROXINE SODIUM 200 MCG: 100 TABLET ORAL at 05:49

## 2024-01-01 RX ADMIN — HEPARIN SODIUM 5000 UNITS: 5000 INJECTION INTRAVENOUS; SUBCUTANEOUS at 21:18

## 2024-01-01 RX ADMIN — CHLORHEXIDINE GLUCONATE 0.12% ORAL RINSE 15 ML: 1.2 LIQUID ORAL at 21:36

## 2024-01-01 RX ADMIN — PIPERACILLIN SODIUM AND TAZOBACTAM SODIUM 4.5 G: 36; 4.5 INJECTION, POWDER, LYOPHILIZED, FOR SOLUTION INTRAVENOUS at 14:06

## 2024-01-01 RX ADMIN — HYDROMORPHONE HYDROCHLORIDE 1 MG: 1 INJECTION, SOLUTION INTRAMUSCULAR; INTRAVENOUS; SUBCUTANEOUS at 01:57

## 2024-01-01 RX ADMIN — NOREPINEPHRINE BITARTRATE 1 MCG/MIN: 1 SOLUTION INTRAVENOUS at 12:24

## 2024-01-01 RX ADMIN — IOHEXOL 85 ML: 350 INJECTION, SOLUTION INTRAVENOUS at 13:11

## 2024-01-01 RX ADMIN — HEPARIN SODIUM 5000 UNITS: 5000 INJECTION INTRAVENOUS; SUBCUTANEOUS at 14:53

## 2024-01-01 RX ADMIN — LEVETIRACETAM 750 MG: 100 INJECTION, SOLUTION INTRAVENOUS at 08:09

## 2024-01-01 RX ADMIN — PROPOFOL 150 MG: 10 INJECTION, EMULSION INTRAVENOUS at 10:28

## 2024-01-01 RX ADMIN — FAMOTIDINE 20 MG: 20 TABLET, FILM COATED ORAL at 19:58

## 2024-01-01 RX ADMIN — FENTANYL CITRATE 50 MCG: 50 INJECTION INTRAMUSCULAR; INTRAVENOUS at 23:24

## 2024-01-01 RX ADMIN — MODAFINIL 200 MG: 100 TABLET ORAL at 08:14

## 2024-01-01 RX ADMIN — ACETAMINOPHEN 650 MG: 325 TABLET, FILM COATED ORAL at 00:33

## 2024-01-01 RX ADMIN — MAGNESIUM SULFATE HEPTAHYDRATE 2 G: 40 INJECTION, SOLUTION INTRAVENOUS at 18:10

## 2024-01-01 RX ADMIN — ATOVAQUONE 750 MG: 750 SUSPENSION ORAL at 21:10

## 2024-01-01 RX ADMIN — DEXTROSE MONOHYDRATE 100 ML: 25 INJECTION, SOLUTION INTRAVENOUS at 01:56

## 2024-01-01 RX ADMIN — CALCIUM GLUCONATE 1 G: 20 INJECTION, SOLUTION INTRAVENOUS at 12:36

## 2024-01-01 RX ADMIN — ACETAMINOPHEN 1000 MG: 10 INJECTION INTRAVENOUS at 20:36

## 2024-01-01 RX ADMIN — PROPOFOL 20 MCG/KG/MIN: 10 INJECTION, EMULSION INTRAVENOUS at 06:01

## 2024-01-01 RX ADMIN — NOREPINEPHRINE BITARTRATE 16 MCG: 1 INJECTION, SOLUTION, CONCENTRATE INTRAVENOUS at 10:37

## 2024-01-01 RX ADMIN — FUROSEMIDE 40 MG: 10 INJECTION, SOLUTION INTRAMUSCULAR; INTRAVENOUS at 14:38

## 2024-01-01 RX ADMIN — THIAMINE HCL TAB 100 MG 100 MG: 100 TAB at 08:06

## 2024-01-01 RX ADMIN — ACETAMINOPHEN 1000 MG: 10 INJECTION INTRAVENOUS at 12:23

## 2024-01-01 RX ADMIN — SODIUM CHLORIDE, SODIUM LACTATE, POTASSIUM CHLORIDE, AND CALCIUM CHLORIDE 1000 ML: .6; .31; .03; .02 INJECTION, SOLUTION INTRAVENOUS at 01:25

## 2024-01-01 RX ADMIN — ACETYLCYSTEINE 600 MG: 200 SOLUTION ORAL; RESPIRATORY (INHALATION) at 07:49

## 2024-01-01 RX ADMIN — ACETAMINOPHEN 1000 MG: 10 INJECTION INTRAVENOUS at 11:24

## 2024-01-01 RX ADMIN — VANCOMYCIN HYDROCHLORIDE 1750 MG: 1 INJECTION, POWDER, LYOPHILIZED, FOR SOLUTION INTRAVENOUS at 08:52

## 2024-01-01 RX ADMIN — HEPARIN SODIUM 5000 UNITS: 5000 INJECTION INTRAVENOUS; SUBCUTANEOUS at 21:01

## 2024-01-01 RX ADMIN — SODIUM CHLORIDE, SODIUM LACTATE, POTASSIUM CHLORIDE, AND CALCIUM CHLORIDE 1000 ML: .6; .31; .03; .02 INJECTION, SOLUTION INTRAVENOUS at 02:18

## 2024-01-01 RX ADMIN — PIPERACILLIN SODIUM AND TAZOBACTAM SODIUM 4.5 G: 36; 4.5 INJECTION, POWDER, LYOPHILIZED, FOR SOLUTION INTRAVENOUS at 14:00

## 2024-01-01 RX ADMIN — FAMOTIDINE 20 MG: 20 TABLET, FILM COATED ORAL at 17:53

## 2024-01-01 RX ADMIN — IPRATROPIUM BROMIDE 0.5 MG: 0.5 SOLUTION RESPIRATORY (INHALATION) at 19:34

## 2024-01-01 RX ADMIN — HEPARIN SODIUM 5000 UNITS: 5000 INJECTION INTRAVENOUS; SUBCUTANEOUS at 05:22

## 2024-01-01 RX ADMIN — ACETYLCYSTEINE 600 MG: 200 SOLUTION ORAL; RESPIRATORY (INHALATION) at 01:45

## 2024-01-01 RX ADMIN — Medication 10 ML: at 10:31

## 2024-01-01 RX ADMIN — SODIUM CHLORIDE SOLN NEBU 3% 4 ML: 3 NEBU SOLN at 06:56

## 2024-01-01 RX ADMIN — HEPARIN SODIUM 5000 UNITS: 5000 INJECTION INTRAVENOUS; SUBCUTANEOUS at 05:06

## 2024-01-01 RX ADMIN — VANCOMYCIN HYDROCHLORIDE 1750 MG: 1 INJECTION, POWDER, LYOPHILIZED, FOR SOLUTION INTRAVENOUS at 21:20

## 2024-01-01 RX ADMIN — HYDROMORPHONE HYDROCHLORIDE 1 MG: 1 INJECTION, SOLUTION INTRAMUSCULAR; INTRAVENOUS; SUBCUTANEOUS at 06:11

## 2024-01-01 RX ADMIN — FENTANYL CITRATE 50 MCG: 50 INJECTION INTRAMUSCULAR; INTRAVENOUS at 20:36

## 2024-01-01 RX ADMIN — FENTANYL CITRATE 100 MCG: 50 INJECTION INTRAMUSCULAR; INTRAVENOUS at 15:29

## 2024-01-01 RX ADMIN — FENTANYL CITRATE 50 MCG: 50 INJECTION INTRAMUSCULAR; INTRAVENOUS at 11:14

## 2024-01-01 RX ADMIN — GADOBUTROL 10 ML: 604.72 INJECTION INTRAVENOUS at 23:51

## 2024-01-01 RX ADMIN — LEVOTHYROXINE SODIUM 200 MCG: 100 TABLET ORAL at 05:40

## 2024-01-01 RX ADMIN — ACETYLCYSTEINE 600 MG: 200 SOLUTION ORAL; RESPIRATORY (INHALATION) at 07:17

## 2024-01-01 RX ADMIN — LEVALBUTEROL HYDROCHLORIDE 1.25 MG: 1.25 SOLUTION RESPIRATORY (INHALATION) at 19:58

## 2024-01-01 RX ADMIN — VANCOMYCIN HYDROCHLORIDE 1250 MG: 10 INJECTION, POWDER, LYOPHILIZED, FOR SOLUTION INTRAVENOUS at 00:04

## 2024-01-01 RX ADMIN — PROPOFOL 30 MCG/KG/MIN: 10 INJECTION, EMULSION INTRAVENOUS at 18:09

## 2024-01-01 RX ADMIN — CALCIUM GLUCONATE 1 G: 20 INJECTION, SOLUTION INTRAVENOUS at 08:16

## 2024-01-01 RX ADMIN — IPRATROPIUM BROMIDE 0.5 MG: 0.5 SOLUTION RESPIRATORY (INHALATION) at 19:14

## 2024-01-01 RX ADMIN — LEVALBUTEROL HYDROCHLORIDE 1.25 MG: 1.25 SOLUTION RESPIRATORY (INHALATION) at 13:13

## 2024-02-28 ENCOUNTER — HOSPITAL ENCOUNTER (OUTPATIENT)
Dept: CT IMAGING | Facility: HOSPITAL | Age: 44
Discharge: HOME | End: 2024-02-28
Payer: COMMERCIAL

## 2024-02-28 DIAGNOSIS — M79.89 OTHER SPECIFIED SOFT TISSUE DISORDERS: ICD-10-CM

## 2024-02-28 PROCEDURE — 71250 CT THORAX DX C-: CPT

## 2024-03-15 ENCOUNTER — APPOINTMENT (EMERGENCY)
Dept: RADIOLOGY | Facility: HOSPITAL | Age: 44
End: 2024-03-15
Payer: COMMERCIAL

## 2024-03-15 ENCOUNTER — HOSPITAL ENCOUNTER (EMERGENCY)
Facility: HOSPITAL | Age: 44
Discharge: HOME | End: 2024-03-16
Attending: EMERGENCY MEDICINE
Payer: COMMERCIAL

## 2024-03-15 ENCOUNTER — APPOINTMENT (EMERGENCY)
Dept: CT IMAGING | Facility: HOSPITAL | Age: 44
End: 2024-03-15
Payer: COMMERCIAL

## 2024-03-15 DIAGNOSIS — R07.89 ATYPICAL CHEST PAIN: Primary | ICD-10-CM

## 2024-03-15 LAB
2HR DELTA HS TROPONIN: 0 NG/L
ALBUMIN SERPL BCP-MCNC: 3.5 G/DL (ref 3.5–5)
ALP SERPL-CCNC: 146 U/L (ref 34–104)
ALT SERPL W P-5'-P-CCNC: 38 U/L (ref 7–52)
ANION GAP SERPL CALCULATED.3IONS-SCNC: 6 MMOL/L (ref 4–13)
APTT PPP: 36 SECONDS (ref 23–37)
AST SERPL W P-5'-P-CCNC: 49 U/L (ref 13–39)
BASOPHILS # BLD AUTO: 0.04 THOUSANDS/ÂΜL (ref 0–0.1)
BASOPHILS NFR BLD AUTO: 1 % (ref 0–1)
BILIRUB SERPL-MCNC: 1.23 MG/DL (ref 0.2–1)
BNP SERPL-MCNC: 59 PG/ML (ref 0–100)
BUN SERPL-MCNC: 18 MG/DL (ref 5–25)
CALCIUM SERPL-MCNC: 8.5 MG/DL (ref 8.4–10.2)
CARDIAC TROPONIN I PNL SERPL HS: 4 NG/L
CARDIAC TROPONIN I PNL SERPL HS: 4 NG/L
CHLORIDE SERPL-SCNC: 100 MMOL/L (ref 96–108)
CO2 SERPL-SCNC: 30 MMOL/L (ref 21–32)
CREAT SERPL-MCNC: 0.99 MG/DL (ref 0.6–1.3)
D DIMER PPP FEU-MCNC: 3.52 UG/ML FEU
EOSINOPHIL # BLD AUTO: 0 THOUSAND/ÂΜL (ref 0–0.61)
EOSINOPHIL NFR BLD AUTO: 0 % (ref 0–6)
ERYTHROCYTE [DISTWIDTH] IN BLOOD BY AUTOMATED COUNT: 15.9 % (ref 11.6–15.1)
ERYTHROCYTE [SEDIMENTATION RATE] IN BLOOD: 3 MM/HOUR (ref 0–14)
GFR SERPL CREATININE-BSD FRML MDRD: 92 ML/MIN/1.73SQ M
GLUCOSE SERPL-MCNC: 83 MG/DL (ref 65–140)
HCT VFR BLD AUTO: 35.8 % (ref 36.5–49.3)
HGB BLD-MCNC: 11.4 G/DL (ref 12–17)
IMM GRANULOCYTES # BLD AUTO: 0.05 THOUSAND/UL (ref 0–0.2)
IMM GRANULOCYTES NFR BLD AUTO: 1 % (ref 0–2)
INR PPP: 1.08 (ref 0.84–1.19)
LYMPHOCYTES # BLD AUTO: 0.66 THOUSANDS/ÂΜL (ref 0.6–4.47)
LYMPHOCYTES NFR BLD AUTO: 8 % (ref 14–44)
MAGNESIUM SERPL-MCNC: 1.8 MG/DL (ref 1.9–2.7)
MCH RBC QN AUTO: 29 PG (ref 26.8–34.3)
MCHC RBC AUTO-ENTMCNC: 31.8 G/DL (ref 31.4–37.4)
MCV RBC AUTO: 91 FL (ref 82–98)
MONOCYTES # BLD AUTO: 0.69 THOUSAND/ÂΜL (ref 0.17–1.22)
MONOCYTES NFR BLD AUTO: 8 % (ref 4–12)
NEUTROPHILS # BLD AUTO: 7.03 THOUSANDS/ÂΜL (ref 1.85–7.62)
NEUTS SEG NFR BLD AUTO: 82 % (ref 43–75)
NRBC BLD AUTO-RTO: 0 /100 WBCS
PLATELET # BLD AUTO: 358 THOUSANDS/UL (ref 149–390)
PMV BLD AUTO: 10.1 FL (ref 8.9–12.7)
POTASSIUM SERPL-SCNC: 4.3 MMOL/L (ref 3.5–5.3)
PROT SERPL-MCNC: 5.8 G/DL (ref 6.4–8.4)
PROTHROMBIN TIME: 14.3 SECONDS (ref 11.6–14.5)
RBC # BLD AUTO: 3.93 MILLION/UL (ref 3.88–5.62)
SODIUM SERPL-SCNC: 136 MMOL/L (ref 135–147)
TSH SERPL DL<=0.05 MIU/L-ACNC: 20.67 UIU/ML (ref 0.45–4.5)
WBC # BLD AUTO: 8.47 THOUSAND/UL (ref 4.31–10.16)

## 2024-03-15 PROCEDURE — 80053 COMPREHEN METABOLIC PANEL: CPT | Performed by: EMERGENCY MEDICINE

## 2024-03-15 PROCEDURE — 99285 EMERGENCY DEPT VISIT HI MDM: CPT

## 2024-03-15 PROCEDURE — 85730 THROMBOPLASTIN TIME PARTIAL: CPT | Performed by: EMERGENCY MEDICINE

## 2024-03-15 PROCEDURE — 84439 ASSAY OF FREE THYROXINE: CPT | Performed by: EMERGENCY MEDICINE

## 2024-03-15 PROCEDURE — 71046 X-RAY EXAM CHEST 2 VIEWS: CPT

## 2024-03-15 PROCEDURE — 83880 ASSAY OF NATRIURETIC PEPTIDE: CPT | Performed by: EMERGENCY MEDICINE

## 2024-03-15 PROCEDURE — 85652 RBC SED RATE AUTOMATED: CPT | Performed by: EMERGENCY MEDICINE

## 2024-03-15 PROCEDURE — 84443 ASSAY THYROID STIM HORMONE: CPT | Performed by: EMERGENCY MEDICINE

## 2024-03-15 PROCEDURE — 83735 ASSAY OF MAGNESIUM: CPT | Performed by: EMERGENCY MEDICINE

## 2024-03-15 PROCEDURE — 36415 COLL VENOUS BLD VENIPUNCTURE: CPT | Performed by: EMERGENCY MEDICINE

## 2024-03-15 PROCEDURE — 85610 PROTHROMBIN TIME: CPT | Performed by: EMERGENCY MEDICINE

## 2024-03-15 PROCEDURE — 93005 ELECTROCARDIOGRAM TRACING: CPT

## 2024-03-15 PROCEDURE — 85025 COMPLETE CBC W/AUTO DIFF WBC: CPT | Performed by: EMERGENCY MEDICINE

## 2024-03-15 PROCEDURE — 84484 ASSAY OF TROPONIN QUANT: CPT | Performed by: EMERGENCY MEDICINE

## 2024-03-15 PROCEDURE — 71275 CT ANGIOGRAPHY CHEST: CPT

## 2024-03-15 PROCEDURE — 99285 EMERGENCY DEPT VISIT HI MDM: CPT | Performed by: EMERGENCY MEDICINE

## 2024-03-15 PROCEDURE — 85379 FIBRIN DEGRADATION QUANT: CPT | Performed by: EMERGENCY MEDICINE

## 2024-03-15 RX ORDER — FERROUS SULFATE 325(65) MG
325 TABLET ORAL
COMMUNITY
Start: 2024-02-26

## 2024-03-15 RX ORDER — LEVOTHYROXINE SODIUM 0.15 MG/1
150 TABLET ORAL DAILY
COMMUNITY
Start: 2024-03-14

## 2024-03-15 RX ORDER — FUROSEMIDE 20 MG/1
20 TABLET ORAL DAILY
COMMUNITY
Start: 2024-03-12

## 2024-03-15 RX ADMIN — IOHEXOL 85 ML: 350 INJECTION, SOLUTION INTRAVENOUS at 22:59

## 2024-03-15 NOTE — Clinical Note
Clayton Duval was seen and treated in our emergency department on 3/15/2024.                Diagnosis:     Clayton  is off the rest of the shift today.    He may return on this date:          If you have any questions or concerns, please don't hesitate to call.      Rona Esquivel, DO    ______________________________           _______________          _______________  Hospital Representative                              Date                                Time

## 2024-03-16 VITALS
WEIGHT: 237.66 LBS | RESPIRATION RATE: 16 BRPM | DIASTOLIC BLOOD PRESSURE: 72 MMHG | TEMPERATURE: 96.6 F | BODY MASS INDEX: 31.35 KG/M2 | SYSTOLIC BLOOD PRESSURE: 114 MMHG | OXYGEN SATURATION: 96 % | HEART RATE: 77 BPM

## 2024-03-16 LAB — T4 FREE SERPL-MCNC: 0.59 NG/DL (ref 0.61–1.12)

## 2024-03-16 NOTE — ED PROVIDER NOTES
History  Chief Complaint   Patient presents with    Chest Pain     Patient presents to the ED with complaints of chest pain that began at work this evening. He states he also felt shaky.      Patient is a 43-year-old male presenting to the emergency department complaining of lightheadedness, feeling weak and shaky in the legs, with fleeting episode of stabbing chest pain occurring while he was at work today, he works at as a  at [employer], he does perform a degree of physical labor, he denies feeling ill earlier in the day, has been eating and drinking well, he does have a history of hypothyroidism and his medication is being adjusted slowly, he also has a history of gastric bypass surgery previously        Prior to Admission Medications   Prescriptions Last Dose Informant Patient Reported? Taking?   docusate sodium (COLACE) 100 mg capsule   No No   Sig: Take 1 capsule (100 mg total) by mouth every 12 (twelve) hours   ferrous sulfate 325 (65 Fe) mg tablet   Yes Yes   Sig: Take 325 mg by mouth daily with breakfast   furosemide (LASIX) 20 mg tablet   Yes Yes   Sig: Take 20 mg by mouth daily   hydrocortisone (ANUSOL-HC) 2.5 % rectal cream   No No   Sig: Apply topically 2 (two) times a day for 5 days   levothyroxine 150 mcg tablet   Yes Yes   Sig: Take 150 mcg by mouth daily      Facility-Administered Medications: None       History reviewed. No pertinent past medical history.    Past Surgical History:   Procedure Laterality Date    GASTRIC BYPASS  2012    GASTRIC BYPASS         History reviewed. No pertinent family history.  I have reviewed and agree with the history as documented.    E-Cigarette/Vaping    E-Cigarette Use Never User      E-Cigarette/Vaping Substances     Social History     Tobacco Use    Smoking status: Never    Smokeless tobacco: Current   Vaping Use    Vaping status: Never Used   Substance Use Topics    Alcohol use: Never    Drug use: Never       Review of Systems   Constitutional: Negative.     HENT: Negative.     Eyes: Negative.    Respiratory: Negative.     Cardiovascular:  Positive for chest pain.   Gastrointestinal: Negative.    Endocrine: Negative.    Genitourinary: Negative.    Musculoskeletal: Negative.    Skin: Negative.    Allergic/Immunologic: Negative.    Neurological:  Positive for light-headedness.   Hematological: Negative.    Psychiatric/Behavioral: Negative.         Physical Exam  Physical Exam  Constitutional:       Appearance: He is well-developed.   HENT:      Head: Normocephalic and atraumatic.   Eyes:      Conjunctiva/sclera: Conjunctivae normal.      Pupils: Pupils are equal, round, and reactive to light.   Cardiovascular:      Rate and Rhythm: Normal rate and regular rhythm.      Heart sounds: Normal heart sounds.   Pulmonary:      Effort: Pulmonary effort is normal.      Breath sounds: Normal breath sounds.   Abdominal:      Palpations: Abdomen is soft.   Musculoskeletal:         General: Normal range of motion.      Cervical back: Normal range of motion and neck supple.   Skin:     General: Skin is warm and dry.      Coloration: Skin is pale.   Neurological:      Mental Status: He is alert and oriented to person, place, and time.         Vital Signs  ED Triage Vitals [03/15/24 2115]   Temperature Pulse Respirations Blood Pressure SpO2   (!) 96.6 °F (35.9 °C) 75 18 132/81 98 %      Temp Source Heart Rate Source Patient Position - Orthostatic VS BP Location FiO2 (%)   Temporal Monitor Sitting Left arm --      Pain Score       3           Vitals:    03/15/24 2345 03/16/24 0000 03/16/24 0015 03/16/24 0030   BP: 109/70 123/82 118/82 114/72   Pulse: 68 78 80 77   Patient Position - Orthostatic VS: Lying Lying Lying Lying         Visual Acuity      ED Medications  Medications   iohexol (OMNIPAQUE) 350 MG/ML injection (SINGLE-DOSE) 85 mL (85 mL Intravenous Given 3/15/24 5748)       Diagnostic Studies  Results Reviewed       Procedure Component Value Units Date/Time    T4, free  [[id number]] Collected: 03/15/24 2138    Lab Status: In process Specimen: Blood from Arm, Right Updated: 03/15/24 2346    HS Troponin I 2hr [[id number]]  (Normal) Collected: 03/15/24 2316    Lab Status: Final result Specimen: Blood from Arm, Right Updated: 03/15/24 2344     hs TnI 2hr 4 ng/L      Delta 2hr hsTnI 0 ng/L     D-Dimer [[id number]]  (Abnormal) Collected: 03/15/24 2138    Lab Status: Final result Specimen: Blood from Arm, Right Updated: 03/15/24 2223     D-Dimer, Quant 3.52 ug/ml FEU     TSH, 3rd generation with Free T4 reflex [[id number]]  (Abnormal) Collected: 03/15/24 2138    Lab Status: Final result Specimen: Blood from Arm, Right Updated: 03/15/24 2217     TSH 3RD GENERATON 20.669 uIU/mL     B-Type Natriuretic Peptide(BNP) [[id number]]  (Normal) Collected: 03/15/24 2138    Lab Status: Final result Specimen: Blood from Arm, Right Updated: 03/15/24 2213     BNP 59 pg/mL     HS Troponin 0hr (reflex protocol) [[id number]]  (Normal) Collected: 03/15/24 2138    Lab Status: Final result Specimen: Blood from Arm, Right Updated: 03/15/24 2208     hs TnI 0hr 4 ng/L     Comprehensive metabolic panel [[id number]]  (Abnormal) Collected: 03/15/24 2138    Lab Status: Final result Specimen: Blood from Arm, Right Updated: 03/15/24 2201     Sodium 136 mmol/L      Potassium 4.3 mmol/L      Chloride 100 mmol/L      CO2 30 mmol/L      ANION GAP 6 mmol/L      BUN 18 mg/dL      Creatinine 0.99 mg/dL      Glucose 83 mg/dL      Calcium 8.5 mg/dL      AST 49 U/L      ALT 38 U/L      Alkaline Phosphatase 146 U/L      Total Protein 5.8 g/dL      Albumin 3.5 g/dL      Total Bilirubin 1.23 mg/dL      eGFR 92 ml/min/1.73sq m     Narrative:      National Kidney Disease Foundation guidelines for Chronic Kidney Disease (CKD):     Stage 1 with normal or high GFR (GFR > 90 mL/min/1.73 square meters)    Stage 2 Mild CKD (GFR = 60-89 mL/min/1.73 square meters)    Stage 3A Moderate CKD (GFR = 45-59 mL/min/1.73 square meters)    Stage 3B Moderate  CKD (GFR = 30-44 mL/min/1.73 square meters)    Stage 4 Severe CKD (GFR = 15-29 mL/min/1.73 square meters)    Stage 5 End Stage CKD (GFR <15 mL/min/1.73 square meters)  Note: GFR calculation is accurate only with a steady state creatinine    Magnesium [[id number]]  (Abnormal) Collected: 03/15/24 2138    Lab Status: Final result Specimen: Blood from Arm, Right Updated: 03/15/24 2201     Magnesium 1.8 mg/dL     Protime-INR [[id number]]  (Normal) Collected: 03/15/24 2138    Lab Status: Final result Specimen: Blood from Arm, Right Updated: 03/15/24 2157     Protime 14.3 seconds      INR 1.08    APTT [[id number]]  (Normal) Collected: 03/15/24 2138    Lab Status: Final result Specimen: Blood from Arm, Right Updated: 03/15/24 2157     PTT 36 seconds     Sedimentation rate, automated [[id number]]  (Normal) Collected: 03/15/24 2138    Lab Status: Final result Specimen: Blood from Arm, Right Updated: 03/15/24 2147     Sed Rate 3 mm/hour     CBC and differential [[id number]]  (Abnormal) Collected: 03/15/24 2138    Lab Status: Final result Specimen: Blood from Arm, Right Updated: 03/15/24 2144     WBC 8.47 Thousand/uL      RBC 3.93 Million/uL      Hemoglobin 11.4 g/dL      Hematocrit 35.8 %      MCV 91 fL      MCH 29.0 pg      MCHC 31.8 g/dL      RDW 15.9 %      MPV 10.1 fL      Platelets 358 Thousands/uL      nRBC 0 /100 WBCs      Neutrophils Relative 82 %      Immature Grans % 1 %      Lymphocytes Relative 8 %      Monocytes Relative 8 %      Eosinophils Relative 0 %      Basophils Relative 1 %      Neutrophils Absolute 7.03 Thousands/µL      Absolute Immature Grans 0.05 Thousand/uL      Absolute Lymphocytes 0.66 Thousands/µL      Absolute Monocytes 0.69 Thousand/µL      Eosinophils Absolute 0.00 Thousand/µL      Basophils Absolute 0.04 Thousands/µL                    CTA ED chest PE study   Final Result by Wai Waller MD (03/16 0022)      1.  No acute pulmonary embolism or thoracic aortic aneurysm/dissection.   2.  Stable right  middle lobe atelectasis and a parenchymal scarring. No lobar airspace consolidation/pneumonia, pneumothorax, or pleural effusions.   3.  Abnormal sclerotic appearance of the axial skeleton with multiple areas of bony erosions and associated soft tissue thickening and pleural thickening especially involving the right anterior third rib and left anterior fourth rib. These findings are    not significantly changed and concerning for metastatic disease.   4.  Abnormal heterogeneous appearing thyroid gland containing 1.8 cm left thyroid hypodense nodule again noted, recommend nonemergent thyroid ultrasound per current guidelines. Additional 1.5 cm nodule just inferior from the left thyroid lobe may    represent parathyroid nodule, recommend clinical correlation and additional medical work-up.   5.  A few slightly prominent right axillary lymph nodes and pathologically enlarged left axillary lymph nodes noted measuring up to 3.1 x 1.8 cm on the left.   6.  Hepatosplenomegaly, sequelae of prior sleeve gastrectomy, cholecystectomy, and mild colonic constipation noted in the visualized upper abdomen.   7.  Additional ancillary findings detailed above.      Workstation performed: [id number]         XR chest 2 views   ED Interpretation by Radha Pride DO (03/15 2211)   No acute findings                 Procedures  ECG 12 Lead Documentation Only    Date/Time: 3/15/2024 11:12 PM    Performed by: Radha Pride DO  Authorized by: Radha Pride DO    Indications / Diagnosis:  Chest pain  ECG reviewed by me, the ED Provider: yes    Patient location:  ED  Previous ECG:     Comparison to cardiac monitor: Yes    Interpretation:     Interpretation: normal    Rate:     ECG rate:  81  Ectopy:     Ectopy: none    QRS:     QRS intervals:  Normal  Conduction:     Conduction: normal    ST segments:     ST segments:  Normal  T waves:     T waves: inverted      Inverted:  III           ED Course             HEART Risk Score      Flowsheet  Row Most Recent Value   Heart Score Risk Calculator    History 0 Filed at: 03/15/2024 2313   ECG 0 Filed at: 03/15/2024 2313   Age 0 Filed at: 03/15/2024 2313   Risk Factors 1 Filed at: 03/15/2024 2313   Troponin 0 Filed at: 03/15/2024 2313   HEART Score 1 Filed at: 03/15/2024 2313                          SBIRT 22yo+      Flowsheet Row Most Recent Value   Initial Alcohol Screen: US AUDIT-C     1. How often do you have a drink containing alcohol? 0 Filed at: 03/15/2024 2115   2. How many drinks containing alcohol do you have on a typical day you are drinking?  0 Filed at: 03/15/2024 2115   3a. Male UNDER 65: How often do you have five or more drinks on one occasion? 0 Filed at: 03/15/2024 2115   Audit-C Score 0 Filed at: 03/15/2024 2115   BREANN: How many times in the past year have you...    Used an illegal drug or used a prescription medication for non-medical reasons? Never Filed at: 03/15/2024 2115                      Medical Decision Making  Exam without evidence of volume overload so doubt heart failure.  EKG without signs of active ischemia.  Given the timing of pain to ER presentation single/delta troponin negative so doubt NSTEMI.  Presentation not consistent with acute PE, pneumothorax (CXR negative), thoracic aortic dissection, pericarditis, tamponade, pneumonia (no signs of infection, CXR negative), myocarditis.  Heart score low so plan to discharge patient home with PMD follow-up. (no recent illness, Trop negative)  CT scan findings were discussed with patient at bedside concerning for metastatic disease, advise close follow-up, states he had an outpatient CT done with his PCP recently and he is awaiting follow-up, advised patient to return if any additional concerns      Problems Addressed:  Atypical chest pain: acute illness or injury    Amount and/or Complexity of Data Reviewed  Labs: ordered. Decision-making details documented in ED Course.  Radiology: ordered and independent interpretation  performed. Decision-making details documented in ED Course.  ECG/medicine tests: ordered and independent interpretation performed. Decision-making details documented in ED Course.    Risk  Prescription drug management.             Disposition  Final diagnoses:   Atypical chest pain     Time reflects when diagnosis was documented in both MDM as applicable and the Disposition within this note       Time User Action Codes Description Comment    3/16/2024 12:07 AM Radha Pride Add [R07.89] Atypical chest pain           ED Disposition       ED Disposition   Discharge    Condition   Stable    Date/Time   Sat Mar 16, 2024 0030    Comment   Clayton Duval discharge to home/self care.                   Follow-up Information       Follow up With Specialties Details Why Contact Info    Marcio Ann DO Internal Medicine In 2 days As needed [address] [facility] [address] [phone]              Discharge Medication List as of 3/16/2024 12:07 AM        CONTINUE these medications which have NOT CHANGED    Details   ferrous sulfate 325 (65 Fe) mg tablet Take 325 mg by mouth daily with breakfast, Starting Mon 2/26/2024, Historical Med      furosemide (LASIX) 20 mg tablet Take 20 mg by mouth daily, Starting Tue 3/12/2024, Historical Med      levothyroxine 150 mcg tablet Take 150 mcg by mouth daily, Starting Thu 3/14/2024, Historical Med      docusate sodium (COLACE) 100 mg capsule Take 1 capsule (100 mg total) by mouth every 12 (twelve) hours, Starting Fri 6/30/2023, Normal      hydrocortisone (ANUSOL-HC) 2.5 % rectal cream Apply topically 2 (two) times a day for 5 days, Starting Fri 6/30/2023, Until Wed 7/5/2023, Normal             No discharge procedures on file.    PDMP Review       None            ED Provider  Electronically Signed by             Radha Pride DO  03/16/24 0041

## 2024-03-18 LAB
ATRIAL RATE: 81 BPM
P AXIS: 37 DEGREES
PR INTERVAL: 144 MS
QRS AXIS: 1 DEGREES
QRSD INTERVAL: 106 MS
QT INTERVAL: 366 MS
QTC INTERVAL: 425 MS
T WAVE AXIS: 12 DEGREES
VENTRICULAR RATE: 81 BPM

## 2024-03-22 ENCOUNTER — HOSPITAL ENCOUNTER (OUTPATIENT)
Dept: ULTRASOUND IMAGING | Facility: HOSPITAL | Age: 44
End: 2024-03-22
Payer: COMMERCIAL

## 2024-03-22 DIAGNOSIS — E04.1 NONTOXIC SINGLE THYROID NODULE: ICD-10-CM

## 2024-03-22 DIAGNOSIS — E21.1 SECONDARY HYPERPARATHYROIDISM, NOT ELSEWHERE CLASSIFIED (HCC): ICD-10-CM

## 2024-03-22 PROCEDURE — 76536 US EXAM OF HEAD AND NECK: CPT

## 2024-04-15 ENCOUNTER — TELEPHONE (OUTPATIENT)
Age: 44
End: 2024-04-15

## 2024-04-24 ENCOUNTER — HOSPITAL ENCOUNTER (OUTPATIENT)
Dept: NON INVASIVE DIAGNOSTICS | Facility: HOSPITAL | Age: 44
Discharge: HOME | End: 2024-04-24
Payer: COMMERCIAL

## 2024-04-24 VITALS
DIASTOLIC BLOOD PRESSURE: 72 MMHG | SYSTOLIC BLOOD PRESSURE: 114 MMHG | HEART RATE: 80 BPM | HEIGHT: 73 IN | BODY MASS INDEX: 31.41 KG/M2 | WEIGHT: 237 LBS

## 2024-04-24 DIAGNOSIS — R60.0 LOCALIZED EDEMA: ICD-10-CM

## 2024-04-24 LAB
AORTIC ROOT: 3.6 CM
APICAL FOUR CHAMBER EJECTION FRACTION: 67 %
BSA FOR ECHO PROCEDURE: 2.31 M2
E WAVE DECELERATION TIME: 147 MS
E/A RATIO: 0.7
FRACTIONAL SHORTENING: 42 (ref 28–44)
INTERVENTRICULAR SEPTUM IN DIASTOLE (PARASTERNAL SHORT AXIS VIEW): 1 CM
INTERVENTRICULAR SEPTUM: 1 CM (ref 0.6–1.1)
LAAS-AP2: 19 CM2
LAAS-AP4: 16.5 CM2
LEFT ATRIUM SIZE: 4.2 CM
LEFT ATRIUM VOLUME (MOD BIPLANE): 53 ML
LEFT ATRIUM VOLUME INDEX (MOD BIPLANE): 22.8 ML/M2
LEFT INTERNAL DIMENSION IN SYSTOLE: 3.4 CM (ref 2.1–4)
LEFT VENTRICLE DIASTOLIC VOLUME (MOD BIPLANE): 98 ML
LEFT VENTRICLE DIASTOLIC VOLUME INDEX (MOD BIPLANE): 42.4 ML/M2
LEFT VENTRICLE SYSTOLIC VOLUME (MOD BIPLANE): 35 ML
LEFT VENTRICLE SYSTOLIC VOLUME INDEX (MOD BIPLANE): 15.2 ML/M2
LEFT VENTRICULAR INTERNAL DIMENSION IN DIASTOLE: 5.9 CM (ref 3.5–6)
LEFT VENTRICULAR POSTERIOR WALL IN END DIASTOLE: 1.1 CM
LEFT VENTRICULAR STROKE VOLUME: 122 ML
LV EF: 65 %
LVSV (TEICH): 122 ML
MV E'TISSUE VEL-LAT: 11 CM/S
MV E'TISSUE VEL-SEP: 9 CM/S
MV PEAK A VEL: 1.02 M/S
MV PEAK E VEL: 71 CM/S
MV STENOSIS PRESSURE HALF TIME: 43 MS
MV VALVE AREA P 1/2 METHOD: 5.12
RIGHT ATRIUM AREA SYSTOLE A4C: 10.7 CM2
RIGHT VENTRICLE ID DIMENSION: 4.3 CM
SL CV LEFT ATRIUM LENGTH A2C: 4.9 CM
SL CV PED ECHO LEFT VENTRICLE DIASTOLIC VOLUME (MOD BIPLANE) 2D: 171 ML
SL CV PED ECHO LEFT VENTRICLE SYSTOLIC VOLUME (MOD BIPLANE) 2D: 49 ML
TR MAX PG: 18 MMHG
TR PEAK VELOCITY: 2.2 M/S
TRICUSPID ANNULAR PLANE SYSTOLIC EXCURSION: 3 CM
TRICUSPID VALVE PEAK REGURGITATION VELOCITY: 2.15 M/S

## 2024-04-24 PROCEDURE — 93306 TTE W/DOPPLER COMPLETE: CPT

## 2024-04-24 PROCEDURE — 93306 TTE W/DOPPLER COMPLETE: CPT | Performed by: INTERNAL MEDICINE

## 2024-10-01 ENCOUNTER — HOSPITAL ENCOUNTER (OUTPATIENT)
Dept: RADIOLOGY | Facility: HOSPITAL | Age: 44
Discharge: HOME | End: 2024-10-01
Payer: COMMERCIAL

## 2024-10-01 DIAGNOSIS — R05.1 ACUTE COUGH: ICD-10-CM

## 2024-10-01 DIAGNOSIS — R50.9 FEVER, UNKNOWN ORIGIN: ICD-10-CM

## 2024-10-01 PROCEDURE — 71046 X-RAY EXAM CHEST 2 VIEWS: CPT

## 2024-10-03 ENCOUNTER — TELEPHONE (OUTPATIENT)
Dept: ENDOCRINOLOGY | Facility: CLINIC | Age: 44
End: 2024-10-03

## 2024-10-03 NOTE — TELEPHONE ENCOUNTER
LVM for PT 10/3/24 after receiving referral for patient to see . Provided office number so patient may call back to schedule. Referral loaded in chart.

## 2024-10-10 ENCOUNTER — APPOINTMENT (EMERGENCY)
Dept: CT IMAGING | Facility: HOSPITAL | Age: 44
DRG: 435 | End: 2024-10-10
Payer: COMMERCIAL

## 2024-10-10 ENCOUNTER — HOSPITAL ENCOUNTER (INPATIENT)
Facility: HOSPITAL | Age: 44
LOS: 2 days | DRG: 435 | End: 2024-10-13
Attending: EMERGENCY MEDICINE | Admitting: FAMILY MEDICINE
Payer: COMMERCIAL

## 2024-10-10 DIAGNOSIS — E87.1 HYPONATREMIA: ICD-10-CM

## 2024-10-10 DIAGNOSIS — R17 ELEVATED BILIRUBIN: ICD-10-CM

## 2024-10-10 DIAGNOSIS — E87.20 LACTIC ACIDOSIS: ICD-10-CM

## 2024-10-10 DIAGNOSIS — J90 PLEURAL EFFUSION, BILATERAL: ICD-10-CM

## 2024-10-10 DIAGNOSIS — S06.5XAA SUBDURAL HEMATOMA (HCC): Primary | ICD-10-CM

## 2024-10-10 DIAGNOSIS — R60.1 ANASARCA: ICD-10-CM

## 2024-10-10 DIAGNOSIS — D73.89 SPLENIC LESION: ICD-10-CM

## 2024-10-10 DIAGNOSIS — K76.9 HEPATIC LESION: ICD-10-CM

## 2024-10-10 DIAGNOSIS — R53.1 GENERALIZED WEAKNESS: ICD-10-CM

## 2024-10-10 DIAGNOSIS — M89.8X5 LYTIC BONE LESION OF HIP: ICD-10-CM

## 2024-10-10 DIAGNOSIS — R06.02 SHORTNESS OF BREATH: ICD-10-CM

## 2024-10-10 DIAGNOSIS — E83.42 HYPOMAGNESEMIA: ICD-10-CM

## 2024-10-10 DIAGNOSIS — R79.89 ELEVATED LFTS: ICD-10-CM

## 2024-10-10 LAB
ABO GROUP BLD: NORMAL
ALBUMIN SERPL BCG-MCNC: 2.4 G/DL (ref 3.5–5)
ALP SERPL-CCNC: 538 U/L (ref 34–104)
ALT SERPL W P-5'-P-CCNC: 330 U/L (ref 7–52)
ANION GAP SERPL CALCULATED.3IONS-SCNC: 7 MMOL/L (ref 4–13)
ANISOCYTOSIS BLD QL SMEAR: PRESENT
APTT PPP: 54 SECONDS (ref 23–34)
AST SERPL W P-5'-P-CCNC: 599 U/L (ref 13–39)
BASOPHILS # BLD MANUAL: 0 THOUSAND/UL (ref 0–0.1)
BASOPHILS NFR MAR MANUAL: 0 % (ref 0–1)
BILIRUB DIRECT SERPL-MCNC: 1.13 MG/DL (ref 0–0.2)
BILIRUB SERPL-MCNC: 2.39 MG/DL (ref 0.2–1)
BLD GP AB SCN SERPL QL: NEGATIVE
BNP SERPL-MCNC: 30 PG/ML (ref 0–100)
BUN SERPL-MCNC: 26 MG/DL (ref 5–25)
CALCIUM ALBUM COR SERPL-MCNC: 8.8 MG/DL (ref 8.3–10.1)
CALCIUM SERPL-MCNC: 7.5 MG/DL (ref 8.4–10.2)
CARDIAC TROPONIN I PNL SERPL HS: 23 NG/L
CHLORIDE SERPL-SCNC: 96 MMOL/L (ref 96–108)
CO2 SERPL-SCNC: 25 MMOL/L (ref 21–32)
CREAT SERPL-MCNC: 1.08 MG/DL (ref 0.6–1.3)
EOSINOPHIL # BLD MANUAL: 0 THOUSAND/UL (ref 0–0.4)
EOSINOPHIL NFR BLD MANUAL: 0 % (ref 0–6)
ERYTHROCYTE [DISTWIDTH] IN BLOOD BY AUTOMATED COUNT: 20 % (ref 11.6–15.1)
FLUAV AG UPPER RESP QL IA.RAPID: NEGATIVE
FLUBV AG UPPER RESP QL IA.RAPID: NEGATIVE
GFR SERPL CREATININE-BSD FRML MDRD: 82 ML/MIN/1.73SQ M
GLUCOSE SERPL-MCNC: 100 MG/DL (ref 65–140)
HCT VFR BLD AUTO: 33.1 % (ref 36.5–49.3)
HGB BLD-MCNC: 10.8 G/DL (ref 12–17)
INR PPP: 1.74 (ref 0.85–1.19)
LACTATE SERPL-SCNC: 3.4 MMOL/L (ref 0.5–2)
LG PLATELETS BLD QL SMEAR: PRESENT
LIPASE SERPL-CCNC: 53 U/L (ref 11–82)
LYMPHOCYTES # BLD AUTO: 0.19 THOUSAND/UL (ref 0.6–4.47)
LYMPHOCYTES # BLD AUTO: 3 % (ref 14–44)
MAGNESIUM SERPL-MCNC: 1.7 MG/DL (ref 1.9–2.7)
MCH RBC QN AUTO: 29 PG (ref 26.8–34.3)
MCHC RBC AUTO-ENTMCNC: 32.6 G/DL (ref 31.4–37.4)
MCV RBC AUTO: 89 FL (ref 82–98)
MONOCYTES # BLD AUTO: 0.26 THOUSAND/UL (ref 0–1.22)
MONOCYTES NFR BLD: 4 % (ref 4–12)
NEUTROPHILS # BLD MANUAL: 5.97 THOUSAND/UL (ref 1.85–7.62)
NEUTS BAND NFR BLD MANUAL: 4 % (ref 0–8)
NEUTS SEG NFR BLD AUTO: 89 % (ref 43–75)
PLATELET # BLD AUTO: 189 THOUSANDS/UL (ref 149–390)
PLATELET BLD QL SMEAR: ADEQUATE
PMV BLD AUTO: 11.5 FL (ref 8.9–12.7)
POTASSIUM SERPL-SCNC: 4.3 MMOL/L (ref 3.5–5.3)
PROT SERPL-MCNC: 3.9 G/DL (ref 6.4–8.4)
PROTHROMBIN TIME: 20.6 SECONDS (ref 12.3–15)
RBC # BLD AUTO: 3.73 MILLION/UL (ref 3.88–5.62)
RBC MORPH BLD: PRESENT
RH BLD: POSITIVE
SARS-COV+SARS-COV-2 AG RESP QL IA.RAPID: NEGATIVE
SODIUM SERPL-SCNC: 128 MMOL/L (ref 135–147)
SPECIMEN EXPIRATION DATE: NORMAL
STOMATOCYTES BLD QL SMEAR: PRESENT
TSH SERPL DL<=0.05 MIU/L-ACNC: 36.08 UIU/ML (ref 0.45–4.5)
WBC # BLD AUTO: 6.42 THOUSAND/UL (ref 4.31–10.16)

## 2024-10-10 PROCEDURE — 93005 ELECTROCARDIOGRAM TRACING: CPT

## 2024-10-10 PROCEDURE — 96365 THER/PROPH/DIAG IV INF INIT: CPT

## 2024-10-10 PROCEDURE — 87804 INFLUENZA ASSAY W/OPTIC: CPT | Performed by: EMERGENCY MEDICINE

## 2024-10-10 PROCEDURE — 36415 COLL VENOUS BLD VENIPUNCTURE: CPT | Performed by: EMERGENCY MEDICINE

## 2024-10-10 PROCEDURE — 99285 EMERGENCY DEPT VISIT HI MDM: CPT

## 2024-10-10 PROCEDURE — 85730 THROMBOPLASTIN TIME PARTIAL: CPT | Performed by: EMERGENCY MEDICINE

## 2024-10-10 PROCEDURE — 84484 ASSAY OF TROPONIN QUANT: CPT | Performed by: EMERGENCY MEDICINE

## 2024-10-10 PROCEDURE — 74177 CT ABD & PELVIS W/CONTRAST: CPT

## 2024-10-10 PROCEDURE — 83880 ASSAY OF NATRIURETIC PEPTIDE: CPT | Performed by: EMERGENCY MEDICINE

## 2024-10-10 PROCEDURE — 82248 BILIRUBIN DIRECT: CPT | Performed by: EMERGENCY MEDICINE

## 2024-10-10 PROCEDURE — 87811 SARS-COV-2 COVID19 W/OPTIC: CPT | Performed by: EMERGENCY MEDICINE

## 2024-10-10 PROCEDURE — 96361 HYDRATE IV INFUSION ADD-ON: CPT

## 2024-10-10 PROCEDURE — 84443 ASSAY THYROID STIM HORMONE: CPT | Performed by: EMERGENCY MEDICINE

## 2024-10-10 PROCEDURE — 85610 PROTHROMBIN TIME: CPT | Performed by: EMERGENCY MEDICINE

## 2024-10-10 PROCEDURE — 83690 ASSAY OF LIPASE: CPT | Performed by: EMERGENCY MEDICINE

## 2024-10-10 PROCEDURE — 83735 ASSAY OF MAGNESIUM: CPT | Performed by: EMERGENCY MEDICINE

## 2024-10-10 PROCEDURE — 84439 ASSAY OF FREE THYROXINE: CPT | Performed by: EMERGENCY MEDICINE

## 2024-10-10 PROCEDURE — 86850 RBC ANTIBODY SCREEN: CPT | Performed by: EMERGENCY MEDICINE

## 2024-10-10 PROCEDURE — 80053 COMPREHEN METABOLIC PANEL: CPT | Performed by: EMERGENCY MEDICINE

## 2024-10-10 PROCEDURE — 71260 CT THORAX DX C+: CPT

## 2024-10-10 PROCEDURE — 99285 EMERGENCY DEPT VISIT HI MDM: CPT | Performed by: EMERGENCY MEDICINE

## 2024-10-10 PROCEDURE — 83605 ASSAY OF LACTIC ACID: CPT | Performed by: EMERGENCY MEDICINE

## 2024-10-10 PROCEDURE — 85007 BL SMEAR W/DIFF WBC COUNT: CPT | Performed by: EMERGENCY MEDICINE

## 2024-10-10 PROCEDURE — 85027 COMPLETE CBC AUTOMATED: CPT | Performed by: EMERGENCY MEDICINE

## 2024-10-10 PROCEDURE — 86900 BLOOD TYPING SEROLOGIC ABO: CPT | Performed by: EMERGENCY MEDICINE

## 2024-10-10 PROCEDURE — 86901 BLOOD TYPING SEROLOGIC RH(D): CPT | Performed by: EMERGENCY MEDICINE

## 2024-10-10 RX ORDER — MAGNESIUM SULFATE 1 G/100ML
1 INJECTION INTRAVENOUS ONCE
Status: COMPLETED | OUTPATIENT
Start: 2024-10-10 | End: 2024-10-10

## 2024-10-10 RX ADMIN — MAGNESIUM SULFATE HEPTAHYDRATE 1 G: 1 INJECTION, SOLUTION INTRAVENOUS at 22:45

## 2024-10-10 RX ADMIN — SODIUM CHLORIDE 500 ML: 0.9 INJECTION, SOLUTION INTRAVENOUS at 21:27

## 2024-10-10 RX ADMIN — SODIUM CHLORIDE 1000 ML: 0.9 INJECTION, SOLUTION INTRAVENOUS at 21:59

## 2024-10-10 RX ADMIN — IOHEXOL 100 ML: 350 INJECTION, SOLUTION INTRAVENOUS at 23:32

## 2024-10-11 ENCOUNTER — APPOINTMENT (OUTPATIENT)
Dept: ULTRASOUND IMAGING | Facility: HOSPITAL | Age: 44
DRG: 435 | End: 2024-10-11
Payer: COMMERCIAL

## 2024-10-11 ENCOUNTER — APPOINTMENT (INPATIENT)
Dept: MRI IMAGING | Facility: HOSPITAL | Age: 44
DRG: 435 | End: 2024-10-11
Payer: COMMERCIAL

## 2024-10-11 PROBLEM — R93.89 ABNORMAL CT SCAN, CHEST: Status: ACTIVE | Noted: 2024-10-11

## 2024-10-11 PROBLEM — E83.42 HYPOMAGNESEMIA: Status: ACTIVE | Noted: 2024-10-11

## 2024-10-11 PROBLEM — E46 MALNUTRITION (HCC): Status: ACTIVE | Noted: 2024-10-11

## 2024-10-11 PROBLEM — E87.1 HYPONATREMIA: Status: ACTIVE | Noted: 2024-10-11

## 2024-10-11 PROBLEM — E87.20 LACTIC ACIDOSIS: Status: ACTIVE | Noted: 2024-10-11

## 2024-10-11 PROBLEM — R74.01 ELEVATION OF LEVELS OF LIVER TRANSAMINASE LEVELS: Status: ACTIVE | Noted: 2024-10-11

## 2024-10-11 PROBLEM — E44.0 MODERATE PROTEIN-CALORIE MALNUTRITION (HCC): Status: ACTIVE | Noted: 2024-10-11

## 2024-10-11 PROBLEM — E03.9 HYPOTHYROIDISM (ACQUIRED): Status: ACTIVE | Noted: 2024-10-11

## 2024-10-11 LAB
25(OH)D3 SERPL-MCNC: <7 NG/ML (ref 30–100)
ABO GROUP BLD: NORMAL
ALBUMIN SERPL BCG-MCNC: 2 G/DL (ref 3.5–5)
ALP SERPL-CCNC: 491 U/L (ref 34–104)
ALT SERPL W P-5'-P-CCNC: 303 U/L (ref 7–52)
AMMONIA PLAS-SCNC: 67 UMOL/L (ref 18–72)
ANION GAP SERPL CALCULATED.3IONS-SCNC: 8 MMOL/L (ref 4–13)
ANION GAP SERPL CALCULATED.3IONS-SCNC: 9 MMOL/L (ref 4–13)
ANION GAP SERPL CALCULATED.3IONS-SCNC: 9 MMOL/L (ref 4–13)
APAP SERPL-MCNC: <2 UG/ML (ref 10–20)
AST SERPL W P-5'-P-CCNC: 561 U/L (ref 13–39)
ATRIAL RATE: 95 BPM
BACTERIA UR QL AUTO: ABNORMAL /HPF
BASOPHILS # BLD AUTO: 0.01 THOUSANDS/ΜL (ref 0–0.1)
BILIRUB SERPL-MCNC: 2.05 MG/DL (ref 0.2–1)
BILIRUB UR QL STRIP: ABNORMAL
BUN SERPL-MCNC: 24 MG/DL (ref 5–25)
BUN SERPL-MCNC: 25 MG/DL (ref 5–25)
BUN SERPL-MCNC: 26 MG/DL (ref 5–25)
CA-I BLD-SCNC: 1.06 MMOL/L (ref 1.12–1.32)
CALCIUM ALBUM COR SERPL-MCNC: 8.9 MG/DL (ref 8.3–10.1)
CALCIUM SERPL-MCNC: 7.1 MG/DL (ref 8.4–10.2)
CALCIUM SERPL-MCNC: 7.3 MG/DL (ref 8.4–10.2)
CALCIUM SERPL-MCNC: 7.5 MG/DL (ref 8.4–10.2)
CAOX CRY URNS QL MICRO: ABNORMAL /HPF
CHLORIDE SERPL-SCNC: 95 MMOL/L (ref 96–108)
CHLORIDE SERPL-SCNC: 97 MMOL/L (ref 96–108)
CHLORIDE SERPL-SCNC: 98 MMOL/L (ref 96–108)
CK SERPL-CCNC: 87 U/L (ref 39–308)
CLARITY UR: CLEAR
CO2 SERPL-SCNC: 20 MMOL/L (ref 21–32)
CO2 SERPL-SCNC: 21 MMOL/L (ref 21–32)
CO2 SERPL-SCNC: 21 MMOL/L (ref 21–32)
COLOR UR: ABNORMAL
CREAT SERPL-MCNC: 0.95 MG/DL (ref 0.6–1.3)
CREAT SERPL-MCNC: 0.95 MG/DL (ref 0.6–1.3)
CREAT SERPL-MCNC: 0.97 MG/DL (ref 0.6–1.3)
ERYTHROCYTE [DISTWIDTH] IN BLOOD BY AUTOMATED COUNT: 19.7 % (ref 11.6–15.1)
FERRITIN SERPL-MCNC: >7500 NG/ML (ref 24–336)
GFR SERPL CREATININE-BSD FRML MDRD: 93 ML/MIN/1.73SQ M
GFR SERPL CREATININE-BSD FRML MDRD: 96 ML/MIN/1.73SQ M
GFR SERPL CREATININE-BSD FRML MDRD: 96 ML/MIN/1.73SQ M
GLUCOSE SERPL-MCNC: 119 MG/DL (ref 65–140)
GLUCOSE SERPL-MCNC: 82 MG/DL (ref 65–140)
GLUCOSE SERPL-MCNC: 85 MG/DL (ref 65–140)
GLUCOSE UR STRIP-MCNC: NEGATIVE MG/DL
HAV IGM SER QL: NORMAL
HBV CORE IGM SER QL: NORMAL
HBV SURFACE AG SER QL: NORMAL
HCT VFR BLD AUTO: 30.8 % (ref 36.5–49.3)
HCV AB SER QL: NORMAL
HGB BLD-MCNC: 10.1 G/DL (ref 12–17)
HGB UR QL STRIP.AUTO: ABNORMAL
HYALINE CASTS #/AREA URNS LPF: ABNORMAL /LPF
IMM GRANULOCYTES # BLD AUTO: 0.11 THOUSAND/UL (ref 0–0.2)
INR PPP: 1.84 (ref 0.85–1.19)
IRON SATN MFR SERPL: 55 % (ref 15–50)
IRON SERPL-MCNC: 66 UG/DL (ref 50–212)
KETONES UR STRIP-MCNC: NEGATIVE MG/DL
LACTATE SERPL-SCNC: 3.5 MMOL/L (ref 0.5–2)
LACTATE SERPL-SCNC: 4 MMOL/L (ref 0.5–2)
LACTATE SERPL-SCNC: 4.1 MMOL/L (ref 0.5–2)
LDH SERPL-CCNC: 868 U/L (ref 140–271)
LEUKOCYTE ESTERASE UR QL STRIP: NEGATIVE
MAGNESIUM SERPL-MCNC: 1.7 MG/DL (ref 1.9–2.7)
MCH RBC QN AUTO: 28.9 PG (ref 26.8–34.3)
MCHC RBC AUTO-ENTMCNC: 32.8 G/DL (ref 31.4–37.4)
MCV RBC AUTO: 88 FL (ref 82–98)
NITRITE UR QL STRIP: NEGATIVE
NON-SQ EPI CELLS URNS QL MICRO: ABNORMAL /HPF
NRBC BLD AUTO-RTO: 0 /100 WBCS
OSMOLALITY UR: 743 MMOL/KG
P AXIS: 26 DEGREES
PH UR STRIP.AUTO: 6.5 [PH]
PHOSPHATE SERPL-MCNC: 3.6 MG/DL (ref 2.7–4.5)
PLATELET # BLD AUTO: 189 THOUSANDS/UL (ref 149–390)
PMV BLD AUTO: 11 FL (ref 8.9–12.7)
POTASSIUM SERPL-SCNC: 4.2 MMOL/L (ref 3.5–5.3)
POTASSIUM SERPL-SCNC: 4.3 MMOL/L (ref 3.5–5.3)
POTASSIUM SERPL-SCNC: 4.5 MMOL/L (ref 3.5–5.3)
PR INTERVAL: 156 MS
PROT SERPL-MCNC: 3.5 G/DL (ref 6.4–8.4)
PROT UR STRIP-MCNC: ABNORMAL MG/DL
PROTHROMBIN TIME: 21.5 SECONDS (ref 12.3–15)
QRS AXIS: -7 DEGREES
QRSD INTERVAL: 98 MS
QT INTERVAL: 352 MS
QTC INTERVAL: 442 MS
RBC # BLD AUTO: 3.49 MILLION/UL (ref 3.88–5.62)
RBC #/AREA URNS AUTO: ABNORMAL /HPF
RH BLD: POSITIVE
SODIUM 24H UR-SCNC: 13 MOL/L
SODIUM 24H UR-SCNC: 70 MOL/L
SODIUM SERPL-SCNC: 125 MMOL/L (ref 135–147)
SODIUM SERPL-SCNC: 126 MMOL/L (ref 135–147)
SODIUM SERPL-SCNC: 127 MMOL/L (ref 135–147)
SP GR UR STRIP.AUTO: 1.02 (ref 1–1.03)
T WAVE AXIS: 19 DEGREES
T4 FREE SERPL-MCNC: 0.86 NG/DL (ref 0.61–1.12)
TIBC SERPL-MCNC: 121 UG/DL (ref 250–450)
UIBC SERPL-MCNC: 55 UG/DL (ref 155–355)
UROBILINOGEN UR QL STRIP.AUTO: 2 E.U./DL
VENTRICULAR RATE: 95 BPM
WBC # BLD AUTO: 6.65 THOUSAND/UL (ref 4.31–10.16)
WBC #/AREA URNS AUTO: ABNORMAL /HPF

## 2024-10-11 PROCEDURE — 82105 ALPHA-FETOPROTEIN SERUM: CPT | Performed by: FAMILY MEDICINE

## 2024-10-11 PROCEDURE — 80053 COMPREHEN METABOLIC PANEL: CPT | Performed by: NURSE PRACTITIONER

## 2024-10-11 PROCEDURE — 82550 ASSAY OF CK (CPK): CPT | Performed by: NURSE PRACTITIONER

## 2024-10-11 PROCEDURE — 80143 DRUG ASSAY ACETAMINOPHEN: CPT | Performed by: NURSE PRACTITIONER

## 2024-10-11 PROCEDURE — 83615 LACTATE (LD) (LDH) ENZYME: CPT | Performed by: NURSE PRACTITIONER

## 2024-10-11 PROCEDURE — 81001 URINALYSIS AUTO W/SCOPE: CPT | Performed by: EMERGENCY MEDICINE

## 2024-10-11 PROCEDURE — 76705 ECHO EXAM OF ABDOMEN: CPT

## 2024-10-11 PROCEDURE — 99223 1ST HOSP IP/OBS HIGH 75: CPT | Performed by: FAMILY MEDICINE

## 2024-10-11 PROCEDURE — 82306 VITAMIN D 25 HYDROXY: CPT | Performed by: NURSE PRACTITIONER

## 2024-10-11 PROCEDURE — 93010 ELECTROCARDIOGRAM REPORT: CPT | Performed by: INTERNAL MEDICINE

## 2024-10-11 PROCEDURE — 83550 IRON BINDING TEST: CPT | Performed by: NURSE PRACTITIONER

## 2024-10-11 PROCEDURE — 87040 BLOOD CULTURE FOR BACTERIA: CPT | Performed by: EMERGENCY MEDICINE

## 2024-10-11 PROCEDURE — 83540 ASSAY OF IRON: CPT | Performed by: NURSE PRACTITIONER

## 2024-10-11 PROCEDURE — 74183 MRI ABD W/O CNTR FLWD CNTR: CPT

## 2024-10-11 PROCEDURE — 82140 ASSAY OF AMMONIA: CPT | Performed by: NURSE PRACTITIONER

## 2024-10-11 PROCEDURE — 87086 URINE CULTURE/COLONY COUNT: CPT | Performed by: FAMILY MEDICINE

## 2024-10-11 PROCEDURE — 80048 BASIC METABOLIC PNL TOTAL CA: CPT | Performed by: INTERNAL MEDICINE

## 2024-10-11 PROCEDURE — 83605 ASSAY OF LACTIC ACID: CPT | Performed by: NURSE PRACTITIONER

## 2024-10-11 PROCEDURE — 85610 PROTHROMBIN TIME: CPT | Performed by: NURSE PRACTITIONER

## 2024-10-11 PROCEDURE — 82728 ASSAY OF FERRITIN: CPT | Performed by: NURSE PRACTITIONER

## 2024-10-11 PROCEDURE — 96375 TX/PRO/DX INJ NEW DRUG ADDON: CPT

## 2024-10-11 PROCEDURE — 83735 ASSAY OF MAGNESIUM: CPT | Performed by: NURSE PRACTITIONER

## 2024-10-11 PROCEDURE — 84166 PROTEIN E-PHORESIS/URINE/CSF: CPT | Performed by: FAMILY MEDICINE

## 2024-10-11 PROCEDURE — 83935 ASSAY OF URINE OSMOLALITY: CPT | Performed by: FAMILY MEDICINE

## 2024-10-11 PROCEDURE — A9585 GADOBUTROL INJECTION: HCPCS | Performed by: FAMILY MEDICINE

## 2024-10-11 PROCEDURE — 84300 ASSAY OF URINE SODIUM: CPT | Performed by: INTERNAL MEDICINE

## 2024-10-11 PROCEDURE — 84100 ASSAY OF PHOSPHORUS: CPT | Performed by: NURSE PRACTITIONER

## 2024-10-11 PROCEDURE — 85027 COMPLETE CBC AUTOMATED: CPT | Performed by: NURSE PRACTITIONER

## 2024-10-11 PROCEDURE — 82330 ASSAY OF CALCIUM: CPT | Performed by: NURSE PRACTITIONER

## 2024-10-11 PROCEDURE — 84300 ASSAY OF URINE SODIUM: CPT | Performed by: FAMILY MEDICINE

## 2024-10-11 PROCEDURE — 36415 COLL VENOUS BLD VENIPUNCTURE: CPT | Performed by: EMERGENCY MEDICINE

## 2024-10-11 PROCEDURE — 80074 ACUTE HEPATITIS PANEL: CPT | Performed by: NURSE PRACTITIONER

## 2024-10-11 RX ORDER — GADOBUTROL 604.72 MG/ML
12 INJECTION INTRAVENOUS
Status: COMPLETED | OUTPATIENT
Start: 2024-10-11 | End: 2024-10-11

## 2024-10-11 RX ORDER — FUROSEMIDE 10 MG/ML
40 INJECTION INTRAMUSCULAR; INTRAVENOUS ONCE
Status: COMPLETED | OUTPATIENT
Start: 2024-10-11 | End: 2024-10-11

## 2024-10-11 RX ORDER — KETOROLAC TROMETHAMINE 30 MG/ML
15 INJECTION, SOLUTION INTRAMUSCULAR; INTRAVENOUS ONCE
Status: COMPLETED | OUTPATIENT
Start: 2024-10-11 | End: 2024-10-11

## 2024-10-11 RX ORDER — HEPARIN SODIUM 5000 [USP'U]/ML
5000 INJECTION, SOLUTION INTRAVENOUS; SUBCUTANEOUS EVERY 8 HOURS SCHEDULED
Status: DISCONTINUED | OUTPATIENT
Start: 2024-10-11 | End: 2024-10-11

## 2024-10-11 RX ORDER — FUROSEMIDE 20 MG/1
20 TABLET ORAL DAILY
Status: DISCONTINUED | OUTPATIENT
Start: 2024-10-11 | End: 2024-10-11

## 2024-10-11 RX ORDER — POLYETHYLENE GLYCOL 3350 17 G/17G
17 POWDER, FOR SOLUTION ORAL DAILY PRN
Status: DISCONTINUED | OUTPATIENT
Start: 2024-10-11 | End: 2024-10-13 | Stop reason: HOSPADM

## 2024-10-11 RX ORDER — CEFTRIAXONE 1 G/50ML
1000 INJECTION, SOLUTION INTRAVENOUS EVERY 24 HOURS
Status: DISCONTINUED | OUTPATIENT
Start: 2024-10-11 | End: 2024-10-13 | Stop reason: HOSPADM

## 2024-10-11 RX ORDER — LEVOTHYROXINE SODIUM 100 UG/1
200 TABLET ORAL
Status: DISCONTINUED | OUTPATIENT
Start: 2024-10-11 | End: 2024-10-13 | Stop reason: HOSPADM

## 2024-10-11 RX ORDER — FERROUS SULFATE 325(65) MG
325 TABLET ORAL
Status: DISCONTINUED | OUTPATIENT
Start: 2024-10-11 | End: 2024-10-13 | Stop reason: HOSPADM

## 2024-10-11 RX ORDER — POLYETHYLENE GLYCOL 3350 17 G/17G
17 POWDER, FOR SOLUTION ORAL DAILY
Status: ON HOLD | COMMUNITY

## 2024-10-11 RX ORDER — ALBUMIN (HUMAN) 12.5 G/50ML
25 SOLUTION INTRAVENOUS EVERY 8 HOURS
Status: DISCONTINUED | OUTPATIENT
Start: 2024-10-11 | End: 2024-10-13 | Stop reason: HOSPADM

## 2024-10-11 RX ORDER — DOCUSATE SODIUM 100 MG/1
100 CAPSULE, LIQUID FILLED ORAL EVERY 12 HOURS
Status: DISCONTINUED | OUTPATIENT
Start: 2024-10-11 | End: 2024-10-13 | Stop reason: HOSPADM

## 2024-10-11 RX ORDER — MAGNESIUM SULFATE HEPTAHYDRATE 40 MG/ML
2 INJECTION, SOLUTION INTRAVENOUS ONCE
Status: COMPLETED | OUTPATIENT
Start: 2024-10-11 | End: 2024-10-11

## 2024-10-11 RX ORDER — CEFTRIAXONE 1 G/50ML
1000 INJECTION, SOLUTION INTRAVENOUS ONCE
Status: COMPLETED | OUTPATIENT
Start: 2024-10-11 | End: 2024-10-11

## 2024-10-11 RX ORDER — ONDANSETRON 2 MG/ML
4 INJECTION INTRAMUSCULAR; INTRAVENOUS EVERY 8 HOURS PRN
Status: DISCONTINUED | OUTPATIENT
Start: 2024-10-11 | End: 2024-10-13 | Stop reason: HOSPADM

## 2024-10-11 RX ADMIN — DOCUSATE SODIUM 100 MG: 100 CAPSULE, LIQUID FILLED ORAL at 20:45

## 2024-10-11 RX ADMIN — SODIUM CHLORIDE, SODIUM LACTATE, POTASSIUM CHLORIDE, AND CALCIUM CHLORIDE 1000 ML: .6; .31; .03; .02 INJECTION, SOLUTION INTRAVENOUS at 08:45

## 2024-10-11 RX ADMIN — FUROSEMIDE 20 MG: 20 TABLET ORAL at 08:45

## 2024-10-11 RX ADMIN — FUROSEMIDE 40 MG: 10 INJECTION, SOLUTION INTRAMUSCULAR; INTRAVENOUS at 14:23

## 2024-10-11 RX ADMIN — CEFTRIAXONE 1000 MG: 1 INJECTION, SOLUTION INTRAVENOUS at 02:03

## 2024-10-11 RX ADMIN — HEPARIN SODIUM 5000 UNITS: 5000 INJECTION, SOLUTION INTRAVENOUS; SUBCUTANEOUS at 05:24

## 2024-10-11 RX ADMIN — KETOROLAC TROMETHAMINE 15 MG: 30 INJECTION, SOLUTION INTRAMUSCULAR at 03:32

## 2024-10-11 RX ADMIN — DOCUSATE SODIUM 100 MG: 100 CAPSULE, LIQUID FILLED ORAL at 08:45

## 2024-10-11 RX ADMIN — MAGNESIUM SULFATE HEPTAHYDRATE 2 G: 40 INJECTION, SOLUTION INTRAVENOUS at 06:28

## 2024-10-11 RX ADMIN — LEVOTHYROXINE SODIUM 200 MCG: 100 TABLET ORAL at 05:24

## 2024-10-11 RX ADMIN — Medication 5000 UNITS: at 08:45

## 2024-10-11 RX ADMIN — FERROUS SULFATE TAB 325 MG (65 MG ELEMENTAL FE) 325 MG: 325 (65 FE) TAB at 08:45

## 2024-10-11 RX ADMIN — CEFTRIAXONE 1000 MG: 1 INJECTION, SOLUTION INTRAVENOUS at 15:43

## 2024-10-11 RX ADMIN — GADOBUTROL 12 ML: 604.72 INJECTION INTRAVENOUS at 16:26

## 2024-10-11 RX ADMIN — ALBUMIN (HUMAN) 25 G: 0.25 INJECTION, SOLUTION INTRAVENOUS at 18:00

## 2024-10-11 NOTE — MALNUTRITION/BMI
This medical record reflects one or more clinical indicators suggestive of malnutrition.    Malnutrition Findings:   Adult Malnutrition type: Chronic illness  Adult Degree of Malnutrition: Malnutrition of moderate degree  Malnutrition Characteristics: Muscle loss, Inadequate energy                360 Statement: Chronic moderate malnutrition related to hx RYGB/malabsorption + inadequate calorie intake as evidenced by moderate muscle loss to pectoralis and temporalis muscles; < 75% estimated kcal intake > 1 mo. Treated with: Fluid restriction per MD. Will order ensure plus high PRO daily and increase as appropriate. RYGB noted from 2012 per [facility], will liberalize diet to regular to open up more calorically dense food options. Consider daily weights. Noting vitamin D level deficiency, supplementation per MD. Consider checking serum vitamin A, copper, zinc, vitamin B12 levels and replete as indicated.    BMI Findings:           Body mass index is 34.94 kg/m².     See Nutrition note dated 10/11/24 for additional details.  Completed nutrition assessment is viewable in the nutrition documentation.

## 2024-10-11 NOTE — ASSESSMENT & PLAN NOTE
Lactic acid 3.5  Tachycardia and tachypnea, no leukocytosis or fever  UA without pyuria  CT chest abdomen pelvis no obvious source of infection   Blood culture pending x2  Check procalcitonin  Received empiric ceftriaxone x1   Low thyroid function could also be impeding clearance of lactic acid

## 2024-10-11 NOTE — PLAN OF CARE
Problem: PAIN - ADULT  Goal: Verbalizes/displays adequate comfort level or baseline comfort level  Description: Interventions:  - Encourage patient to monitor pain and request assistance  - Assess pain using appropriate pain scale  - Administer analgesics based on type and severity of pain and evaluate response  - Implement non-pharmacological measures as appropriate and evaluate response  - Consider cultural and social influences on pain and pain management  - Notify physician/advanced practitioner if interventions unsuccessful or patient reports new pain  Outcome: Progressing     Problem: INFECTION - ADULT  Goal: Absence or prevention of progression during hospitalization  Description: INTERVENTIONS:  - Assess and monitor for signs and symptoms of infection  - Monitor lab/diagnostic results  - Monitor all insertion sites, i.e. indwelling lines, tubes, and drains  - Monitor endotracheal if appropriate and nasal secretions for changes in amount and color  - [place] appropriate cooling/warming therapies per order  - Administer medications as ordered  - Instruct and encourage patient and family to use good hand hygiene technique  - Identify and instruct in appropriate isolation precautions for identified infection/condition  Outcome: Progressing  Goal: Absence of fever/infection during neutropenic period  Description: INTERVENTIONS:  - Monitor WBC    Outcome: Progressing     Problem: SAFETY ADULT  Goal: Patient will remain free of falls  Description: INTERVENTIONS:  - Educate patient/family on patient safety including physical limitations  - Instruct patient to call for assistance with activity   - Consult OT/PT to assist with strengthening/mobility   - Keep Call bell within reach  - Keep bed low and locked with side rails adjusted as appropriate  - Keep care items and personal belongings within reach  - Initiate and maintain comfort rounds  - Apply yellow socks and bracelet for high fall risk patients  -  Consider moving patient to room near nurses station  Outcome: Progressing  Goal: Maintain or return to baseline ADL function  Description: INTERVENTIONS:  -  Assess patient's ability to carry out ADLs; assess patient's baseline for ADL function and identify physical deficits which impact ability to perform ADLs (bathing, care of mouth/teeth, toileting, grooming, dressing, etc.)  - Assess/evaluate cause of self-care deficits   - Assess range of motion  - Assess patient's mobility; develop plan if impaired  - Assess patient's need for assistive devices and provide as appropriate  - Encourage maximum independence but intervene and supervise when necessary  - Involve family in performance of ADLs  - Assess for home care needs following discharge   - Consider OT consult to assist with ADL evaluation and planning for discharge  - Provide patient education as appropriate  Outcome: Progressing  Goal: Maintains/Returns to pre admission functional level  Description: INTERVENTIONS:  - Perform AM-PAC 6 Click Basic Mobility/ Daily Activity assessment daily.  - Set and communicate daily mobility goal to care team and patient/family/caregiver.   - Collaborate with rehabilitation services on mobility goals if consulted  - Perform Range of Motion 3 times a day.  - Reposition patient every 2 hours.  - Dangle patient 3 times a day  - Stand patient 3 times a day  - Ambulate patient 3 times a day  - Out of bed to chair 3 times a day   - Out of bed for meals 3 times a day  - Out of bed for toileting  - Record patient progress and toleration of activity level   Outcome: Progressing     Problem: DISCHARGE PLANNING  Goal: Discharge to home or other facility with appropriate resources  Description: INTERVENTIONS:  - Identify barriers to discharge w/patient and caregiver  - Arrange for needed discharge resources and transportation as appropriate  - Identify discharge learning needs (meds, wound care, etc.)  - Arrange for interpretive  services to assist at discharge as needed  - Refer to Case Management Department for coordinating discharge planning if the patient needs post-hospital services based on physician/advanced practitioner order or complex needs related to functional status, cognitive ability, or social support system  Outcome: Progressing     Problem: Knowledge Deficit  Goal: Patient/family/caregiver demonstrates understanding of disease process, treatment plan, medications, and discharge instructions  Description: Complete learning assessment and assess knowledge base.  Interventions:  - Provide teaching at level of understanding  - Provide teaching via preferred learning methods  Outcome: Progressing     Problem: CARDIOVASCULAR - ADULT  Goal: Maintains optimal cardiac output and hemodynamic stability  Description: INTERVENTIONS:  - Monitor I/O, vital signs and rhythm  - Monitor for S/S and trends of decreased cardiac output  - Administer and titrate ordered vasoactive medications to optimize hemodynamic stability  - Assess quality of pulses, skin color and temperature  - Assess for signs of decreased coronary artery perfusion  - Instruct patient to report change in severity of symptoms  Outcome: Progressing  Goal: Absence of cardiac dysrhythmias or at baseline rhythm  Description: INTERVENTIONS:  - Continuous cardiac monitoring, vital signs, obtain 12 lead EKG if ordered  - Administer antiarrhythmic and heart rate control medications as ordered  - Monitor electrolytes and administer replacement therapy as ordered  Outcome: Progressing     Problem: METABOLIC, FLUID AND ELECTROLYTES - ADULT  Goal: Electrolytes maintained within normal limits  Description: INTERVENTIONS:  - Monitor labs and assess patient for signs and symptoms of electrolyte imbalances  - Administer electrolyte replacement as ordered  - Monitor response to electrolyte replacements, including repeat lab results as appropriate  - Instruct patient on fluid and nutrition  as appropriate  Outcome: Progressing  Goal: Fluid balance maintained  Description: INTERVENTIONS:  - Monitor labs   - Monitor I/O and WT  - Instruct patient on fluid and nutrition as appropriate  - Assess for signs & symptoms of volume excess or deficit  Outcome: Progressing  Goal: Glucose maintained within target range  Description: INTERVENTIONS:  - Monitor Blood Glucose as ordered  - Assess for signs and symptoms of hyperglycemia and hypoglycemia  - Administer ordered medications to maintain glucose within target range  - Assess nutritional intake and initiate nutrition service referral as needed  Outcome: Progressing

## 2024-10-11 NOTE — CONSULTS
Consultation - [facility]  Clayton Duval 44 y.o. male MRN: [id number]  Unit/Bed#: -01 Encounter: [id number]      ASSESSMENT & PLAN  Elevated LFTs  Abnormal CT abdomen/pelvis  Hepatosplenomegaly  Ascites/anasarca  [facility]  CT concerning for possible malignancy in the spleen and liver with large sclerotic and lytic lesions in the iliac bone and smaller lesions in the lumbar spine. No particular risk factors for infectious process such at TB.   -Awaiting RUQ/doppler US  - Recommend IR liver bx   - If ascites worsens or he develops symptoms, paracentesis can be done (diagnostic). Will hold off on this and try for liver bx first.   - trend LFTs  - avoid hepatotoxins    Hyponatremia   -recommend nephro consult, possibly due to systemic illness and/or possible torso malignancy        Reason for Consult / Principal Problem:     HPI: Clayton Duval is a 44 y.o. year old male with a PMHx of Osiel en Y bypass surgery, MASH, hypothyroidism, and anemia who presents with flu like symptoms and GI was consulted for elevated LFTs. He was at his PCP 10 days ago for his symptoms and was started on prednisone which helped until he ran out. He developed gradually worsening epigastric pain and distension prior to admission. These have improved since arriving. He did tolerate eating breakfast this AM. Denies significant nsaid use. Has been taking about 4 tylenol daily (2 tabs BID) for the last 10-14 days for his viral-like symptoms. He does not know the exact dose he was taking.     Denies alcohol use recently. Used to drink heavily but quit 4 years ago    LFTs elevated 10/1- , , T bili 2, Alk phos 352. Na 130  On arrival 10/10 LFTs elevated , , Alk phos 538, T bili 2.39. Na 128  10/11 LFTs , , Alk phos 491, T bili 2.05, Na 127    CT C/A/P on arrival 10/10 small bilateral pleural effusions, moderate ascites and anascarca, stable hepatosplenomegaly,  hypoenhancing partially confluent lesions in the spleen concerning for malignancy. Ill defined areas of hypoenhancement in the R lobe of the liver new from prior CT (below), with large sclerotic and lytic lesions in the iliac bone and smaller lesions in the lumbar spine.     CT Chest w/o contrast 2/28/24 at [facility] concerning for multiple sclerotic appearing lesions of the bony structures with pleural thickening and soft tissue surrounding the sclerotic portion of the anterior R 3rd ant 4th ribs. New splenomegaly and nodule in or adjactent to inferior L thyroid lobe    US neck 3/22/24 thyroid nodules not meeting criteria for FNA    No prior EGD/colon    Endoscopic risk assessment:  Anticoagulation use:n  Diabetes medication:n  CVS history:n  Abdominal surgeries:Osiel en Y  Sleep apnea:n  O2 use:n    History reviewed. No pertinent past medical history.  Past Surgical History:   Procedure Laterality Date    GASTRIC BYPASS  2012    GASTRIC BYPASS       Social History   Social History     Substance and Sexual Activity   Alcohol Use Never     Social History     Substance and Sexual Activity   Drug Use Never     Social History     Tobacco Use   Smoking Status Never   Smokeless Tobacco Current       History reviewed. No pertinent family history.      Medications Prior to Admission:     Cholecalciferol 125 MCG (5000 UT) capsule    ferrous sulfate 325 (65 Fe) mg tablet    furosemide (LASIX) 20 mg tablet    levothyroxine 200 mcg tablet    polyethylene glycol (MIRALAX) 17 g packet    docusate sodium (COLACE) 100 mg capsule    hydrocortisone (ANUSOL-HC) 2.5 % rectal cream    Current Facility-Administered Medications:     Cholecalciferol (VITAMIN D3) tablet 5,000 Units, Daily    docusate sodium (COLACE) capsule 100 mg, Q12H    ferrous sulfate tablet 325 mg, Daily With Breakfast    furosemide (LASIX) tablet 20 mg, Daily    heparin (porcine) subcutaneous injection 5,000 Units, Q8H KAYLIE    lactated ringers bolus 1,000 mL, Once     "levothyroxine tablet 200 mcg, Early Morning    magnesium sulfate 2 g/50 mL IVPB (premix) 2 g, Once, Last Rate: 2 g (10/11/24 0628)    ondansetron (ZOFRAN) injection 4 mg, Q8H PRN    polyethylene glycol (MIRALAX) packet 17 g, Daily PRN  No Known Allergies    Physical Exam  Constitutional:       General: He is not in acute distress.     Appearance: He is ill-appearing.   HENT:      Head: Normocephalic and atraumatic.   Eyes:      General: Scleral icterus present.   Cardiovascular:      Rate and Rhythm: Normal rate.   Pulmonary:      Effort: Pulmonary effort is normal.   Abdominal:      General: There is distension.      Palpations: Abdomen is soft.      Tenderness: There is no abdominal tenderness. There is no guarding.   Skin:     General: Skin is warm and dry.      Coloration: Skin is not jaundiced.   Neurological:      Mental Status: He is alert and oriented to person, place, and time.   Psychiatric:         Mood and Affect: Mood normal.         Behavior: Behavior normal.       Most Recent Vital Signs:  Vitals:    10/10/24 2315 10/11/24 0306 10/11/24 0308 10/11/24 0719   BP: 119/79 139/92  112/80   BP Location: Left arm   Left arm   Pulse: 84 (!) 106  (!) 110   Resp: 20 18  18   Temp:  97.5 °F (36.4 °C)  97.7 °F (36.5 °C)   TempSrc:    Temporal   SpO2: 95% 97%  96%   Weight:   120 kg (264 lb 12.8 oz)    Height:   6' 1\" (1.854 m)        Intake/Output Summary (Last 24 hours) at 10/11/2024 0813  Last data filed at 10/10/2024 2358  Gross per 24 hour   Intake 1600 ml   Output --   Net 1600 ml       LABS/IMAGING  Lab Results: I have reviewed all relevant lab results during this hospitalization.    Imaging Studies:  I have reviewed all the relevant images during this hospitalizations    Counseling / Coordination of Care  Total time spent today 45 minutes. Greater than 50% of total time was spent with the patient and / or family counseling and / or coordination of care.    Barrie Oliver PA-C    "

## 2024-10-11 NOTE — QUICK NOTE
Patient with history of Osiel-en-Y bypass surgery/MASH/hypothyroidism presenting with flulike symptoms began on prednisone when he ran out worsening GI pain presents now with increased liver function studies hyponatremia; CT scan shows pleural effusions moderate ascites anasarca hypoenhancing partially confluent lesions in the spleen concerning for malignancy ill-defined hypoenhancement the right lobe of the liver, and also lytic lesions in the iliac bone small lesions lumbar spine concerning for metastatic disease.    We are seeing this patient for hyponatremia  Sodium was 140 on 5/9  Sodium on 10/1/1/1930  Sodium on admission 128 given lactated Ringer's and saline boluses    Sodium now 126,  Recommend  1.  Workup ordered please see orders  2.  Fluid restriction  3.  Albumin  4.  Follow labs closely    Discussed with Dr. Bray  Will formally see in a.m.

## 2024-10-11 NOTE — H&P
H&P - Hospitalist   Name: Clayton Duval 44 y.o. male I MRN: [id number]  Unit/Bed#: -[facility] I Date of Admission: 10/10/2024   Date of Service: 10/11/2024 I Hospital Day: 0     Assessment & Plan  Elevation of levels of liver transaminase levels  History VILLALOBOS, gastric bypass surgery, cholecystectomy  Presented with 2 weeks of cough, intermittent fever, sweats and chills and fatigue to PCP on 10/1 where he was found to have elevated transaminase levels.  He was placed on a steroid daily and felt improved until that ran out, now feels worse prompting ED visit continue with cough and nausea  CT chest abdomen pelvis Stable hepatosplenomegaly. Hypoenhancing partially confluent lesions in the spleen concerning for malignancy. Ill-defined areas of hypoenhancement in the right lobe of the liver, not visualized on the prior exam.Small shima pleural effusion  , , alk phos 538, T bili 2.39, INR 1.74, ammonia normal  Uncontrolled hypothyroidism TSH 36   Check Tylenol level, hepatitis panel  Check ultrasound liver with Dopplers  COVID/flu negative  MELD score 24 points  Significant anasarca-was prescribed furosemide 20 mg in the past but does not take on a regular basis, continue daily  Appreciate gastroenterology consultation   Hyponatremia  Sodium 127 in setting of anasarca  Trend with diuresis  Hypomagnesemia  Magnesium 1.7  Replete and recheck  Hypothyroidism (acquired)  Managed by PCP  Recent increase of levothyroxine to 200 mcg daily on 10/1  TSH 36, free T4 pending  Check vitamin D level, known deficiency and reports taking over-the-counter 5000 units daily  Will need outpatient follow-up  Malnutrition (HCC)  Malnutrition Findings: Loss of subcutaneous fat in orbital, triceps, ribcage areas.  Loss of muscle at temples, clavicles, scapula, extremities.  S/P Gastric bypass  Significant anasarca         BMI Findings:      Body mass index is 34.94 kg/m².     Lactic acidosis  Lactic acid 3.5  Tachycardia  and tachypnea, no leukocytosis or fever  UA without pyuria  CT chest abdomen pelvis no obvious source of infection   Blood culture pending x2  Check procalcitonin  Received empiric ceftriaxone x1   Low thyroid function could also be impeding clearance of lactic acid  Abnormal CT scan, chest  Large sclerotic and lytic lesion in the left iliac bone and smaller lytic lesions in the lumbar spine, also concerning for malignancy.   These lesions were seen on previous CT scans in February at [facility] at that time suspected metabolic process      VTE Pharmacologic Prophylaxis:   High Risk (Score >/= 5) - Pharmacological DVT Prophylaxis Ordered: heparin. Sequential Compression Devices Ordered.  Code Status: Level 1 - Full Code   Discussion with family: Updated  (wife) at bedside.    Anticipated Length of Stay: Patient will be admitted on an inpatient basis with an anticipated length of stay of greater than 2 midnights secondary to transaminase elevation.    History of Present Illness   Chief Complaint: Cough, nausea    Clayton Duval is a 44 y.o. male with a PMH of gastric bypass surgery, VILLALOBOS, hypothyroidism, anemia who presents with 2 weeks of feeling ill with fever, chills, cough, fatigue.  Presented to his PCP on 10/1 diagnosed with viral illness prescribed prednisone and felt improved until ran out of medication.  At that time noted to have elevated LFTs.  Tonight in the ED found to have significant anasarca, elevated transaminase level and CT chest abdomen pelvis with splenic lesions concerning for malignancy and large sclerotic lytic lesions.  Presented to the medical service for further evaluation and treatment of transaminase elevation.  Appreciate gastroenterology evaluation.    Review of Systems   Constitutional:  Positive for activity change, appetite change, chills, fatigue and fever.   HENT:  Negative for ear pain and sore throat.    Eyes:  Negative for pain and visual disturbance.    Respiratory:  Positive for cough. Negative for shortness of breath.    Cardiovascular:  Positive for leg swelling. Negative for chest pain and palpitations.   Gastrointestinal:  Positive for abdominal distention and nausea. Negative for abdominal pain and vomiting.   Genitourinary:  Negative for dysuria and hematuria.   Musculoskeletal:  Negative for arthralgias and back pain.   Skin:  Negative for color change and rash.   Neurological:  Positive for weakness. Negative for seizures and syncope.   All other systems reviewed and are negative.      Historical Information   History reviewed. No pertinent past medical history.  Past Surgical History:   Procedure Laterality Date    GASTRIC BYPASS  2012    GASTRIC BYPASS       Social History     Tobacco Use    Smoking status: Never    Smokeless tobacco: Current   Vaping Use    Vaping status: Never Used   Substance and Sexual Activity    Alcohol use: Never    Drug use: Never    Sexual activity: Not on file     E-Cigarette/Vaping    E-Cigarette Use Never User      E-Cigarette/Vaping Substances     History reviewed. No pertinent family history.  Social History:  Marital Status: /Civil Union   Occupation: [employer]  Patient Pre-hospital Living Situation: With spouse  Patient Pre-hospital Level of Mobility: walks  Patient Pre-hospital Diet Restrictions: none    Meds/Allergies   Medications Prior to Admission   Medication Sig Dispense Refill Last Dispense    Cholecalciferol 125 MCG (5000 UT) capsule Take 5,000 Units by mouth daily   Unknown (patient-reported)    ferrous sulfate 325 (65 Fe) mg tablet Take 325 mg by mouth daily with breakfast   Unknown (patient-reported)    furosemide (LASIX) 20 mg tablet Take 20 mg by mouth daily   Unknown (patient-reported)    levothyroxine 200 mcg tablet Take 200 mcg by mouth daily   Unknown (patient-reported)    polyethylene glycol (MIRALAX) 17 g packet Take 17 g by mouth daily   Unknown (patient-reported)    docusate sodium  (COLACE) 100 mg capsule Take 1 capsule (100 mg total) by mouth every 12 (twelve) hours (Patient not taking: Reported on 10/11/2024) 60 capsule 0 Unknown (outside pharmacy)    hydrocortisone (ANUSOL-HC) 2.5 % rectal cream Apply topically 2 (two) times a day for 5 days 28 g 0 Unknown (outside pharmacy)     No Known Allergies    Objective :  Temp:  [97.5 °F (36.4 °C)-98.3 °F (36.8 °C)] 97.5 °F (36.4 °C)  HR:  [] 106  BP: (119-139)/(79-92) 139/92  Resp:  [18-21] 18  SpO2:  [95 %-100 %] 97 %  O2 Device: None (Room air)    Physical Exam  Vitals and nursing note reviewed.   Constitutional:       General: He is not in acute distress.     Appearance: He is well-developed. He is ill-appearing.   HENT:      Head: Normocephalic and atraumatic.      Mouth/Throat:      Mouth: Mucous membranes are moist.   Eyes:      General: Scleral icterus present.      Conjunctiva/sclera: Conjunctivae normal.   Cardiovascular:      Rate and Rhythm: Normal rate and regular rhythm.      Heart sounds: No murmur heard.  Pulmonary:      Effort: Pulmonary effort is normal. No respiratory distress.      Breath sounds: Normal breath sounds.   Abdominal:      General: There is distension.      Palpations: Abdomen is soft.      Tenderness: There is no abdominal tenderness.   Musculoskeletal:         General: Swelling present.      Cervical back: Neck supple.      Right lower leg: Edema present.      Left lower leg: Edema present.   Skin:     General: Skin is warm and dry.      Capillary Refill: Capillary refill takes less than 2 seconds.      Coloration: Skin is jaundiced and pale.   Neurological:      General: No focal deficit present.      Mental Status: He is alert and oriented to person, place, and time.   Psychiatric:         Mood and Affect: Mood normal.         Behavior: Behavior normal.       Lines/Drains:        Lab Results: I have reviewed the following results:  Results from last 7 days   Lab Units 10/11/24  5751 10/10/24  7422   WBC  Thousand/uL 6.65 6.42   HEMOGLOBIN g/dL 10.1* 10.8*   HEMATOCRIT % 30.8* 33.1*   PLATELETS Thousands/uL 189 189   BANDS PCT %  --  4   LYMPHO PCT %  --  3*   MONO PCT %  --  4   EOS PCT %  --  0     Results from last 7 days   Lab Units 10/11/24  0447   SODIUM mmol/L 127*   POTASSIUM mmol/L 4.2   CHLORIDE mmol/L 98   CO2 mmol/L 20*   BUN mg/dL 24   CREATININE mg/dL 0.97   ANION GAP mmol/L 9   CALCIUM mg/dL 7.3*   ALBUMIN g/dL 2.0*   TOTAL BILIRUBIN mg/dL 2.05*   ALK PHOS U/L 491*   ALT U/L 303*   AST U/L 561*   GLUCOSE RANDOM mg/dL 119     Results from last 7 days   Lab Units 10/11/24  0447   INR  1.84*         Lab Results   Component Value Date    HGBA1C 3.9 (L) 03/18/2024     Results from last 7 days   Lab Units 10/10/24  2358 10/10/24  2128   LACTIC ACID mmol/L 3.5* 3.4*       Imaging Results Review: I reviewed radiology reports from this admission including: CT chest and CT abdomen/pelvis.  Other Study Results Review: EKG was reviewed.       ** Please Note: This note has been constructed using a voice recognition system. **

## 2024-10-11 NOTE — UTILIZATION REVIEW
"Initial Clinical Review    Admission: Date/Time/Statement:   Admission Orders (From admission, onward)       Ordered        10/11/24 1110  INPATIENT ADMISSION  Once                    Orders Placed This Encounter    INPATIENT ADMISSION     Standing Status:   Standing     Number of Occurrences:   1     Order Specific Question:   Level of Care     Answer:   Med Surg [16]     Order Specific Question:   Estimated length of stay     Answer:   More than 2 Midnights     Order Specific Question:   Certification     Answer:   I certify that inpatient services are medically necessary for this patient for a duration of greater than two midnights. See H&P and MD Progress Notes for additional information about the patient's course of treatment.     Comments:   spleenic mass and transaminitis     ED Arrival Information       Expected   -    Arrival   10/10/2024 21:12    Acuity   Urgent              Means of arrival   Walk-In    Escorted by   Family Member    Service   Hospitalist    Admission type   Emergency              Arrival complaint   SOB             Chief Complaint   Patient presents with    Shortness of Breath     Patient reports that he started with SOB last week. Seen PCP last week and was put on a steroid. Patient reports the steroids helped but he finished now and is feeling worse. +cough/n/v. Patient also states that he feels a \"lump in his chest when he eats. Denies fevers       Initial Presentation: 44 y.o. male  with a PMHx of Osiel en Y bypass surgery, MASH, hypothyroidism, anemia,  elevated LFT's.   2 wk h/o    cough, intermittent fever, sweats and chills and fatigue to PCP on 10/1 where he was found to have elevated transaminase levels. He was placed on a steroid daily and felt improved until that ran out, now feels worse prompting ED visit continue with cough and nausea.  Has been taking about 4 tylenol daily (2 tabs BID)     10/01  Labs:  , , T bili 2, Alk phos 352. Na 130  10/10  Labs:  AST " 599, , T bili 2.39, Alk phos 538, Na 128    MELD Score 24  10/11  Labs:  , , T bili 2.05, Alk phos 491, Na 127    10/10  ER: CTAP  revealed small b/l pleural effusions, moderate ascites and anascarca, stable hepatosplenomegaly, hypoenhancing partially confluent lesions in the spleen concerning for malignancy. Ill defined areas of hypoenhancement in the R lobe of the liver new from prior CT (below), with large sclerotic and lytic lesions in the iliac bone and smaller lesions in the lumbar spine.   Uncontrolled hypothyroidism TSH 36  (Low thyroid function could also be impeding clearance of lactic acid)      MELD score 24  hyponatremia  Na 127,  hypomagnesemia  1.7.   lactic acid 3.5   tachycardia,  tachypnea  Malnutrition : Loss of sq fat in orbital, triceps, ribcage areas.  Loss of muscle at temples, clavicles, scapula, extremities.  Significant anasarca.  BMI  34.94 kg/m²    10/11     Admit  IP status,  MS   Level of care for  URI symptom management ,  workup of Tasnsaminitis/ lactic acidosis,  monitoring/repletion of electrolytes.   Will  Check Tylenol level, hepatitis panel.  Check ultrasound liver with Dopplers.   Initiate lasix for anasarca:  monitor volume status w/ cautious IVF, daily wgt,  I/O q shift,  daily renal indices.   Consult GI .   Check vitamin D level  (known deficiency and reports taking over-the-counter 5000 units daily)    Cont infectious workup:  trend wbc, procal, lactic acid (q2H until normalized)      Anticipated Length of Stay/Certification Statement: Patient will be admitted on an inpatient basis with an anticipated length of stay of greater than 2 midnights secondary to transaminase elevation.       ED Treatment-Medication Administration from 10/10/2024 2110 to 10/11/2024 0301         Date/Time Order Dose Route Action     10/10/2024 2127 sodium chloride 0.9 % bolus 500 mL 500 mL Intravenous New Bag     10/10/2024 2159 sodium chloride 0.9 % bolus 1,000 mL 1,000 mL  Intravenous New Bag     10/10/2024 2245 magnesium sulfate IVPB (premix) SOLN 1 g 1 g Intravenous New Bag     10/11/2024 0203 cefTRIAXone (ROCEPHIN) IVPB (premix in dextrose) 1,000 mg 50 mL 1,000 mg Intravenous New Bag     Scheduled Medications:  Cholecalciferol, 5,000 Units, Oral, Daily  docusate sodium, 100 mg, Oral, Q12H  ferrous sulfate, 325 mg, Oral, Daily With Breakfast  furosemide, 20 mg, Oral, Daily  heparin (porcine), 5,000 Units, Subcutaneous, Q8H KAYLIE  levothyroxine, 200 mcg, Oral, Early Morning      Continuous IV Infusions:     PRN Meds:  ondansetron, 4 mg, Intravenous, Q8H PRN  polyethylene glycol, 17 g, Oral, Daily PRN      ED Triage Vitals   Temperature Pulse Respirations Blood Pressure SpO2 Pain Score   10/10/24 2119 10/10/24 2119 10/10/24 2119 10/10/24 2119 10/10/24 2119 10/10/24 2120   98.3 °F (36.8 °C) 92 19 119/81 100 % No Pain     Weight (last 2 days)       Date/Time Weight    10/11/24 0308 120 (264.8)    10/10/24 2119 118 (259.04)            Vital Signs (last 3 days)       Date/Time Temp Pulse Resp BP SpO2 O2 Device Pain    10/11/24 1000 -- 111 -- -- -- -- --    10/11/24 0900 -- 114 -- -- -- -- --    10/11/24 0845 -- -- -- -- 93 % None (Room air) No Pain    10/11/24 0800 -- 119 -- -- -- -- --    10/11/24 07:19:33 97.7 °F (36.5 °C) 110 18 112/80 96 % -- --    10/11/24 0332 -- -- -- -- -- -- 3    10/11/24 0308 -- -- -- -- -- -- No Pain    10/11/24 03:06:50 97.5 °F (36.4 °C) 106 18 139/92 97 % -- --    10/10/24 2315 -- 84 20 119/79 95 % None (Room air) --    10/10/24 2200 -- 80 21 128/83 99 % None (Room air) --    10/10/24 2120 -- -- -- -- -- -- No Pain    10/10/24 2119 98.3 °F (36.8 °C) 92 19 119/81 100 % None (Room air) --              Pertinent Labs/Diagnostic Test Results:   Radiology:  US right upper quadrant with liver dopplers   Final Interpretation by Cleopatra Blue MD (10/11 0909)   Hepatomegaly. Multiple hypodense foci evident on CT are not well outlined on ultrasound, however, may be  causative for transaminitis.   Patent major hepatic vessels with normal directional flow.   Redemonstrated ascites.      Workstation performed: [id number]         CT chest abdomen pelvis w contrast   Final Interpretation by Bryon Christina MD (10/11 0133)         1. Small bilateral pleural effusions. Moderate ascites and anasarca.   2. Stable hepatosplenomegaly. Hypoenhancing partially confluent lesions in the spleen concerning for malignancy. Ill-defined areas of hypoenhancement in the right lobe of the liver, not visualized on the prior exam. Malignancy not excluded.   3. Large sclerotic and lytic lesion in the left iliac bone and smaller lytic lesions in the lumbar spine, also concerning for malignancy.               Workstation performed: [id number]           Cardiology:  No orders to display     GI:  No orders to display           Results from last 7 days   Lab Units 10/11/24  0447 10/10/24  2128   WBC Thousand/uL 6.65 6.42   HEMOGLOBIN g/dL 10.1* 10.8*   HEMATOCRIT % 30.8* 33.1*   PLATELETS Thousands/uL 189 189   BANDS PCT %  --  4         Results from last 7 days   Lab Units 10/11/24  0447 10/10/24  2128   SODIUM mmol/L 127* 128*   POTASSIUM mmol/L 4.2 4.3   CHLORIDE mmol/L 98 96   CO2 mmol/L 20* 25   ANION GAP mmol/L 9 7   BUN mg/dL 24 26*   CREATININE mg/dL 0.97 1.08   EGFR ml/min/1.73sq m 93 82   CALCIUM mg/dL 7.3* 7.5*   CALCIUM, IONIZED mmol/L 1.06*  --    MAGNESIUM mg/dL 1.7* 1.7*   PHOSPHORUS mg/dL 3.6  --      Results from last 7 days   Lab Units 10/11/24  0521 10/11/24  0447 10/10/24  2128   AST U/L  --  561* 599*   ALT U/L  --  303* 330*   ALK PHOS U/L  --  491* 538*   TOTAL PROTEIN g/dL  --  3.5* 3.9*   ALBUMIN g/dL  --  2.0* 2.4*   TOTAL BILIRUBIN mg/dL  --  2.05* 2.39*   BILIRUBIN DIRECT mg/dL  --   --  1.13*   AMMONIA umol/L 67  --   --          Results from last 7 days   Lab Units 10/11/24  0447 10/10/24  2128   GLUCOSE RANDOM mg/dL 119 100     Results from last 7 days   Lab Units  10/10/24  2128   HS TNI 0HR ng/L 23         Results from last 7 days   Lab Units 10/11/24  0447 10/10/24  2128   PROTIME seconds 21.5* 20.6*   INR  1.84* 1.74*   PTT seconds  --  54*     Results from last 7 days   Lab Units 10/10/24  2128   TSH 3RD GENERATON uIU/mL 36.083*         Results from last 7 days   Lab Units 10/10/24  2358 10/10/24  2128   LACTIC ACID mmol/L 3.5* 3.4*             Results from last 7 days   Lab Units 10/10/24  2128   BNP pg/mL 30     Results from last 7 days   Lab Units 10/11/24  0447   FERRITIN ng/mL >7,500*                 Results from last 7 days   Lab Units 10/10/24  2128   LIPASE u/L 53                 Results from last 7 days   Lab Units 10/11/24  0008   CLARITY UA  Clear   COLOR UA  Diane   SPEC GRAV UA  1.020   PH UA  6.5   GLUCOSE UA mg/dl Negative   KETONES UA mg/dl Negative   BLOOD UA  Small*   PROTEIN UA mg/dl Trace*   NITRITE UA  Negative   BILIRUBIN UA  Moderate*   UROBILINOGEN UA E.U./dl 2.0*   LEUKOCYTES UA  Negative   WBC UA /hpf 0-1   RBC UA /hpf 4-10*   BACTERIA UA /hpf Occasional   EPITHELIAL CELLS WET PREP /hpf Occasional                 Results from last 7 days   Lab Units 10/11/24  0447   ACETAMINOPHEN LVL ug/mL <2*       Admitting Diagnosis: Shortness of breath [R06.02]  Lactic acidosis [E87.20]  Hypomagnesemia [E83.42]  Anasarca [R60.1]  Hyponatremia [E87.1]  SOB (shortness of breath) [R06.02]  Elevated LFTs [R79.89]  Pleural effusion, bilateral [J90]  Splenic lesion [D73.89]  Hepatic lesion [K76.9]  Generalized weakness [R53.1]  Elevated bilirubin [R17]  Lytic bone lesion of hip [M89.8X5]  Age/Sex: 44 y.o. male    Network Utilization Review Department  ATTENTION: Please call with any questions or concerns to [phone] and carefully listen to the prompts so that you are directed to the right person. All voicemails are confidential.   For Discharge needs, contact Care Management DC Support Team at [phone] opt. [phone]  Send all requests for admission clinical  reviews, approved or denied determinations and any other requests to dedicated fax number below belonging to the campus where the patient is receiving treatment. List of dedicated fax numbers for the Facilities:  FACILITY NAME UR FAX NUMBER   ADMISSION DENIALS (Administrative/Medical Necessity) [phone]   DISCHARGE SUPPORT TEAM (NETWORK) [phone]   PARENT CHILD HEALTH (Maternity/NICU/Pediatrics) [phone]   [facility] [phone]   [facility] [phone]   [facility] [phone]   [facility] [phone]   [facility] [phone]   [facility] [phone]   [facility] [phone]   [facility] [phone]   [facility] [phone]   [facility] [phone]   [facility] [phone]   [facility] [phone]

## 2024-10-11 NOTE — CONSULTS
"Consult received for gastric bypass.     Pt reports decreased appetite overall though not significantly changed from baseline. Reports eating 6 small frequent meals per day. Reports primarily eating meat options, very limited intake of carb containing foods \"they fill me up too quick\". Reports drinking 2 protein shakes per day \"whatever is highest protein, 20 or 30 grams\". Drinks \"a lot\" of water each day, asking for refillable pitcher of water at his bedside. Pt reports weighing > 550lb prior to RYGB. Chart review of weight hx: 8/21/23 217lb, 3/29/24 219lb, 10/1/24 237lb, 10/11/24 264lb (standing). +ascites. Pt with noted moderate muscle loss to pectoralis and temporalis muscles, pt stating that started to be noticeable after his bariatric surgery. Pt likely not meeting calorie needs at baseline.     Meets criteria for chronic moderate malnutrition.   Fluid restriction per MD.   Will order ensure plus high PRO daily and increase as appropriate.   RYGB noted from 2012 per PMHx, will liberalize diet to regular to open up more calorically dense food options.   Consider daily weights.   Noting vitamin D level deficiency, supplementation per MD. Consider checking serum vitamin A, copper, zinc, vitamin B12 levels and replete as indicated.   "

## 2024-10-11 NOTE — ASSESSMENT & PLAN NOTE
Magnesium 1.7  Replete and recheck   [] [address]  [address]  [phone]  F: [phone] [] [address]   [address]  P: [phone]  F: [phone] [x] [address]  [address] [facility]  P: [phone]  F: [phone] [] [address]  [facility]   [address]   [place]: [phone]  F: [phone]     Physical Therapy Lower Extremity Amputee Evaluation    Date:  2022  Patient: Marge Traylor  : 1997  MRN: [id number]  Physician: uCba Zarate MD   Insurance: [employer] Hard max  Medical Diagnosis:   E04.092 (ICD-10-CM) - History of disarticulation of right hip   G54.6 (ICD-10-CM) - Phantom pain [facility]   Rehab Codes: R26.2 Difficulty walking  Onset date: 22  Next Dr's appt. : tbd    Subjective:   CC: Patient returns to physical therapy to progress gait ability using new prosthesis.  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  Last chemo Tx  2021  Prosthetic company and prosthetist: [employer]val[employer] Lakhani  Date received prosthesis: tbd  Type of knee jt.: C leg  Current wearing schedule: tbd  Wearing shrinkler sock at night:[] Yes [x] No  Number of Sock ply: tbd    PMHx: [] Unremarkable [] Diabetes [] HTN  [] Pacemaker   [] MI/Heart Problems [x] Cancer [] Arthritis [] Other:              [x] Refer to full medical chart  In EPIC      Medications: [x] Refer to full medical record [] None [] Other:  Allergies:      [x] Refer to full medical record [] None [] Other:    General Pain:  [] Yes  [x] No  Location:   Phantom Pain:[] Yes  [x] No   Phantom Sensation:[x] Yes intermittent  [] No     Working:  [] Normal Duty  [] Light Duty  [] Off D/T Condition  [] Retired    [x] Not Employed    []  Disability  [] Other:           Return to work:   Job/ADL Description:    Home environment:  -  Lives with [facility] with  and new born son  5 steps to enter with ramp     Durable Medical Equipment:  Current DME available: wc, bilateral axillary crutches    Objective:    ROM  ° A/P STRENGTH    Left Right Left Right   Hip Flex       Ext       ER       IR       ABD   -3 na   ADD       Knee Flex       Ext       Ankle DF    dec   PF                              Flexibility Normal Left tight Right tight   Hip flexor [] [] []   quad [] [] []   HS [] [] []   piriformis [] [] []   ITB [] [] []   gastroc [] [x] na   Soleus  [] [x] na     OBSERVATION No Deficit Deficit Not Tested Comments   Posture       Forward Head [] [] []    Rounded Shoulders [] [] []    Kyphosis [] [] []    Lordosis [] [] []    Lateral Shift [] [] []    Scoliosis [] [] []    Iliac Crest [] [] []    PSIS [] [] []    ASIS [] [] []    Genu Valgus [] [] []    Genu Varus [] [] []    Genu Recurvatum [] [] []    Pronation [] [] []    Supination [] [] []    Leg Length Discrp [] [] []    Slumped Sitting [] [] []    Palpation [] [] []    Sensation [] [] []    Edema [] [] []    Neurological [] [] []                             Gait Deviations No Deficit Deficit Not Tested Comments   Posture    Flexed, Neutral   Abducted gait [] [x] []    Trendelenburg [] [] []    Reverse trendelenburg [] [] []    Functional scoliosis [] [] []    Exaggerated lordosis [] [] []    Circumduction [] [x] []    Vaulting [] [] []    Hip Hiking [] [] []    Medial whip [] [] []    Lateral whip [] [] []    Increased knee flexion [] [] []    Toe catch [] [] []    Varus moment [] [] []    Valgus moment [] [] []    Decreased stance time [] [] []    Decreased step length [] [x] []    Transverse pelvic rotation [] [] []    Thoracic rotation [] [x] []    Inc wt shift over sound side [] [x] []    Other:                         FUNCTION INDEP MIN ASSIST MOD ASSIST MAX ASSIST understanding. [] Demonstrates understanding. [] Needs Review. [] Demonstrates/verbalizes understanding of HEP/Ed previously given. Treatment Plan:  [x] Therapeutic Exercise   18013  [] Iontophoresis: 4 mg/mL Dexamethasone Sodium Phosphate  mAmin  78164   [x] Therapeutic Activity  45577 [] Vasopneumatic cold with compression  79126    [x] Gait Training   10747 [] Ultrasound   85288   [x] Neuromuscular Re-education  65801 [] Electrical Stimulation Unattended  22201   [x] Manual Therapy  11658 [] Electrical Stimulation Attended  79059   [x] Instruction in HEP  [] Dry Needling       Frequency:  1 x/week for 12 visits    Todays Treatment:  Modalities:   Precautions:  Exercises:  Exercise Reps/ Time Weight/ Level Comments   Gait training      turns                        Other:    Specific Instructions for next treatment: gait training with prosthetist      Evaluation Complexity:  History (Personal factors, comorbidities) [x] 0 [] 1-2 [] 3+   Exam (limitations, restrictions) [x] 1-2 [] 3 [] 4+   Clinical presentation (progression) [x] Stable [] Evolving  [] Unstable   Decision Making [x] Low [] Moderate [] High    [x] Low Complexity [] Moderate Complexity [] High Complexity       Treatment Charges: Mins Units   [x] Evaluation       [x]  Low       []  Moderate       []  High 30 1   []  Modalities     []  Ther Exercise     []  Manual Therapy     []  Ther Activities     []  Aquatics     []  Vasocompression     [x]  Other: gait 15 1     TOTAL TREATMENT TIME: 45    Time in:3pm   Time Out:4pm    Electronically signed by: Lois Mojica PT        Physician Signature:________________________________Date:__________________  By signing above or cosigning this note, I have reviewed this plan of care and certify a need for medically necessary rehabilitation services.      *PLEASE SIGN ABOVE AND FAX BACK ALL PAGES*

## 2024-10-11 NOTE — ED PROVIDER NOTES
Time reflects when diagnosis was documented in both MDM as applicable and the Disposition within this note       Time User Action Codes Description Comment    10/11/2024  1:46 AM Remaley, Suhail Add [R53.1] Generalized weakness     10/11/2024  1:46 AM Remaley, Suhail Add [R06.02] Shortness of breath     10/11/2024  1:46 AM Remaley, Suhail Add [J90] Pleural effusion, bilateral     10/11/2024  1:47 AM Remaley, Suhail Add [D73.89] Splenic lesion     10/11/2024  1:47 AM Remaley, Suhail Add [K76.9] Hepatic lesion     10/11/2024  1:47 AM Remaley, Suhail Add [M89.8X5] Lytic bone lesion of hip     10/11/2024  1:48 AM Remaley, Suhail Add [E87.20] Lactic acidosis     10/11/2024  1:48 AM Remaley, Suhail Add [R79.89] Elevated LFTs     10/11/2024  1:48 AM Remaley, Suhail Add [R17] Elevated bilirubin     10/11/2024  1:53 AM Remaley, Suhail Add [R60.1] Anasarca     10/11/2024  2:34 AM Remaley, Suhail Add [E87.1] Hyponatremia     10/11/2024  2:34 AM Remaley, Suhail Add [E83.42] Hypomagnesemia           ED Disposition       ED Disposition   Admit    Condition   Stable    Date/Time   Fri Oct 11, 2024  2:06 AM    Comment   Case was discussed with [place] and the patient's admission status was agreed to be Admission Status: observation status to the service of Dr. Rodrigues.               Assessment & Plan       Medical Decision Making  Problems Addressed:  Anasarca: acute illness or injury  Elevated bilirubin: acute illness or injury  Elevated LFTs: acute illness or injury  Generalized weakness: acute illness or injury  Hepatic lesion: acute illness or injury  Lactic acidosis: acute illness or injury  Lytic bone lesion of hip: acute illness or injury  Pleural effusion, bilateral: acute illness or injury  Shortness of breath: acute illness or injury  Splenic lesion: acute illness or injury    Amount and/or Complexity of Data Reviewed  Labs: ordered. Decision-making details documented in ED Course.  Radiology: ordered and independent interpretation performed.  Decision-making details documented in ED Course.  ECG/medicine tests: ordered and independent interpretation performed. Decision-making details documented in ED Course.    Risk  Prescription drug management.  Decision regarding hospitalization.        ED Course as of 10/11/24 0235   Thu Oct 10, 2024   2358 Lactic acidosis is noted no hypotension or evidence of severe sepsis or septic shock present treating with IV fluids for now awaiting CT imaging results to determine if infection present.     Fri Oct 11, 2024   0208 Spoke with hospitalist on-call [place] reviewed case and findings in the emergency department and management thus far accepts for observation on behalf of Dr. Rodrigues.         Medications   sodium chloride 0.9 % bolus 500 mL (0 mL Intravenous Stopped 10/10/24 2227)   sodium chloride 0.9 % bolus 1,000 mL (0 mL Intravenous Stopped 10/10/24 2340)   magnesium sulfate IVPB (premix) SOLN 1 g (0 g Intravenous Stopped 10/10/24 2358)   iohexol (OMNIPAQUE) 350 MG/ML injection (MULTI-DOSE) 100 mL (100 mL Intravenous Given 10/10/24 2332)   cefTRIAXone (ROCEPHIN) IVPB (premix in dextrose) 1,000 mg 50 mL (1,000 mg Intravenous New Bag 10/11/24 0203)       ED Risk Strat Scores                           SBIRT 22yo+      Flowsheet Row Most Recent Value   Initial Alcohol Screen: US AUDIT-C     1. How often do you have a drink containing alcohol? 0 Filed at: 10/10/2024 2121   2. How many drinks containing alcohol do you have on a typical day you are drinking?  0 Filed at: 10/10/2024 2121   3a. Male UNDER 65: How often do you have five or more drinks on one occasion? 0 Filed at: 10/10/2024 2121   Audit-C Score 0 Filed at: 10/10/2024 2121   BREANN: How many times in the past year have you...    Used an illegal drug or used a prescription medication for non-medical reasons? Never Filed at: 10/10/2024 2121                            History of Present Illness       Chief Complaint   Patient presents with    Shortness of Breath  "    Patient reports that he started with SOB last week. Seen PCP last week and was put on a steroid. Patient reports the steroids helped but he finished now and is feeling worse. +cough/n/v. Patient also states that he feels a \"lump in his chest when he eats. Denies fevers       History reviewed. No pertinent past medical history.   Past Surgical History:   Procedure Laterality Date    GASTRIC BYPASS  2012    GASTRIC BYPASS        History reviewed. No pertinent family history.   Social History     Tobacco Use    Smoking status: Never    Smokeless tobacco: Current   Vaping Use    Vaping status: Never Used   Substance Use Topics    Alcohol use: Never    Drug use: Never      E-Cigarette/Vaping    E-Cigarette Use Never User       E-Cigarette/Vaping Substances      I have reviewed and agree with the history as documented.     Patient is a 44-year-old male with history of gastric bypass biliopancreatic diversion with duodenal switch by Dr. Garner on December 13, 2012.  Patient presents to the emergency department complaining of generalized weakness and fatigue and shortness of breath and cough and abdominal bloating and distention occasional nausea and vomiting symptoms have been ongoing for about 2 weeks and progressively worsening.  Has felt warm no fevers.        History provided by:  Patient  Shortness of Breath  Severity:  Moderate  Onset quality:  Gradual  Duration:  2 weeks  Timing:  Constant  Progression:  Worsening  Associated symptoms: cough and vomiting    Associated symptoms: no abdominal pain, no chest pain, no fever and no headaches        Review of Systems   Constitutional:  Positive for activity change, appetite change, chills and fatigue. Negative for fever.   HENT:  Positive for congestion.    Respiratory:  Positive for cough and shortness of breath.    Cardiovascular:  Negative for chest pain.   Gastrointestinal:  Positive for abdominal distention, nausea and vomiting. Negative for abdominal pain and " diarrhea.   Neurological:  Negative for weakness, numbness and headaches.   All other systems reviewed and are negative.          Objective       ED Triage Vitals   Temperature Pulse Blood Pressure Respirations SpO2 Patient Position - Orthostatic VS   10/10/24 2119 10/10/24 2119 10/10/24 2119 10/10/24 2119 10/10/24 2119 10/10/24 2119   98.3 °F (36.8 °C) 92 119/81 19 100 % Lying      Temp Source Heart Rate Source BP Location FiO2 (%) Pain Score    10/10/24 2119 10/10/24 2119 10/10/24 2119 -- 10/10/24 2120    Temporal Monitor Left arm  No Pain      Vitals      Date and Time Temp Pulse SpO2 Resp BP Pain Score FACES Pain Rating User   10/10/24 2315 -- 84 95 % 20 119/79 -- -- AR   10/10/24 2200 -- 80 99 % 21 128/83 -- -- SB   10/10/24 2120 -- -- -- -- -- No Pain -- SB   10/10/24 2119 98.3 °F (36.8 °C) 92 100 % 19 119/81 -- -- SB            Physical Exam  Vitals and nursing note reviewed.   Constitutional:       General: He is not in acute distress.     Appearance: Normal appearance. He is ill-appearing.   HENT:      Head: Normocephalic and atraumatic.      Nose: Nose normal.   Eyes:      General: Scleral icterus present.      Conjunctiva/sclera: Conjunctivae normal.   Cardiovascular:      Rate and Rhythm: Normal rate and regular rhythm.   Pulmonary:      Effort: Pulmonary effort is normal. No respiratory distress.      Breath sounds: Normal breath sounds.   Abdominal:      General: There is distension.   Musculoskeletal:      Right lower leg: Edema present.      Left lower leg: Edema present.   Skin:     General: Skin is dry.      Coloration: Skin is jaundiced and pale.   Neurological:      General: No focal deficit present.      Mental Status: He is alert and oriented to person, place, and time.         Results Reviewed       Procedure Component Value Units Date/Time    Ammonia [[id number]]     Lab Status: No result Specimen: Blood     Hepatitis panel, acute [[id number]]     Lab Status: No result Specimen: Blood      "Acetaminophen level-\"If concentration is detectable, please discuss with medical  on call.\" [[id number]]     Lab Status: No result Specimen: Blood     Lactic acid 2 Hours [[id number]]  (Abnormal) Collected: 10/10/24 2358    Lab Status: Final result Specimen: Blood from Arm, Right Updated: 10/11/24 0045     LACTIC ACID 3.5 mmol/L     Narrative:      Result may be elevated if tourniquet was used during collection.    Urine Microscopic [[id number]]  (Abnormal) Collected: 10/11/24 0008    Lab Status: Final result Specimen: Urine, Clean Catch Updated: 10/11/24 0038     RBC, UA 4-10 /hpf      WBC, UA 0-1 /hpf      Epithelial Cells Occasional /hpf      Bacteria, UA Occasional /hpf      Hyaline Casts, UA 1-2 /lpf      Ca Oxalate Apurva, UA Occasional /hpf     Blood culture #2 [[id number]] Collected: 10/11/24 0013    Lab Status: In process Specimen: Blood from Arm, Right Updated: 10/11/24 0019    UA w Reflex to Microscopic w Reflex to Culture [[id number]]  (Abnormal) Collected: 10/11/24 0008    Lab Status: Final result Specimen: Urine, Clean Catch Updated: 10/11/24 0016     Color, UA Diane     Clarity, UA Clear     Specific Gravity, UA 1.020     pH, UA 6.5     Leukocytes, UA Negative     Nitrite, UA Negative     Protein, UA Trace mg/dl      Glucose, UA Negative mg/dl      Ketones, UA Negative mg/dl      Urobilinogen, UA 2.0 E.U./dl      Bilirubin, UA Moderate     Occult Blood, UA Small    Blood culture #1 [[id number]] Collected: 10/11/24 0008    Lab Status: In process Specimen: Blood from Arm, Right Updated: 10/11/24 0012    Bilirubin, direct [[id number]]  (Abnormal) Collected: 10/10/24 2128    Lab Status: Final result Specimen: Blood from Arm, Right Updated: 10/10/24 2323     Bilirubin, Direct 1.13 mg/dL     B-Type Natriuretic Peptide(BNP) [[id number]]  (Normal) Collected: 10/10/24 2128    Lab Status: Final result Specimen: Blood from Arm, Right Updated: 10/10/24 2306     BNP 30 pg/mL     RBC Morphology Reflex Test " [[id number]] Collected: 10/10/24 2128    Lab Status: Final result Specimen: Blood from Arm, Right Updated: 10/10/24 2301    Comprehensive metabolic panel [[id number]]  (Abnormal) Collected: 10/10/24 2128    Lab Status: Final result Specimen: Blood from Arm, Right Updated: 10/10/24 2230     Sodium 128 mmol/L      Potassium 4.3 mmol/L      Chloride 96 mmol/L      CO2 25 mmol/L      ANION GAP 7 mmol/L      BUN 26 mg/dL      Creatinine 1.08 mg/dL      Glucose 100 mg/dL      Calcium 7.5 mg/dL      Corrected Calcium 8.8 mg/dL       U/L       U/L      Alkaline Phosphatase 538 U/L      Total Protein 3.9 g/dL      Albumin 2.4 g/dL      Total Bilirubin 2.39 mg/dL      eGFR 82 ml/min/1.73sq m     Narrative:      [employer] guidelines for Chronic Kidney Disease (CKD):     Stage 1 with normal or high GFR (GFR > 90 mL/min/1.73 square meters)    Stage 2 Mild CKD (GFR = 60-89 mL/min/1.73 square meters)    Stage 3A Moderate CKD (GFR = 45-59 mL/min/1.73 square meters)    Stage 3B Moderate CKD (GFR = 30-44 mL/min/1.73 square meters)    Stage 4 Severe CKD (GFR = 15-29 mL/min/1.73 square meters)    Stage 5 End Stage CKD (GFR <15 mL/min/1.73 square meters)  Note: GFR calculation is accurate only with a steady state creatinine    Magnesium [[id number]]  (Abnormal) Collected: 10/10/24 2128    Lab Status: Final result Specimen: Blood from Arm, Right Updated: 10/10/24 2230     Magnesium 1.7 mg/dL     Lipase [[id number]]  (Normal) Collected: 10/10/24 2128    Lab Status: Final result Specimen: Blood from Arm, Right Updated: 10/10/24 2230     Lipase 53 u/L     TSH, 3rd generation with Free T4 reflex [[id number]]  (Abnormal) Collected: 10/10/24 2128    Lab Status: Final result Specimen: Blood from Arm, Right Updated: 10/10/24 2230     TSH 3RD GENERATON 36.083 uIU/mL     T4, free [[id number]] Collected: 10/10/24 2128    Lab Status: In process Specimen: Blood from Arm, Right Updated: 10/10/24 2230    Manual  Differential(PHLEBS Do Not Order) [[id number]]  (Abnormal) Collected: 10/10/24 2128    Lab Status: Final result Specimen: Blood from Arm, Right Updated: 10/10/24 2225     Segmented % 89 %      Bands % 4 %      Lymphocytes % 3 %      Monocytes % 4 %      Eosinophils % 0 %      Basophils % 0 %      Absolute Neutrophils 5.97 Thousand/uL      Absolute Lymphocytes 0.19 Thousand/uL      Absolute Monocytes 0.26 Thousand/uL      Absolute Eosinophils 0.00 Thousand/uL      Absolute Basophils 0.00 Thousand/uL      Total Counted --     RBC Morphology Present     Platelet Estimate Adequate     Large Platelet Present     Anisocytosis Present     Stomatocytes Present    CBC and differential [[id number]]  (Abnormal) Collected: 10/10/24 2128    Lab Status: Final result Specimen: Blood from Arm, Right Updated: 10/10/24 2225     WBC 6.42 Thousand/uL      RBC 3.73 Million/uL      Hemoglobin 10.8 g/dL      Hematocrit 33.1 %      MCV 89 fL      MCH 29.0 pg      MCHC 32.6 g/dL      RDW 20.0 %      MPV 11.5 fL      Platelets 189 Thousands/uL     Narrative:      This is an appended report.  These results have been appended to a previously verified report.    HS Troponin 0hr (reflex protocol) [[id number]]  (Normal) Collected: 10/10/24 2128    Lab Status: Final result Specimen: Blood from Arm, Right Updated: 10/10/24 2208     hs TnI 0hr 23 ng/L     Protime-INR [[id number]]  (Abnormal) Collected: 10/10/24 2128    Lab Status: Final result Specimen: Blood from Arm, Right Updated: 10/10/24 2159     Protime 20.6 seconds      INR 1.74    Narrative:      INR Therapeutic Range    Indication                                             INR Range      Atrial Fibrillation                                               2.0-3.0  Hypercoagulable State                                    2.0.2.3  Left Ventricular Asist Device                            2.0-3.0  Mechanical Heart Valve                                  -    Aortic(with afib, MI, embolism, HF, LA  enlargement,    and/or coagulopathy)                                     2.0-3.0 (2.5-3.5)     Mitral                                                             2.5-3.5  Prosthetic/Bioprosthetic Heart Valve               2.0-3.0  Venous thromboembolism (VTE: VT, PE        2.0-3.0    APTT [[id number]]  (Abnormal) Collected: 10/10/24 2128    Lab Status: Final result Specimen: Blood from Arm, Right Updated: 10/10/24 2159     PTT 54 seconds     FLU/COVID Rapid Antigen (30 min. TAT) - Preferred screening test in ED [[id number]]  (Normal) Collected: 10/10/24 2128    Lab Status: Final result Specimen: Nares from Nose Updated: 10/10/24 2157     SARS COV Rapid Antigen Negative     Influenza A Rapid Antigen Negative     Influenza B Rapid Antigen Negative    Narrative:      This test has been performed using the Kudanidel Ayleen 2 FLU+SARS Antigen test under the Emergency Use Authorization (EUA). This test has been validated by the  and verified by the performing laboratory. The Ayleen uses lateral flow immunofluorescent sandwich assay to detect SARS-COV, Influenza A and Influenza B Antigen.     The Quidel Ayleen 2 SARS Antigen test does not differentiate between SARS-CoV and SARS-CoV-2.     Negative results are presumptive and may be confirmed with a molecular assay, if necessary, for patient management. Negative results do not rule out SARS-CoV-2 or influenza infection and should not be used as the sole basis for treatment or patient management decisions. A negative test result may occur if the level of antigen in a sample is below the limit of detection of this test.     Positive results are indicative of the presence of viral antigens, but do not rule out bacterial infection or co-infection with other viruses.     All test results should be used as an adjunct to clinical observations and other information available to the provider.    FOR PEDIATRIC PATIENTS - copy/paste COVID Guidelines URL to browser:  [email or web address]    Lactic acid, plasma (w/reflex if result > 2.0) [[id number]]  (Abnormal) Collected: 10/10/24 2128    Lab Status: Final result Specimen: Blood from Arm, Right Updated: 10/10/24 2156     LACTIC ACID 3.4 mmol/L     Narrative:      Result may be elevated if tourniquet was used during collection.            CT chest abdomen pelvis w contrast   Final Interpretation by Bryon Christina MD (10/11 0133)         1. Small bilateral pleural effusions. Moderate ascites and anasarca.   2. Stable hepatosplenomegaly. Hypoenhancing partially confluent lesions in the spleen concerning for malignancy. Ill-defined areas of hypoenhancement in the right lobe of the liver, not visualized on the prior exam. Malignancy not excluded.   3. Large sclerotic and lytic lesion in the left iliac bone and smaller lytic lesions in the lumbar spine, also concerning for malignancy.               Workstation performed: [id number]             ECG 12 Lead Documentation Only    Date/Time: 10/10/2024 9:29 PM    Performed by: Suhail Masters DO  Authorized by: Suhail Masters DO    ECG reviewed by me, the ED Provider: yes    Patient location:  ED  Previous ECG:     Comparison to cardiac monitor: Yes    Quality:     Tracing quality:  Limited by artifact  Rate:     ECG rate:  95    ECG rate assessment: normal    Rhythm:     Rhythm: sinus rhythm    QRS:     QRS axis:  Left    QRS intervals:  Normal  Conduction:     Conduction: normal    ST segments:     ST segments:  Normal  T waves:     T waves: non-specific and flattening        ED Medication and Procedure Management   Prior to Admission Medications   Prescriptions Last Dose Informant Patient Reported? Taking?   Cholecalciferol 125 MCG (5000 UT) capsule   Yes Yes   Sig: Take 5,000 Units by mouth daily   docusate sodium (COLACE) 100 mg capsule   No No   Sig: Take 1 capsule (100 mg total) by mouth every 12 (twelve) hours   ferrous  sulfate 325 (65 Fe) mg tablet 10/10/2024  Yes Yes   Sig: Take 325 mg by mouth daily with breakfast   furosemide (LASIX) 20 mg tablet   Yes No   Sig: Take 20 mg by mouth daily   hydrocortisone (ANUSOL-HC) 2.5 % rectal cream   No No   Sig: Apply topically 2 (two) times a day for 5 days   levothyroxine 200 mcg tablet 10/10/2024  Yes Yes   Sig: Take 200 mcg by mouth daily   polyethylene glycol (MIRALAX) 17 g packet   Yes Yes   Sig: Take 17 g by mouth daily      Facility-Administered Medications: None     Patient's Medications   Discharge Prescriptions    No medications on file     No discharge procedures on file.  ED SEPSIS DOCUMENTATION   Time reflects when diagnosis was documented in both MDM as applicable and the Disposition within this note       Time User Action Codes Description Comment    10/11/2024  1:46 AM Suhail Masters Add [R53.1] Generalized weakness     10/11/2024  1:46 AM Suhail Masters Add [R06.02] Shortness of breath     10/11/2024  1:46 AM Suhail Masters Add [J90] Pleural effusion, bilateral     10/11/2024  1:47 AM Suhail Masters Add [D73.89] Splenic lesion     10/11/2024  1:47 AM Suhail Masters Add [K76.9] Hepatic lesion     10/11/2024  1:47 AM Suhail Masters Add [M89.8X5] Lytic bone lesion of hip     10/11/2024  1:48 AM Suhail Masters Add [E87.20] Lactic acidosis     10/11/2024  1:48 AM Suhail Masters Add [R79.89] Elevated LFTs     10/11/2024  1:48 AM Suhail Masters Add [R17] Elevated bilirubin     10/11/2024  1:53 AM Suhail Masters Add [R60.1] Anasarca     10/11/2024  2:34 AM Suhail Masters Add [E87.1] Hyponatremia     10/11/2024  2:34 AM Suhail Masters Add [E83.42] Hypomagnesemia                  Suhail S Radhaaley, DO  10/11/24 0209       Suhail S Radhaaley, DO  10/11/24 0235

## 2024-10-11 NOTE — ASSESSMENT & PLAN NOTE
Managed by PCP  Recent increase of levothyroxine to 200 mcg daily on 10/1  TSH 36, free T4 pending  Check vitamin D level, known deficiency and reports taking over-the-counter 5000 units daily  Will need outpatient follow-up

## 2024-10-11 NOTE — TELEMEDICINE
e-Consult ([facility])  - Interventional Radiology  Clayton Duval 44 y.o. male MRN: [id number]  Unit/Bed#: -01 Encounter: [id number]          Interventional Radiology has been consulted to evaluate Clayton Duval    We were consulted by medicine concerning this patient with transaminitis and newly identified osseous pelvic lesion and splenomegaly with concern for possible malignancy.  IR has been consulted to evaluate the patient for possible biopsy.    Inpatient Consult to IR  Consult performed by: Tyrone Celestin PA-C  Consult ordered by: Dorothea Bray MD        10/11/24    Assessment/Recommendation:     Lesions Concerning for Malignancy  -Biopsy of the pelvic lesion can be considered, tentative plan for early next week, pending IR scheduling, target of biopsy may change pending MRI results.  -Recommend obtaining MRI of the abdomen with and without contrast to further evaluate liver and spleen  -N.p.o. for Monday  -Hold all long-acting anticoagulation. Hold heparin Monday  - hold antiplatelets  - Case discussed with Dr. Lovett    5-10 minutes, >50% of the total time devoted to medical consultative verbal/EMR discussion between providers. Written report will be generated in the EMR.     Thank you for allowing Interventional Radiology to participate in the care of Clayton Duval. Please don't hesitate to call or TigerText us with any questions.     Tyrone Celestin PA-C

## 2024-10-11 NOTE — ASSESSMENT & PLAN NOTE
History VILLALOBOS, gastric bypass surgery, cholecystectomy  Presented with 2 weeks of cough, intermittent fever, sweats and chills and fatigue to PCP on 10/1 where he was found to have elevated transaminase levels.  He was placed on a steroid daily and felt improved until that ran out, now feels worse prompting ED visit continue with cough and nausea  CT chest abdomen pelvis Stable hepatosplenomegaly. Hypoenhancing partially confluent lesions in the spleen concerning for malignancy. Ill-defined areas of hypoenhancement in the right lobe of the liver, not visualized on the prior exam.Small shima pleural effusion  , , alk phos 538, T bili 2.39, INR 1.74, ammonia normal  Uncontrolled hypothyroidism TSH 36   Check Tylenol level, hepatitis panel  Check ultrasound liver with Dopplers  COVID/flu negative  MELD score 24 points  Significant anasarca-was prescribed furosemide 20 mg in the past but does not take on a regular basis, continue daily  Appreciate gastroenterology consultation

## 2024-10-11 NOTE — ASSESSMENT & PLAN NOTE
Malnutrition Findings: Loss of subcutaneous fat in orbital, triceps, ribcage areas.  Loss of muscle at temples, clavicles, scapula, extremities.  S/P Gastric bypass  Significant anasarca         BMI Findings:      Body mass index is 34.94 kg/m².

## 2024-10-12 LAB
AFP-TM SERPL-MCNC: 1.2 NG/ML (ref 0–9)
ALBUMIN SERPL BCG-MCNC: 2.3 G/DL (ref 3.5–5)
ALP SERPL-CCNC: 518 U/L (ref 34–104)
ALT SERPL W P-5'-P-CCNC: 280 U/L (ref 7–52)
ANION GAP SERPL CALCULATED.3IONS-SCNC: 8 MMOL/L (ref 4–13)
APTT PPP: 63 SECONDS (ref 23–34)
AST SERPL W P-5'-P-CCNC: 530 U/L (ref 13–39)
BACTERIA UR CULT: NORMAL
BILIRUB SERPL-MCNC: 2.06 MG/DL (ref 0.2–1)
BNP SERPL-MCNC: 46 PG/ML (ref 0–100)
BUN SERPL-MCNC: 25 MG/DL (ref 5–25)
CALCIUM ALBUM COR SERPL-MCNC: 8.4 MG/DL (ref 8.3–10.1)
CALCIUM SERPL-MCNC: 7 MG/DL (ref 8.4–10.2)
CARDIAC TROPONIN I PNL SERPL HS: 35 NG/L (ref 8–18)
CHLORIDE SERPL-SCNC: 96 MMOL/L (ref 96–108)
CO2 SERPL-SCNC: 22 MMOL/L (ref 21–32)
CORTIS SERPL-MCNC: 14.6 UG/DL
CREAT SERPL-MCNC: 0.84 MG/DL (ref 0.6–1.3)
ERYTHROCYTE [DISTWIDTH] IN BLOOD BY AUTOMATED COUNT: 19.2 % (ref 11.6–15.1)
GFR SERPL CREATININE-BSD FRML MDRD: 105 ML/MIN/1.73SQ M
GLUCOSE SERPL-MCNC: 82 MG/DL (ref 65–140)
HCT VFR BLD AUTO: 25.6 % (ref 36.5–49.3)
HCT VFR BLD AUTO: 26.2 % (ref 36.5–49.3)
HCT VFR BLD AUTO: 26.3 % (ref 36.5–49.3)
HCT VFR BLD AUTO: 26.5 % (ref 36.5–49.3)
HGB BLD-MCNC: 8.2 G/DL (ref 12–17)
HGB BLD-MCNC: 8.4 G/DL (ref 12–17)
HGB BLD-MCNC: 8.5 G/DL (ref 12–17)
HGB BLD-MCNC: 8.7 G/DL (ref 12–17)
LACTATE SERPL-SCNC: 3.9 MMOL/L (ref 0.5–2)
MAGNESIUM SERPL-MCNC: 1.7 MG/DL (ref 1.9–2.7)
MCH RBC QN AUTO: 28.3 PG (ref 26.8–34.3)
MCHC RBC AUTO-ENTMCNC: 32.4 G/DL (ref 31.4–37.4)
MCV RBC AUTO: 87 FL (ref 82–98)
OSMOLALITY UR/SERPL-RTO: 263 MMOL/KG (ref 282–298)
OSMOLALITY UR/SERPL-RTO: 265 MMOL/KG (ref 282–298)
PLATELET # BLD AUTO: 178 THOUSANDS/UL (ref 149–390)
PMV BLD AUTO: 11.1 FL (ref 8.9–12.7)
POTASSIUM SERPL-SCNC: 4.2 MMOL/L (ref 3.5–5.3)
PROT SERPL-MCNC: 3.4 G/DL (ref 6.4–8.4)
RBC # BLD AUTO: 3 MILLION/UL (ref 3.88–5.62)
SODIUM SERPL-SCNC: 126 MMOL/L (ref 135–147)
T4 FREE SERPL-MCNC: 0.68 NG/DL (ref 0.61–1.12)
TSH SERPL DL<=0.05 MIU/L-ACNC: 23.27 UIU/ML (ref 0.45–4.5)
URATE SERPL-MCNC: 6.6 MG/DL (ref 3.5–8.5)
WBC # BLD AUTO: 5.57 THOUSAND/UL (ref 4.31–10.16)

## 2024-10-12 PROCEDURE — 83930 ASSAY OF BLOOD OSMOLALITY: CPT | Performed by: INTERNAL MEDICINE

## 2024-10-12 PROCEDURE — 85730 THROMBOPLASTIN TIME PARTIAL: CPT | Performed by: FAMILY MEDICINE

## 2024-10-12 PROCEDURE — 84439 ASSAY OF FREE THYROXINE: CPT | Performed by: INTERNAL MEDICINE

## 2024-10-12 PROCEDURE — 85027 COMPLETE CBC AUTOMATED: CPT | Performed by: FAMILY MEDICINE

## 2024-10-12 PROCEDURE — 93005 ELECTROCARDIOGRAM TRACING: CPT

## 2024-10-12 PROCEDURE — 99223 1ST HOSP IP/OBS HIGH 75: CPT | Performed by: INTERNAL MEDICINE

## 2024-10-12 PROCEDURE — 82533 TOTAL CORTISOL: CPT | Performed by: INTERNAL MEDICINE

## 2024-10-12 PROCEDURE — 84484 ASSAY OF TROPONIN QUANT: CPT | Performed by: NURSE PRACTITIONER

## 2024-10-12 PROCEDURE — 99233 SBSQ HOSP IP/OBS HIGH 50: CPT | Performed by: FAMILY MEDICINE

## 2024-10-12 PROCEDURE — 84443 ASSAY THYROID STIM HORMONE: CPT | Performed by: INTERNAL MEDICINE

## 2024-10-12 PROCEDURE — 85018 HEMOGLOBIN: CPT | Performed by: FAMILY MEDICINE

## 2024-10-12 PROCEDURE — 83735 ASSAY OF MAGNESIUM: CPT | Performed by: FAMILY MEDICINE

## 2024-10-12 PROCEDURE — 84550 ASSAY OF BLOOD/URIC ACID: CPT | Performed by: INTERNAL MEDICINE

## 2024-10-12 PROCEDURE — 85014 HEMATOCRIT: CPT | Performed by: FAMILY MEDICINE

## 2024-10-12 PROCEDURE — 80053 COMPREHEN METABOLIC PANEL: CPT | Performed by: FAMILY MEDICINE

## 2024-10-12 PROCEDURE — 83605 ASSAY OF LACTIC ACID: CPT | Performed by: NURSE PRACTITIONER

## 2024-10-12 PROCEDURE — 83880 ASSAY OF NATRIURETIC PEPTIDE: CPT | Performed by: NURSE PRACTITIONER

## 2024-10-12 RX ORDER — CYANOCOBALAMIN 1000 UG/ML
1000 INJECTION, SOLUTION INTRAMUSCULAR; SUBCUTANEOUS
Status: ON HOLD | COMMUNITY
Start: 2024-10-01

## 2024-10-12 RX ORDER — SODIUM CHLORIDE 1 G/1
1 TABLET ORAL
Status: DISCONTINUED | OUTPATIENT
Start: 2024-10-12 | End: 2024-10-13 | Stop reason: HOSPADM

## 2024-10-12 RX ORDER — CALCIUM GLUCONATE 20 MG/ML
1 INJECTION, SOLUTION INTRAVENOUS ONCE
Status: COMPLETED | OUTPATIENT
Start: 2024-10-12 | End: 2024-10-12

## 2024-10-12 RX ORDER — ACETAMINOPHEN 325 MG/1
975 TABLET ORAL ONCE
Status: COMPLETED | OUTPATIENT
Start: 2024-10-12 | End: 2024-10-12

## 2024-10-12 RX ORDER — BENZONATATE 100 MG/1
100 CAPSULE ORAL 3 TIMES DAILY PRN
Status: DISCONTINUED | OUTPATIENT
Start: 2024-10-12 | End: 2024-10-13 | Stop reason: HOSPADM

## 2024-10-12 RX ORDER — MAGNESIUM SULFATE HEPTAHYDRATE 40 MG/ML
2 INJECTION, SOLUTION INTRAVENOUS ONCE
Status: COMPLETED | OUTPATIENT
Start: 2024-10-12 | End: 2024-10-12

## 2024-10-12 RX ORDER — HEPARIN SODIUM 5000 [USP'U]/ML
5000 INJECTION, SOLUTION INTRAVENOUS; SUBCUTANEOUS EVERY 8 HOURS SCHEDULED
Status: DISCONTINUED | OUTPATIENT
Start: 2024-10-12 | End: 2024-10-12

## 2024-10-12 RX ADMIN — CEFTRIAXONE 1000 MG: 1 INJECTION, SOLUTION INTRAVENOUS at 14:04

## 2024-10-12 RX ADMIN — DOCUSATE SODIUM 100 MG: 100 CAPSULE, LIQUID FILLED ORAL at 08:48

## 2024-10-12 RX ADMIN — LEVOTHYROXINE SODIUM 200 MCG: 100 TABLET ORAL at 06:14

## 2024-10-12 RX ADMIN — ACETAMINOPHEN 325MG 975 MG: 325 TABLET ORAL at 21:53

## 2024-10-12 RX ADMIN — Medication 1 G: at 18:11

## 2024-10-12 RX ADMIN — CALCIUM GLUCONATE 1 G: 20 INJECTION, SOLUTION INTRAVENOUS at 16:26

## 2024-10-12 RX ADMIN — FERROUS SULFATE TAB 325 MG (65 MG ELEMENTAL FE) 325 MG: 325 (65 FE) TAB at 08:48

## 2024-10-12 RX ADMIN — ALBUMIN (HUMAN) 25 G: 0.25 INJECTION, SOLUTION INTRAVENOUS at 00:26

## 2024-10-12 RX ADMIN — BENZONATATE 100 MG: 100 CAPSULE ORAL at 20:53

## 2024-10-12 RX ADMIN — ALBUMIN (HUMAN) 25 G: 0.25 INJECTION, SOLUTION INTRAVENOUS at 18:35

## 2024-10-12 RX ADMIN — Medication 5000 UNITS: at 08:48

## 2024-10-12 RX ADMIN — ALBUMIN (HUMAN) 25 G: 0.25 INJECTION, SOLUTION INTRAVENOUS at 08:48

## 2024-10-12 RX ADMIN — MAGNESIUM SULFATE HEPTAHYDRATE 2 G: 40 INJECTION, SOLUTION INTRAVENOUS at 15:54

## 2024-10-12 RX ADMIN — SODIUM CHLORIDE, SODIUM LACTATE, POTASSIUM CHLORIDE, AND CALCIUM CHLORIDE 1000 ML: .6; .31; .03; .02 INJECTION, SOLUTION INTRAVENOUS at 21:29

## 2024-10-12 NOTE — ASSESSMENT & PLAN NOTE
Lactic acid 3.5  Tachycardia and tachypnea, no leukocytosis or fever  UA without pyuria  CT chest abdomen pelvis no obvious source of infection   Blood culture pending x2  Check procalcitonin  Received empiric ceftriaxone x1   Low thyroid function could also be impeding clearance of lactic acid  Unable to provide effective fluid therapy due to hyponatremia (try to avoid overcorrection quickly) as well as patient has fluid overload

## 2024-10-12 NOTE — PROGRESS NOTES
Progress Note - Hospitalist   Name: Clayton Duval 44 y.o. male I MRN: [id number]  Unit/Bed#: -[facility] I Date of Admission: 10/10/2024   Date of Service: 10/12/2024 I Hospital Day: 1    Assessment & Plan  Elevation of levels of liver transaminase levels  History VILLALOBOS, gastric bypass surgery, cholecystectomy  Presented with 2 weeks of cough, intermittent fever, sweats and chills and fatigue to PCP on 10/1 where he was found to have elevated transaminase levels.  He was placed on a steroid daily and felt improved until that ran out, now feels worse prompting ED visit continue with cough and nausea  CT chest abdomen pelvis Stable hepatosplenomegaly. Hypoenhancing partially confluent lesions in the spleen concerning for malignancy. Ill-defined areas of hypoenhancement in the right lobe of the liver, not visualized on the prior exam.Small shima pleural effusion  , , alk phos 538, T bili 2.39, INR 1.74, ammonia normal  Uncontrolled hypothyroidism TSH 36   C acetaminophen level is less than 2, hepatitis panel negative  ultrasound liver with Dopplers:IMPRESSION:  Hepatomegaly. Multiple hypodense foci evident on CT are not well outlined on ultrasound, however, may be causative for transaminitis.  Patent major hepatic vessels with normal directional flow.  Redemonstrated ascites.    COVID/flu negative  MELD score 24 points  Significant anasarca-was prescribed furosemide 20 mg in the past but does not take on a regular basis, continue daily  Imaging (CT scan as well as MRI)-showing hepatosplenomegaly, hepatic lesions.  Patient also has bony lesions affecting pelvis and spine.  Check SPEP, UPEP, AFP  Hyponatremia  Sodium level fluctuating, today sodium level is 126  Patient has volume overload as well as suspected SIADH due to concerning findings on imaging  Follow nephrology recommended  Hypomagnesemia  Magnesium 1.7  Replete and recheck  Hypothyroidism (acquired)  Managed by PCP  Recent increase of  levothyroxine to 200 mcg daily on 10/1  TSH 36, free T4 pending  Check vitamin D level, known deficiency and reports taking over-the-counter 5000 units daily  Will need outpatient follow-up  Malnutrition (HCC)  Malnutrition Findings: Loss of subcutaneous fat in orbital, triceps, ribcage areas.  Loss of muscle at temples, clavicles, scapula, extremities.  S/P Gastric bypass  Significant anasarca      Adult Malnutrition type: Chronic illness  BMI Findings:      Body mass index is 34.94 kg/m².     Lactic acidosis  Lactic acid 3.5  Tachycardia and tachypnea, no leukocytosis or fever  UA without pyuria  CT chest abdomen pelvis no obvious source of infection   Blood culture pending x2  Check procalcitonin  Received empiric ceftriaxone x1   Low thyroid function could also be impeding clearance of lactic acid  Unable to provide effective fluid therapy due to hyponatremia (try to avoid overcorrection quickly) as well as patient has fluid overload    Abnormal CT scan, chest  Large sclerotic and lytic lesion in the left iliac bone and smaller lytic lesions in the lumbar spine, also concerning for malignancy.   These lesions were seen on previous CT scans in February at [facility] at that time suspected metabolic process  MRI of abdomen pelvis showing concerning signs features including hepatosplenomegaly, hepatic lesions, bony lesions affecting spine and pelvic bone  Appreciated gastroenterology and IR recommendation  Unfortunately, this Mena does not have any oncologists coverage in person for  Will reach out to IR for possible bone biopsy from pelvic area  Moderate protein-calorie malnutrition (HCC)  Malnutrition Findings:   Adult Malnutrition type: Chronic illness  Adult Degree of Malnutrition: Malnutrition of moderate degree  Malnutrition Characteristics: Muscle loss, Inadequate energy                  360 Statement: Chronic moderate malnutrition related to hx RYGB/malabsorption + inadequate calorie intake as evidenced by  moderate muscle loss to pectoralis and temporalis muscles; < 75% estimated kcal intake > 1 mo. Treated with: Fluid restriction per MD. Will order ensure plus high PRO daily and increase as appropriate. RYGB noted from 2012 per PMHx, will liberalize diet to regular to open up more calorically dense food options. Consider daily weights. Noting vitamin D level deficiency, supplementation per MD. Consider checking serum vitamin A, copper, zinc, vitamin B12 levels and replete as indicated.    BMI Findings:           Body mass index is 34.94 kg/m².       VTE Pharmacologic Prophylaxis:   Moderate Risk (Score 3-4) - Pharmacological DVT Prophylaxis Ordered: heparin.    Mobility:   Basic Mobility Inpatient Raw Score: 24  JH-HLM Goal: 8: Walk 250 feet or more  JH-HLM Achieved: 6: Walk 10 steps or more  JH-HLM Goal achieved. Continue to encourage appropriate mobility.    Patient Centered Rounds: I performed bedside rounds with nursing staff today.   Discussions with Specialists or Other Care Team Provider: Nephrology, IR, gastroenterology note reviewed    Education and Discussions with Family / Patient: Updated  (wife) via phone.    Current Length of Stay: 1 day(s)  Current Patient Status: Inpatient   Certification Statement: The patient will continue to require additional inpatient hospital stay due to monitor above condition  Discharge Plan: Anticipate discharge in 48-72 hrs to to be determined    Code Status: Level 1 - Full Code    Subjective   Seen and evaluated during the rounding.    Objective :  Temp:  [97.7 °F (36.5 °C)-97.9 °F (36.6 °C)] 97.7 °F (36.5 °C)  HR:  [111-122] 120  BP: (123-133)/(82-87) 123/85  Resp:  [16-18] 16  SpO2:  [91 %-94 %] 94 %  O2 Device: None (Room air)    Body mass index is 34.94 kg/m².     Input and Output Summary (last 24 hours):     Intake/Output Summary (Last 24 hours) at 10/12/2024 0912  Last data filed at 10/12/2024 0902  Gross per 24 hour   Intake 1320 ml   Output 1901 ml    Net -581 ml       Physical Exam  Vitals and nursing note reviewed.   Constitutional:       Appearance: He is obese. He is not ill-appearing or diaphoretic.   HENT:      Mouth/Throat:      Mouth: Mucous membranes are moist.   Eyes:      General: No scleral icterus.        Left eye: No discharge.      Extraocular Movements: Extraocular movements intact.      Pupils: Pupils are equal, round, and reactive to light.      Comments: Looks pale   Cardiovascular:      Rate and Rhythm: Normal rate.      Heart sounds:      No gallop.   Pulmonary:      Effort: No respiratory distress.      Breath sounds: No stridor. No wheezing or rhonchi.   Abdominal:      General: There is no distension.      Palpations: There is no mass.      Tenderness: There is no abdominal tenderness.      Hernia: No hernia is present.   Musculoskeletal:         General: Swelling present. No tenderness.   Neurological:      Mental Status: He is alert and oriented to person, place, and time.      Cranial Nerves: No cranial nerve deficit.      Sensory: No sensory deficit.      Motor: No weakness.      Coordination: Coordination normal.       Lines/Drains:        Lab Results: I have reviewed the following results:   Results from last 7 days   Lab Units 10/12/24  0449 10/11/24  0447 10/10/24  2128   WBC Thousand/uL 5.57   < > 6.42   HEMOGLOBIN g/dL 8.5*   < > 10.8*   HEMATOCRIT % 26.2*   < > 33.1*   PLATELETS Thousands/uL 178   < > 189   BANDS PCT %  --   --  4   LYMPHO PCT %  --   --  3*   MONO PCT %  --   --  4   EOS PCT %  --   --  0    < > = values in this interval not displayed.     Results from last 7 days   Lab Units 10/12/24  0449   SODIUM mmol/L 126*   POTASSIUM mmol/L 4.2   CHLORIDE mmol/L 96   CO2 mmol/L 22   BUN mg/dL 25   CREATININE mg/dL 0.84   ANION GAP mmol/L 8   CALCIUM mg/dL 7.0*   ALBUMIN g/dL 2.3*   TOTAL BILIRUBIN mg/dL 2.06*   ALK PHOS U/L 518*   ALT U/L 280*   AST U/L 530*   GLUCOSE RANDOM mg/dL 82     Results from last 7 days   Lab  Units 10/11/24  0447   INR  1.84*             Results from last 7 days   Lab Units 10/11/24  1749 10/11/24  1438 10/10/24  2358 10/10/24  2128   LACTIC ACID mmol/L 4.1* 4.0* 3.5* 3.4*       Recent Cultures (last 7 days):   Results from last 7 days   Lab Units 10/11/24  0013 10/11/24  0008   BLOOD CULTURE  Received in Microbiology Lab. Culture in Progress. Received in Microbiology Lab. Culture in Progress.       Imaging Results Review: No pertinent imaging studies reviewed.  Other Study Results Review: No additional pertinent studies reviewed.    Last 24 Hours Medication List:     Current Facility-Administered Medications:     albumin human (FLEXBUMIN) 25 % injection 25 g, Q8H    cefTRIAXone (ROCEPHIN) IVPB (premix in dextrose) 1,000 mg 50 mL, Q24H, Last Rate: 1,000 mg (10/11/24 1543)    Cholecalciferol (VITAMIN D3) tablet 5,000 Units, Daily    docusate sodium (COLACE) capsule 100 mg, Q12H    ferrous sulfate tablet 325 mg, Daily With Breakfast    levothyroxine tablet 200 mcg, Early Morning    ondansetron (ZOFRAN) injection 4 mg, Q8H PRN    polyethylene glycol (MIRALAX) packet 17 g, Daily PRN    Administrative Statements   Today, Patient Was Seen By: Craig Rocha MD      **Please Note: This note may have been constructed using a voice recognition system.**

## 2024-10-12 NOTE — ASSESSMENT & PLAN NOTE
FR. Sp IV lasix. Hold on standing diuresis given patient reports of improvement in swelling.   Malignancy workup as ordered.

## 2024-10-12 NOTE — PLAN OF CARE
Problem: Potential for Falls  Goal: Patient will remain free of falls  Description: INTERVENTIONS:  - Educate patient/family on patient safety including physical limitations  - Instruct patient to call for assistance with activity   - Consult OT/PT to assist with strengthening/mobility   - Keep Call bell within reach  - Keep bed low and locked with side rails adjusted as appropriate  - Keep care items and personal belongings within reach  - Initiate and maintain comfort rounds  - Make Fall Risk Sign visible to staff  - Offer Toileting every 2 Hours, in advance of need  - Obtain necessary fall risk management equipment: walker  - Apply yellow socks and bracelet for high fall risk patients  - Consider moving patient to room near nurses station  Outcome: Progressing     Problem: PAIN - ADULT  Goal: Verbalizes/displays adequate comfort level or baseline comfort level  Description: Interventions:  - Encourage patient to monitor pain and request assistance  - Assess pain using appropriate pain scale  - Administer analgesics based on type and severity of pain and evaluate response  - Implement non-pharmacological measures as appropriate and evaluate response  - Consider cultural and social influences on pain and pain management  - Notify physician/advanced practitioner if interventions unsuccessful or patient reports new pain  Outcome: Progressing     Problem: INFECTION - ADULT  Goal: Absence or prevention of progression during hospitalization  Description: INTERVENTIONS:  - Assess and monitor for signs and symptoms of infection  - Monitor lab/diagnostic results  - Monitor all insertion sites, i.e. indwelling lines, tubes, and drains  - Monitor endotracheal if appropriate and nasal secretions for changes in amount and color  - [place] appropriate cooling/warming therapies per order  - Administer medications as ordered  - Instruct and encourage patient and family to use good hand hygiene technique  - Identify and  instruct in appropriate isolation precautions for identified infection/condition  Outcome: Progressing  Goal: Absence of fever/infection during neutropenic period  Description: INTERVENTIONS:  - Monitor WBC    Outcome: Progressing     Problem: SAFETY ADULT  Goal: Patient will remain free of falls  Description: INTERVENTIONS:  - Educate patient/family on patient safety including physical limitations  - Instruct patient to call for assistance with activity   - Consult OT/PT to assist with strengthening/mobility   - Keep Call bell within reach  - Keep bed low and locked with side rails adjusted as appropriate  - Keep care items and personal belongings within reach  - Initiate and maintain comfort rounds  - Make Fall Risk Sign visible to staff  - Offer Toileting every 2 Hours, in advance of need  - Obtain necessary fall risk management equipment: walker  - Apply yellow socks and bracelet for high fall risk patients  - Consider moving patient to room near nurses station  Outcome: Progressing  Goal: Maintain or return to baseline ADL function  Description: INTERVENTIONS:  -  Assess patient's ability to carry out ADLs; assess patient's baseline for ADL function and identify physical deficits which impact ability to perform ADLs (bathing, care of mouth/teeth, toileting, grooming, dressing, etc.)  - Assess/evaluate cause of self-care deficits   - Assess range of motion  - Assess patient's mobility; develop plan if impaired  - Assess patient's need for assistive devices and provide as appropriate  - Encourage maximum independence but intervene and supervise when necessary  - Involve family in performance of ADLs  - Assess for home care needs following discharge   - Consider OT consult to assist with ADL evaluation and planning for discharge  - Provide patient education as appropriate  Outcome: Progressing  Goal: Maintains/Returns to pre admission functional level  Description: INTERVENTIONS:  - Perform AM-PAC 6 Click Basic  Mobility/ Daily Activity assessment daily.  - Set and communicate daily mobility goal to care team and patient/family/caregiver.   - Collaborate with rehabilitation services on mobility goals if consulted  - Perform Range of Motion 3 times a day.  - Reposition patient every 2 hours.  - Dangle patient 3 times a day  - Stand patient 3 times a day  - Ambulate patient 3 times a day  - Out of bed to chair 3 times a day   - Out of bed for meals 3 times a day  - Out of bed for toileting  - Record patient progress and toleration of activity level   Outcome: Progressing     Problem: DISCHARGE PLANNING  Goal: Discharge to home or other facility with appropriate resources  Description: INTERVENTIONS:  - Identify barriers to discharge w/patient and caregiver  - Arrange for needed discharge resources and transportation as appropriate  - Identify discharge learning needs (meds, wound care, etc.)  - Arrange for interpretive services to assist at discharge as needed  - Refer to Case Management Department for coordinating discharge planning if the patient needs post-hospital services based on physician/advanced practitioner order or complex needs related to functional status, cognitive ability, or social support system  Outcome: Progressing     Problem: Knowledge Deficit  Goal: Patient/family/caregiver demonstrates understanding of disease process, treatment plan, medications, and discharge instructions  Description: Complete learning assessment and assess knowledge base.  Interventions:  - Provide teaching at level of understanding  - Provide teaching via preferred learning methods  Outcome: Progressing     Problem: CARDIOVASCULAR - ADULT  Goal: Maintains optimal cardiac output and hemodynamic stability  Description: INTERVENTIONS:  - Monitor I/O, vital signs and rhythm  - Monitor for S/S and trends of decreased cardiac output  - Administer and titrate ordered vasoactive medications to optimize hemodynamic stability  - Assess  quality of pulses, skin color and temperature  - Assess for signs of decreased coronary artery perfusion  - Instruct patient to report change in severity of symptoms  Outcome: Progressing  Goal: Absence of cardiac dysrhythmias or at baseline rhythm  Description: INTERVENTIONS:  - Continuous cardiac monitoring, vital signs, obtain 12 lead EKG if ordered  - Administer antiarrhythmic and heart rate control medications as ordered  - Monitor electrolytes and administer replacement therapy as ordered  Outcome: Progressing     Problem: METABOLIC, FLUID AND ELECTROLYTES - ADULT  Goal: Electrolytes maintained within normal limits  Description: INTERVENTIONS:  - Monitor labs and assess patient for signs and symptoms of electrolyte imbalances  - Administer electrolyte replacement as ordered  - Monitor response to electrolyte replacements, including repeat lab results as appropriate  - Instruct patient on fluid and nutrition as appropriate  Outcome: Progressing  Goal: Fluid balance maintained  Description: INTERVENTIONS:  - Monitor labs   - Monitor I/O and WT  - Instruct patient on fluid and nutrition as appropriate  - Assess for signs & symptoms of volume excess or deficit  Outcome: Progressing  Goal: Glucose maintained within target range  Description: INTERVENTIONS:  - Monitor Blood Glucose as ordered  - Assess for signs and symptoms of hyperglycemia and hypoglycemia  - Administer ordered medications to maintain glucose within target range  - Assess nutritional intake and initiate nutrition service referral as needed  Outcome: Progressing     Problem: Nutrition/Hydration-ADULT  Goal: Nutrient/Hydration intake appropriate for improving, restoring or maintaining nutritional needs  Description: Monitor and assess patient's nutrition/hydration status for malnutrition. Collaborate with interdisciplinary team and initiate plan and interventions as ordered.  Monitor patient's weight and dietary intake as ordered or per policy.  Utilize nutrition screening tool and intervene as necessary. Determine patient's food preferences and provide high-protein, high-caloric foods as appropriate.     INTERVENTIONS:  - Monitor oral intake, urinary output, labs, and treatment plans  - Assess nutrition and hydration status and recommend course of action  - Evaluate amount of meals eaten  - Assist patient with eating if necessary   - Allow adequate time for meals  - Recommend/ encourage appropriate diets, oral nutritional supplements, and vitamin/mineral supplements  - Order, calculate, and assess calorie counts as needed  - Recommend, monitor, and adjust tube feedings and TPN/PPN based on assessed needs  - Assess need for intravenous fluids  - Provide specific nutrition/hydration education as appropriate  - Include patient/family/caregiver in decisions related to nutrition  Outcome: Progressing

## 2024-10-12 NOTE — ASSESSMENT & PLAN NOTE
Malnutrition Findings:   Adult Malnutrition type: Chronic illness  Adult Degree of Malnutrition: Malnutrition of moderate degree  Malnutrition Characteristics: Muscle loss, Inadequate energy                  360 Statement: Chronic moderate malnutrition related to hx RYGB/malabsorption + inadequate calorie intake as evidenced by moderate muscle loss to pectoralis and temporalis muscles; < 75% estimated kcal intake > 1 mo. Treated with: Fluid restriction per MD. Will order ensure plus high PRO daily and increase as appropriate. RYGB noted from 2012 per PMHx, will liberalize diet to regular to open up more calorically dense food options. Consider daily weights. Noting vitamin D level deficiency, supplementation per MD. Consider checking serum vitamin A, copper, zinc, vitamin B12 levels and replete as indicated.    BMI Findings:           Body mass index is 34.94 kg/m².

## 2024-10-12 NOTE — ASSESSMENT & PLAN NOTE
Na 128 on admission and 126 today. Suspect chronically low with Na 130 on 10/1. Sodium slightly declined after IVF/lasix which is not typical for hypovolemic state.   Initially urine studies consistent with prerenal state with low urine Na at 13 and urine osm 743. Urine sodium morgan sp lasix administration.   Despite third spacing may be intravascularly dry with low oncotic pressure and albumin 2.3.   Concerns for malignancy based on imaging which can contribute to nonosmotic ADH release.  Currently asymptomatic from sodium perspective.     Currently receiving IV albumin and sp lasix. Reports improvement in swelling from admission.  Advise to liberalize fluid intake for patient comfort.   Advise to add sodium chloride tablet 1 gram TID.   Check serum osm.  Thyroid management per primary.   Check pararproteinemia workup.  Check Ical for replacement-1 gram IV now

## 2024-10-12 NOTE — PLAN OF CARE
Problem: PAIN - ADULT  Goal: Verbalizes/displays adequate comfort level or baseline comfort level  Description: Interventions:  - Encourage patient to monitor pain and request assistance  - Assess pain using appropriate pain scale  - Administer analgesics based on type and severity of pain and evaluate response  - Implement non-pharmacological measures as appropriate and evaluate response  - Consider cultural and social influences on pain and pain management  - Notify physician/advanced practitioner if interventions unsuccessful or patient reports new pain  Outcome: Progressing     Problem: INFECTION - ADULT  Goal: Absence or prevention of progression during hospitalization  Description: INTERVENTIONS:  - Assess and monitor for signs and symptoms of infection  - Monitor lab/diagnostic results  - Monitor all insertion sites, i.e. indwelling lines, tubes, and drains  - Monitor endotracheal if appropriate and nasal secretions for changes in amount and color  - [place] appropriate cooling/warming therapies per order  - Administer medications as ordered  - Instruct and encourage patient and family to use good hand hygiene technique  - Identify and instruct in appropriate isolation precautions for identified infection/condition  Outcome: Progressing     Problem: CARDIOVASCULAR - ADULT  Goal: Maintains optimal cardiac output and hemodynamic stability  Description: INTERVENTIONS:  - Monitor I/O, vital signs and rhythm  - Monitor for S/S and trends of decreased cardiac output  - Administer and titrate ordered vasoactive medications to optimize hemodynamic stability  - Assess quality of pulses, skin color and temperature  - Assess for signs of decreased coronary artery perfusion  - Instruct patient to report change in severity of symptoms  Outcome: Progressing     Problem: METABOLIC, FLUID AND ELECTROLYTES - ADULT  Goal: Electrolytes maintained within normal limits  Description: INTERVENTIONS:  - Monitor labs and assess  patient for signs and symptoms of electrolyte imbalances  - Administer electrolyte replacement as ordered  - Monitor response to electrolyte replacements, including repeat lab results as appropriate  - Instruct patient on fluid and nutrition as appropriate  Outcome: Progressing     Problem: Nutrition/Hydration-ADULT  Goal: Nutrient/Hydration intake appropriate for improving, restoring or maintaining nutritional needs  Description: Monitor and assess patient's nutrition/hydration status for malnutrition. Collaborate with interdisciplinary team and initiate plan and interventions as ordered.  Monitor patient's weight and dietary intake as ordered or per policy. Utilize nutrition screening tool and intervene as necessary. Determine patient's food preferences and provide high-protein, high-caloric foods as appropriate.     INTERVENTIONS:  - Monitor oral intake, urinary output, labs, and treatment plans  - Assess nutrition and hydration status and recommend course of action  - Evaluate amount of meals eaten  - Assist patient with eating if necessary   - Allow adequate time for meals  - Recommend/ encourage appropriate diets, oral nutritional supplements, and vitamin/mineral supplements  - Order, calculate, and assess calorie counts as needed  - Recommend, monitor, and adjust tube feedings and TPN/PPN based on assessed needs  - Assess need for intravenous fluids  - Provide specific nutrition/hydration education as appropriate  - Include patient/family/caregiver in decisions related to nutrition  Outcome: Progressing

## 2024-10-12 NOTE — NURSING NOTE
Pt masimo alarming MT high >200. Assessed pt at bedside, radial pulse 110s /82. Pt resting comfortably in bed with no signs of distress or discomfort, denies chest pain and chest discomfort. Pt HR on Masimo fluctuating frequently between 110s to 200s. EKG done and placed on telemetry.  MARY Barrett made aware.

## 2024-10-12 NOTE — CONSULTS
"NEPHROLOGY HOSPITAL CONSULTATION   Clayton Duval 44 y.o. male MRN: [id number]  Unit/Bed#: -01 Encounter: [id number]      Assessment & Plan  Hyponatremia  Na 128 on admission and 126 today. Suspect chronically low with Na 130 on 10/1. Sodium slightly declined after IVF/lasix which is not typical for hypovolemic state.   Initially urine studies consistent with prerenal state with low urine Na at 13 and urine osm 743. Urine sodium morgan sp lasix administration.   Despite third spacing may be intravascularly dry with low oncotic pressure and albumin 2.3.   Concerns for malignancy based on imaging which can contribute to nonosmotic ADH release.  Currently asymptomatic from sodium perspective.     Currently receiving IV albumin and sp lasix. Reports improvement in swelling from admission.  Advise to liberalize fluid intake for patient comfort.   Advise to add sodium chloride tablet 1 gram TID.   Check serum osm.  Thyroid management per primary.   Check pararproteinemia workup.  Check Ical for replacement-1 gram IV now    Elevation of levels of liver transaminase levels  Management per GI team.   Hypomagnesemia  Replace as appropriate.   Hypothyroidism (acquired)  ;levothyroxine per primary.   Lactic acidosis  Repeat lactic acid at discretion of primary team.   Abnormal CT scan, chest  FR. Sp IV lasix. Hold on standing diuresis given patient reports of improvement in swelling.   Malignancy workup as ordered.     I have reviewed the nephrology recommendations including sodium management, with primary team, and we are in agreement with renal plan including the information outlined above.    Portions of the record may have been created with voice recognition software. Occasional wrong word or \"sound a like\" substitutions may have occurred due to the inherent limitations of voice recognition software. Read the chart carefully and recognize, using context, where substitutions have occurred.If you have any " questions, please contact the dictating provider.    HISTORY OF PRESENT ILLNESS:  Requesting Physician: Craig Rocha MD  Reason for Consult: hyponatremia    Clayton Duval is a 44 y.o. male who was admitted to [facility] after presenting with worsening fatigue, abdominal distention and leg swelling. A renal consultation is requested today for assistance in the management of hyponatremia. Patient has hx Osiel en Y gastric bypass,VILLALOBOS, hypothyroidism and anemia. He saw PCP 10 days ago and steroids had run out. Patient reported viral symptoms for 10-14 days and took tylenol 2 tabs BID.  Found to have small bilateral effusions, moderate ascites and asaraca on imaging.Concern for lesion in spleen concerning for malignancy. Large sclerotic and lytic lesion in iliac bone and smaller lesion in lumbar spine. Ct chest 2/28/24 at [facility] showed concern for splenomegaly and nodule of thyroid with sclerotic lesion in bony structure. Denies hx hyponatremia but sodium 130 on 10/1/24.   Na 128 on presentation and down to 126 today. Placed on FR.   Urine sodium initially low at 13 and morgan to 70 but after lasix administration. Urine osm 743. TSH significantly elevated but T4 normal.     PAST MEDICAL HISTORY:  History reviewed. No pertinent past medical history.    PAST SURGICAL HISTORY:  Past Surgical History:   Procedure Laterality Date    GASTRIC BYPASS  2012    GASTRIC BYPASS         ALLERGIES:  No Known Allergies    SOCIAL HISTORY:  Social History     Substance and Sexual Activity   Alcohol Use Never     Social History     Substance and Sexual Activity   Drug Use Never     Social History     Tobacco Use   Smoking Status Never   Smokeless Tobacco Current       FAMILY HISTORY:  History reviewed. No pertinent family history.    MEDICATIONS:    Current Facility-Administered Medications:     albumin human (FLEXBUMIN) 25 % injection 25 g, 25 g, Intravenous, Q8H, Fredy Schulte MD, Stopped at 10/12/24 0936     cefTRIAXone (ROCEPHIN) IVPB (premix in dextrose) 1,000 mg 50 mL, 1,000 mg, Intravenous, Q24H, Dorothea Bray MD, Last Rate: 100 mL/hr at 10/12/24 1404, 1,000 mg at 10/12/24 1404    Cholecalciferol (VITAMIN D3) tablet 5,000 Units, 5,000 Units, Oral, Daily, Brooklyn S Nena, CRNP, 5,000 Units at 10/12/24 0848    docusate sodium (COLACE) capsule 100 mg, 100 mg, Oral, Q12H, Brooklyn S Nena, CRNP, 100 mg at 10/12/24 0848    ferrous sulfate tablet 325 mg, 325 mg, Oral, Daily With Breakfast, Brooklyn S Nena, CRNP, 325 mg at 10/12/24 0848    levothyroxine tablet 200 mcg, 200 mcg, Oral, Early Morning, Brooklyn S Nena, CRNP, 200 mcg at 10/12/24 0614    ondansetron (ZOFRAN) injection 4 mg, 4 mg, Intravenous, Q8H PRN, Brooklyn S Nena, CRNP    polyethylene glycol (MIRALAX) packet 17 g, 17 g, Oral, Daily PRN, Brooklyn S Nena, CRNP    REVIEW OF SYSTEMS:  Constitutional:+fatigue   HENT: Negative for postnasal drip  Eyes: Negative for visual disturbance.   Respiratory: Negative for cough, shortness of breath and wheezing.   Cardiovascular: leg swelling ,abdominal swelling   Gastrointestinal: Negative for abdominal pain, constipation, diarrhea, nausea and vomiting.   Genitourinary: urine dark  Endocrine: Negative for polyuria.   Musculoskeletal: Negative for arthralgias, back pain and joint swelling.   Skin: Negative for rash.   Neurological: Negative for focal weakness, headaches, dizziness.  Hematological: Negative for easy bruising or bleeding.  Psychiatric/Behavioral: Negative for confusion and sleep disturbance.   All the systems were reviewed and were negative except as documented on the HPI.    PHYSICAL EXAM:  Current Weight: Weight - Scale: 120 kg (264 lb 12.8 oz)  First Weight: Weight - Scale: 118 kg (259 lb 0.7 oz)  Vitals:    10/11/24 2308 10/12/24 0436 10/12/24 0825 10/12/24 0845   BP: 129/85 125/82 123/85    BP Location: Left arm  Left arm    Pulse: (!) 119 (!) 113 (!) 120    Resp: 18  16    Temp: 97.7 °F (36.5 °C)  97.7 °F (36.5 °C)     TempSrc: Temporal  Temporal    SpO2: 91% 91% 94% 92%   Weight:       Height:           Intake/Output Summary (Last 24 hours) at 10/12/2024 1432  Last data filed at 10/12/2024 1301  Gross per 24 hour   Intake 1200 ml   Output 1900 ml   Net -700 ml     Physical Exam  Vitals reviewed.   Constitutional:       General: He is not in acute distress.     Appearance: He is obese. He is ill-appearing.   HENT:      Head: Normocephalic and atraumatic.      Nose: No congestion.      Mouth/Throat:      Mouth: Mucous membranes are moist.      Pharynx: Oropharynx is clear.   Eyes:      General: Scleral icterus present.   Cardiovascular:      Rate and Rhythm: Normal rate and regular rhythm.   Pulmonary:      Breath sounds: Normal breath sounds. No wheezing, rhonchi or rales.   Abdominal:      Tenderness: There is no abdominal tenderness. There is no guarding.   Musculoskeletal:      Right lower leg: No edema.      Left lower leg: No edema.   Skin:     Coloration: Skin is pale. Skin is not jaundiced.      Findings: No bruising.   Neurological:      General: No focal deficit present.      Mental Status: He is alert and oriented to person, place, and time. Mental status is at baseline.      Cranial Nerves: No cranial nerve deficit.   Psychiatric:         Behavior: Behavior normal.           Invasive Devices:      Lab Results:   Results from last 7 days   Lab Units 10/12/24  1327 10/12/24  0449 10/11/24  2130 10/11/24  1438 10/11/24  0447 10/10/24  2128   WBC Thousand/uL  --  5.57  --   --  6.65 6.42   HEMOGLOBIN g/dL 8.4* 8.5*  --   --  10.1* 10.8*   HEMATOCRIT % 26.3* 26.2*  --   --  30.8* 33.1*   PLATELETS Thousands/uL  --  178  --   --  189 189   POTASSIUM mmol/L  --  4.2 4.3 4.5 4.2 4.3   CHLORIDE mmol/L  --  96 95* 97 98 96   CO2 mmol/L  --  22 21 21 20* 25   BUN mg/dL  --  25 26* 25 24 26*   CREATININE mg/dL  --  0.84 0.95 0.95 0.97 1.08   CALCIUM mg/dL  --  7.0* 7.1* 7.5* 7.3* 7.5*   MAGNESIUM mg/dL  --  1.7*  --   --  1.7*  1.7*   PHOSPHORUS mg/dL  --   --   --   --  3.6  --    ALK PHOS U/L  --  518*  --   --  491* 538*   ALT U/L  --  280*  --   --  303* 330*   AST U/L  --  530*  --   --  561* 599*

## 2024-10-12 NOTE — ASSESSMENT & PLAN NOTE
History VILLALOBOS, gastric bypass surgery, cholecystectomy  Presented with 2 weeks of cough, intermittent fever, sweats and chills and fatigue to PCP on 10/1 where he was found to have elevated transaminase levels.  He was placed on a steroid daily and felt improved until that ran out, now feels worse prompting ED visit continue with cough and nausea  CT chest abdomen pelvis Stable hepatosplenomegaly. Hypoenhancing partially confluent lesions in the spleen concerning for malignancy. Ill-defined areas of hypoenhancement in the right lobe of the liver, not visualized on the prior exam.Small shima pleural effusion  , , alk phos 538, T bili 2.39, INR 1.74, ammonia normal  Uncontrolled hypothyroidism TSH 36   C acetaminophen level is less than 2, hepatitis panel negative  ultrasound liver with Dopplers:IMPRESSION:  Hepatomegaly. Multiple hypodense foci evident on CT are not well outlined on ultrasound, however, may be causative for transaminitis.  Patent major hepatic vessels with normal directional flow.  Redemonstrated ascites.    COVID/flu negative  MELD score 24 points  Significant anasarca-was prescribed furosemide 20 mg in the past but does not take on a regular basis, continue daily  Imaging (CT scan as well as MRI)-showing hepatosplenomegaly, hepatic lesions.  Patient also has bony lesions affecting pelvis and spine.  Check SPEP, UPEP, AFP

## 2024-10-12 NOTE — ASSESSMENT & PLAN NOTE
Malnutrition Findings: Loss of subcutaneous fat in orbital, triceps, ribcage areas.  Loss of muscle at temples, clavicles, scapula, extremities.  S/P Gastric bypass  Significant anasarca      Adult Malnutrition type: Chronic illness  BMI Findings:      Body mass index is 34.94 kg/m².

## 2024-10-12 NOTE — ASSESSMENT & PLAN NOTE
Sodium level fluctuating, today sodium level is 126  Patient has volume overload as well as suspected SIADH due to concerning findings on imaging  Follow nephrology recommended

## 2024-10-13 ENCOUNTER — ANESTHESIA EVENT (OUTPATIENT)
Dept: PERIOP | Facility: HOSPITAL | Age: 44
End: 2024-10-13
Payer: COMMERCIAL

## 2024-10-13 ENCOUNTER — ANESTHESIA (OUTPATIENT)
Dept: PERIOP | Facility: HOSPITAL | Age: 44
End: 2024-10-13
Payer: COMMERCIAL

## 2024-10-13 ENCOUNTER — APPOINTMENT (INPATIENT)
Dept: CT IMAGING | Facility: HOSPITAL | Age: 44
DRG: 435 | End: 2024-10-13
Payer: COMMERCIAL

## 2024-10-13 ENCOUNTER — APPOINTMENT (INPATIENT)
Dept: RADIOLOGY | Facility: HOSPITAL | Age: 44
DRG: 435 | End: 2024-10-13
Payer: COMMERCIAL

## 2024-10-13 VITALS
HEART RATE: 104 BPM | SYSTOLIC BLOOD PRESSURE: 118 MMHG | WEIGHT: 264.8 LBS | TEMPERATURE: 99.7 F | HEIGHT: 73 IN | RESPIRATION RATE: 20 BRPM | OXYGEN SATURATION: 100 % | BODY MASS INDEX: 35.09 KG/M2 | DIASTOLIC BLOOD PRESSURE: 77 MMHG

## 2024-10-13 PROBLEM — I10 HTN (HYPERTENSION): Status: ACTIVE | Noted: 2024-10-13

## 2024-10-13 PROBLEM — J96.01 ACUTE HYPOXIC RESPIRATORY FAILURE (HCC): Status: ACTIVE | Noted: 2024-10-13

## 2024-10-13 PROBLEM — I62.01: Status: ACTIVE | Noted: 2024-10-13

## 2024-10-13 LAB
ALBUMIN SERPL BCG-MCNC: 2.6 G/DL (ref 3.5–5)
ALP SERPL-CCNC: 366 U/L (ref 34–104)
ALT SERPL W P-5'-P-CCNC: 247 U/L (ref 7–52)
AMMONIA PLAS-SCNC: 21 UMOL/L (ref 18–72)
ANION GAP SERPL CALCULATED.3IONS-SCNC: 5 MMOL/L (ref 4–13)
ARTERIAL PATENCY WRIST A: YES
AST SERPL W P-5'-P-CCNC: 484 U/L (ref 13–39)
BASE EXCESS BLDA CALC-SCNC: 1.5 MMOL/L
BILIRUB SERPL-MCNC: 2.35 MG/DL (ref 0.2–1)
BUN SERPL-MCNC: 24 MG/DL (ref 5–25)
CALCIUM ALBUM COR SERPL-MCNC: 8.3 MG/DL (ref 8.3–10.1)
CALCIUM SERPL-MCNC: 7.2 MG/DL (ref 8.4–10.2)
CHLORIDE SERPL-SCNC: 97 MMOL/L (ref 96–108)
CO2 SERPL-SCNC: 25 MMOL/L (ref 21–32)
CORTIS AM PEAK SERPL-MCNC: 21.4 UG/DL (ref 6.7–22.6)
CREAT SERPL-MCNC: 0.86 MG/DL (ref 0.6–1.3)
ERYTHROCYTE [DISTWIDTH] IN BLOOD BY AUTOMATED COUNT: 19.3 % (ref 11.6–15.1)
GFR SERPL CREATININE-BSD FRML MDRD: 104 ML/MIN/1.73SQ M
GLUCOSE SERPL-MCNC: 86 MG/DL (ref 65–140)
GLUCOSE SERPL-MCNC: 93 MG/DL (ref 65–140)
HCO3 BLDA-SCNC: 24.7 MMOL/L (ref 22–28)
HCT VFR BLD AUTO: 23.7 % (ref 36.5–49.3)
HGB BLD-MCNC: 7.7 G/DL (ref 12–17)
INR PPP: 2.32 (ref 0.85–1.19)
LACTATE SERPL-SCNC: 3.3 MMOL/L (ref 0.5–2)
LACTATE SERPL-SCNC: 3.8 MMOL/L (ref 0.5–2)
MAGNESIUM SERPL-MCNC: 1.8 MG/DL (ref 1.9–2.7)
MCH RBC QN AUTO: 29.1 PG (ref 26.8–34.3)
MCHC RBC AUTO-ENTMCNC: 32.5 G/DL (ref 31.4–37.4)
MCV RBC AUTO: 89 FL (ref 82–98)
NON VENT ROOM AIR: ABNORMAL %
O2 CT BLDA-SCNC: 9.9 ML/DL (ref 16–23)
OXYHGB MFR BLDA: 89.4 % (ref 94–97)
PCO2 BLDA: 32.5 MM HG (ref 36–44)
PH BLDA: 7.5 [PH] (ref 7.35–7.45)
PLATELET # BLD AUTO: 153 THOUSANDS/UL (ref 149–390)
PMV BLD AUTO: 11.6 FL (ref 8.9–12.7)
PO2 BLDA: 63.2 MM HG (ref 75–129)
POTASSIUM SERPL-SCNC: 4.1 MMOL/L (ref 3.5–5.3)
PROT SERPL-MCNC: 3.7 G/DL (ref 6.4–8.4)
PROTHROMBIN TIME: 25.6 SECONDS (ref 12.3–15)
RBC # BLD AUTO: 2.65 MILLION/UL (ref 3.88–5.62)
SODIUM SERPL-SCNC: 127 MMOL/L (ref 135–147)
SPECIMEN SOURCE: ABNORMAL
WBC # BLD AUTO: 12.89 THOUSAND/UL (ref 4.31–10.16)

## 2024-10-13 PROCEDURE — 82533 TOTAL CORTISOL: CPT | Performed by: INTERNAL MEDICINE

## 2024-10-13 PROCEDURE — 82140 ASSAY OF AMMONIA: CPT | Performed by: NURSE PRACTITIONER

## 2024-10-13 PROCEDURE — 71045 X-RAY EXAM CHEST 1 VIEW: CPT

## 2024-10-13 PROCEDURE — 85027 COMPLETE CBC AUTOMATED: CPT | Performed by: FAMILY MEDICINE

## 2024-10-13 PROCEDURE — 80053 COMPREHEN METABOLIC PANEL: CPT | Performed by: FAMILY MEDICINE

## 2024-10-13 PROCEDURE — 70450 CT HEAD/BRAIN W/O DYE: CPT

## 2024-10-13 PROCEDURE — 94002 VENT MGMT INPAT INIT DAY: CPT

## 2024-10-13 PROCEDURE — 82948 REAGENT STRIP/BLOOD GLUCOSE: CPT

## 2024-10-13 PROCEDURE — 36600 WITHDRAWAL OF ARTERIAL BLOOD: CPT

## 2024-10-13 PROCEDURE — 83735 ASSAY OF MAGNESIUM: CPT | Performed by: FAMILY MEDICINE

## 2024-10-13 PROCEDURE — 83605 ASSAY OF LACTIC ACID: CPT | Performed by: NURSE PRACTITIONER

## 2024-10-13 PROCEDURE — 0BH18EZ INSERTION OF ENDOTRACHEAL AIRWAY INTO TRACHEA, VIA NATURAL OR ARTIFICIAL OPENING ENDOSCOPIC: ICD-10-PCS | Performed by: ANESTHESIOLOGY

## 2024-10-13 PROCEDURE — 82805 BLOOD GASES W/O2 SATURATION: CPT | Performed by: PHYSICIAN ASSISTANT

## 2024-10-13 PROCEDURE — 5A1935Z RESPIRATORY VENTILATION, LESS THAN 24 CONSECUTIVE HOURS: ICD-10-PCS | Performed by: ANESTHESIOLOGY

## 2024-10-13 PROCEDURE — 93005 ELECTROCARDIOGRAM TRACING: CPT

## 2024-10-13 PROCEDURE — 85610 PROTHROMBIN TIME: CPT | Performed by: NURSE PRACTITIONER

## 2024-10-13 RX ORDER — CHLORHEXIDINE GLUCONATE ORAL RINSE 1.2 MG/ML
15 SOLUTION DENTAL EVERY 12 HOURS SCHEDULED
Status: CANCELLED | OUTPATIENT
Start: 2024-10-13

## 2024-10-13 RX ORDER — MANNITOL 250 MG/ML
INJECTION, SOLUTION INTRAVENOUS AS NEEDED
Status: DISCONTINUED | OUTPATIENT
Start: 2024-10-13 | End: 2024-10-13

## 2024-10-13 RX ORDER — BENZONATATE 100 MG/1
100 CAPSULE ORAL 3 TIMES DAILY PRN
Status: CANCELLED | OUTPATIENT
Start: 2024-10-13

## 2024-10-13 RX ORDER — SODIUM CHLORIDE 9 MG/ML
INJECTION, SOLUTION INTRAVENOUS CONTINUOUS PRN
Status: DISCONTINUED | OUTPATIENT
Start: 2024-10-13 | End: 2024-10-13

## 2024-10-13 RX ORDER — ALBUMIN, HUMAN INJ 5% 5 %
SOLUTION INTRAVENOUS CONTINUOUS PRN
Status: DISCONTINUED | OUTPATIENT
Start: 2024-10-13 | End: 2024-10-13

## 2024-10-13 RX ORDER — PROPOFOL 10 MG/ML
5-50 INJECTION, EMULSION INTRAVENOUS
Status: CANCELLED | OUTPATIENT
Start: 2024-10-13

## 2024-10-13 RX ORDER — LEVETIRACETAM 500 MG/5ML
1000 INJECTION, SOLUTION, CONCENTRATE INTRAVENOUS ONCE
Status: COMPLETED | OUTPATIENT
Start: 2024-10-13 | End: 2024-10-13

## 2024-10-13 RX ORDER — PHYTONADIONE 2 MG/ML
10 INJECTION, EMULSION INTRAMUSCULAR; INTRAVENOUS; SUBCUTANEOUS ONCE
Status: DISCONTINUED | OUTPATIENT
Start: 2024-10-13 | End: 2024-10-13

## 2024-10-13 RX ORDER — LABETALOL HYDROCHLORIDE 5 MG/ML
10 INJECTION, SOLUTION INTRAVENOUS ONCE
Status: COMPLETED | OUTPATIENT
Start: 2024-10-13 | End: 2024-10-13

## 2024-10-13 RX ORDER — FENTANYL CITRATE 50 UG/ML
50 INJECTION, SOLUTION INTRAMUSCULAR; INTRAVENOUS ONCE
Status: COMPLETED | OUTPATIENT
Start: 2024-10-13 | End: 2024-10-13

## 2024-10-13 RX ORDER — ETOMIDATE 2 MG/ML
40 INJECTION INTRAVENOUS ONCE
Status: COMPLETED | OUTPATIENT
Start: 2024-10-13 | End: 2024-10-13

## 2024-10-13 RX ORDER — ALBUMIN (HUMAN) 12.5 G/50ML
25 SOLUTION INTRAVENOUS EVERY 8 HOURS
Status: CANCELLED | OUTPATIENT
Start: 2024-10-13 | End: 2024-10-13

## 2024-10-13 RX ORDER — HYDRALAZINE HYDROCHLORIDE 20 MG/ML
10 INJECTION INTRAMUSCULAR; INTRAVENOUS ONCE
Status: DISCONTINUED | OUTPATIENT
Start: 2024-10-13 | End: 2024-10-13 | Stop reason: HOSPADM

## 2024-10-13 RX ORDER — DEXAMETHASONE SODIUM PHOSPHATE 10 MG/ML
10 INJECTION, SOLUTION INTRAMUSCULAR; INTRAVENOUS ONCE
Status: COMPLETED | OUTPATIENT
Start: 2024-10-13 | End: 2024-10-13

## 2024-10-13 RX ORDER — SUCCINYLCHOLINE/SOD CL,ISO/PF 100 MG/5ML
100 SYRINGE (ML) INTRAVENOUS ONCE
Status: COMPLETED | OUTPATIENT
Start: 2024-10-13 | End: 2024-10-13

## 2024-10-13 RX ORDER — CHLORHEXIDINE GLUCONATE ORAL RINSE 1.2 MG/ML
15 SOLUTION DENTAL EVERY 12 HOURS SCHEDULED
Status: DISCONTINUED | OUTPATIENT
Start: 2024-10-13 | End: 2024-10-13 | Stop reason: HOSPADM

## 2024-10-13 RX ORDER — LEVETIRACETAM 500 MG/5ML
INJECTION, SOLUTION, CONCENTRATE INTRAVENOUS
Status: COMPLETED
Start: 2024-10-13 | End: 2024-10-13

## 2024-10-13 RX ORDER — ONDANSETRON 2 MG/ML
4 INJECTION INTRAMUSCULAR; INTRAVENOUS EVERY 8 HOURS PRN
Status: CANCELLED | OUTPATIENT
Start: 2024-10-13

## 2024-10-13 RX ORDER — POLYETHYLENE GLYCOL 3350 17 G/17G
17 POWDER, FOR SOLUTION ORAL DAILY PRN
Status: CANCELLED | OUTPATIENT
Start: 2024-10-13

## 2024-10-13 RX ORDER — FENTANYL CITRATE 50 UG/ML
INJECTION, SOLUTION INTRAMUSCULAR; INTRAVENOUS AS NEEDED
Status: DISCONTINUED | OUTPATIENT
Start: 2024-10-13 | End: 2024-10-13

## 2024-10-13 RX ORDER — CALCIUM CHLORIDE 100 MG/ML
INJECTION INTRAVENOUS; INTRAVENTRICULAR AS NEEDED
Status: DISCONTINUED | OUTPATIENT
Start: 2024-10-13 | End: 2024-10-13

## 2024-10-13 RX ORDER — PROPOFOL 10 MG/ML
INJECTION, EMULSION INTRAVENOUS CONTINUOUS PRN
Status: DISCONTINUED | OUTPATIENT
Start: 2024-10-13 | End: 2024-10-13

## 2024-10-13 RX ORDER — CEFTRIAXONE 1 G/50ML
1000 INJECTION, SOLUTION INTRAVENOUS EVERY 24 HOURS
Status: CANCELLED | OUTPATIENT
Start: 2024-10-13

## 2024-10-13 RX ORDER — KETOROLAC TROMETHAMINE 30 MG/ML
15 INJECTION, SOLUTION INTRAMUSCULAR; INTRAVENOUS ONCE
Status: COMPLETED | OUTPATIENT
Start: 2024-10-13 | End: 2024-10-13

## 2024-10-13 RX ORDER — DOCUSATE SODIUM 100 MG/1
100 CAPSULE, LIQUID FILLED ORAL EVERY 12 HOURS
OUTPATIENT
Start: 2024-10-13

## 2024-10-13 RX ORDER — FERROUS SULFATE 325(65) MG
325 TABLET ORAL
Status: CANCELLED | OUTPATIENT
Start: 2024-10-14

## 2024-10-13 RX ORDER — SODIUM CHLORIDE 1 G/1
1 TABLET ORAL
OUTPATIENT
Start: 2024-10-13

## 2024-10-13 RX ORDER — LEVOTHYROXINE SODIUM 100 UG/1
200 TABLET ORAL
Status: CANCELLED | OUTPATIENT
Start: 2024-10-14

## 2024-10-13 RX ORDER — LEVETIRACETAM 500 MG/5ML
500 INJECTION, SOLUTION, CONCENTRATE INTRAVENOUS EVERY 12 HOURS SCHEDULED
Status: DISCONTINUED | OUTPATIENT
Start: 2024-10-13 | End: 2024-10-13 | Stop reason: HOSPADM

## 2024-10-13 RX ORDER — PROPOFOL 10 MG/ML
5-50 INJECTION, EMULSION INTRAVENOUS
Status: DISCONTINUED | OUTPATIENT
Start: 2024-10-13 | End: 2024-10-13 | Stop reason: HOSPADM

## 2024-10-13 RX ORDER — ETOMIDATE 2 MG/ML
INJECTION INTRAVENOUS CODE/TRAUMA/SEDATION MEDICATION
Status: COMPLETED | OUTPATIENT
Start: 2024-10-13 | End: 2024-10-13

## 2024-10-13 RX ORDER — ROCURONIUM BROMIDE 10 MG/ML
INJECTION, SOLUTION INTRAVENOUS AS NEEDED
Status: DISCONTINUED | OUTPATIENT
Start: 2024-10-13 | End: 2024-10-13

## 2024-10-13 RX ORDER — SUCCINYLCHOLINE/SOD CL,ISO/PF 100 MG/5ML
SYRINGE (ML) INTRAVENOUS CODE/TRAUMA/SEDATION MEDICATION
Status: COMPLETED | OUTPATIENT
Start: 2024-10-13 | End: 2024-10-13

## 2024-10-13 RX ORDER — LEVETIRACETAM 500 MG/5ML
500 INJECTION, SOLUTION, CONCENTRATE INTRAVENOUS EVERY 12 HOURS SCHEDULED
Status: CANCELLED | OUTPATIENT
Start: 2024-10-13

## 2024-10-13 RX ADMIN — ETOMIDATE 40 MG: 2 INJECTION, SOLUTION INTRAVENOUS at 05:56

## 2024-10-13 RX ADMIN — SODIUM CHLORIDE: 9 INJECTION, SOLUTION INTRAVENOUS at 08:48

## 2024-10-13 RX ADMIN — CALCIUM CHLORIDE 0.5 G: 100 INJECTION INTRAVENOUS; INTRAVENTRICULAR at 08:56

## 2024-10-13 RX ADMIN — CALCIUM CHLORIDE 1 G: 100 INJECTION INTRAVENOUS; INTRAVENTRICULAR at 11:41

## 2024-10-13 RX ADMIN — SODIUM CHLORIDE: 0.9 INJECTION, SOLUTION INTRAVENOUS at 08:12

## 2024-10-13 RX ADMIN — FENTANYL CITRATE 100 MCG: 50 INJECTION INTRAMUSCULAR; INTRAVENOUS at 08:35

## 2024-10-13 RX ADMIN — MANNITOL 100 G: 12.5 INJECTION, SOLUTION INTRAVENOUS at 08:56

## 2024-10-13 RX ADMIN — CALCIUM CHLORIDE 0.5 G: 100 INJECTION INTRAVENOUS; INTRAVENTRICULAR at 10:20

## 2024-10-13 RX ADMIN — DEXAMETHASONE SODIUM PHOSPHATE 10 MG: 10 INJECTION, SOLUTION INTRAMUSCULAR; INTRAVENOUS at 06:38

## 2024-10-13 RX ADMIN — FENTANYL CITRATE 50 MCG: 50 INJECTION INTRAMUSCULAR; INTRAVENOUS at 06:37

## 2024-10-13 RX ADMIN — Medication 100 MG: at 05:58

## 2024-10-13 RX ADMIN — ALBUMIN (HUMAN): 12.5 INJECTION, SOLUTION INTRAVENOUS at 09:20

## 2024-10-13 RX ADMIN — ROCURONIUM BROMIDE 50 MG: 10 INJECTION, SOLUTION INTRAVENOUS at 08:15

## 2024-10-13 RX ADMIN — KETOROLAC TROMETHAMINE 15 MG: 30 INJECTION, SOLUTION INTRAMUSCULAR at 00:38

## 2024-10-13 RX ADMIN — PHENYLEPHRINE HYDROCHLORIDE 20 MCG/MIN: 10 INJECTION INTRAVENOUS at 08:15

## 2024-10-13 RX ADMIN — LABETALOL HYDROCHLORIDE 10 MG: 5 INJECTION, SOLUTION INTRAVENOUS at 06:16

## 2024-10-13 RX ADMIN — ROCURONIUM BROMIDE 50 MG: 10 INJECTION, SOLUTION INTRAVENOUS at 11:05

## 2024-10-13 RX ADMIN — ETOMIDATE 40 MG: 20 INJECTION, SOLUTION INTRAVENOUS at 05:56

## 2024-10-13 RX ADMIN — LEVETIRACETAM 1000 MG: 500 INJECTION, SOLUTION, CONCENTRATE INTRAVENOUS at 06:16

## 2024-10-13 RX ADMIN — PHYTONADIONE 10 MG: 10 INJECTION, EMULSION INTRAMUSCULAR; INTRAVENOUS; SUBCUTANEOUS at 07:25

## 2024-10-13 RX ADMIN — ALBUMIN (HUMAN) 25 G: 0.25 INJECTION, SOLUTION INTRAVENOUS at 02:54

## 2024-10-13 RX ADMIN — ROCURONIUM BROMIDE 50 MG: 10 INJECTION, SOLUTION INTRAVENOUS at 08:40

## 2024-10-13 RX ADMIN — LEVETIRACETAM 1000 MG: 100 INJECTION, SOLUTION INTRAVENOUS at 06:16

## 2024-10-13 RX ADMIN — Medication 2000 UNITS: at 07:33

## 2024-10-13 RX ADMIN — NOREPINEPHRINE BITARTRATE 4 MCG/MIN: 1 INJECTION, SOLUTION, CONCENTRATE INTRAVENOUS at 09:25

## 2024-10-13 RX ADMIN — PROPOFOL 50 MCG/KG/MIN: 10 INJECTION, EMULSION INTRAVENOUS at 08:15

## 2024-10-13 RX ADMIN — PROPOFOL 10 MCG/KG/MIN: 10 INJECTION, EMULSION INTRAVENOUS at 06:23

## 2024-10-13 RX ADMIN — ROCURONIUM BROMIDE 50 MG: 10 INJECTION, SOLUTION INTRAVENOUS at 09:20

## 2024-10-13 NOTE — NURSING NOTE
This RN arrived at patients bedside for morning medications administration and labs. Pt not responding to commands, would not open eyes. Sternal rub done, pt pushed RN hand away. Provider arrived in room, sternal rub done with same response. RRT called at 0529.

## 2024-10-13 NOTE — ANESTHESIA PREPROCEDURE EVALUATION
Procedure:  CRANIOTOMY FOR SUBDURAL HEMATOMA (Left: Head)    Relevant Problems   ENDO   (+) Hypothyroidism (acquired)      NEURO/PSYCH   (+) Non-traumatic acute L subdural hematoma (HCC)      Lab Results   Component Value Date    WBC 12.89 (H) 10/13/2024    HGB 7.7 (L) 10/13/2024    HCT 23.7 (L) 10/13/2024    MCV 89 10/13/2024     10/13/2024       Chemistry        Component Value Date/Time    K 4.1 10/13/2024 0534    K 4.5 10/01/2024 1447    CL 97 10/13/2024 0534    CL 95 (L) 10/01/2024 1447    CO2 25 10/13/2024 0534    CO2 24 10/01/2024 1447    BUN 24 10/13/2024 0534    BUN 18 10/01/2024 1447    BUN 18 10/11/2019 1521    CREATININE 0.86 10/13/2024 0534    CREATININE 0.9 10/01/2024 1447        Component Value Date/Time    CALCIUM 7.2 (L) 10/13/2024 0534    CALCIUM 7.5 (L) 10/01/2024 1447    ALKPHOS 366 (H) 10/13/2024 0534    ALKPHOS 352 (H) 10/01/2024 1447     (H) 10/13/2024 0534     (H) 10/01/2024 1447     (H) 10/13/2024 0534     (H) 10/01/2024 1447          Physical Exam    Airway    Mallampati score: unable to assess         Dental       Cardiovascular  Rhythm: regular, Rate: normal    Pulmonary   Breath sounds clear to auscultation    Other Findings  Intercisor Distance > 3cm          Anesthesia Plan  ASA Score- 4 Emergent    Anesthesia Type- general with ASA Monitors.         Additional Monitors: arterial line.    Airway Plan: ETT.    Comment: Emergent case. Attempted to call wife but no response x3. Will proceed given emergent nature of procedure. Will place arterial line along with 2 large bore peripheral IV or midlines. If access is an issue, will place central line,.       Plan Factors-Exercise tolerance (METS): >4 METS.    Chart reviewed. EKG reviewed.  Existing labs reviewed.                   Induction-     Postoperative Plan- Plan for postoperative opioid use.     Perioperative Resuscitation Plan - Level 1 - Full Code.       Informed Consent-   I personally reviewed  this patient with the CRNA. Discussed and agreed on the Anesthesia Plan with the CRNA..

## 2024-10-13 NOTE — ASSESSMENT & PLAN NOTE
,Managed by PCP  Recent increase of levothyroxine to 200mcg daily on 10/1  TSH 23.26, T4 normal  Continue levothyroxine as prescribed

## 2024-10-13 NOTE — QUICK NOTE
Tachycardic, febrile.  Improved after Tylenol, LR bolus.   Continue ceftriaxone  Lactic acid remains elevated slightly improved.  Will avoid further fluid bolus in setting of hyponatremia.    Patient alert/oriented x 3 conversational.  No evidence of volume overload.

## 2024-10-13 NOTE — ASSESSMENT & PLAN NOTE
Reported headache around midnight and received toradol, last known well was 0300 10/13, RRT at 0530 with GCS 7 and unequal pupils  CT Head 10/13: Mixed density left convexity subdural hemorrhage (1.2 cm thickness) with by 1.5 cm rightward midline shift. Trace subdural hemorrhage along the left tentorium. Mass effect with low-lying cerebellar tonsils, effacement of the suprasellar cistern, and partial effacement of the quadrigeminal plate cistern.  Intubated for airway protection  Transferred to [facility] for neurosurgery and neuro critical care   SBP goal < 140  1 x dose vitamin K 10mg and kcentra

## 2024-10-13 NOTE — DISCHARGE SUMMARY
"Discharge Summary - Critical Care/ICU   Name: Clayton Duval 44 y.o. male I MRN: [id number]  Unit/Bed#: -01 I Date of Admission: 10/10/2024   Date of Service: 10/13/2024 I Hospital Day: 2    Admission Date: 10/10/2024 2114  Discharge Date: 10/13/24  Admitting Diagnosis: Shortness of breath [R06.02]  Lactic acidosis [E87.20]  Hypomagnesemia [E83.42]  Anasarca [R60.1]  Hyponatremia [E87.1]  SOB (shortness of breath) [R06.02]  Elevated LFTs [R79.89]  Pleural effusion, bilateral [J90]  Splenic lesion [D73.89]  Hepatic lesion [K76.9]  Generalized weakness [R53.1]  Elevated bilirubin [R17]  Lytic bone lesion of hip [M89.8X5]  Discharge Diagnosis: Non traumatic L Subdural hematoma with right midline shift   Medical Problems       Resolved Problems  Date Reviewed: 10/13/2024   None         HPI: As per Brooklyn SANTOS on 10/11 \"Clayton Duval is a 44 y.o. male with a PMH of gastric bypass surgery, VILLALOBOS, hypothyroidism, anemia who presents with 2 weeks of feeling ill with fever, chills, cough, fatigue.  Presented to his PCP on 10/1 diagnosed with viral illness prescribed prednisone and felt improved until ran out of medication.  At that time noted to have elevated LFTs.  Tonight in the ED found to have significant anasarca, elevated transaminase level and CT chest abdomen pelvis with splenic lesions concerning for malignancy and large sclerotic lytic lesions.  Presented to the medical service for further evaluation and treatment of transaminase elevation.  Appreciate gastroenterology evaluation.\"     Procedures Performed:   Orders Placed This Encounter   Procedures    ED ECG Documentation Only    Intubation       Summary of Hospital Course: Hospital Course: 44M Hx of gastric bypass surgery, VILLALOBOS, hyopthyroidism, anemia who PRESENTED with 2 weeks of fatigue, fever, and cough NOW with 1.2cm L subdural hematoma with 1.5cm right midline shift transferred to [place] for neurosurgical intervention. While " admitted the pt had extensive workup for transaminitis of hepatocellular pattern. Ultimately, MRI abdomen showed probable malignant lesions of the liver, spleen, spine, and bony pelvis. He was originally scheduled to have IR biopsy of pelvic lesion on Monday 10/14. Nephrology was consulted to help manage hyponatremia which was treated with sodium chloride tabs. On 10/13 pt was a RRT for acute change in mental status. LKW was 0300. Upon arrival GCS was 7 with unequal pupils. Proceeded immediately to CT scan which showed NON-TRAUMATIC L 1.2cm Subdural hematoma with 1.5cm right midline shift. He was subsequently intubated for airway protection and taken to the ICU. Upon arrival to the ICU he was given labetalol 10mg IV for BP control to achieve SBP < 140. He was sedated with propofol and fentanyl pushes as needed. Upon speaking with NCC it was recommenced he received Vitamin K 10mg and KCENTRA. He was then transfer to [facility] for neurosurgical intervention.       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 Small bilateral pleural effusions. Moderate ascites and anasarca.  2. Stable hepatosplenomegaly. Hypoenhancing partially confluent lesions in the spleen concerning for malignancy. Ill-defined areas of hypoenhancement in the right lobe of the liver, not visualized on the prior exam. Malignancy not excluded.  3. Large sclerotic and lytic lesion in the left iliac bone and smaller lytic lesions in the lumbar spine, also concerning for malignancy.    RUQ US 10/11: Hepatomegaly. Multiple hypodense foci evident on CT are not well outlined on ultrasound, however, may be causative for transaminitis.  Patent major hepatic vessels with normal directional flow.  Redemonstrated ascites.    MRI Abdomen 10/11: 1.  Findings  concerning for metastatic disease.  2.  Enhancing osseous lesions throughout the majority of the visualized spine and bony pelvis.  3.  Markedly enlarged spleen much of which is occupied by large confluent hypoenhancing lesions suspected to represent metastases.  4.  Hepatomegaly. Multifocal hepatic lesions which correlate to areas of hypoenhancement on CT are also suspected to represent metastasis in the given setting.  5.  Anasarca and moderate volume ascites.    CT head 10/13: Mixed density left convexity subdural hemorrhage (1.2 cm thickness) with by 1.5 cm rightward midline shift. Trace subdural hemorrhage along the left tentorium. Mass effect with low-lying cerebellar tonsils, effacement of the suprasellar cistern, and   partial effacement of the quadrigeminal plate cistern.    Complications: L Subdural hematoma    Condition at Discharge: critical       Discharge instructions/Information to patient and family:   See After Visit Summary (AVS) for information provided to patient and family.      Provisions for Follow-Up Care:  See after visit summary for information related to follow-up care and any pertinent home health orders.      PCP: Marcio Ann DO    Disposition:  Transfer to [facility]     Planned Readmission: Yes [facility]B     Discharge Medications:  See after visit summary for reconciled discharge medications provided to patient and family.      Discharge Statement:  I have spent a total time of 30 minutes in caring for this patient on the day of the visit/encounter. >30 minutes of time was spent on: Diagnostic results, Risks and benefits of tx options, Impressions, Documenting in the medical record, Reviewing / ordering tests, medicine, procedures  , and Communicating with other healthcare professionals .

## 2024-10-13 NOTE — H&P
H&P - Critical Care/ICU   Name: Clayton Duval 44 y.o. male I MRN: [id number]  Unit/Bed#: ICU 05 I Date of Admission: 10/13/2024   Date of Service: 10/13/2024 I Hospital Day: 0       Assessment & Plan   Neuro:   Diagnosis: Subdural hematoma s/p left hemicraniectomy for evacuation of SDH , AMS  CT head - 10/13 0616 - preop: Mixed density left convexity subdural hemorrhage (1.2 cm thickness) with by 1.5 cm rightward midline shift. Trace subdural hemorrhage along the left tentorium. Mass effect with low-lying cerebellar tonsils, effacement of the suprasellar cistern, and partial effacement of the quadrigeminal plate cistern.  CT head - 10/13 - post op: New area of hypodensity within the left thalamus and basal ganglia compared to earlier day exam, suggestive of acute infarct. Expected postsurgical changes from left hemispheric craniectomy with decreased size of mixed density subdural hematoma, improved 4 mm left to right midline shift and improved effacement of the basilar cisterns. Improved mass effect on the ventricular system and dilatation of the right temporal horn of the lateral ventricle  Plan:   MRI brain w/o contrast  Continue monitoring neurological exam closely with frequent neuro checks, obtain stat CTH if any acute changes  Monitor for signs of ICP increase (bradycardia, HTN, changes in neuro exam, increased tone, pupillary changes)  Blood Pressure: SBP goal 110 - 140, cardene gtt or pressers/fluids as needed to keep within range.  AC/AP: Holding AP and AC at this time.   Imaging/Diagnostic Studies: MRI Brain w/o contrast  Labs Pending:  Seizure Prophylaxis: Keppra 1g load followed by 500mg BID for 7 days   Tight glycemic control, goal <180. Monitor temperature, tylenol PRN.  PT/OT/Speech/PMR consults when able  DVT PPx: SCDs and avoid chemical prophylaxis due to hemorrhage    CV:   Diagnosis: sinus bradycardia  Plan:   SBP goal 110 - 140, Cardene as needed  Continue to monitor on  Telemetry    Pulm:  Diagnosis: Mechanical ventillation  Plan: Vt - 500ml, peep 6, rate 14, FiO2 50%    GI:   Diagnosis: Hepatosplenomegaly, hepatic lesions, splenic lesions, transaminitis  Hepatitis panel - negative  Plan:   GI consult  Oncology consult    :   Diagnosis: Barclay in place  Plan:   Continue for acute illness  Monitor I&Os    F/E/N:   F: Albumin 25% for suspected intravascular hypovolemia  E: replenish as indicated for Magnesium >2, K >4  N: NPO    Heme/Onc:   Diagnosis: suspected metastatic cancer, hypocoagulability in the setting of hepatic malignancy, lytic bone lesions, hepatic coagulopathy  MRI Abdomen w/wo contrast - 10/11/24: Findings concerning for metastatic disease. Enhancing osseous lesions throughout the majority of the visualized spine and bony pelvis. Markedly enlarged spleen much of which is occupied by large confluent hypoenhancing lesions suspected to represent metastases. Hepatomegaly. Multifocal hepatic lesions which correlate to areas of hypoenhancement on CT are also suspected to represent metastasis in the given setting. Anasarca and moderate volume ascites  Fibrinogen level - 161  PT-INR - 21.6/1.87 - 10/13 - 0800 - repeat postop pending  Plan:   Factor 5 level  PT-INR  Kcentra 2000 units  DDAVP  Vitamin K  In the OR due to anemia and coagulopathy patient was given: 4 units of pRBCs, 4 units of FFP and 1 unit of platelets  Oncology consult      Endo:   Diagnosis: Hypothyroidism, hyponatremia, hypothermia  Plan:   Continue home Levothyroxine 200mcg  Suspect hyponatremia of malignancy  Bare enochalissazoey    ID:   No active issues    MSK/Skin:   Diagnosis: Anasarca  Plan:   Monitor fluid status    Disposition: Critical care    History of Present Illness   Clayton Duval is a 44 y.o. male with a past medical history of gastric bypass surgery, VILLALOBOS, hypothyroidism, anemia who initially presented to [facility] after 2 weeks of feeling ill with fevers, chills, cough and fatigue.  The patient  had initially went to his PCP on October 1 for the symptoms and was diagnosed with a viral illness and he was prescribed some prednisone which he reportedly felt better on.  The patient was noted to have elevated LFTs during PCP appointment.  The patient then presented on 10/11/2024 to [facility] ED with persistent symptoms.  The patient was found to have increased LFTs and an elevated TSH as well.  The patient was also found to be hyponatremic with a sodium level of 127 in the ED.  The patient was admitted and had imaging completed of his abdomen.  The abdominal imaging including MRI with and without contrast revealed multiple lesions on the liver, spleen, and lytic lesions in the spine.  The patient reportedly became acutely altered on 10/13/2024.  Patient's last known well was 3 AM when he was seen again at 5 AM he was altered.  Stat CT head was completed which showed a left-sided subdural hematoma.  The patient was transferred to [facility] for neurosurgical intervention.  The patient underwent a left hemicraniectomy for hematoma evacuation.  The patient was brought to the ICU postsurgery.  During the surgery the patient was found to be anemic and due to the suspected coagulopathy the patient was transfused with 4 units of packed red blood cells, 4 units of fresh frozen plasma, 1 unit of platelets.  The patient was also given vitamin K and DDAVP as well as for factor PCC to reverse any hypocoagulable state.    History obtained from chart review.  Review of Systems: See HPI for Review of Systems    Historical Information   No past medical history on file. Past Surgical History:  2012: GASTRIC BYPASS  No date: GASTRIC BYPASS   Current Outpatient Medications   Medication Instructions    Cholecalciferol 5,000 Units, Oral, Daily    cyanocobalamin 1,000 mcg, Intramuscular, Every 30 days    docusate sodium (COLACE) 100 mg, Oral, Every 12 hours    ferrous sulfate 325 mg, Oral, Daily with breakfast     furosemide (LASIX) 20 mg, Oral, Daily    hydrocortisone (ANUSOL-HC) 2.5 % rectal cream Topical, 2 times daily    levothyroxine 200 mcg, Oral, Daily    polyethylene glycol (MIRALAX) 17 g, Oral, Daily    No Known Allergies   Social History     Tobacco Use    Smoking status: Never    Smokeless tobacco: Current   Vaping Use    Vaping status: Never Used   Substance Use Topics    Alcohol use: Never    Drug use: Never    No family history on file.       Objective :                   Vitals I/O      Most Recent Min/Max in 24hrs   Temp (!) 96.1 °F (35.6 °C) Temp  Min: 96.1 °F (35.6 °C)  Max: 104.6 °F (40.3 °C)   Pulse 62 Pulse  Min: 62  Max: 132   Resp 13 Resp  Min: 13  Max: 22   /76 BP  Min: 118/77  Max: 170/101   O2 Sat 99 % SpO2  Min: 75 %  Max: 100 %      Intake/Output Summary (Last 24 hours) at 10/13/2024 1236  Last data filed at 10/13/2024 1219  Gross per 24 hour   Intake 3765 ml   Output 1950 ml   Net 1815 ml       Diet NPO    Invasive Monitoring   Arterial Line  Linda /56  Arterial Line BP  Min: 106/56  Max: 121/71   MAP 72 mmHg  Arterial Line MAP (mmHg)  Min: 72 mmHg  Max: 88 mmHg           Physical Exam   Physical Exam  Vitals reviewed.   Eyes:      Comments: Pinpoint fixed right pupil, nonreactive to light.  4 mm fixed left pupil, nonreactive to light   Skin:     Coloration: Skin is pale.   Cardiovascular:      Rate and Rhythm: Regular rhythm. Bradycardia present.   Pulmonary:      Comments: intubated  Neurological:        Corneal reflex absent, cough reflex and gag reflex not intact.      Comments: Intubated and sedated   Genitourinary/Anorectal:  Barclay present.        Diagnostic Studies        Lab Results: I have reviewed the following results:     Medications:  Scheduled PRN   albumin human, 25 g, Q8H  cefTRIAXone, 1,000 mg, Q24H  desmopressin, 0.3 mcg/kg, Once  famotidine, 20 mg, BID  [START ON 10/14/2024] ferrous sulfate, 325 mg, Daily With Breakfast  levETIRAcetam, 500 mg, Q12H KAYLIE  [START ON  10/14/2024] levothyroxine, 200 mcg, Early Morning      ondansetron, 4 mg, Q8H PRN  polyethylene glycol, 17 g, Daily PRN       Continuous    norepinephrine, 1-30 mcg/min, Last Rate: 7 mcg/min (10/13/24 1221)  propofol, 5-50 mcg/kg/min, Last Rate: 50 mcg/kg/min (10/13/24 1221)  sodium chloride, 125 mL/hr, Last Rate: 125 mL/hr (10/13/24 1223)         Labs:   CBC    Recent Labs     10/13/24  0534 10/13/24  0850 10/13/24  0853 10/13/24  1031   WBC 12.89* 13.97*  --   --    HGB 7.7* 7.2* 7.5* 6.8*   HCT 23.7* 22.0* 22* 20*    149  --   --      BMP    Recent Labs     10/12/24  0449 10/13/24  0534 10/13/24  0853 10/13/24  1031   SODIUM 126* 127*  --   --    K 4.2 4.1  --   --    CL 96 97  --   --    CO2 22 25 24 22   AGAP 8 5  --   --    BUN 25 24  --   --    CREATININE 0.84 0.86  --   --    CALCIUM 7.0* 7.2*  --   --        Coags    Recent Labs     10/12/24  0449 10/13/24  0534 10/13/24  0850   INR  --  2.32* 1.87*   PTT 63*  --   --         Additional Electrolytes  Recent Labs     10/12/24  0449 10/13/24  0534 10/13/24  0853 10/13/24  1031   MG 1.7* 1.8*  --   --    CAIONIZED  --   --  0.92* 1.18          Blood Gas    Recent Labs     10/13/24  0544   PHART 7.498*   [id number] 32.5*   PO2ART 63.2*   [id number] 24.7   BEART 1.5   SOURCE Radial, Left     Recent Labs     10/13/24  0544   SOURCE Radial, Left    LFTs  Recent Labs     10/12/24  0449 10/13/24  0534   * 247*   * 484*   ALKPHOS 518* 366*   ALB 2.3* 2.6*   TBILI 2.06* 2.35*       Infectious  No recent results  Glucose  Recent Labs     10/11/24  1438 10/11/24  2130 10/12/24  0449 10/13/24  0534   GLUC 82 85 82 93

## 2024-10-13 NOTE — ANESTHESIA PROCEDURE NOTES
Insert Complex Venous Access Line    Date/Time: 10/13/2024 9:07 AM    Performed by: Ishan Baez MD  Authorized by: Ishan Baez MD    Patient location:  OR  Consent:     Consent obtained:  Verbal    Consent given by:  Patient    Risks discussed:  Incorrect placement, bleeding and infection  Pre-procedure details:     Hand hygiene: Hand hygiene performed prior to insertion      Sterile barrier technique: All elements of maximal sterile technique followed      Skin preparation:  2% chlorhexidine    Skin preparation agent: Skin preparation agent completely dried prior to procedure    Procedure details:     Complex Venous Access Line Type: Midline      Complex Venous Access Line Indications: no peripheral vascular access      Orientation:  Right    Location:  Basilic    Catheter size:  18 gauge    Total catheter length (cm):  10    Approach: percutaneous technique used      Ultrasound image availability:  Still images obtained    Sterile ultrasound techniques: Sterile gel and sterile probe covers were used      Number of attempts:  1    Successful placement: yes      Landmarks identified: yes    Anesthesia (see MAR for exact dosages):     Anesthesia method:  Local infiltration    Local anesthetic:  Lidocaine 1% w/o epi  Post-procedure details:     Post-procedure:  Dressing applied    Assessment:  Blood return through all ports and free fluid flow    Post-procedure complications: none      Patient tolerance of procedure:  Tolerated well, no immediate complications

## 2024-10-13 NOTE — RESPIRATORY THERAPY NOTE
RT Protocol Note  Clayton Duval 44 y.o. male MRN: [id number]  Unit/Bed#: ICU 05 Encounter: [id number]    Assessment    Active Problems:  There are no active Hospital Problems.      Home Pulmonary Medications:NA         No past medical history on file.  Social History     Socioeconomic History    Marital status: /Civil Union     Spouse name: Not on file    Number of children: Not on file    Years of education: Not on file    Highest education level: Not on file   Occupational History    Not on file   Tobacco Use    Smoking status: Never    Smokeless tobacco: Current   Vaping Use    Vaping status: Never Used   Substance and Sexual Activity    Alcohol use: Never    Drug use: Never    Sexual activity: Not on file   Other Topics Concern    Not on file   Social History Narrative    Not on file     Social Determinants of Health     Financial Resource Strain: Not on file   Food Insecurity: No Food Insecurity (5/15/2023)    Received from [facility]    Hunger Vital Sign     Worried About Running Out of Food in the Last Year: Never true     Ran Out of Food in the Last Year: Never true   Transportation Needs: Not on file   Physical Activity: Not on file   Stress: Not on file   Social Connections: Unknown (6/18/2024)    Received from [facility]    Social Connections     How often do you feel lonely or isolated from those around you? (Adult - for ages 18 years and over): Not on file   Intimate Partner Violence: Not on file   Housing Stability: Not on file       Subjective         Objective    Physical Exam:   Assessment Type: Assess only  General Appearance: Sedated  Respiratory Pattern: Assisted  Chest Assessment: Chest expansion symmetrical  Bilateral Breath Sounds: Diminished, Clear    Vitals:  Blood pressure 118/76, pulse 62, temperature (!) 96.1 °F (35.6 °C), temperature source Bladder, resp. rate 13, SpO2 99%.    Results from last 7 days   Lab Units 10/13/24  0544   PH ART  7.498*   PCO2 ART mm Hg  32.5*   PO2 ART mm Hg 63.2*   HCO3 ART mmol/L 24.7   BASE EXC ART mmol/L 1.5   O2 CONTENT ART mL/dL 9.9*   O2 HGB, ARTERIAL % 89.4*   ABG SOURCE  Radial, Left   FRANCISCO JAVIER TEST  Yes   NON VENT ROOM AIR % room air       Imaging and other studies: Results Review Statement: I personally reviewed the following image studies/reports in PACS and discussed pertinent findings with Radiology: chest xray. My interpretation of the radiology images/reports is: NA.          Plan    Respiratory Plan: (P) Vent/NIV/HFNC        Resp Comments: Pt from OR s/p craniectomy. Pt has  non traumatic acute left subdural hematoma. Pt has no pulmonary hx. BS decreased clear. Will continue to monitor pt.

## 2024-10-13 NOTE — RESPIRATORY THERAPY NOTE
RT Ventilator Management Note  Clayton Duval 44 y.o. male MRN: [id number]  Unit/Bed#: -01 Encounter: [id number]      Daily Screen    No data found in the last 10 encounters.           Physical Exam:   Assessment Type: Assess only  General Appearance: Unresponsive  Respiratory Pattern: Assisted  Chest Assessment: Chest expansion symmetrical      Resp Comments: 44 yr old pt, intubated for unresponsiveness and airway protection in CT., 8.0 ET tube at 24 to the lip, (+) Etco2 change, anchorfast applied to support ET tube pt placed on vent in APVcmv 500/14/+6/50%, Sp02 ~ 97%. Pt sync with vent settings, respiratory pattern unlabored.

## 2024-10-13 NOTE — ASSESSMENT & PLAN NOTE
Secondary to Subdural hematoma  Intubated 10/13 for airway protection  Sedation with propofol and fentanyl PRN

## 2024-10-13 NOTE — ASSESSMENT & PLAN NOTE
CT C/A/P 10/10:  Stable hepatosplenomegaly. Hypoenhancing partially confluent lesions in the spleen concerning for malignancy. Ill-defined areas of hypoenhancement in the right lobe of the liver, not visualized on the prior exam. Malignancy not excluded. Large sclerotic and lytic lesion in the left iliac bone and smaller lytic lesions in the lumbar spine, also concerning for malignancy.  Follow-up with MRI abdomen, see under Elevation of levels of liver transaminases

## 2024-10-13 NOTE — ANESTHESIA POSTPROCEDURE EVALUATION
Post-Op Assessment Note    CV Status:  Stable         Mental Status:  Unresponsive   Hydration Status:  Euvolemic   PONV Controlled:  Controlled   Airway Patency:  Patent     Post Op Vitals Reviewed: Yes    No anethesia notable event occurred.    Staff: Anesthesiologist           Last Filed PACU Vitals:  Vitals Value Taken Time   Temp 100.76 °F (38.2 °C) 10/13/24 1929   Pulse 80 10/13/24 1929   /67 10/13/24 1918   Resp 18 10/13/24 1929   SpO2 100 % 10/13/24 1929   Vitals shown include unfiled device data.    Modified Venus:  No data recorded

## 2024-10-13 NOTE — PROCEDURES
Intubation    Date/Time: 10/13/2024 6:48 AM    Performed by: Mann Arteaga Jr., PA-C  Authorized by: Mann Arteaga Jr., PA-C    Patient location:  Other (comment) (Radiology - CT)  Consent:     Consent obtained:  Emergent situation  Universal protocol:     Patient identity confirmed:  Arm band  Pre-procedure details:     Patient status:  Unresponsive    Mallampati score:  2    Pretreatment medications:  Etomidate    Paralytics:  Succinylcholine  Indications:     Indications for intubation: respiratory distress and airway protection    Procedure details:     Preoxygenation:  Bag valve mask    CPR in progress: no      Intubation method:  Oral    Laryngoscope blade:  Mac 3    Tube size (mm):  8.0    Tube type:  Cuffed    Number of attempts:  1    Cricoid pressure: no      Tube visualized through cords: yes    Placement assessment:     ETT to lip:  24    Tube secured with:  ETT welsh    Breath sounds:  Equal    Placement verification: CXR verification and ETCO2 detector      CXR findings:  ETT in proper place  Post-procedure details:     Patient tolerance of procedure:  Tolerated well, no immediate complications

## 2024-10-13 NOTE — PROGRESS NOTES
RN called to give telephone sbar to [facility] OR RN. Flight medics at bedside - Cole Kaye PA-C at bedside providing bedside report with staff RN's to flight team. Pt belongings transported with pt - all belongings documented and accounted for. RN taped pt ring that is on left ring finger - black and green. All other belongings in 1 belonging bag.

## 2024-10-13 NOTE — CONSULTS
ASSESSMENT:  This is a 44-year-old male recently admitted for evaluation for anasarca and elevated transaminase levels found to have splenic lesions and large sclerotic lytic lesions concerning malignancy who had acute altered mental status decline and unresponsiveness this morning.      CT head demonstrates a large left acute subdural hematoma measuring 1.5cm with brain compression and left to right midline shift. There is left uncal herniation.    The patient has a left blown pupil on exam.  Given the size of his hemorrhage, I believe emergent evacuation of the subdural hematoma and decompression of the brain is warranted to prevent further neurologic injury.  I made several attempts to contact the patient's healthcare proxy, his wife.  She did not  multiple times and I left a voice message.  Given how dire the situation is, we performed a two-physician consent and are proceeding with emergent craniectomy for hematoma evacuation. The patient has hepatic coagulopathy. He was given PCP and Vitamin K.      HISTORY OF PRESENT ILLNESS:  Reason for Consult:  Left acute subdural hematoma, AMS     Per reports, this is a 44 y.o. male with a PMH of gastric bypass surgery, VILLALOBOS, hypothyroidism, anemia who presented on 10/11/24 with 2 weeks of feeling ill with fever, chills, cough, fatigue.  Presented to his PCP on 10/1 diagnosed with viral illness prescribed prednisone and felt improved until ran out of medication.  At that time noted to have elevated LFTs. In the ED, he was found to have significant anasarca, elevated transaminase level and CT chest abdomen pelvis with splenic lesions concerning for malignancy and large sclerotic lytic lesions.  He was admitted for further work up. This morning, the patient became acutely unresponsive. Rapid response was called and the patient was intubated. A head CT was obtained which demonstrated a large left subdural hematoma. The patient was transferred for emergent  evaluation.     PAST MEDICAL HISTORY:       PAST SURGICAL HISTORY:  Gastric bypass     ALLERGIES:  No Known Allergies     SOCIAL HISTORY:  Noncontributory    FAMILY HISTORY:  Noncontributory     MEDICATIONS:   Cholecalciferol 125 MCG (5000 UT) capsule Take 5,000 Units by mouth daily     Unknown (patient-reported)    ferrous sulfate 325 (65 Fe) mg tablet Take 325 mg by mouth daily with breakfast     Unknown (patient-reported)    furosemide (LASIX) 20 mg tablet Take 20 mg by mouth daily     Unknown (patient-reported)    levothyroxine 200 mcg tablet Take 200 mcg by mouth daily     Unknown (patient-reported)    polyethylene glycol (MIRALAX) 17 g packet Take 17 g by mouth daily     Unknown (patient-reported)    docusate sodium (COLACE) 100 mg capsule Take 1 capsule (100 mg total) by mouth every 12 (twelve) hours (Patient not taking: Reported on 10/11/2024) 60 capsule 0 Unknown (outside pharmacy)    hydrocortisone (ANUSOL-HC) 2.5 % rectal cream Apply topically 2 (two) times a day for 5 days 28 g 0 Unknown (outside pharmacy)            REVIEW OF SYSTEMS:  Unable to obtain     PHYSICAL EXAM:       Neurologic Exam:  Intubated and sedated  Left pupil blown and does not react to light  No spontaneous movements

## 2024-10-13 NOTE — ANESTHESIA POSTPROCEDURE EVALUATION
Post-Op Assessment Note    CV Status:  Stable  Pain Score: 0    Pain management: adequate       Mental Status:  Alert and awake   Hydration Status:  Euvolemic and stable   PONV Controlled:  Controlled   Airway Patency:  Patent and adequate     Post Op Vitals Reviewed: Yes    No anethesia notable event occurred.    Staff: CRNA   Comments: patient transported on monitor/vent, o2 via ETT, to CT scan without events, then to ICU6, reportgiven @ bedside, patient parakyzed/sedated on prop gtt on trabsport no evengts noted, no further interventions # this time, reportgvien @ [facility], will cont tomonitor.        Last Filed PACU Vitals:  Vitals Value Taken Time   Temp 96.08 °F (35.6 °C) 10/13/24 1209   Pulse 70 10/13/24 1209   /71 10/13/24 1159   Resp 46 10/13/24 1209   SpO2 100 % 10/13/24 1209   Vitals shown include unfiled device data.    Modified Venus:  No data recorded

## 2024-10-13 NOTE — OP NOTE
OPERATIVE REPORT  PATIENT NAME: Clayton Duval    :  1980  MRN: [id number]  Pt Location: [facility] OR ROOM 17    SURGERY DATE: 10/13/2024    Surgeons and Role:     * Son Mendoza MD - Primary    Preop Diagnosis:  Acute subdural hematoma  Intracranial hypertension with herniation syndrome    Procedure(s):  1) Left sided hemicraniectomy for evacuation of subdural hematoma    Specimen(s):  * No specimens in log *    Estimated Blood Loss:   500 mL    Drains:  Closed/Suction Drain Left;Lateral Head Bulb 7 Fr. (Active)   Site Description Healing 10/13/24 1219   Dressing Status Clean;Dry;Intact 10/13/24 1219   Drainage Appearance Bloody 10/13/24 1219   Status To bulb suction 10/13/24 1219   Intake (mL) 35 mL 10/13/24 1219   Number of days: 0       Closed/Suction Drain Left;Lateral Head Bulb 7 Fr. (Active)   Site Description Healing 10/13/24 1219   Dressing Status Clean;Intact 10/13/24 1219   Drainage Appearance Bloody 10/13/24 1219   Status To bulb suction 10/13/24 1219   Intake (mL) 30 mL 10/13/24 1219   Number of days: 0       NG/OG/Enteral Tube Orogastric 16 Fr Right mouth (Active)   Placement Reverification Aspiration 10/13/24 06   Site Assessment Clean;Dry;Intact 10/13/24 06   External Tube Length (cm) 65 cm 10/13/24 0636   Status Suction-low continuous 10/13/24 06   Drainage Appearance Brown 10/13/24 0636   Number of days: 0       Urethral Catheter Latex;Temperature probe 16 Fr. (Active)   Reasons to continue Urinary Catheter  Accurate I&O assessment in critically ill patients (48 hr. max) 10/13/24 1219   Goal for Removal Voiding trial when ambulation improves 10/13/24 1219   Site Assessment Clean;Skin intact 10/13/24 1219   [place]ay Care Done 10/13/24 1219   Collection Container Standard drainage bag 10/13/24 1219   Securement Method Securing device (Describe) 10/13/24 1219   Output (mL) 800 mL 10/13/24 1219   Number of days: 0       Anesthesia Type:   General    Operative Indications:  Large  left subdural hematoma with brain compression and herniation    Operative Findings:  Large left acute subdural hematoma    Complications:   None    Procedure and Technique:  The patient was brought to the operating room.  The patient was connected to the ventilator. The left side of the head was shaved and incision was marked. The left side of the head was prepped and draped in the usual sterile fashion. The patient was then given Ancef 2g as perioperative antibiotic. He was also given 100g of Mannitol. Given his coagulopathy and anemia, he was given 4 units of pRBCs, 4 units of FFP and 1 unit of Platelets.     After time-out, a reverse question-marked incision was made on the patient's scalp.  Scalp bleeders were controlled with a combination of monopolar, bipolar and Malgorzata clips.  The periosteum was undermined and the temporalis was taken off the skull wiith monopolar. The scalp was reflected anteriorly. Using the , stanton holes were created in the temporal, parietal and frontal regions.  The craniotome was then used to complete the craniotomy.  A periosteal elevator was used to dissect the dura from the skull bone and the bone at the sphenoid was cracked.  The skull bone was removed.  The underlying dura was cut to revealing the underlying acute subdural hematoma.  Using a combination of suction, irrigation, and gentle traction, the subdural hematoma was evacuated.  Bleeding along the medial portion of the craniotomy was controlled with Gelfoam soaked in thrombin and cottonoid patties.  The bone at the temporal floor was removed with Leksell rongeurs and the rest of the hematoma was evacuated. When we were satisfied with evacuation and hemostasis was achieved, we proceeded to closing.  Holes were made at the edges of the bone and tack-up sutures were placed along the bone edge using 4-0 Nurolons to help with epidural venous bleeding.    Duragen was placed over the brain.  Two 7 [place] SAIRA drains were then  placed in the epidural space.     Copious antibiotic irrigation was used to irrigate the wound.  The galea was then closed with 2-0 Vicryl sutures.  The skin was closed using staples.  Sterile dressing was then applied to the wound.    I was present for the entire procedure.    Patient Disposition:  Critical Care Unit             SIGNATURE: Son Mendoza MD  DATE: October 13, 2024  TIME: 12:23 PM

## 2024-10-13 NOTE — PLAN OF CARE
Problem: PAIN - ADULT  Goal: Verbalizes/displays adequate comfort level or baseline comfort level  Description: Interventions:  - Encourage patient to monitor pain and request assistance  - Assess pain using appropriate pain scale  - Administer analgesics based on type and severity of pain and evaluate response  - Implement non-pharmacological measures as appropriate and evaluate response  - Consider cultural and social influences on pain and pain management  - Notify physician/advanced practitioner if interventions unsuccessful or patient reports new pain  Outcome: Progressing     Problem: INFECTION - ADULT  Goal: Absence or prevention of progression during hospitalization  Description: INTERVENTIONS:  - Assess and monitor for signs and symptoms of infection  - Monitor lab/diagnostic results  - Monitor all insertion sites, i.e. indwelling lines, tubes, and drains  - Monitor endotracheal if appropriate and nasal secretions for changes in amount and color  - [place] appropriate cooling/warming therapies per order  - Administer medications as ordered  - Instruct and encourage patient and family to use good hand hygiene technique  - Identify and instruct in appropriate isolation precautions for identified infection/condition  Outcome: Progressing  Goal: Absence of fever/infection during neutropenic period  Description: INTERVENTIONS:  - Monitor WBC    Outcome: Progressing

## 2024-10-13 NOTE — ASSESSMENT & PLAN NOTE
Suspect malignancy based etiology with nonosmotic ADH release vs prerenal state  Baseline near 130  Gayla 12, Uosm 743  Gayla increased s/p lasix  Nephrology following

## 2024-10-13 NOTE — ANESTHESIA PROCEDURE NOTES
Arterial Line Insertion    Performed by: Ishan Baez MD  Authorized by: Ishan Baez MD  Consent: Verbal consent obtained. Written consent obtained.  Risks and benefits: risks, benefits and alternatives were discussed  Consent given by: patient  Patient identity confirmed: arm band and provided demographic data  Preparation: Patient was prepped and draped in the usual sterile fashion.  Indications: multiple ABGs and hemodynamic monitoring  Orientation:  Right  Location: radial artery  Procedure Details:  Needle gauge: 20  Seldinger technique: Seldinger technique used  Number of attempts: 1    Post-procedure:  Post-procedure: dressing applied  Waveform: good waveform and waveform confirmed  Patient tolerance: Patient tolerated the procedure well with no immediate complications

## 2024-10-13 NOTE — UTILIZATION REVIEW
"Ordered          10/11/24 1110   INPATIENT ADMISSION  Once                 hief Complaint   Patient presents with    Shortness of Breath       Patient reports that he started with SOB last week. Seen PCP last week and was put on a steroid. Patient reports the steroids helped but he finished now and is feeling worse. +cough/n/v. Patient also states that he feels a \"lump in his chest when he eats. Denies fevers         Initial Presentation: 44 y.o. male  with a PMHx of Osiel en Y bypass surgery, MASH, hypothyroidism, anemia,  elevated LFT's.   2 wk h/o    cough, intermittent fever, sweats and chills and fatigue to PCP on 10/1 where he was found to have elevated transaminase levels. He was placed on a steroid daily and felt improved until that ran out, now feels worse prompting ED visit continue with cough and nausea.  Has been taking about 4 tylenol daily (2 tabs BID)      10/01  Labs:  , , T bili 2, Alk phos 352. Na 130  10/10  Labs:  , , T bili 2.39, Alk phos 538, Na 128    MELD Score 24  10/11  Labs:  , , T bili 2.05, Alk phos 491, Na 127     10/10  ER: CTAP  revealed small b/l pleural effusions, moderate ascites and anascarca, stable hepatosplenomegaly, hypoenhancing partially confluent lesions in the spleen concerning for malignancy. Ill defined areas of hypoenhancement in the R lobe of the liver new from prior CT (below), with large sclerotic and lytic lesions in the iliac bone and smaller lesions in the lumbar spine.   Uncontrolled hypothyroidism TSH 36  (Low thyroid function could also be impeding clearance of lactic acid)      MELD score 24  hyponatremia  Na 127,  hypomagnesemia  1.7.   lactic acid 3.5   tachycardia,  tachypnea  Malnutrition : Loss of sq fat in orbital, triceps, ribcage areas.  Loss of muscle at temples, clavicles, scapula, extremities.  Significant anasarca.  BMI  34.94 kg/m²     10/11     Admit  IP status,  MS   Level of care for  URI symptom " management ,  workup of Tasnsaminitis/ lactic acidosis,    monitoring/repletion of electrolytes.   Will  Check Tylenol level, hepatitis panel.  Check ultrasound liver with Dopplers.   Initiate lasix for anasarca:  monitor volume status w/ cautious IVF, daily wgt,  I/O q shift,  daily renal indices.   Consult GI .   Check vitamin D level  (known deficiency and reports taking over-the-counter 5000 units daily)    Cont infectious workup:  trend wbc, procal, lactic acid (q2H until normalized)       Anticipated Length of Stay/Certification Statement: Patient will be admitted on an inpatient basis with an anticipated length of stay of greater than 2 midnights secondary to transaminase elevation.       ED Treatment-Medication Administration from 10/10/2024 2110 to 10/11/2024 0301           Date/Time Order Dose Route Action       10/10/2024 2127 sodium chloride 0.9 % bolus 500 mL 500 mL Intravenous New Bag       10/10/2024 2159 sodium chloride 0.9 % bolus 1,000 mL 1,000 mL Intravenous New Bag       10/10/2024 2245 magnesium sulfate IVPB (premix) SOLN 1 g 1 g Intravenous New Bag       10/11/2024 0203 cefTRIAXone (ROCEPHIN) IVPB (premix in dextrose) 1,000 mg 50 mL 1,000 mg Intravenous New Bag      Scheduled Medications:    Scheduled Medications ONLY (does not pull in infusions nor PRN medications order   Cholecalciferol, 5,000 Units, Oral, Daily  docusate sodium, 100 mg, Oral, Q12H  ferrous sulfate, 325 mg, Oral, Daily With Breakfast  furosemide, 20 mg, Oral, Daily  heparin (porcine), 5,000 Units, Subcutaneous, Q8H KAYLIE  levothyroxine, 200 mcg, Oral, Early Morning         Continuous IV Infusions:  Infusions Meds - Displays dose, route, & frequency only         PRN Meds:    PRN Medications - displays dose, route, and frequency   ondansetron, 4 mg, Intravenous, Q8H PRN  polyethylene glycol, 17 g, Oral, Daily PRN                 ED Triage Vitals   Temperature Pulse Respirations Blood Pressure SpO2 Pain Score   10/10/24 2119  10/10/24 2119 10/10/24 2119 10/10/24 2119 10/10/24 2119 10/10/24 2120   98.3 °F (36.8 °C) 92 19 119/81 100 % No Pain      Pertinent Labs/Diagnostic Test Results:   Radiology:  US right upper quadrant with liver dopplers   Final Interpretation by Cleopatra Blue MD (10/11 0909)   Hepatomegaly. Multiple hypodense foci evident on CT are not well outlined on ultrasound, however, may be causative for transaminitis.   Patent major hepatic vessels with normal directional flow.   Redemonstrated ascites.       Workstation performed: [id number]           CT chest abdomen pelvis w contrast   Final Interpretation by Bryon Christina MD (10/11 0133)           1. Small bilateral pleural effusions. Moderate ascites and anasarca.   2. Stable hepatosplenomegaly. Hypoenhancing partially confluent lesions in the spleen concerning for malignancy. Ill-defined areas of hypoenhancement in the right lobe of the liver, not visualized on the prior exam. Malignancy not excluded.   3. Large sclerotic and lytic lesion in the left iliac bone and smaller lytic lesions in the lumbar spine, also concerning for malignancy.                       Results from last 7 days   Lab Units 10/11/24  0447 10/10/24  2128   WBC Thousand/uL 6.65 6.42   HEMOGLOBIN g/dL 10.1* 10.8*   HEMATOCRIT % 30.8* 33.1*   PLATELETS Thousands/uL 189 189   BANDS PCT %  --  4                Results from last 7 days   Lab Units 10/11/24  0447 10/10/24  2128   SODIUM mmol/L 127* 128*   POTASSIUM mmol/L 4.2 4.3   CHLORIDE mmol/L 98 96   CO2 mmol/L 20* 25   ANION GAP mmol/L 9 7   BUN mg/dL 24 26*   CREATININE mg/dL 0.97 1.08   EGFR ml/min/1.73sq m 93 82   CALCIUM mg/dL 7.3* 7.5*   CALCIUM, IONIZED mmol/L 1.06*  --    MAGNESIUM mg/dL 1.7* 1.7*   PHOSPHORUS mg/dL 3.6  --                                     Continued Stay Review    Date:     For  10/12                          Current Patient Class: Inpatient  Current Level of Care:  med surg    HPI:44 y.o. male initially  admitted on   10/11 with    URI  ELEvated LFT's    Assessment/Plan:   10/12  MELD    24   Monitor labs.  Sodium  today   126.  Replace electrolytes as needed.  Pale   Continue  DAVID  and  IV  albumin.    Nephrology consult  Suspect  sodium chronically low.   Asymptomatic.   Add sodium   tablets.   Replace electrolytes as  needed.  Scleral icterus.  Monitor labs  closely.    10/13   0623  Rapid  response call  Found unresponsive   Required intubation  and transfer to  ICU.    Medications:   Scheduled Medications:  albumin human, 25 g, Intravenous, Q8H  cefTRIAXone, 1,000 mg, Intravenous, Q24H  chlorhexidine, 15 mL, Mouth/Throat, Q12H KAYLIE  Cholecalciferol, 5,000 Units, Oral, Daily  docusate sodium, 100 mg, Oral, Q12H  ferrous sulfate, 325 mg, Oral, Daily With Breakfast  hydrALAZINE, 10 mg, Intravenous, Once  levETIRAcetam, 500 mg, Intravenous, Q12H KAYLIE  levothyroxine, 200 mcg, Oral, Early Morning  phytonadione, 10 mg, Intravenous, Once  prothrombin complex concentrate (human) (Kcentra) 2,000 Units, 2,000 Units, Intravenous, Once  sodium chloride, 1 g, Oral, TID With Meals      Continuous IV Infusions:  propofol, 5-50 mcg/kg/min, Intravenous, Titrated      PRN Meds:  benzonatate, 100 mg, Oral, TID PRN  ondansetron, 4 mg, Intravenous, Q8H PRN  polyethylene glycol, 17 g, Oral, Daily PRN      Discharge Plan:   TBD    Vital Signs (last 3 days)       Date/Time Temp Pulse Resp BP MAP (mmHg) SpO2 O2 Device Patient Position - Orthostatic VS Pain    10/13/24 0645 99.7 °F (37.6 °C) 104 20 -- -- 100 % -- -- --    10/13/24 0640 99.3 °F (37.4 °C) 102 18 118/77 92 88 % -- -- --    10/13/24 0637 -- -- -- -- -- -- -- -- Med Not Given for Pain - for MAR use only    10/13/24 0635 98.4 °F (36.9 °C) 109 22 128/84 100 99 % -- -- --    10/13/24 0630 97.7 °F (36.5 °C) 119 21 133/88 105 75 % -- -- --    10/13/24 0625 -- 117 21 130/88 105 86 % -- -- --    10/13/24 0620 -- 121 17 155/99 121 98 % -- -- --    10/13/24 0615 -- 81 16 170/101 127 98  % -- -- --    10/13/24 0614 -- -- -- -- -- 98 % -- -- --    10/13/24 0546 -- 94 -- -- -- 97 % -- -- --    10/13/24 0543 -- 75 -- 128/75 -- 92 % None (Room air) Lying --    10/13/24 05:38:12 -- 82 -- 127/78 94 98 % -- -- --    10/13/24 0538 -- 82 -- 127/78 -- 98 % None (Room air) Lying --    10/13/24 0038 -- -- -- -- -- 90 % None (Room air) -- 4    10/12/24 2337 101.1 °F (38.4 °C) 115 -- -- -- 89 % None (Room air) -- --    10/12/24 2308 101.6 °F (38.7 °C) 118 19 122/77 92 90 % None (Room air) Lying --    10/12/24 2153 -- 132 -- -- -- -- -- -- Med Not Given for Pain - for MAR use only    10/12/24 2125 104.6 °F (40.3 °C) 129 20 -- -- -- -- -- --    10/12/24 2043 -- 130 -- -- -- 94 % -- -- No Pain    10/12/24 15:17:50 98.4 °F (36.9 °C) 122 19 140/91 107 93 % -- -- --    10/12/24 0845 -- -- -- -- -- 92 % None (Room air) -- No Pain    10/12/24 08:25:04 97.7 °F (36.5 °C) 120 16 123/85 98 94 % None (Room air) Sitting --    10/12/24 04:36:24 -- 113 -- 125/82 96 91 % -- -- --    10/11/24 23:08:26 97.7 °F (36.5 °C) 119 18 129/85 100 91 % None (Room air) Lying --    10/11/24 2100 -- -- -- -- -- -- None (Room air) -- No Pain    10/11/24 14:25:44 97.9 °F (36.6 °C) 122 17 133/87 102 91 % -- -- --    10/11/24 1000 -- 111 -- -- -- -- -- -- --    10/11/24 0900 -- 114 -- -- -- -- -- -- --    10/11/24 0845 -- -- -- -- -- 93 % None (Room air) -- No Pain    10/11/24 0800 -- 119 -- -- -- -- -- -- --    10/11/24 07:19:33 97.7 °F (36.5 °C) 110 18 112/80 91 96 % -- Lying --    10/11/24 0332 -- -- -- -- -- -- -- -- 3    10/11/24 0308 -- -- -- -- -- -- -- -- No Pain    10/11/24 03:06:50 97.5 °F (36.4 °C) 106 18 139/92 108 97 % -- -- --    10/10/24 2315 -- 84 20 119/79 95 95 % None (Room air) Lying --    10/10/24 2200 -- 80 21 128/83 101 99 % None (Room air) Sitting --    10/10/24 2120 -- -- -- -- -- -- -- -- No Pain    10/10/24 2119 98.3 °F (36.8 °C) 92 19 119/81 96 100 % None (Room air) Lying --          Weight (last 2 days)       Date/Time  Weight    10/11/24 0308 120 (264.8)            Pertinent Labs/Diagnostic Results:   Radiology:  CT head wo contrast   Final Interpretation by Hai Horn MD (10/13 0616)      Mixed density left convexity subdural hemorrhage (1.2 cm thickness) with by 1.5 cm rightward midline shift. Trace subdural hemorrhage along the left tentorium. Mass effect with low-lying cerebellar tonsils, effacement of the suprasellar cistern, and    partial effacement of the quadrigeminal plate cistern.         I personally discussed this study with Michael Yaw Vargas on 10/13/2024 6:11 AM.                        Workstation performed: [id number]         MRI abdomen w wo contrast   Final Interpretation by Jair Gann MD (10/12 0902)      1.  Findings concerning for metastatic disease.   2.  Enhancing osseous lesions throughout the majority of the visualized spine and bony pelvis.   3.  Markedly enlarged spleen much of which is occupied by large confluent hypoenhancing lesions suspected to represent metastases.   4.  Hepatomegaly. Multifocal hepatic lesions which correlate to areas of hypoenhancement on CT are also suspected to represent metastasis in the given setting.   5.  Anasarca and moderate volume ascites.         The study was marked in EPIC for immediate notification.      Workstation performed: [id number]         US right upper quadrant with liver dopplers   Final Interpretation by Cleopatra Blue MD (10/11 0909)   Hepatomegaly. Multiple hypodense foci evident on CT are not well outlined on ultrasound, however, may be causative for transaminitis.   Patent major hepatic vessels with normal directional flow.   Redemonstrated ascites.      Workstation performed: [id number]         CT chest abdomen pelvis w contrast   Final Interpretation by Bryon Christina MD (10/11 0133)         1. Small bilateral pleural effusions. Moderate ascites and anasarca.   2. Stable hepatosplenomegaly. Hypoenhancing partially confluent  lesions in the spleen concerning for malignancy. Ill-defined areas of hypoenhancement in the right lobe of the liver, not visualized on the prior exam. Malignancy not excluded.   3. Large sclerotic and lytic lesion in the left iliac bone and smaller lytic lesions in the lumbar spine, also concerning for malignancy.               Workstation performed: [id number]         IR biopsy other    (Results Pending)   XR chest portable    (Results Pending)     Cardiology:  ECG 12 lead   Final Result by Taylor Song MD (10/11 2034)   Normal sinus rhythm   Cannot rule out Inferior infarct , age undetermined   Possible Anterolateral infarct (cited on or before 15-MAR-2024)   Abnormal ECG   When compared with ECG of 15-MAR-2024 21:14,   No significant change was found   Confirmed by Taylor Song ([id number]) on 10/11/2024 8:34:31 PM              Results from last 7 days   Lab Units 10/13/24  0534 10/12/24  2115 10/12/24  1754 10/12/24  1327 10/12/24  0449 10/11/24  0447 10/10/24  2128   WBC Thousand/uL 12.89*  --   --   --  5.57 6.65 6.42   HEMOGLOBIN g/dL 7.7* 8.2* 8.7* 8.4* 8.5* 10.1* 10.8*   HEMATOCRIT % 23.7* 25.6* 26.5* 26.3* 26.2* 30.8* 33.1*   PLATELETS Thousands/uL 153  --   --   --  178 189 189   BANDS PCT %  --   --   --   --   --   --  4         Results from last 7 days   Lab Units 10/13/24  0534 10/12/24  0449 10/11/24  2130 10/11/24  1438 10/11/24  0447 10/10/24  2128   SODIUM mmol/L 127* 126* 125* 126* 127* 128*   POTASSIUM mmol/L 4.1 4.2 4.3 4.5 4.2 4.3   CHLORIDE mmol/L 97 96 95* 97 98 96   CO2 mmol/L 25 22 21 21 20* 25   ANION GAP mmol/L 5 8 9 8 9 7   BUN mg/dL 24 25 26* 25 24 26*   CREATININE mg/dL 0.86 0.84 0.95 0.95 0.97 1.08   EGFR ml/min/1.73sq m 104 105 96 96 93 82   CALCIUM mg/dL 7.2* 7.0* 7.1* 7.5* 7.3* 7.5*   CALCIUM, IONIZED mmol/L  --   --   --   --  1.06*  --    MAGNESIUM mg/dL 1.8* 1.7*  --   --  1.7* 1.7*   PHOSPHORUS mg/dL  --   --   --   --  3.6  --      Results from last 7 days   Lab Units  10/13/24  0534 10/12/24  0449 10/11/24  0521 10/11/24  0447 10/10/24  2128   AST U/L 484* 530*  --  561* 599*   ALT U/L 247* 280*  --  303* 330*   ALK PHOS U/L 366* 518*  --  491* 538*   TOTAL PROTEIN g/dL 3.7* 3.4*  --  3.5* 3.9*   ALBUMIN g/dL 2.6* 2.3*  --  2.0* 2.4*   TOTAL BILIRUBIN mg/dL 2.35* 2.06*  --  2.05* 2.39*   BILIRUBIN DIRECT mg/dL  --   --   --   --  1.13*   AMMONIA umol/L 21  --  67  --   --      Results from last 7 days   Lab Units 10/13/24  0531   POC GLUCOSE mg/dl 86     Results from last 7 days   Lab Units 10/13/24  0534 10/12/24  0449 10/11/24  2130 10/11/24  1438 10/11/24  0447 10/10/24  2128   GLUCOSE RANDOM mg/dL 93 82 85 82 119 100     Results from last 7 days   Lab Units 10/12/24  1754 10/12/24  0449   OSMOLALITY, SERUM mmol/* 263*      Results from last 7 days   Lab Units 10/13/24  0544   PH ART  7.498*   PCO2 ART mm Hg 32.5*   PO2 ART mm Hg 63.2*   HCO3 ART mmol/L 24.7   BASE EXC ART mmol/L 1.5   O2 CONTENT ART mL/dL 9.9*   O2 HGB, ARTERIAL % 89.4*   ABG SOURCE  Radial, Left             Results from last 7 days   Lab Units 10/11/24  0447   CK TOTAL U/L 87     Results from last 7 days   Lab Units 10/10/24  2128   HS TNI 0HR ng/L 23         Results from last 7 days   Lab Units 10/13/24  0534 10/12/24  0449 10/11/24  0447 10/10/24  2128   PROTIME seconds 25.6*  --  21.5* 20.6*   INR  2.32*  --  1.84* 1.74*   PTT seconds  --  63*  --  54*     Results from last 7 days   Lab Units 10/12/24  0449 10/10/24  2128   TSH 3RD GENERATON uIU/mL 23.267* 36.083*         Results from last 7 days   Lab Units 10/13/24  0534 10/12/24  2337 10/12/24  2115 10/11/24  1749 10/11/24  1438 10/10/24  2358 10/10/24  2128   LACTIC ACID mmol/L 3.3* 3.8* 3.9* 4.1* 4.0* 3.5* 3.4*             Results from last 7 days   Lab Units 10/12/24  2115 10/10/24  2128   BNP pg/mL 46 30     Results from last 7 days   Lab Units 10/11/24  0447   FERRITIN ng/mL >7,500*   IRON SATURATION % 55*   IRON ug/dL 66   TIBC ug/dL 121*              Results from last 7 days   Lab Units 10/11/24  0447   HEP B S AG  Non-reactive   HEP C AB  Non-reactive   HEP B C IGM  Non-reactive     Results from last 7 days   Lab Units 10/10/24  2128   LIPASE u/L 53             Results from last 7 days   Lab Units 10/12/24  1754 10/12/24  0449 10/11/24  0008   OSMOLALITY, SERUM mmol/* 263*  --    OSMO UR mmol/KG  --   --  743     Results from last 7 days   Lab Units 10/11/24  1523 10/11/24  0008   CLARITY UA   --  Clear   COLOR UA   --  Diane   SPEC GRAV UA   --  1.020   PH UA   --  6.5   GLUCOSE UA mg/dl  --  Negative   KETONES UA mg/dl  --  Negative   BLOOD UA   --  Small*   PROTEIN UA mg/dl  --  Trace*   NITRITE UA   --  Negative   BILIRUBIN UA   --  Moderate*   UROBILINOGEN UA E.U./dl  --  2.0*   LEUKOCYTES UA   --  Negative   WBC UA /hpf  --  0-1   RBC UA /hpf  --  4-10*   BACTERIA UA /hpf  --  Occasional   EPITHELIAL CELLS WET PREP /hpf  --  Occasional   SODIUM UR  70 13                 Results from last 7 days   Lab Units 10/11/24  0447   ACETAMINOPHEN LVL ug/mL <2*                 Results from last 7 days   Lab Units 10/11/24  1522 10/11/24  0013 10/11/24  0008   BLOOD CULTURE   --  No Growth at 24 hrs. No Growth at 24 hrs.   URINE CULTURE  No Growth <1000 cfu/mL  --   --                    Network Utilization Review Department  ATTENTION: Please call with any questions or concerns to [phone] and carefully listen to the prompts so that you are directed to the right person. All voicemails are confidential.   For Discharge needs, contact Care Management DC Support Team at [phone] opt. 2  Send all requests for admission clinical reviews, approved or denied determinations and any other requests to dedicated fax number below belonging to the campus where the patient is receiving treatment. List of dedicated fax numbers for the Facilities:  FACILITY NAME UR FAX NUMBER   ADMISSION DENIALS (Administrative/Medical Necessity) [phone]   DISCHARGE  SUPPORT TEAM (NETWORK) [phone]   PARENT CHILD HEALTH (Maternity/NICU/Pediatrics) [phone]   [facility] [phone]   [facility] [phone]   [facility] [phone]   [facility] [phone]   [facility] [phone]   [facility] [phone]   [facility] [phone]   [facility] [phone]   [facility] [phone]   [facility] [phone]   [facility] [phone]   [facility] [phone]

## 2024-10-13 NOTE — RESPIRATORY THERAPY NOTE
RT Ventilator Management Note  Clayton Duval 44 y.o. male MRN: [id number]  Unit/Bed#: ICU 05 Encounter: [id number]      Daily Screen         10/13/2024  1208             Patient safety screen outcome:: Failed    Not Ready for Weaning due to:: Recieving paralytics    Spont breathing trial outcome:: Failed              Physical Exam:   Assessment Type: Assess only  General Appearance: (P) Sedated  Respiratory Pattern: (P) Assisted  Chest Assessment: (P) Chest expansion symmetrical  Bilateral Breath Sounds: (P) Diminished, Clear      Resp Comments: (P) Pt from OR s/p craniectomy. Pt has  non traumatic acute left subdural hematoma. Pt has no pulmonary hx. BS decreased clear. Will continue to monitor pt.

## 2024-10-13 NOTE — RAPID RESPONSE
Rapid Response Note  Claytno Duval 44 y.o. male MRN: [id number]  Unit/Bed#: -01 Encounter: [id number]    Rapid Response Notification(s):   Response called date/time:  10/13/2024 5:29 AM  Response team arrival date/time:  10/13/2024 5:31 AM  Response end date/time:  10/13/2024 6:27 AM  [facility]  Rapid response location:  [facility] unit  Primary reason for rapid response call:  Acute change in neuro status    Rapid Response Intervention(s):   Airway:  Endotracheal intubation  Breathing:  None  Circulation:  None  Fluids administered:  None  Medications administered:  Etomidate and other (comment) (succyncholine)       Assessment:   CT head - Left subdural hemorrhage with midline shift  Suspected liver, spleen mets and lytic bone lesions in the spine     Plan:   Call placed to PACS for transfer.  Keep SBP < 160, DBP < 85  Load with keppra 1 g.  Propofol for sedation     Rapid Response Outcome:   Transfer:  Transfer to ICU  Primary service notified of transfer: Yes    Code Status: Level 1 (Full Code)      Family notified: Yes, Name of Family member contacted Liane Duval spouse.  Message left for her by Cole Kaye PA-C.          Background/Situation:   Clayton Duval is a 44 y.o. male who was admitted on 10/11 for flu like symptoms.  He was found to have transaminitis. MRI was completed on 10/11 which is concerning metastatic disease.  His LKWT was 0300 this morning when nursing administered albumin.  He was awake and conversational with staff.  He c/o a headache around midnight and received toradol.  He later stated that the toradol helped.  When nursing staff entered the around 0525 this morning, he was unresponsive and the rapid response was called.     Review of Systems   Unable to perform ROS: Mental status change       Objective:   Vitals:    10/12/24 2337 10/13/24 0538 10/13/24 0546 10/13/24 0614   BP:  127/78     BP Location:       Pulse: (!) 115 82 94    Resp:       Temp:  (!) 101.1 °F (38.4 °C)      TempSrc: Oral      SpO2: (!) 89% 98% 97% 98%   Weight:       Height:         Physical Exam  Constitutional:       Appearance: He is ill-appearing.   Cardiovascular:      Rate and Rhythm: Normal rate and regular rhythm.   Pulmonary:      Effort: Pulmonary effort is normal.      Breath sounds: Normal breath sounds.      Comments: After CT head complete, his breathing pattern changed requiring intubation.  Abdominal:      General: Abdomen is flat.      Palpations: Abdomen is soft.   Skin:     Coloration: Skin is pale.   Neurological:      Mental Status: He is lethargic.      GCS: GCS eye subscore is 1. GCS verbal subscore is 1. GCS motor subscore is 5.      Comments: GCS 7, localizes to painful stimuli with only right hand.  Left pupil dilated, right pupil pinpoint.  No motion noted to left side extremities.

## 2024-10-13 NOTE — ASSESSMENT & PLAN NOTE
Likely due to decreased clearance  Lactic acid downtrending throughout admission  No obvious source of infection, UA and UCx negative and CT c/a/p without source  Bcx negative  Continue to trend to clearance   No

## 2024-10-13 NOTE — ANESTHESIA PROCEDURE NOTES
Insert Complex Venous Access Line    Date/Time: 10/13/2024 9:06 AM    Performed by: Ishan Baez MD  Authorized by: Ishan Baez MD    Patient location:  OR  Consent:     Consent obtained:  Verbal    Consent given by:  Patient    Risks discussed:  Incorrect placement, bleeding and infection  Pre-procedure details:     Hand hygiene: Hand hygiene performed prior to insertion      Sterile barrier technique: All elements of maximal sterile technique followed      Skin preparation:  2% chlorhexidine    Skin preparation agent: Skin preparation agent completely dried prior to procedure    Procedure details:     Complex Venous Access Line Type: Midline      Complex Venous Access Line Indications: no peripheral vascular access      Orientation:  Left    Location:  Basilic    Catheter size:  18 gauge    Total catheter length (cm):  10    Approach: percutaneous technique used      Ultrasound image availability:  Still images obtained    Sterile ultrasound techniques: Sterile gel and sterile probe covers were used      Number of attempts:  1    Successful placement: yes      Landmarks identified: yes    Anesthesia (see MAR for exact dosages):     Anesthesia method:  Local infiltration    Local anesthetic:  Lidocaine 1% w/o epi  Post-procedure details:     Post-procedure:  Dressing applied    Assessment:  Blood return through all ports and free fluid flow    Post-procedure complications: none      Patient tolerance of procedure:  Tolerated well, no immediate complications

## 2024-10-13 NOTE — ASSESSMENT & PLAN NOTE
Hepatocellular pattern evidenced by   ALK Phos 366 Tbili 2.35 IN R 2.32 total protein 3.7  Hepatitis panel negative, AFP normal, APAP negative, SPEP pending   Suspected secondary to hepatic malignancy   History VILLALOBOS, gastric bypass surgery, cholecystectomy   MRI Abdomen: Findings concerning for metastatic disease. Enhancing osseous lesions throughout the majority of the visualized spine and bony pelvis. Markedly enlarged spleen much of which is occupied by large confluent hypoenhancing lesions suspected to represent metastases. Hepatomegaly. Multifocal hepatic lesions which correlate to areas of hypoenhancement on CT are also suspected to represent metastasis in the given setting. Anasarca and moderate volume ascites.  Continue to trend CMP

## 2024-10-14 LAB
ALBUMIN UR ELPH-MCNC: 100 %
ALPHA1 GLOB MFR UR ELPH: 0 %
ALPHA2 GLOB MFR UR ELPH: 0 %
ATRIAL RATE: 112 BPM
ATRIAL RATE: 91 BPM
B-GLOBULIN MFR UR ELPH: 0 %
GAMMA GLOB MFR UR ELPH: 0 %
P AXIS: 21 DEGREES
P AXIS: 28 DEGREES
PR INTERVAL: 152 MS
PR INTERVAL: 164 MS
PROT PATTERN UR ELPH-IMP: NORMAL
PROT UR-MCNC: 11.6 MG/DL
QRS AXIS: -3 DEGREES
QRS AXIS: 1 DEGREES
QRSD INTERVAL: 102 MS
QRSD INTERVAL: 108 MS
QT INTERVAL: 340 MS
QT INTERVAL: 380 MS
QTC INTERVAL: 464 MS
QTC INTERVAL: 467 MS
T WAVE AXIS: 42 DEGREES
T WAVE AXIS: 46 DEGREES
VENTRICULAR RATE: 112 BPM
VENTRICULAR RATE: 91 BPM

## 2024-10-14 PROCEDURE — 84166 PROTEIN E-PHORESIS/URINE/CSF: CPT | Performed by: STUDENT IN AN ORGANIZED HEALTH CARE EDUCATION/TRAINING PROGRAM

## 2024-10-14 NOTE — WOUND OSTOMY CARE
Consult Note - Wound   Clayton Duval 44 y.o. male MRN: [id number]  Unit/Bed#: ICU 05 Encounter: [id number]      History and Present Illness: Patient is seen for wound care consult today . He is a 44 year old male that is a transfer from the [facility] to [place] yesterday .PMH of super morbid obesity status post gastric sleeve and biliary duodenal switch procedure , VILLALOBOS, hypothyroidism , and anemia . Noted on CT scan to have multiple lesions in the liver , marked splenomegaly with splenic lesions and pelvic bone lytic lesions and lytic lesions in the lumbar spine. Patient was found to be unresponsive and noted left subdural hematoma with brain compression S/P  hemicraniectomy . He is intubated and dependent for all care Max A of 3 for turning in the bed. Incontinent of a large soft stool and ximena care provided . Barclay in place . Patient is on the low air loss mattress         Assessment Findings:     Bilateral heels dry and intact   Lower sacral and perineum area - POA evolving DTI that has dark purple maroon in color and noted full thickness opening above the ximena rectal area . Due to incontinence applied triad paste .       Skin Care Plan:  1-Sacral /and lower perineum area - cleanse with soap and water then pat dry apply triad paste tid and prn   2-Turn/reposition q2h for pressure re-distribution on skin.  3-EHOB  cushion when out of bed  4-Elevate heels to offload pressure  5-Moisturize skin daily with skin nourishing cream.  6. [facility] low air loss mattress   7. ATR turning system   8. Silicone foam to bilateral heels summer with a P and date peel and check skin integrity every shift change every 3 days   9. Prevalon boots                   Wound 10/13/24 Head Left (Active)   Wound Description Clean;Dry 10/14/24 1200   Ximena-wound Assessment Clean;Dry;Intact 10/14/24 1200   Drainage Amount None 10/14/24 1200   Dressing Open to air 10/14/24 1200       Wound 10/13/24 Pressure Injury Perineum (Active)    Wound Image   10/14/24 1313   Wound Description Beefy red;Slough;Non-blanchable erythema 10/14/24 1313   Pressure Injury Stage DTPI 10/14/24 1313   Ximena-wound Assessment Fragile 10/14/24 1313   Wound Length (cm) 10 cm 10/14/24 1313   Wound Width (cm) 6 cm 10/14/24 1313   Wound Depth (cm) 0.5 cm 10/14/24 1313   Wound Surface Area (cm^2) 60 cm^2 10/14/24 1313   Wound Volume (cm^3) 30 cm^3 10/14/24 1313   Calculated Wound Volume (cm^3) 30 cm^3 10/14/24 1313   Wound Site Closure ISAIAH 10/14/24 1313   Drainage Amount Small 10/14/24 1313   Drainage Description Serosanguineous 10/14/24 1313   Non-staged Wound Description Full thickness 10/14/24 1313   Treatments Cleansed 10/14/24 1313   Dressing Moisture barrier 10/14/24 1313   Wound packed? No 10/14/24 1313   Packing- # removed 0 10/14/24 1313   Packing- # inserted 0 10/14/24 1313   Dressing Changed New 10/14/24 1313   Patient Tolerance Tolerated well 10/14/24 1313   Dressing Status Clean;Dry;Intact 10/14/24 1313       POA evolving DTI     Evolving DTI on admission       Wound care will follow weekly secure chat with questions r concerns     Radha Kirkland RN BSN CWOCN

## 2024-10-14 NOTE — CONSULTS
Consultation Note: Medical Oncology:   Clayton Duval 44 y.o. male MRN: [id number]  Unit/Bed#: ICU 05 Encounter: [id number]    Assessment and Plan:  Acute Subdural Hemorrhage with Midline Shift Status-Post Left Hemicraniectomy:  Presumed Metastatic Disease of Unknown Primary:  Multifocal Hepatic Lesions:  Splenomegaly with Large Confluent Enhancing Lesions:  Anasarca with Moderate-Volume Ascites, and Small Bilateral Pleural Effusions:  Osseous Lesions Throughout the Spine and Bony Pelvis:  Sepsis-Like Syndrome of Unclear Source:  Acute-on-Chronic Normocytic Anemia:  Mild Thrombocytopenia:  Hepatocellular-Predominant Transaminitis with Hyperbilirubinemia:  Mild Coagulopathy Likely Secondary to Infiltrative Hepatic Disease:  Patient is a 44-year-old male, with an established history of non-alcoholic steatohepatitis; who presented to [facility] on October 11th, 2024, as a referral for hepatocellular-predominant transaminitis with hyperbilirubinemia identified on outpatient laboratory-studies obtained for work-up of an Influenza-like syndrome. Inpatient work-up has revealed evidence of diffuse metastatic disease, including enhancing osseous lesions throughout the majority of the visualized spine and bony pelvis, splenomegaly with large confluent hypoenhancing lesions, and hepatomegaly with multifocal lesions. Differential diagnosis of the above-mentioned includes but is not limited to: Metastatic melanoma, versus testicular cancer (e.g. Choriocarcinoma) versus lung cancer, versus lymphoma. Will need tissue-sampling for definitive-diagnosis and further recommendations. Placed consultation to Interventional Radiology, accordingly. In the interim, ordered nutritional-parameters to evaluate for deficiencies in the setting of prior gastric-bypass. Contacted patient's spouse via phone (Liane Duval at [phone]) to update her regarding the above-mentioned. Patient's spouse  states that she and her  were previously agreeable to biopsy, and she is still amenable to the same. Addressed all questions and concerns on completion of our encounter.    Summary of Recommendations:  Anemia work-up: Ordered Reticulocyte Count, Vitamin B12, and Folate Levels.  For presumed metastatic disease of unknown primary:  Ordered Tumor-Markers, as follows: CEA, B-HCG, LDH, and CA 19-9.  Recommend tissue-sampling via Interventional Radiology (Ordered).  Further recommendations are pending definitive-diagnosis.  Will coordinate for close interval follow-up with Medical Oncology, on hospital discharge.    Subjective:  History of Present Illness: Clayton Duval is a 44-year-old male, with an established history of hypothyroidism, non-alcoholic steatohepatitis, and remote bariatric-surgery ([facility] in 2012); who presented to [facility] on October 11th, 2024, as a referral for hepatocellular-predominant transaminitis with hyperbilirubinemia identified on outpatient laboratory-studies obtained for work-up of an Influenza-like syndrome. Historical information was obtained by patient, and prior documentation.     In the Emergency Department, laboratory-studies were obtained, and remarkable for normocytic-anemia near baseline (Hemoglobin: 10.8 MCV: 89), hyponatremia (Sodium: 128), and hepatocellular-predominant transaminitis with hyperbilirubinemia (AST: 599 ALT: 330 ALP: 538 Total Bilirubin: 2.39 Direct Bilirubin: 1.13). Hepatitis Panel was non-reactive. Lactic Acid: 3.4 to 3.5. CT Chest, Abdomen, and Pelvis was remarkable for stable hepatosplenomegaly with ill-defined areas of hypoenhancement in the right lobe of the liver, as well as hypoenhancing partially-confluent lesions in the spleen concerning for malignancy. Imaging also demonstrated small bilateral pleural effusions, moderate ascites, and anasarca were noted, as well. Right Upper Quadrant Ultrasound with  Doppler showed patent majora hepatic-vessels with normal directional-low. Subsequent MRI Abdomen with MRCP re-demonstrated findings concerning for metastatic disease, including enhancing osseous lesions throughout the majority of the visualized spine and bony pelvis, markedly enlarged spleen occupied by large confluent hypoenhancing lesions (Likely metastases), hepatomegaly with multifocal hepatic lesions (Also, likely metastasis), and anasarca with moderate-volume ascites. Patient was admitted to Internal Medicine for further evaluation and management. Of note, Tumor-Markers were obtained, and are as follows: AFP: 1.20.    Of note, Rapid Response was called on October 13th, 2024, after patient became unresponsive. Patient underwent endotracheal-intubation and was transferred to the Medical Intensive Care Unit. CT Head was obtained and demonstrated a mixed-density left convexity subdural hemorrhage with rightward midline shift. Trace subdural hemorrhage along the left tentorium was also noted. Mass-effect with low-lying cerebellar tonsils, effacement of the suprasellar cistern, and partial-effacement of the quadrigeminal plate cistern noted, as well. Given the above-mentioned, Neurosurgery performed left-sided hemicraniectomy for evacuation of the subdural hematoma in the setting of intracranial hypertension with herniation-syndrome on October 13th, 2024. Patient remains intubated and mechanically-ventilated at the present time. For sepsis-like syndrome without clear source (Tm: 101.5 F), patient is requiring vasopressor-support with Norepinephrine, and is on empiric-antibiotic therapy with Vancomycin and Ceftriaxone. Medical Oncology was consulted for further evaluation of presumed metastatic disease.    Review of Systems:  All systems reviewed and negative except otherwise listed in the History of Present Illness.    Historical Information:  No past medical history on file.  Past Surgical History:   Procedure  Laterality Date    GASTRIC BYPASS  2012    GASTRIC BYPASS       Social History:  Social History     Substance and Sexual Activity   Alcohol Use Never     Social History     Substance and Sexual Activity   Drug Use Never     Social History     Tobacco Use   Smoking Status Never   Smokeless Tobacco Current     E-Cigarette/Vaping    E-Cigarette Use Never User      E-Cigarette/Vaping Substances     Family History: Non-Contributory.    Medications and Allergies: All Medications Reviewed.    Objective:  Vitals:    10/14/24 0700   BP: 121/72   Pulse: 58   Resp: 14   Temp: 97.5 °F (36.4 °C)   SpO2: 99%     Physical Exam:  General: Sedated, Intubated and Mechanically-Ventilated  HEENT: Atraumatic, and normocephalic; PERRLA; EOMI; Orogastric-Tube  Neck: Trachea midline; No neck masses, thyromegaly, or cervical lymphadenopathy present  Cardiovascular: Regular rate and rhythm; No murmurs, rubs, or gallops  Respiratory: Intubated and Mechanically-Ventilated with Superimposed Ventilator Sounds (Otherwise, Clear to Auscultation)  Abdomen: Soft/Non-Distended, Obese Abdomen - Difficult to Appreciate Organomegaly; Bowel sounds present  Extremities: No obvious deformities; 2+ Pitting Edema Bilaterally; Synchronous Compressive Devices in Place; Arterial Line (Left Upper Extremity)    WBC   Date Value Ref Range Status   10/14/2024 9.22 4.31 - 10.16 Thousand/uL Final     Comment:     This is an appended report.  These results have been appended to a previously preliminary verified report.   10/13/2024 13.82 (H) 4.31 - 10.16 Thousand/uL Final   10/13/2024 13.97 (H) 4.31 - 10.16 Thousand/uL Final     Hemoglobin   Date Value Ref Range Status   10/14/2024 8.3 (L) 12.0 - 17.0 g/dL Final   10/14/2024 7.4 (L) 12.0 - 17.0 g/dL Final   10/14/2024 7.5 (L) 12.0 - 17.0 g/dL Final     Comment:     This is an appended report.  These results have been appended to a previously preliminary verified report.     Platelets   Date Value Ref Range Status    10/14/2024 116 (L) 149 - 390 Thousands/uL Final   10/13/2024 142 (L) 149 - 390 Thousands/uL Final   10/13/2024 149 149 - 390 Thousands/uL Final     MCV   Date Value Ref Range Status   10/14/2024 91 82 - 98 fL Final     Comment:     This is an appended report.  These results have been appended to a previously preliminary verified report.   10/13/2024 90 82 - 98 fL Final   10/13/2024 89 82 - 98 fL Final      Potassium   Date Value Ref Range Status   10/14/2024 4.4 3.5 - 5.3 mmol/L Final   10/13/2024 4.2 3.5 - 5.3 mmol/L Final   10/13/2024 4.1 3.5 - 5.3 mmol/L Final   10/01/2024 4.5 3.5 - 5.1 mmol/L Final   05/09/2024 4.5 3.5 - 5.1 mmol/L Final   03/25/2024 4.6 3.5 - 5.1 mmol/L Final     Chloride   Date Value Ref Range Status   10/14/2024 102 96 - 108 mmol/L Final   10/13/2024 102 96 - 108 mmol/L Final   10/13/2024 97 96 - 108 mmol/L Final   10/01/2024 95 (L) 98 - 107 mmol/L Final   05/09/2024 105 98 - 107 mmol/L Final   03/25/2024 100 98 - 107 mmol/L Final     Carbon Dioxide   Date Value Ref Range Status   10/01/2024 24 22 - 32 mmol/L Final   05/09/2024 26 22 - 32 mmol/L Final   03/25/2024 27 22 - 32 mmol/L Final     CO2   Date Value Ref Range Status   10/14/2024 23 21 - 32 mmol/L Final   10/13/2024 23 21 - 32 mmol/L Final   10/13/2024 25 21 - 32 mmol/L Final     CO2, i-STAT   Date Value Ref Range Status   10/13/2024 22 21 - 32 mmol/L Final   10/13/2024 24 21 - 32 mmol/L Final     BUN   Date Value Ref Range Status   10/14/2024 21 5 - 25 mg/dL Final   10/13/2024 21 5 - 25 mg/dL Final   10/13/2024 24 5 - 25 mg/dL Final   10/01/2024 18 6 - 20 mg/dL Final   05/09/2024 21 (H) 6 - 20 mg/dL Final   03/25/2024 20 6 - 20 mg/dL Final   10/11/2019 18 7 - 18 mg/dL Final     Creatinine   Date Value Ref Range Status   10/14/2024 0.60 0.60 - 1.30 mg/dL Final     Comment:     Standardized to IDMS reference method   10/13/2024 0.65 0.60 - 1.30 mg/dL Final     Comment:     Standardized to IDMS reference method   10/13/2024 0.86 0.60  - 1.30 mg/dL Final     Comment:     Standardized to IDMS reference method   10/01/2024 0.9 0.6 - 1.2 mg/dL Final   05/09/2024 1.0 0.6 - 1.2 mg/dL Final   03/25/2024 1.0 0.6 - 1.2 mg/dL Final     Glucose   Date Value Ref Range Status   10/14/2024 86 65 - 140 mg/dL Final     Comment:     If the patient is fasting, the ADA then defines impaired fasting glucose as > 100 mg/dL and diabetes as > or equal to 123 mg/dL.   10/13/2024 110 65 - 140 mg/dL Final     Comment:     If the patient is fasting, the ADA then defines impaired fasting glucose as > 100 mg/dL and diabetes as > or equal to 123 mg/dL.   10/13/2024 93 65 - 140 mg/dL Final     Comment:     If the patient is fasting, the ADA then defines impaired fasting glucose as > 100 mg/dL and diabetes as > or equal to 123 mg/dL.   10/01/2024 79 70 - 120 mg/dL Final   05/09/2024 105 70 - 120 mg/dL Final   03/25/2024 97 70 - 120 mg/dL Final     Calcium   Date Value Ref Range Status   10/14/2024 7.5 (L) 8.4 - 10.2 mg/dL Final   10/13/2024 8.5 8.4 - 10.2 mg/dL Final   10/13/2024 7.2 (L) 8.4 - 10.2 mg/dL Final   10/01/2024 7.5 (L) 8.4 - 10.2 mg/dL Final   05/09/2024 8.4 8.4 - 10.2 mg/dL Final   03/25/2024 8.5 8.4 - 10.2 mg/dL Final     Albumin   Date Value Ref Range Status   10/14/2024 2.3 (L) 3.5 - 5.0 g/dL Final   10/13/2024 2.8 (L) 3.5 - 5.0 g/dL Final   10/13/2024 2.6 (L) 3.5 - 5.0 g/dL Final   10/01/2024 2.7 (L) 3.8 - 5.0 g/dL Final   05/09/2024 3.4 (L) 3.8 - 5.0 g/dL Final   03/25/2024 3.5 (L) 3.8 - 5.0 g/dL Final     Total Bilirubin   Date Value Ref Range Status   10/14/2024 2.13 (H) 0.20 - 1.00 mg/dL Final     Comment:     Use of this assay is not recommended for patients undergoing treatment with eltrombopag due to the potential for falsely elevated results.  N-acetyl-p-benzoquinone imine (metabolite of Acetaminophen) will generate erroneously low results in samples for patients that have taken an overdose of Acetaminophen.   10/13/2024 2.45 (H) 0.20 - 1.00 mg/dL Final      Comment:     Use of this assay is not recommended for patients undergoing treatment with eltrombopag due to the potential for falsely elevated results.  N-acetyl-p-benzoquinone imine (metabolite of Acetaminophen) will generate erroneously low results in samples for patients that have taken an overdose of Acetaminophen.   10/13/2024 2.35 (H) 0.20 - 1.00 mg/dL Final     Comment:     Use of this assay is not recommended for patients undergoing treatment with eltrombopag due to the potential for falsely elevated results.  N-acetyl-p-benzoquinone imine (metabolite of Acetaminophen) will generate erroneously low results in samples for patients that have taken an overdose of Acetaminophen.   10/01/2024 2.0 (H) <=1.2 mg/dL Final   05/09/2024 1.2 <=1.2 mg/dL Final   03/12/2024 1.1 <=1.2 mg/dL Final     Alkaline Phosphatase   Date Value Ref Range Status   10/14/2024 223 (H) 34 - 104 U/L Final   10/13/2024 289 (H) 34 - 104 U/L Final   10/13/2024 366 (H) 34 - 104 U/L Final   10/01/2024 352 (H) 35 - 130 U/L Final   05/09/2024 148 (H) 35 - 130 U/L Final   03/12/2024 165 (H) 35 - 130 U/L Final     AST   Date Value Ref Range Status   10/14/2024 437 (H) 13 - 39 U/L Final   10/13/2024 392 (H) 13 - 39 U/L Final   10/13/2024 484 (H) 13 - 39 U/L Final   10/01/2024 385 (H) 10 - 50 U/L Final   05/09/2024 67 (H) 10 - 50 U/L Final   03/12/2024 60 (H) 10 - 50 U/L Final     ALT   Date Value Ref Range Status   10/14/2024 189 (H) 7 - 52 U/L Final     Comment:     Specimen collection should occur prior to Sulfasalazine administration due to the potential for falsely depressed results.    10/13/2024 189 (H) 7 - 52 U/L Final     Comment:     Specimen collection should occur prior to Sulfasalazine administration due to the potential for falsely depressed results.    10/13/2024 247 (H) 7 - 52 U/L Final     Comment:     Specimen collection should occur prior to Sulfasalazine administration due to the potential for falsely depressed results.   "  10/01/2024 226 (H) 10 - 50 U/L Final   05/09/2024 75 (H) 10 - 50 U/L Final   03/12/2024 46 10 - 50 U/L Final      LD   Date Value Ref Range Status   10/11/2024 868 (H) 140 - 271 U/L Final      TSH   Date Value Ref Range Status   05/09/2024 18.70 (H) 0.27 - 4.20 uIU/mL Final   03/25/2024 16.60 (H) 0.27 - 4.20 uIU/mL Final   03/12/2024 23.10 (H) 0.27 - 4.20 uIU/mL Final    No results found for: \"[id number]\"   Free T4   Date Value Ref Range Status   10/12/2024 0.68 0.61 - 1.12 ng/dL Final     Comment:     Specimens with biotin concentrations > 10 ng/mL can lead to significant (> 10%) positive bias in result.   10/10/2024 0.86 0.61 - 1.12 ng/dL Final     Comment:     Specimens with biotin concentrations > 10 ng/mL can lead to significant (> 10%) positive bias in result.   03/15/2024 0.59 (L) 0.61 - 1.12 ng/dL Final     Comment:     Specimens with biotin concentrations > 10 ng/mL can lead to significant (> 10%) positive bias in result.      Patient was seen and discussed with attending physician, Kem Patel M.D.    Estefani Monique D.O.  Hematology-Oncology Fellow (PGY-5)   "

## 2024-10-14 NOTE — PROGRESS NOTES
Progress Note - Critical Care/ICU   Name: Clayton Duval 44 y.o. male I MRN: [id number]  Unit/Bed#: ICU 05 I Date of Admission: 10/13/2024   Date of Service: 10/14/2024 I Hospital Day: 1       Assessment & Plan   Neuro:   Diagnosis: Subdural hematoma s/p left hemicraniectomy for evacuation of SDH , AMS  CT head - 10/13 0616 - preop: Mixed density left convexity subdural hemorrhage (1.2 cm thickness) with by 1.5 cm rightward midline shift. Trace subdural hemorrhage along the left tentorium. Mass effect with low-lying cerebellar tonsils, effacement of the suprasellar cistern, and partial effacement of the quadrigeminal plate cistern.  CT head - 10/13 - post op: New area of hypodensity within the left thalamus and basal ganglia compared to earlier day exam, suggestive of acute infarct. Expected postsurgical changes from left hemispheric craniectomy with decreased size of mixed density subdural hematoma, improved 4 mm left to right midline shift and improved effacement of the basilar cisterns. Improved mass effect on the ventricular system and dilatation of the right temporal horn of the lateral ventricle  Plan:   MRI brain w/o contrast  Continue monitoring neurological exam closely with frequent neuro checks, obtain stat CTH if any acute changes  Monitor for signs of ICP increase (bradycardia, HTN, changes in neuro exam, increased tone, pupillary changes)  Blood Pressure: SBP goal 110 - 140, cardene gtt or pressers/fluids as needed to keep within range.  AC/AP: Holding AP and AC at this time.   Imaging/Diagnostic Studies: MRI Brain w/o contrast  Labs Pending:  Seizure Prophylaxis: Keppra 1g load followed by 500mg BID for 7 days   Tight glycemic control, goal <180. Monitor temperature, tylenol PRN.  PT/OT/Speech/PMR consults when able  DVT PPx: SCDs and avoid chemical prophylaxis due to hemorrhage    CV:   Diagnosis: sinus bradycardia  Plan:   SBP goal 110 - 140, Cardene as needed  Continue to monitor on  Telemetry     Pulm:  Diagnosis: Mechanical ventillation  Plan: Vt - 500ml, peep 6, rate 14, FiO2 50%  Keep on spontaneous as tolerated during the day time, rest overnight     GI:   Diagnosis: Hepatosplenomegaly, hepatic lesions, splenic lesions, transaminitis  Hepatitis panel - negative  Plan:   GI consult  Oncology consult  IR consulted - plan for biopsy of a hepatic lesion vs iliac bone lesion     :   Diagnosis: Barclay in place  Plan:   Continue for acute illness  Monitor I&Os     F/E/N:   F: Albumin 25% for suspected intravascular hypovolemia  E: replenish as indicated for Magnesium >2, K >4  N: NPO     Heme/Onc:   Diagnosis: suspected metastatic cancer, hypocoagulability in the setting of hepatic malignancy, lytic bone lesions, hepatic coagulopathy  MRI Abdomen w/wo contrast - 10/11/24: Findings concerning for metastatic disease. Enhancing osseous lesions throughout the majority of the visualized spine and bony pelvis. Markedly enlarged spleen much of which is occupied by large confluent hypoenhancing lesions suspected to represent metastases. Hepatomegaly. Multifocal hepatic lesions which correlate to areas of hypoenhancement on CT are also suspected to represent metastasis in the given setting. Anasarca and moderate volume ascites  Fibrinogen level - 161  PT-INR - 21.6/1.87 - 10/13 - 0800 - repeat postop pending  Plan:   Factor 5 level  PT-INR  Kcentra 2000 units  DDAVP  Vitamin K  In the OR due to anemia and coagulopathy patient was given: 4 units of pRBCs, 4 units of FFP and 1 unit of platelets  IR consulted for biopsy  Oncology consult  Tumor markers ordered - CEA, B-HCG, LDH, CA 19-9  B12, Folate, reticulocyte count        Endo:   Diagnosis: Hypothyroidism, hyponatremia, hypothermia  Plan:   Continue home Levothyroxine 200mcg  Suspect hyponatremia of malignancy  Bare hugger     ID:   Diagnosis: Sepsis  Sepsis alert - hyperthermia, tachycardia, leukocytosis, bandemia  Plan:   Sepsis pathway  Blood cultures  pending  Sputum culture collected  4L IV Lactated ringers  Rocephin and Vancomycin initiated     MSK/Skin:   Diagnosis: Anasarca  Plan:   Monitor fluid status     Disposition: Critical care  ICU Core Measures     Vented Patient  VAP Bundle  VAP Bundle: ordered     A: Assess, Prevent, and Manage Pain Has pain been assessed? Yes  Need for changes to pain regimen? No   B: Both Spontaneous Awakening Trials (SATs) and Spontaneous Breathing Trials (SBTs) Plan to perform spontaneous awakening trial today? Yes   Plan to perform spontaneous breathing trial today? Yes   Obvious barriers to extubation? Yes   C: Choice of Sedation RASS Goal: -1 Drowsy  Need for changes to sedation or analgesia regimen? No   D: Delirium CAM-ICU: Unable to perform secondary to Acute cognitive dysfunction   E: Early Mobility  Plan for early mobility? Yes   F: Family Engagement Plan for family engagement today? Yes       Antibiotic Review: Awaiting culture results.     Review of Invasive Devices:    [place]clay Plan: Continue for accurate I/O monitoring for 48 hours and Voiding trial after improvement in ambulation     [place] Plan: Keep arterial line for hemodynamic monitoring    Prophylaxis:  VTE SDH   Stress Ulcer  covered byfamotidine (PEPCID) tablet 20 mg [[id number]]         24 Hour Events : No acute events overnight. Patient participating with exam this AM. Oncology and IR consulted. Planning biopsy mid-week.  Subjective   Review of Systems: See HPI for Review of Systems    Objective :                   Vitals I/O      Most Recent Min/Max in 24hrs   Temp 97.5 °F (36.4 °C) Temp  Min: 96.1 °F (35.6 °C)  Max: 101.5 °F (38.6 °C)   Pulse 58 Pulse  Min: 42  Max: 82   Resp 14 Resp  Min: 11  Max: 21   /72 BP  Min: 76/58  Max: 147/79   O2 Sat 99 % SpO2  Min: 97 %  Max: 100 %      Intake/Output Summary (Last 24 hours) at 10/14/2024 0709  Last data filed at 10/14/2024 0642  Gross per 24 hour   Intake 04441.04 ml   Output 3685 ml   Net 6698.04 ml        Diet NPO    Invasive Monitoring   Arterial Line  [place] /56  Arterial Line BP  Min: 78/60  Max: 128/62   MAP 70 mmHg  Arterial Line MAP (mmHg)  Min: 60 mmHg  Max: 88 mmHg           Physical Exam   Physical Exam  Vitals reviewed.   Eyes:      Pupils: Pupils are equal, round, and reactive to light.   Cardiovascular:      Rate and Rhythm: Normal rate and regular rhythm.      Pulses: Normal pulses.   Constitutional:       Appearance: He is well-developed.   Pulmonary:      Effort: Pulmonary effort is normal.      Breath sounds: Rhonchi present.   Neurological:      Comments: Following directions, thumbs up bilateral upper extremities.  Wiggles toes b/l lower extremities.  Sticks out tongue, lifts head off bed, opens eyes            Diagnostic Studies        Lab Results: I have reviewed the following results:     Medications:  Scheduled PRN   cefTRIAXone, 1,000 mg, Q24H  chlorhexidine, 15 mL, Q12H KAYLIE  Cholecalciferol, 5,000 Units, Daily  famotidine, 20 mg, BID  ferrous sulfate, 325 mg, Daily With Breakfast  levETIRAcetam, 500 mg, Q12H KAYLIE  levothyroxine, 200 mcg, Early Morning      acetaminophen, 1,000 mg, Q6H PRN  benzonatate, 100 mg, TID PRN  fentaNYL, 50 mcg, Q1H PRN  hydrALAZINE, 10 mg, Q4H PRN  ondansetron, 4 mg, Q8H PRN  polyethylene glycol, 17 g, Daily PRN       Continuous    niCARdipine, 1-15 mg/hr  norepinephrine, 1-30 mcg/min, Last Rate: 1 mcg/min (10/14/24 0551)  propofol, 5-50 mcg/kg/min, Last Rate: 20 mcg/kg/min (10/14/24 0601)         Labs:   CBC    Recent Labs     10/13/24  1252 10/13/24  1450 10/14/24  0435 10/14/24  0438   WBC 13.82*  --  9.22  --    HGB 8.2*   < > 7.5* 7.4*   HCT 24.8*   < > 22.6* 22.2*   *  --  116*  --     < > = values in this interval not displayed.     BMP    Recent Labs     10/13/24  1252 10/14/24  0435   SODIUM 131* 132*   K 4.2 4.4    102   CO2 23 23   AGAP 6 7   BUN 21 21   CREATININE 0.65 0.60   CALCIUM 8.5 7.5*       Coags    Recent Labs      10/13/24  1450 10/14/24  0435   INR 1.27* 1.35*        Additional Electrolytes  Recent Labs     10/13/24  0534 10/13/24  0853 10/13/24  1031 10/14/24  0435   MG 1.8*  --   --  1.7*   PHOS  --   --   --  2.9   CAIONIZED  --    < > 1.18 1.04*    < > = values in this interval not displayed.          Blood Gas    Recent Labs     10/13/24  1250   PHART 7.338*   YRE0GAG 39.9   PO2ART 124.1   HVE2MQT 20.9*   BEART -4.5   SOURCE Line, Arterial     Recent Labs     10/13/24  1250   SOURCE Line, Arterial    LFTs  Recent Labs     10/13/24  1252 10/14/24  0435   * 189*   * 437*   ALKPHOS 289* 223*   ALB 2.8* 2.3*   TBILI 2.45* 2.13*       Infectious  Recent Labs     10/14/24  0121   PROCALCITONI 1.14*     Glucose  Recent Labs     10/13/24  0534 10/13/24  1252 10/14/24  0435   GLUC 93 110 86

## 2024-10-14 NOTE — CASE MANAGEMENT
Case Management Assessment    Patient name Clayton Duval  Location ICU 05/ICU 05 MRN [id number]  : 1980 Date 10/14/2024       Current Admission Date: 10/13/2024  Current Admission Diagnosis:Non-traumatic acute L subdural hematoma (HCC)   Patient Active Problem List    Diagnosis Date Noted Date Diagnosed    Non-traumatic acute L subdural hematoma (HCC) 10/13/2024     Acute hypoxic respiratory failure (HCC) 10/13/2024     HTN (hypertension) 10/13/2024     Hyponatremia 10/11/2024     Hypomagnesemia 10/11/2024     Hypothyroidism (acquired) 10/11/2024     Elevation of levels of liver transaminase levels 10/11/2024     Malnutrition (HCC) 10/11/2024     Lactic acidosis 10/11/2024     Abnormal CT scan, chest 10/11/2024     Moderate protein-calorie malnutrition (HCC) 10/11/2024       LOS (days): 1  Geometric Mean LOS (GMLOS) (days):   Days to GMLOS:     OBJECTIVE:  Risk of Unplanned Readmission Score: 19.9     Current admission status: Inpatient    Preferred Pharmacy:   [id number] - [place], [place] - [address]  [address] [place] [address]  Phone: [phone] Fax: [phone]    Primary Care Provider: Marcio Ann DO    Primary Insurance: BLUE CROSS    ASSESSMENT:  Active Health Care Proxies    There are no active Health Care Proxies on file.    Advance Directives  Does patient have a Health Care POA?: No  Does patient have Advance Directives?: No  Primary Contact: pt's wife Liane Duval / phone# [phone]    Patient Information  Admitted from:: Home  Mental Status: Sedated, Intubated  Assessment information provided by:: Spouse  Primary Caregiver: Self  Caregiver's Name::  (pt's wife)  Support Systems: Spouse/significant other  County of Residence: [facility]  What city do you live in?: [place], [place]  Home entry access options. Select all that apply.: Stairs  Number of steps to enter home.: 3  Type of Current Residence: [address] [place] home  Upon entering residence,  is there a bedroom on the main floor (no further steps)?: No  A bedroom is located on the following floor levels of residence (select all that apply):: 2nd Floor  Upon entering residence, is there a bathroom on the main floor (no further steps)?: Yes  Number of steps to 2nd floor from main floor: One Flight  Living Arrangements: Lives w/ Spouse/significant other  Is patient a ?: No    Activities of Daily Living Prior to Admission  Functional Status: Independent  Completes ADLs independently?: Yes  Ambulates independently?: Yes  Does patient use assisted devices?: No  Does patient currently own DME?: No  Does the patient have a history of Short-Term Rehab?: No  Does patient have a history of HHC?: No    Patient Information Continued  Income Source: Employed  Does patient have prescription coverage?: Yes  Does patient receive dialysis treatments?: No  Does patient have a history of substance abuse?: No  Does patient have a history of Mental Health Diagnosis?: No    Means of Transportation  Means of Transport to hospitals:: Drives Self    Social Determinants of Health (SDOH)      Flowsheet Row Most Recent Value   Housing Stability    In the last 12 months, was there a time when you were not able to pay the mortgage or rent on time? N   In the past 12 months, how many times have you moved where you were living? 0   At any time in the past 12 months, were you homeless or living in a shelter (including now)? N   Transportation Needs    In the past 12 months, has lack of transportation kept you from medical appointments or from getting medications? no   In the past 12 months, has lack of transportation kept you from meetings, work, or from getting things needed for daily living? No   Food Insecurity    Within the past 12 months, you worried that your food would run out before you got the money to buy more. Never true   Within the past 12 months, the food you bought just didn't last and you didn't have money to get more.  Never true   Utilities    In the past 12 months has the electric, gas, oil, or water company threatened to shut off services in your home? No     Additional Comments: CM reviewed d/c planning process including the following: identifying help at home, patient preference for d/c planning needs, availability of treatment team to discuss questions or concerns patient and/or family may have regarding understanding medications and recognizing signs and symptoms once discharged. CM also encouraged patient to follow up with all recommended appointments after discharge. Patient advised of importance for patient and family to participate in managing patient’s medical well being. Patient/caregiver received discharge checklist. Content reviewed. Patient/caregiver encouraged to participate in discharge plan of care prior to discharge home.

## 2024-10-14 NOTE — SEPSIS NOTE
"  Sepsis Note   Clayton Duval 44 y.o. male MRN: [id number]  Unit/Bed#: ICU 05 Encounter: [id number]       Initial Sepsis Screening       Row Name 10/14/24 0054                Is the patient's history suggestive of a new or worsening infection? Yes (Proceed)  -TB        Suspected source of infection pneumonia  -TB        Indicate SIRS criteria Hyperthemia > 38.3C (100.9F) OR Hypothermia <36C (96.8F);Tachycardia > 90 bpm;Leukocytosis (WBC > 78712 IJL) OR Leukopenia (WBC <4000 IJL) OR Bandemia (WBC >10% bands)  -TB        Are two or more of the above signs & symptoms of infection both present and new to the patient? Yes (Proceed)  -TB        Assess for evidence of organ dysfunction: Are any of the below criteria present within 6 hours of suspected infection and SIRS criteria that are NOT considered to be chronic conditions? SBP < 90;MAP < 65;New need for invasive/non-invasive ventilation  -TB        Date of presentation of severe sepsis 10/15/24  -TB        Time of presentation of severe sepsis 0054  -TB        Date of presentation of septic shock 10/14/24  -TB        Time of presentation of septic shock 0055  -TB        Fluid Resuscitation: 30 ml/kg IV fluid bolus will be given based on actual body weight  -TB        Is the patient is persistently hypotensive in the hour after fluid bolus administration? If yes, patient meets criteria for vasopressor use. YES  -TB        Sepsis Note: Click \"NEXT\" below (NOT \"close\") to generate sepsis note based on above information. YES (proceed by clicking \"NEXT\")  -TB                  User Key  (r) = Recorded By, (t) = Taken By, (c) = Cosigned By      Initials Name Provider Type    TB Igor Fowler PA-C Physician Assistant                        There is no height or weight on file to calculate BMI.  Wt Readings from Last 1 Encounters:   10/11/24 120 kg (264 lb 12.8 oz)        Ideal body weight: 79.9 kg (176 lb 2.4 oz)  Adjusted ideal body weight: 96 kg (211 lb 9.7 " oz)

## 2024-10-14 NOTE — PROGRESS NOTES
Progress Note - Neurosurgery   Name: Clayton Duval 44 y.o. male I MRN: [id number]  Unit/Bed#: ICU 05 I Date of Admission: 10/13/2024   Date of Service: 10/14/2024 I Hospital Day: 1    Assessment & Plan  Non-traumatic acute L subdural hematoma (HCC)  POD#1 left [facility] for evacuation of SDH (Dr. Dover, 10/13)  Found unresponsive in hospital 10/13.  Recently w/ c/o generalized fatigue/SOB x several weeks - [place] [facility] [place]. Noted on workup with lesions to liver, spleen, pelvis and lumbar spine concerning for metastatic disease.  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 Expected postsurgical changes from left hemispheric craniectomy with decreased size of mixed density subdural hematoma, improved 4 mm left to right midline shift and improved effacement of the basilar cisterns. Improved mass effect on the ventricular system and dilatation of the right temporal horn of the lateral ventricle as detailed above.  MRI brain wo, 10/13/1024: Multifocal early ischemia including right anterior thalamus, anterior occipital lobes left greater than right. On the left this tracks into the posterior medial temporal lobe. There is also a punctate focus of cortical ischemia within the posterior parafalcine frontal lobe. Cortical petechial hemorrhage noted within the central aspect of the right thalamus and left anterior occipital lobe. No sofía hemorrhagic transformation. Evaluation of the cervical and intracranial vasculature is recommended. Patient is status post left hemicraniectomy for surgical decompression of acute subdural hemorrhage. Mixed signal subdural hemorrhage, fluid and air remains within the periphery of the left hemisphere with mild mass effect and 4 mm of left to right shift. There is a very small subdural  hemorrhage noted within the right middle cranial fossa extending posteriorly lateral to the occipital lobe, less than 2 mm in thickness. Focal intraventricular hemorrhage within the occipital horn of the right greater than left. There is increased T2 and FLAIR signal change surrounding the occipital horn of the lateral ventricle suggesting focal transependymal flow of CSF.    Plan:  Continue to monitor neuro exam closely.  STAT CT head with decline in GCS > 2 pts in 1 hour.  Concern for infarcts on MRI brain - recommend neurology consultation.  Hem/onc consulted - ongoing workup for unknown primary.  2 SAIRA drains:  Left lateral #1: 50 mL since OR  Left lateral #2: 20 mL since OR  Wean to extubate per CCM.  Maintain plts > 100, s/p transfusion 1 unit plts intrapop, RBCs, PCC.  Mobilize with PT/OT.  DVT ppx: SCDs, continue to hold any pharmacologic DVT ppx.    Neurosurgery will continue to follow. Call with questions.      Neurosurgery service will follow.  Please contact the SecureChat role for the Neurosurgery service with any questions/concerns.    Subjective   Patient remains intubated, sedation has been off. He is following commands in all 4 extremities, seems more weak on left.   PERRL 3 mm bilaterally.  ETT in place.  L craniectomy incision clean and dry, dressing in place.  2 SAIRA drains.  Flap is full but soft.    Objective :  Temp:  [96.1 °F (35.6 °C)-101.5 °F (38.6 °C)] 98.6 °F (37 °C)  HR:  [42-82] 68  BP: ()/(58-90) 139/79  Resp:  [11-21] 14  SpO2:  [97 %-100 %] 100 %  O2 Device: Ventilator    I/O         10/12 0701  10/13 0700 10/13 0701  10/14 0700 10/14 0701  10/15 0700    P.O.  0 0    I.V. (mL/kg)  2815 (26.1) 158.7 (1.5)    Blood  2600     NG/GT  165 60    IV Piggyback  4803     Total Intake(mL/kg)  44210 (96.1) 218.7 (2)    Urine (mL/kg/hr)  3065 180 (0.4)    Emesis/NG output  50 0    Drains  70 20    Stool   0    Blood  500     Total Output  3685 200    Net  +6698 +18.7           Unmeasured  Stool Occurrence   0 x          Physical Exam  Constitutional:       Appearance: He is ill-appearing.      Neurological Exam  Mental Status  Arousable to tactile stimuli.    Motor    Left side weakness.        Lab Results: I have reviewed the following results:  Recent Labs     10/12/24  0449 10/12/24  1327 10/12/24  2115 10/12/24  2337 10/14/24  0435 10/14/24  0438 10/14/24  0734   WBC 5.57  --   --    < > 9.22  --   --    HGB 8.5*   < > 8.2*   < > 7.5*   < > 8.3*   HCT 26.2*   < > 25.6*   < > 22.6*   < > 25.1*     --   --    < > 116*  --   --    SODIUM 126*  --   --    < > 132*  --   --    K 4.2  --   --    < > 4.4  --   --    CL 96  --   --    < > 102  --   --    CO2 22  --   --    < > 23  --   --    BUN 25  --   --    < > 21  --   --    CREATININE 0.84  --   --    < > 0.60  --   --    GLUC 82  --   --    < > 86  --   --    CAIONIZED  --   --   --    < > 1.04*  --   --    MG 1.7*  --   --    < > 1.7*  --   --    PHOS  --   --   --   --  2.9  --   --    *  --   --    < > 437*  --   --    *  --   --    < > 189*  --   --    ALB 2.3*  --   --    < > 2.3*  --   --    TBILI 2.06*  --   --    < > 2.13*  --   --    ALKPHOS 518*  --   --    < > 223*  --   --    PTT 63*  --   --   --   --   --   --    INR  --   --   --    < > 1.35*  --   --    BNP  --   --  46  --   --   --   --    LACTICACID  --   --  3.9*   < > 3.5*  --   --     < > = values in this interval not displayed.       Imaging Results Review: I personally reviewed the following image studies in PACS and associated radiology reports: CT head and MRI brain. My interpretation of the radiology images/reports is: as above..  Other Study Results Review: No additional pertinent studies reviewed.    VTE Pharmacologic Prophylaxis: Sequential compression device (Venodyne)

## 2024-10-14 NOTE — UTILIZATION REVIEW
NOTIFICATION OF ADMISSION DISCHARGE   This is a Notification of Discharge from [facility]. Please be advised that this patient has been discharge from our facility. Below you will find the admission and discharge date and time including the patient’s disposition.   UTILIZATION REVIEW CONTACT:  Lisa Leach  Utilization   Network Utilization Review Department  Phone: [phone] x carefully listen to the prompts. All voicemails are confidential.  Email: [email or web address]     ADMISSION INFORMATION  PRESENTATION DATE: 10/10/2024  9:14 PM  OBERVATION ADMISSION DATE: 10/11/2024 0207  INPATIENT ADMISSION DATE: 10/11/24 11:10 AM   DISCHARGE DATE: 10/13/2024  7:48 AM   DISPOSITION:[facility] Utilization Review Department  ATTENTION: Please call with any questions or concerns to [phone] and carefully listen to the prompts so that you are directed to the right person. All voicemails are confidential.   For Discharge needs, contact Care Management DC Support Team at [phone] opt. [phone]  Send all requests for admission clinical reviews, approved or denied determinations and any other requests to dedicated fax number below belonging to the campus where the patient is receiving treatment. List of dedicated fax numbers for the Facilities:  FACILITY NAME UR FAX NUMBER   ADMISSION DENIALS (Administrative/Medical Necessity) [phone]   DISCHARGE SUPPORT TEAM ([facility]) [phone]   PARENT CHILD HEALTH (Maternity/NICU/Pediatrics) [phone]   [facility] [phone]   [facility] [phone]   On license of [facility] [phone]   [facility] [phone]   [facility] [phone]   [facility] [phone]   [facility] [phone]   [facility]  [facility] [phone]   [facility] [phone]   [facility] [phone]   [facility] [phone]   [facility] [phone]

## 2024-10-14 NOTE — RESTORATIVE TECHNICIAN NOTE
Restorative Technician Note      Patient Name: Clayton Duval     Helmet order received. Pt not medically appropriate to be fit at this time. Will continue to follow.    Please contact [facility] Restorative tech on Epic Secure Chat  in regards to bracing instruction and/or adjustment.    Bonny Martin BS

## 2024-10-14 NOTE — ASSESSMENT & PLAN NOTE
POD#1 left [facility] for evacuation of [facility] (Dr. Dover, 10/13)  Found unresponsive in hospital 10/13.  Recently w/ c/o generalized fatigue/SOB x several weeks [place] [facility] [place]. Noted on workup with lesions to liver, spleen, pelvis and lumbar spine concerning for metastatic disease.  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 Expected postsurgical changes from left hemispheric craniectomy with decreased size of mixed density subdural hematoma, improved 4 mm left to right midline shift and improved effacement of the basilar cisterns. Improved mass effect on the ventricular system and dilatation of the right temporal horn of the lateral ventricle as detailed above.  MRI brain wo, 10/13/1024: Multifocal early ischemia including right anterior thalamus, anterior occipital lobes left greater than right. On the left this tracks into the posterior medial temporal lobe. There is also a punctate focus of cortical ischemia within the posterior parafalcine frontal lobe. Cortical petechial hemorrhage noted within the central aspect of the right thalamus and left anterior occipital lobe. No sofía hemorrhagic transformation. Evaluation of the cervical and intracranial vasculature is recommended. Patient is status post left hemicraniectomy for surgical decompression of acute subdural hemorrhage. Mixed signal subdural hemorrhage, fluid and air remains within the periphery of the left hemisphere with mild mass effect and 4 mm of left to right shift. There is a very small subdural hemorrhage noted within the right middle cranial fossa extending posteriorly lateral to the occipital lobe, less than 2 mm in thickness. Focal intraventricular hemorrhage within the occipital horn of the right greater than left. There is increased T2 and FLAIR  signal change surrounding the occipital horn of the lateral ventricle suggesting focal transependymal flow of CSF.    Plan:  Continue to monitor neuro exam closely.  STAT CT head with decline in GCS > 2 pts in 1 hour.  Concern for infarcts on MRI brain - recommend neurology consultation.  Hem/onc consulted - ongoing workup for unknown primary.  2 SAIRA drains:  Left lateral #1: 50 mL since OR  Left lateral #2: 20 mL since OR  Wean to extubate per CCM.  Maintain plts > 100, s/p transfusion 1 unit plts intrapop, RBCs, PCC.  Mobilize with PT/OT.  DVT ppx: SCDs, continue to hold any pharmacologic DVT ppx.    Neurosurgery will continue to follow. Call with questions.

## 2024-10-14 NOTE — PROGRESS NOTES
Patient evaluated off sedation postoperative subdural evacuation.  With sedation held patient's is very purposeful with his right upper and lower extremity.  He does move his left foot and toes spontaneously as well.  He does not have any movement of the left upper extremity.  Pupils are pinpoint and equal.  Craniectomy site is full and soft.  Surgical drains in place.  Repeat hemoglobin 8.4 after 1 unit of packed red blood cells.    Patient had a temperature of 101.5 was also noted to have some tachycardia.  There is concern patient has multiple liver lesions as well as bone lesions that may have him in an immunocompromise state.  Sepsis workup initiated.  Empiric antibiotics initiated.  Patient is on norepinephrine 5 mcg/min.  Patient arrived postoperatively on norepinephrine infusion.  His arterial line is labile in its accuracy and therefore unable to wean off norepinephrine.  Lactate pending with sepsis workup.  Patient has multiple liver lesions likely lactic acid level will be elevated as his LFTs are elevated at baseline.  Patient was given 1 dose of Tylenol for his fever, I can always if patient's LFTs are slightly elevated.  Patient cannot receive ibuprofen for fever control with recent subdural and operative intervention.  Patient did have ice packs in place in his axilla and groin and still had a temperature of 101.5.  Plan to titrate according to patient's noninvasive blood pressure cuff.    Critical care time does not include procedures or family update.  Critical care time 41 minutes.

## 2024-10-14 NOTE — PLAN OF CARE
Problem: PAIN - ADULT  Goal: Verbalizes/displays adequate comfort level or baseline comfort level  Description: Interventions:  - Encourage patient to monitor pain and request assistance  - Assess pain using appropriate pain scale  - Administer analgesics based on type and severity of pain and evaluate response  - Implement non-pharmacological measures as appropriate and evaluate response  - Consider cultural and social influences on pain and pain management  - Notify physician/advanced practitioner if interventions unsuccessful or patient reports new pain  Outcome: Progressing     Problem: INFECTION - ADULT  Goal: Absence or prevention of progression during hospitalization  Description: INTERVENTIONS:  - Assess and monitor for signs and symptoms of infection  - Monitor lab/diagnostic results  - Monitor all insertion sites, i.e. indwelling lines, tubes, and drains  - Monitor endotracheal if appropriate and nasal secretions for changes in amount and color  - [place] appropriate cooling/warming therapies per order  - Administer medications as ordered  - Instruct and encourage patient and family to use good hand hygiene technique  - Identify and instruct in appropriate isolation precautions for identified infection/condition  Outcome: Progressing  Goal: Absence of fever/infection during neutropenic period  Description: INTERVENTIONS:  - Monitor WBC    Outcome: Progressing     Problem: SAFETY ADULT  Goal: Patient will remain free of falls  Description: INTERVENTIONS:  - Educate patient/family on patient safety including physical limitations  - Instruct patient to call for assistance with activity   - Consult OT/PT to assist with strengthening/mobility   - Keep Call bell within reach  - Keep bed low and locked with side rails adjusted as appropriate  - Keep care items and personal belongings within reach  - Initiate and maintain comfort rounds  - Make Fall Risk Sign visible to staff  - Offer Toileting every 2 Hours,  in advance of need  - Initiate/Maintain necessary alarms  - Obtain necessary fall risk management equipment: yellow bracelet  - Apply yellow socks and bracelet for high fall risk patients  - Consider moving patient to room near nurses station  Outcome: Progressing  Goal: Maintain or return to baseline ADL function  Description: INTERVENTIONS:  -  Assess patient's ability to carry out ADLs; assess patient's baseline for ADL function and identify physical deficits which impact ability to perform ADLs (bathing, care of mouth/teeth, toileting, grooming, dressing, etc.)  - Assess/evaluate cause of self-care deficits   - Assess range of motion  - Assess patient's mobility; develop plan if impaired  - Assess patient's need for assistive devices and provide as appropriate  - Encourage maximum independence but intervene and supervise when necessary  - Involve family in performance of ADLs  - Assess for home care needs following discharge   - Consider OT consult to assist with ADL evaluation and planning for discharge  - Provide patient education as appropriate  Outcome: Progressing  Goal: Maintains/Returns to pre admission functional level  Description: INTERVENTIONS:  - Perform AM-PAC 6 Click Basic Mobility/ Daily Activity assessment daily.  - Set and communicate daily mobility goal to care team and patient/family/caregiver.   - Collaborate with rehabilitation services on mobility goals if consulted  - Perform Range of Motion 3 times a day.  - Reposition patient every 2 hours.  - Dangle patient 3 times a day  - Stand patient 3 times a day  - Ambulate patient 3 times a day  - Out of bed to chair 3 times a day   - Out of bed for meals 3 times a day  - Out of bed for toileting  - Record patient progress and toleration of activity level   Outcome: Progressing     Problem: DISCHARGE PLANNING  Goal: Discharge to home or other facility with appropriate resources  Description: INTERVENTIONS:  - Identify barriers to discharge  w/patient and caregiver  - Arrange for needed discharge resources and transportation as appropriate  - Identify discharge learning needs (meds, wound care, etc.)  - Arrange for interpretive services to assist at discharge as needed  - Refer to Case Management Department for coordinating discharge planning if the patient needs post-hospital services based on physician/advanced practitioner order or complex needs related to functional status, cognitive ability, or social support system  Outcome: Progressing     Problem: Knowledge Deficit  Goal: Patient/family/caregiver demonstrates understanding of disease process, treatment plan, medications, and discharge instructions  Description: Complete learning assessment and assess knowledge base.  Interventions:  - Provide teaching at level of understanding  - Provide teaching via preferred learning methods  Outcome: Progressing     Problem: Prexisting or High Potential for Compromised Skin Integrity  Goal: Skin integrity is maintained or improved  Description: INTERVENTIONS:  - Identify patients at risk for skin breakdown  - Assess and monitor skin integrity  - Assess and monitor nutrition and hydration status  - Monitor labs   - Assess for incontinence   - Turn and reposition patient  - Assist with mobility/ambulation  - Relieve pressure over bony prominences  - Avoid friction and shearing  - Provide appropriate hygiene as needed including keeping skin clean and dry  - Evaluate need for skin moisturizer/barrier cream  - Collaborate with interdisciplinary team   - Patient/family teaching  - Consider wound care consult   Outcome: Progressing

## 2024-10-14 NOTE — PROCEDURES
Arterial Line Insertion    Date/Time: 10/14/2024 4:25 AM    Performed by: TOM Chin  Authorized by: TOM Chin    Patient location:  Bedside  Other Assisting Provider: No    Consent:     Consent obtained:  Emergent situation  Universal protocol:     Procedure explained and questions answered to patient or proxy's satisfaction: no      Relevant documents present and verified: no      Test results available and properly labeled: yes      Required blood products, implants, devices, and special equipment available: yes      Site/side marked: yes      Immediately prior to procedure a time out was called: yes      Patient identity confirmed:  Arm band  Indications:     Indications: hemodynamic monitoring and continuous blood pressure monitoring    Pre-procedure details:     Skin preparation:  Chlorhexidine    Preparation: Patient was prepped and draped in sterile fashion    Anesthesia (see MAR for exact dosages):     Anesthesia method:  None  Procedure details:     Location / Tip of Catheter:  Radial    Laterality:  Left    Brent's test performed: yes      Brent's test abnormal: no      Needle gauge:  20 G    Placement technique:  Percutaneous, Seldinger and ultrasound guided    Ultrasound image availability:  Not saved    Sterile ultrasound techniques: Sterile gel and sterile probe covers were used      Number of attempts:  1    Successful placement: yes      Transducer: waveform confirmed    Post-procedure details:     Post-procedure:  Sterile dressing applied and sutured    CMS:  Unchanged    Patient tolerance of procedure:  Tolerated well, no immediate complications

## 2024-10-14 NOTE — TELEMEDICINE
e-Consult (IPC)  - Interventional Radiology  Clayton Duval 44 y.o. male MRN: [id number]  Unit/Bed#: ICU 05 Encounter: [id number]          Interventional Radiology has been consulted to evaluate Clayton Duval    We were consulted by oncology service concerning this patient with multifocal lesions.    Inpatient Consult to IR  Consult performed by: Eliezer Carter MD  Consult ordered by: Estefani Monique DO        10/14/24    Assessment/Recommendation:   44 year-old male with history of gastric bypass, VILLALOBOS, anemia, now with fevers, chills ,cough, and fatigue, with most recent cross-sectional imaging showing lesions in the liver, spleen, lumbar spine and left iliac bone, concerning for metastatic disease. Patient had acute change in mental status and found to have large subdural hematoma now s/p left craniotomy and evacuation of the hematoma. Patient is currently intubated.    We will plan for biopsy of a lesion either at liver or left iliac bone, to be determined by performing IR physician. Left iliac bone is safer from a bleeding standpoint, however, would require repositioning of an intubated patient. Given request for molecular testing and flow cytometry, yield may be better from liver than from left iliac bone lesion. There is increased risk of bleeding from the liver given small volume ascites present.     Therefore, we will decide on day of biopsy which biopsy target is safest while obtaining high yield of material. We will plan for this biopsy this Wednesday vs Thursday. Please make patient NPO the midnight prior evening from Wednesday and we will plan for biopsy procedure.      >5 min, >50% time spent reviewing/analyzing record, written report will be generated in the EMR.     Thank you for allowing Interventional Radiology to participate in the care of Clayton Duval. Please don't hesitate to call or TigerText us with any questions.     Eliezer Carter MD

## 2024-10-14 NOTE — SEPSIS NOTE
"  Sepsis Note   Clayton Duval 44 y.o. male MRN: [id number]  Unit/Bed#: ICU 05 Encounter: [id number]       Initial Sepsis Screening       Row Name 10/14/24 0054                Is the patient's history suggestive of a new or worsening infection? Yes (Proceed)  -TB        Suspected source of infection pneumonia  -TB        Indicate SIRS criteria Hyperthemia > 38.3C (100.9F) OR Hypothermia <36C (96.8F);Tachycardia > 90 bpm;Leukocytosis (WBC > 93524 IJL) OR Leukopenia (WBC <4000 IJL) OR Bandemia (WBC >10% bands)  -TB        Are two or more of the above signs & symptoms of infection both present and new to the patient? Yes (Proceed)  -TB        Assess for evidence of organ dysfunction: Are any of the below criteria present within 6 hours of suspected infection and SIRS criteria that are NOT considered to be chronic conditions? SBP < 90;MAP < 65;New need for invasive/non-invasive ventilation  -TB        Date of presentation of severe sepsis 10/15/24  -TB        Time of presentation of severe sepsis 0054  -TB        Date of presentation of septic shock 10/14/24  -TB        Time of presentation of septic shock 0055  -TB        Fluid Resuscitation: 30 ml/kg IV fluid bolus will be given based on actual body weight  -TB        Is the patient is persistently hypotensive in the hour after fluid bolus administration? If yes, patient meets criteria for vasopressor use. YES  -TB        Sepsis Note: Click \"NEXT\" below (NOT \"close\") to generate sepsis note based on above information. YES (proceed by clicking \"NEXT\")  -TB                  User Key  (r) = Recorded By, (t) = Taken By, (c) = Cosigned By      Initials Name Provider Type    TB Igor Fowler PA-C Physician Assistant                    Default Flowsheet Data (Last 720 Hours)       Sepsis Reassess       Row Name 10/14/24 0321                   Repeat Volume Status and Tissue Perfusion Assessment Performed    Date of Reassessment: 10/14/24  -TB        Time of " "Reassessment: 0321  -TB        Sepsis Reassessment Note: Click \"NEXT\" below (NOT \"close\") to generate sepsis reassessment note. YES (proceed by clicking \"NEXT\")  -TB        Repeat Volume Status and Tissue Perfusion Assessment Performed --                  User Key  (r) = Recorded By, (t) = Taken By, (c) = Cosigned By      Initials Name Provider Type    TB Igor Fowler PA-C Physician Assistant                    There is no height or weight on file to calculate BMI.  Wt Readings from Last 1 Encounters:   10/11/24 120 kg (264 lb 12.8 oz)        Ideal body weight: 79.9 kg (176 lb 2.4 oz)  Adjusted ideal body weight: 96 kg (211 lb 9.7 oz)    "

## 2024-10-14 NOTE — PHYSICAL THERAPY NOTE
Physical Therapy Cancellation Note    PT orders received chart review completed. Pt is currently intubated/sedated and not appropriate to participate in skilled PT at this time. PT will follow and eval as medically appropriate.     10/14/24 1562   Note Type   Note type Cancelled Session   Cancel Reasons Intubated/sedated       Shahrzad Longo, PT

## 2024-10-14 NOTE — DISCHARGE INSTR - OTHER ORDERS
Skin Care Plan:  1-Sacral wound: per surgery recommendations   2-Turn/reposition q2h for pressure re-distribution on skin.  3-EHOB  cushion when out of bed  4-Elevate heels to offload pressure  5-Moisturize skin daily with skin nourishing cream.  6. Kreg low air loss mattress   7. ATR turning system   8. Silicone foam to bilateral heels summer with a P and date peel and check skin integrity every shift change every 3 days   9. Prevalon boots

## 2024-10-14 NOTE — RESPIRATORY THERAPY NOTE
RT Ventilator Management Note  Clayton Duval 44 y.o. male MRN: [id number]  Unit/Bed#: ICU 05 Encounter: [id number]      Daily Screen         10/13/2024  1208 10/14/2024  0813          Patient safety screen outcome:: Failed Failed      Not Ready for Weaning due to:: Recieving paralytics Underline problem not resolved      Spont breathing trial outcome:: Failed --                Physical Exam:   Respiratory Pattern: Assisted  Chest Assessment: Chest expansion symmetrical  Bilateral Breath Sounds: Diminished, Clear      Resp Comments: pt placed on ps at this time. per resident request. pt tolerating well.will continue monitor the pt.

## 2024-10-14 NOTE — UTILIZATION REVIEW
Initial Clinical Review    Admission: Date/Time/Statement:   Admission Orders (From admission, onward)       Ordered        10/13/24 1201  INPATIENT ADMISSION  Once                          Orders Placed This Encounter   Procedures    INPATIENT ADMISSION     Standing Status:   Standing     Number of Occurrences:   1     Order Specific Question:   Level of Care     Answer:   Critical Care [15]     Order Specific Question:   Estimated length of stay     Answer:   More than 2 Midnights     Order Specific Question:   Certification     Answer:   I certify that inpatient services are medically necessary for this patient for a duration of greater than two midnights. See H&P and MD Progress Notes for additional information about the patient's course of treatment.     10/10/2024 - 10/13/2024 (3 days)  [facility]  Imaging studies showed multiple lesions highly suspicious for metastatic disease, rapid response was called overnight due to acute change in mentation so stat CT head was obtained which showed large subdural hematoma with midline shift as well as uncal herniation. Patient was subsequently intubated for airway protection due to low GCS, started on IV steroids, keppra and received IV vitamin K as well as Kcentra for elevated INR. He was emergently transfer to [facility] for Neurosurgical intervention. Plan includes HH, MRI brain, mechanical ventilation, consult GI for finding of cancer.      Initial Presentation: 44 y.o. male received from [facility] for inpatient critical care admission to further evaluate and treat  non traumatic L subdural hematoma with R midline shift.  PMHX: gastric bypass, VILLALOBOS, anemia.   He had been feeling ill with fever , chills, cough, fatigue.  Found in ed found to  have significant for anasarca, elevated transaminase, and Ct showing splenic lesions, concerning for malignancy and large sclerotic lytic lesions.  This morning the patient was found unresponsive taken  for head CT which demonstrated a 1.5 cm left subdural hematoma with brain compression secondary to a hematoma, cerebral edema, and hydrocephalus. He was taken to the ICU [facility], intubated, received transfusion of packed red blood cells, PCC's, and vitamin K.    Anesthesia Start Date/Time: 10/13/24 0812         Procedure: CRANIOTOMY FOR SUBDURAL HEMATOMA (Left: Head)   Anesthesia type: general   Procedure(s):  1) Left sided hemicraniectomy for evacuation of subdural hematoma    Estimated Blood Loss:   500 mL    Drains x 2 L head, NG/OG, urethral catheter with temp probe.    Anesthesia Type:   General     Operative Indications:  Large left subdural hematoma with brain compression and herniation     Operative Findings:  Large left acute subdural hematoma     Complications:   None    Date: 10-14-24    Day 2: critical care    POD 1 subdural evacuation. No movement in LUE, pupils pinpoint and equal.  HB 8.4 afte r1 U prbc.    Temp 101. 5, 76/ 58. There is concern patient has multiple liver lesions as well as bone lesions that may have him in an immunocompromise state. Sepsis workup initiated. Empiric antibiotics initiated.  Two drains present to suction. NG/OG, ureteral catheter, arterial line and on ventilator.  NPO on nicardipine gtt, norepinephrine gtt, propovol gtt, iv Mag, iv keppra, iv ceftriaxone..    ED Treatment-Medication Administration - No Administrations Displayed (No Start Event Found)       None            Scheduled Medications:  acetylcysteine, 3 mL, Nebulization, Q8H  cefTRIAXone, 1,000 mg, Intravenous, Q24H  chlorhexidine, 15 mL, Mouth/Throat, Q12H KAYLIE  Cholecalciferol, 5,000 Units, Oral, Daily  famotidine, 20 mg, Oral, BID  ferrous sulfate, 325 mg, Oral, Daily With Breakfast  ipratropium-albuterol, 3 mL, Nebulization, Q6H  levETIRAcetam, 500 mg, Intravenous, Q12H KAYLIE  levothyroxine, 200 mcg, Oral, Early Morning  magnesium sulfate, 4 g, Intravenous, Once      Continuous IV Infusions:    niCARdipine,  1-15 mg/hr, Intravenous, Titrated  norepinephrine, 1-30 mcg/min, Intravenous, Titrated  propofol, 5-50 mcg/kg/min, Intravenous, Titrated      PRN Meds:  acetaminophen, 1,000 mg, Intravenous, Q6H PRN  benzonatate, 100 mg, Oral, TID PRN  fentaNYL, 50 mcg, Intravenous, Q1H PRN  hydrALAZINE, 10 mg, Intravenous, Q4H PRN  ondansetron, 4 mg, Intravenous, Q8H PRN  polyethylene glycol, 17 g, Oral, Daily PRN      ED Triage Vitals   Temperature Pulse Respirations Blood Pressure SpO2 Pain Score   10/13/24 1200 10/13/24 1159 10/13/24 1159 10/13/24 1159 10/13/24 1159 10/13/24 2253   (!) 96.1 °F (35.6 °C) 64 19 121/71 98 % Med Not Given for Pain - for MAR use only     Weight (last 2 days)       Date/Time Weight    10/14/24 0600 108 (237.66)            Vital Signs (last 3 days)       Date/Time Temp Pulse Resp BP MAP  Arterial Line BP MAP SpO2 [place] Coma Scale Score    10/14/24 1300 99.3 °F (37.4 °C) 74 19 124/74 86 120/64 78 mmHg 96 % --    10/14/24 1200 99 °F (37.2 °C) 74 17 146/93 124 132/68 84 mmHg 100 % 9    10/14/24 1127 -- -- -- -- -- -- -- 100 % --    10/14/24 1100 99 °F (37.2 °C) 70 16 127/75 88 102/60 74 mmHg 100 % --    10/14/24 1000 98.6 °F (37 °C) 68 14 139/79 94 108/62 76 mmHg 100 % 10    10/14/24 0900 98.2 °F (36.8 °C) 76 19 143/90 107 146/80 100 mmHg 100 % 9    10/14/24 0818 -- -- -- -- -- -- -- 100 % --    10/14/24 0800 97.9 °F (36.6 °C) 60 12 126/71 84 96/58 72 mmHg 100 % 9    10/14/24 0700 97.5 °F (36.4 °C) 58 14 121/72 87 104/56 70 mmHg 99 % --    10/14/24 0600 97.5 °F (36.4 °C) 60 12 117/65 74 86/50 62 mmHg 99 % 6    10/14/24 0551 -- -- -- -- -- 112/56 72 mmHg -- --    10/14/24 0539 97.5 °F (36.4 °C) 46 12 131/75 89 118/60 78 mmHg 100 % --    10/14/24 0500 97.2 °F (36.2 °C) 42 12 125/71 86 114/56 72 mmHg 100 % 6    10/14/24 0430 97.5 °F (36.4 °C) 60 13 76/58 65 86/50 60 mmHg 99 % --    10/14/24 0400 -- -- -- -- -- -- -- -- 8    10/14/24 0330 97.9 °F (36.6 °C) 58 12 119/67 84 -- -- 99 % --    10/14/24 0315 98.2  °F (36.8 °C) 58 14 114/67 85 -- -- 99 % --    10/14/24 0300 98.2 °F (36.8 °C) 58 14 119/69 81 -- -- 99 % 6    10/14/24 0249 98.2 °F (36.8 °C) 52 12 127/68 84 -- -- 100 % --    10/14/24 0230 99 °F (37.2 °C) 58 14 118/69 82 -- -- 99 % --    10/14/24 0200 100.4 °F (38 °C) 62 15 128/72 92 -- -- 100 % 6    10/14/24 0130 101.1 °F (38.4 °C) 70 17 120/73 83 -- -- 100 % --    10/14/24 0100 101.5 °F (38.6 °C) 80 21 147/79 101 116/64 80 mmHg 99 % 6    10/14/24 0000 -- -- -- -- -- -- -- -- 8    10/13/24 2327 -- 58 18 112/69 77 -- -- 100 % --    10/13/24 2300 -- -- -- -- -- -- -- -- 6    10/13/24 2244 -- -- -- 89/73 -- -- -- -- --    10/13/24 2230 -- 74 15 80/67 -- 82/66 74 mmHg 99 % --    10/13/24 2200 101.1 °F (38.4 °C) 76 17 104/67 79 88/66 74 mmHg 100 % 6    10/13/24 2149 -- -- -- 112/69 83 -- 76 mmHg -- --    10/13/24 2145 101.1 °F (38.4 °C) 72 18 112/69 83 90/70 78 mmHg 100 % --    10/13/24 2130 101.1 °F (38.4 °C) 72 18 118/69 91 88/70 78 mmHg 100 % --    10/13/24 2114 101.3 °F (38.5 °C) 73 18 119/69 84 78/60 68 mmHg 100 % --    10/13/24 2100 101.1 °F (38.4 °C) 74 17 118/67 86 80/58 66 mmHg 100 % 6    10/13/24 2025 101.1 °F (38.4 °C) 82 18 110/69 81 78/54 62 mmHg 100 % --    10/13/24 2000 -- -- -- -- -- -- -- -- 8    10/13/24 1900 100.8 °F (38.2 °C) 80 18 -- -- 98/56 70 mmHg 100 % 6    10/13/24 1827 100.4 °F   (38 °C) 80 21 116/73 88 94/48 66 mmHg 100 % --    10/13/24 1800 -- -- -- -- -- -- -- -- 6    10/13/24 1700 99.7 °F (37.6 °C) 74 16 116/77 90 108/54 70 mmHg 100 % 8    10/13/24 1600 -- 72 17 -- -- 120/58 76 mmHg 99 % --    10/13/24 1555 -- -- -- -- -- -- -- -- 8    10/13/24 1500 97.5 °F (36.4 °C) 70 11 135/85 101 128/62 80 mmHg 100 % 7    10/13/24 1400 96.8 °F (36 °C) 58 14 -- -- 112/58 78 mmHg 100 % 3    10/13/24 1300 96.1 °F (35.6 °C) 60 13 -- -- 110/58 78 mmHg 100 % 3    10/13/24 1221 96.1 °F (35.6 °C) 62 13 118/76 88 106/56 72 mmHg 99 % --    10/13/24 1208 -- -- -- -- -- -- -- 99 % --    10/13/24 1200 96.1 °F  (35.6 °C) 66 13 -- -- 116/66 84 mmHg 97 % 3    10/13/24 1159 -- 64 19 121/71 88 121/71 88 mmHg 98 % --       Pertinent Labs/Diagnostic Test Results:   Radiology:    CT C/A/P 10/10: 1. Small bilateral pleural effusions. Moderate ascites and anasarca.  2. Stable hepatosplenomegaly. Hypoenhancing partially confluent lesions in the spleen concerning for malignancy. Ill-defined areas of hypoenhancement in the right lobe of the liver, not visualized on the prior exam. Malignancy not excluded.  3. Large sclerotic and lytic lesion in the left iliac bone and smaller lytic lesions in the lumbar spine, also concerning for malignancy.     RUQ US 10/11: Hepatomegaly. Multiple hypodense foci evident on CT are not well outlined on ultrasound, however, may be causative for transaminitis.  Patent major hepatic vessels with normal directional flow.  Redemonstrated ascites.     MRI Abdomen 10/11: 1.  Findings concerning for metastatic disease.  2.  Enhancing osseous lesions throughout the majority of the visualized spine and bony pelvis.  3.  Markedly enlarged spleen much of which is occupied by large confluent hypoenhancing lesions suspected to represent metastases.  4.  Hepatomegaly. Multifocal hepatic lesions which correlate to areas of hypoenhancement on CT are also suspected to represent metastasis in the given setting.  5.  Anasarca and moderate volume ascites.     CT head 10/13: Mixed density left convexity subdural hemorrhage (1.2 cm thickness) with by 1.5 cm rightward midline shift. Trace subdural hemorrhage along the left tentorium. Mass effect with low-lying cerebellar tonsils, effacement of the suprasellar cistern, and   partial effacement of the quadrigeminal plate cistern.         MRI brain wo contrast   Final (10/14 7788)      Multifocal early ischemia including right anterior thalamus, anterior occipital lobes left greater than right. On the left this tracks into the posterior medial temporal lobe. There is also a  punctate focus of cortical ischemia within the posterior parafalcine frontal lobe. Cortical petechial hemorrhage noted within the central aspect of the right thalamus and left anterior occipital lobe. No sofía hemorrhagic transformation. Evaluation of the cervical and intracranial vasculature is recommended.         Patient is status post left hemicraniectomy for surgical decompression of acute subdural hemorrhage. Mixed signal subdural hemorrhage, fluid and air remains within the periphery of the left hemisphere with mild mass effect and 4 mm of left to right shift.         There is a very small subdural hemorrhage noted within the right middle cranial fossa extending posteriorly lateral to the occipital lobe, less than 2 mm in thickness.      Focal intraventricular hemorrhage within the occipital horn of the right greater than left. There is increased T2 and FLAIR signal change surrounding the occipital horn of the lateral ventricle suggesting focal transependymal flow of CSF.         CT head wo contrast   Final (10/13 1323)      -New area of hypodensity within the left thalamus and basal ganglia compared to earlier day exam, suggestive of acute infarct.      -Expected postsurgical changes from left hemispheric craniectomy with decreased size of mixed density subdural hematoma, improved 4 mm left to right midline shift and improved effacement of the basilar cisterns.      -Improved mass effect on the ventricular system and dilatation of the right temporal horn of the lateral ventricle as detailed above.        Cardiology:  ECG 12 lead   Final  (10/14 1329)   Marked sinus bradycardia   Low voltage QRS   Abnormal ECG      ECG 12 lead   Final  (10/13 1851)   Sinus bradycardia   Septal infarct (cited on or before 15-MAR-2024)   Lateral infarct (cited on or before 15-MAR-2024)   Inferior infarct , age undetermined   Abnormal ECG           GI:  No orders to display           Results from last 7 days   Lab Units  10/14/24  1154 10/14/24  0734 10/14/24  0438 10/14/24  0435 10/14/24  0021 10/13/24  1450 10/13/24  1252 10/13/24  0853 10/13/24  0850 10/11/24  0447 10/10/24  2128   WBC Thousand/uL  --   --   --  9.22  --   --  13.82*  --  13.97*   < > 6.42   HEMOGLOBIN g/dL 9.1* 8.3* 7.4* 7.5* 8.4*   < > 8.2*  --  7.2*   < > 10.8*   I STAT HEMOGLOBIN   --   --   --   --   --   --   --    < >  --   --   --    HEMATOCRIT % 26.8* 25.1* 22.2* 22.6* 25.4*   < > 24.8*  --  22.0*   < > 33.1*   HEMATOCRIT, ISTAT   --   --   --   --   --   --   --    < >  --   --   --    PLATELETS Thousands/uL  --   --   --  116*  --   --  142*  --  149   < > 189   BANDS PCT %  --   --   --   --   --   --   --   --   --   --  4    < > = values in this interval not displayed.     Results from last 7 days   Lab Units 10/14/24  0435   RETIC CT ABS  89,500   RETIC CT PCT % 3.64*     Results from last 7 days   Lab Units 10/14/24  0435 10/13/24  1252 10/13/24  1031 10/13/24  0853 10/13/24  0534 10/12/24  0449 10/11/24  2130 10/11/24  1438 10/11/24  0447 10/10/24  2128   SODIUM mmol/L 132* 131*  --   --  127* 126* 125*   < > 127* 128*   POTASSIUM mmol/L 4.4 4.2  --   --  4.1 4.2 4.3   < > 4.2 4.3   CHLORIDE mmol/L 102 102  --   --  97 96 95*   < > 98 96   CO2 mmol/L 23 23  --   --  25 22 21   < > 20* 25   CO2, I-STAT mmol/L  --   --  22 24  --   --   --   --   --   --    ANION GAP mmol/L 7 6  --   --  5 8 9   < > 9 7   BUN mg/dL 21 21  --   --  24 25 26*   < > 24 26*   CREATININE mg/dL 0.60 0.65  --   --  0.86 0.84 0.95   < > 0.97 1.08   EGFR ml/min/1.73sq m 121 117  --   --  104 105 96   < > 93 82   CALCIUM mg/dL 7.5* 8.5  --   --  7.2* 7.0* 7.1*   < > 7.3* 7.5*   CALCIUM, IONIZED mmol/L 1.04*  --   --   --   --   --   --   --  1.06*  --    CALCIUM, IONIZED, ISTAT mmol/L  --   --  1.18 0.92*  --   --   --   --   --   --    MAGNESIUM mg/dL 1.7*  --   --   --  1.8* 1.7*  --   --  1.7* 1.7*   PHOSPHORUS mg/dL 2.9  --   --   --   --   --   --   --  3.6  --     < >  = values in this interval not displayed.     Results from last 7 days   Lab Units 10/14/24  0435 10/13/24  1252 10/13/24  0534 10/12/24  0449 10/11/24  0521 10/11/24  0447 10/10/24  2128   AST U/L 437* 392* 484* 530*  --  561* 599*   ALT U/L 189* 189* 247* 280*  --  303* 330*   ALK PHOS U/L 223* 289* 366* 518*  --  491* 538*   TOTAL PROTEIN g/dL 3.3* 4.0* 3.7* 3.4*  --  3.5* 3.9*   ALBUMIN g/dL 2.3* 2.8* 2.6* 2.3*  --  2.0* 2.4*   TOTAL BILIRUBIN mg/dL 2.13* 2.45* 2.35* 2.06*  --  2.05* 2.39*   BILIRUBIN DIRECT mg/dL  --   --   --   --   --   --  1.13*   AMMONIA umol/L  --   --  21  --  67  --   --      Results from last 7 days   Lab Units 10/13/24  0531   POC GLUCOSE mg/dl 86     Results from last 7 days   Lab Units 10/14/24  0435 10/13/24  1252 10/13/24  0534 10/12/24  0449 10/11/24  2130 10/11/24  1438 10/11/24  0447 10/10/24  2128   GLUCOSE RANDOM mg/dL 86 110 93 82 85 82 119 100     Results from last 7 days   Lab Units 10/12/24  1754 10/12/24  0449   OSMOLALITY, SERUM mmol/* 263*     Results from last 7 days   Lab Units 10/14/24  0735 10/13/24  1250 10/13/24  0544   PH ART  7.440 7.338* 7.498*   PCO2 ART mm Hg 34.7* 39.9 32.5*   PO2 ART mm Hg 151.4* 124.1 63.2*   HCO3 ART mmol/L 23.0 20.9* 24.7   BASE EXC ART mmol/L -0.8 -4.5 1.5   O2 CONTENT ART mL/dL 12.0* 11.8* 9.9*   O2 HGB, ARTERIAL % 96.8 95.5 89.4*   ABG SOURCE  Line, Arterial Line, Arterial Radial, Left         Results from last 7 days   Lab Units 10/13/24  1031 10/13/24  0853   I STAT BASE EXC mmol/L -4* -2   I STAT O2 SAT % 100* 100*   ISTAT PH ART  7.372 7.363   I STAT ART PCO2 mm HG 35.7* 40.1   I STAT ART PO2 mm .0* 262.0*   I STAT ART HCO3 mmol/L 20.7* 22.8     Results from last 7 days   Lab Units 10/11/24  0447   CK TOTAL U/L 87     Results from last 7 days   Lab Units 10/10/24  2128   HS TNI 0HR ng/L 23         Results from last 7 days   Lab Units 10/14/24  0435 10/13/24  1450 10/13/24  0850 10/13/24  0534 10/12/24  0449  10/11/24  0447 10/10/24  2128   PROTIME seconds 16.9* 16.2* 21.6*   < >  --    < > 20.6*   INR  1.35* 1.27* 1.87*   < >  --    < > 1.74*   PTT seconds  --   --   --   --  63*  --  54*    < > = values in this interval not displayed.     Results from last 7 days   Lab Units 10/12/24  0449 10/10/24  2128   TSH 3RD GENERATON uIU/mL 23.267* 36.083*     Results from last 7 days   Lab Units 10/14/24  0121   PROCALCITONIN ng/ml 1.14*     Results from last 7 days   Lab Units 10/14/24  0435 10/14/24  0121 10/13/24  0534 10/12/24  2337 10/12/24  2115 10/11/24  1749 10/11/24  1438   LACTIC ACID mmol/L 3.5* 3.5* 3.3* 3.8* 3.9* 4.1* 4.0*             Results from last 7 days   Lab Units 10/12/24  2115 10/10/24  2128   BNP pg/mL 46 30     Results from last 7 days   Lab Units 10/11/24  0447   FERRITIN ng/mL >7,500*   IRON SATURATION % 55*   IRON ug/dL 66   TIBC ug/dL 121*         Results from last 7 days   Lab Units 10/14/24  0555   UNIT PRODUCT CODE  [id number]  [id number]  [id number]  [id number]  Y5711M85  Y1570I18  M6261Y22  A2556P16  B9561N23  P1784B42  Z5726V59  P7561I46   UNIT NUMBER  [id number]  [id number]  [id number]  [id number]  [id number]  [id number]  [id number]  [id number]  [id number]  [id number]  [id number]  [id number]   UNITABO  A  A  A  A  A  A  AB  A  A  A  A  A   UNITRH  POS  POS  NEG  NEG  NEG  POS  POS  POS  POS  POS  POS  POS   CROSSMATCH  Compatible  Compatible  Compatible  Compatible  Compatible   UNIT DISPENSE STATUS  Presumed Trans  Presumed Trans  Presumed Trans  Presumed Trans  Presumed Trans  Presumed Trans  Presumed Trans  Presumed Trans  Presumed Trans  Presumed Trans  Presumed Trans  Presumed Trans   UNIT PRODUCT VOL ml 350  250  280  350  350  125  125  300  280  280  350  350     Results from last 7 days   Lab Units 10/11/24  0447   HEP B S AG  Non-reactive   HEP C AB  Non-reactive    HEP B C IGM  Non-reactive     Results from last 7 days   Lab Units 10/10/24  2128   LIPASE u/L 53         Results from last 7 days   Lab Units 10/14/24  0833   CEA ng/mL 2.4     Results from last 7 days   Lab Units 10/12/24  1754 10/12/24  0449 10/11/24  0008   OSMOLALITY, SERUM mmol/* 263*  --    OSMO UR mmol/KG  --   --  743     Results from last 7 days   Lab Units 10/14/24  0121 10/11/24  1523 10/11/24  0008   CLARITY UA  Turbid  --  Clear   COLOR UA  Yellow  --  Diane   SPEC GRAV UA  1.035*  --  1.020   PH UA  6.0  --  6.5   GLUCOSE UA mg/dl Negative  --  Negative   KETONES UA mg/dl Negative  --  Negative   BLOOD UA  Small*  --  Small*   PROTEIN UA mg/dl 30 (1+)*  --  Trace*   NITRITE UA  Negative  --  Negative   BILIRUBIN UA  Negative  --  Moderate*   UROBILINOGEN UA E.U./dl  --   --  2.0*   UROBILINOGEN UA (BE) mg/dl <2.0  --   --    LEUKOCYTES UA  Negative  --  Negative   WBC UA /hpf 4-10*  --  0-1   RBC UA /hpf 2-4*  --  4-10*   BACTERIA UA /hpf Occasional  --  Occasional   EPITHELIAL CELLS WET PREP /hpf Occasional  --  Occasional   MUCUS THREADS  Occasional*  --   --    SODIUM UR   --  70 13     Results from last 7 days   Lab Units 10/11/24  0447   ACETAMINOPHEN LVL ug/mL <2*     Results from last 7 days   Lab Units 10/14/24  0121 10/11/24  1522 10/11/24  0013 10/11/24  0008   BLOOD CULTURE  Received in Microbiology Lab. Culture in Progress.  --  No Growth at 72 hrs. No Growth at 72 hrs.   URINE CULTURE   --  No Growth <1000 cfu/mL  --   --        No past medical history on file.    Present on Admission:     Non-traumatic acute L subdural hematoma (HCC)      Admitting Diagnosis: No admission diagnoses are documented for this encounter.    Age/Sex: 44 y.o. male    Network Utilization Review Department  ATTENTION: Please call with any questions or concerns to [phone] and carefully listen to the prompts so that you are directed to the right person. All voicemails are confidential.   For Discharge  needs, contact Care Management DC Support Team at [phone] opt. 2  Send all requests for admission clinical reviews, approved or denied determinations and any other requests to dedicated fax number below belonging to the campus where the patient is receiving treatment. List of dedicated fax numbers for the Facilities:  FACILITY NAME UR FAX NUMBER   ADMISSION DENIALS (Administrative/Medical Necessity) [phone]   DISCHARGE SUPPORT TEAM (NETWORK) [phone]   PARENT CHILD HEALTH (Maternity/NICU/Pediatrics) [phone]   [facility] [phone]   [facility] [phone]   [facility] [phone]   [facility] [phone]   [facility] [phone]   [facility] [phone]   [facility] [phone]   [facility] [phone]   [facility] [phone]   [facility] [phone]   [facility] [phone]   [facility] [phone]

## 2024-10-14 NOTE — OCCUPATIONAL THERAPY NOTE
Occupational Therapy         Patient Name: Clayton Duval  Today's Date: 10/14/2024       10/14/24 1300   OT Last Visit   OT Visit Date 10/14/24   Note Type   Note type Cancelled Session   Cancel Reasons Intubated/sedated   Additional Comments will defer       Daysi Nogueira

## 2024-10-15 PROBLEM — A41.9 SEPSIS (HCC): Status: ACTIVE | Noted: 2024-10-15

## 2024-10-15 PROBLEM — I63.9 STROKE (HCC): Chronic | Status: ACTIVE | Noted: 2024-10-15

## 2024-10-15 PROBLEM — I63.9 STROKE (HCC): Status: ACTIVE | Noted: 2024-10-15

## 2024-10-15 NOTE — PROGRESS NOTES
Vancomycin IV Pharmacy-to-Dose Consultation    Clayton Duval is a 44 y.o. male who is currently receiving Vancomycin IV with management by the Pharmacy Consult service.    Relevant clinical data and objective / subjective history reviewed.      Vancomycin Assessment:  Indication: Pneumonia (goal -600, trough >10) broad spectrum for pending cultures  Status: critically ill  Micro:   - In process  Procalcitonin: 1.14 on 10/14  Renal Function:     Lab Results   Component Value Date    CREATININE 0.82 10/15/2024     Estimated Creatinine Clearance: 144 mL/min (by C-G formula based on SCr of 0.82 mg/dL).  Dialysis: no  Days of Therapy: 2  Current Dose: 1500 mg IV q12h   Goal AUC / Trough: -600, trough >10   Last Level: None  Model Fit:  N/A  Assessment: WBC slightly elevated, afebrile.       Vancomycin Plan:  New Dosing: continue current regimen   Predicted Trough / AUC: 10.9 / 439  Next Level: on 10/16 at 0600  Renal Function Monitoring: Daily BMP and UOP      Pharmacy will continue to follow closely for s/sx of nephrotoxicity, infusion reactions and appropriateness of therapy.  BMP and CBC will be ordered per protocol. We will continue to follow the patient’s culture results and clinical progress daily.       Jonah Mendoza, PharmD, BCCCP  Critical Care Clinical Pharmacist  Available via [employer]

## 2024-10-15 NOTE — RESPIRATORY THERAPY NOTE
RT Protocol Note  Clayton Duval 44 y.o. male MRN: [id number]  Unit/Bed#: ICU 05 Encounter: [id number]    Assessment    Principal Problem:    Non-traumatic acute L subdural hematoma (HCC)  Active Problems:    Sepsis (HCC)    Stroke (HCC)      Home Pulmonary Medications:  None       No past medical history on file.  Social History     Socioeconomic History    Marital status: /Civil Union     Spouse name: Not on file    Number of children: Not on file    Years of education: Not on file    Highest education level: Not on file   Occupational History    Not on file   Tobacco Use    Smoking status: Never    Smokeless tobacco: Current   Vaping Use    Vaping status: Never Used   Substance and Sexual Activity    Alcohol use: Never    Drug use: Never    Sexual activity: Not on file   Other Topics Concern    Not on file   Social History Narrative    Not on file     Social Determinants of Health     Financial Resource Strain: Not on file   Food Insecurity: No Food Insecurity (10/14/2024)    Hunger Vital Sign     Worried About Running Out of Food in the Last Year: Never true     Ran Out of Food in the Last Year: Never true   Transportation Needs: No Transportation Needs (10/14/2024)    PRAPARE - Transportation     Lack of Transportation (Medical): No     Lack of Transportation (Non-Medical): No   Physical Activity: Not on file   Stress: Not on file   Social Connections: Unknown (6/18/2024)    Received from [employer]    Social youbeQ - Maps With Life     How often do you feel lonely or isolated from those around you? (Adult - for ages 18 years and over): Not on file   Intimate Partner Violence: Not on file   Housing Stability: Low Risk  (10/14/2024)    Housing Stability Vital Sign     Unable to Pay for Housing in the Last Year: No     Number of Times Moved in the Last Year: 0     Homeless in the Last Year: No       Subjective         Objective    Physical Exam:   Assessment Type: During-treatment  General Appearance:  Sleeping  Respiratory Pattern: Assisted  Chest Assessment: Chest expansion symmetrical  Bilateral Breath Sounds: Clear  Suction: Oral, ET Tube  O2 Device: vent    Vitals:  Blood pressure 118/79, pulse (!) 108, temperature 99.3 °F (37.4 °C), resp. rate 16, weight 104 kg (229 lb 8 oz), SpO2 100%.    Results from last 7 days   Lab Units 10/14/24  0735 10/13/24  1250 10/13/24  0544   PH ART  7.440   < > 7.498*   PCO2 ART mm Hg 34.7*   < > 32.5*   PO2 ART mm Hg 151.4*   < > 63.2*   HCO3 ART mmol/L 23.0   < > 24.7   BASE EXC ART mmol/L -0.8   < > 1.5   O2 CONTENT ART mL/dL 12.0*   < > 9.9*   O2 HGB, ARTERIAL % 96.8   < > 89.4*   ABG SOURCE  Line, Arterial   < > Radial, Left   FRANCISCO JAVIER TEST  No  --  Yes   NON VENT ROOM AIR %  --   --  room air    < > = values in this interval not displayed.       Imaging and other studies: Results Review Statement: I personally reviewed the following image studies in PACS and associated radiology reports: chest xray. My interpretation of the radiology images/reports is: mild to moderata pulmonary edema.    O2 Device: vent     Plan    Respiratory Plan: Vent/NIV/HFNC  Airway Clearance Plan: Percussive Vest     Resp Comments: (P) Pt has no known pulmonary hx. Pt recently extubated. Bilateral bS coarse. Imaging on 10/13 states mild to moderate pulm esther

## 2024-10-15 NOTE — ASSESSMENT & PLAN NOTE
POD#2 left DHC for evacuation of SDH (Dr. Dover, 10/13)  Found unresponsive in hospital 10/13.  Recently w/ c/o generalized fatigue/SOB x several weeks - hospitalized at [facility] [place] [facility] for this. Noted on workup with lesions to liver, spleen, pelvis and lumbar spine concerning for metastatic disease.  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 Expected postsurgical changes from left hemispheric craniectomy with decreased size of mixed density subdural hematoma, improved 4 mm left to right midline shift and improved effacement of the basilar cisterns. Improved mass effect on the ventricular system and dilatation of the right temporal horn of the lateral ventricle as detailed above.  MRI brain wo, 10/13/1024: Multifocal early ischemia including right anterior thalamus, anterior occipital lobes left greater than right. On the left this tracks into the posterior medial temporal lobe. There is also a punctate focus of cortical ischemia within the posterior parafalcine frontal lobe. Cortical petechial hemorrhage noted within the central aspect of the right thalamus and left anterior occipital lobe. No sofía hemorrhagic transformation. Evaluation of the cervical and intracranial vasculature is recommended. Patient is status post left hemicraniectomy for surgical decompression of acute subdural hemorrhage. Mixed signal subdural hemorrhage, fluid and air remains within the periphery of the left hemisphere with mild mass effect and 4 mm of left to right shift. There is a very small subdural hemorrhage noted within the right middle cranial fossa extending posteriorly lateral to the occipital lobe, less than 2 mm in thickness. Focal intraventricular hemorrhage within the occipital horn of the right greater than left. There is increased T2 and FLAIR  signal change surrounding the occipital horn of the lateral ventricle suggesting focal transependymal flow of CSF.    Plan:  Continue to monitor neuro exam closely.  STAT CT head with decline in GCS > 2 pts in 1 hour.  Concern for infarcts on MRI brain - recommend neurology consultation.  Hem/onc consulted - ongoing workup for unknown primary planning for IR biopsy.  2 SAIRA drains:  Left lateral anterior #1: 100 mL/24 hrs  Left lateral posterior #2: discontinued 10/15  Wean to extubate per CCM.  Maintain plts > 100, s/p transfusion 1 unit plts intrapop, RBCs, PCC.  Mobilize with PT/OT.  DVT ppx: SCDs, continue to hold any pharmacologic DVT ppx.    Neurosurgery will continue to follow. Call with questions.

## 2024-10-15 NOTE — PROGRESS NOTES
Progress Note - Critical Care/ICU   Name: Clayton Duval 44 y.o. male I MRN: [id number]  Unit/Bed#: ICU 05 I Date of Admission: 10/13/2024   Date of Service: 10/15/2024 I Hospital Day: 2       Assessment & Plan   Neuro:   Diagnosis: Subdural hematoma s/p left hemicraniectomy for evacuation of SDH POD#2, AMS, Acute ischemic stroke  CT head - 10/13 0616 - preop: Mixed density left convexity subdural hemorrhage (1.2 cm thickness) with by 1.5 cm rightward midline shift. Trace subdural hemorrhage along the left tentorium. Mass effect with low-lying cerebellar tonsils, effacement of the suprasellar cistern, and partial effacement of the quadrigeminal plate cistern.  CT head - 10/13 - post op: New area of hypodensity within the left thalamus and basal ganglia compared to earlier day exam, suggestive of acute infarct. Expected postsurgical changes from left hemispheric craniectomy with decreased size of mixed density subdural hematoma, improved 4 mm left to right midline shift and improved effacement of the basilar cisterns. Improved mass effect on the ventricular system and dilatation of the right temporal horn of the lateral ventricle  Plan:   MRI brain w/o contrast  Continue monitoring neurological exam closely with frequent neuro checks, obtain stat CTH if any acute changes  Monitor for signs of ICP increase (bradycardia, HTN, changes in neuro exam, increased tone, pupillary changes)  Blood Pressure: SBP goal 110 - 140, cardene gtt or pressers/fluids as needed to keep within range.  AC/AP: Holding AP and AC at this time.   Imaging/Diagnostic Studies: MRI Brain w/o contrast  Labs Pending:  Seizure Prophylaxis: Keppra 1g load followed by 500mg BID for 7 days   Tight glycemic control, goal <180. Monitor temperature, tylenol PRN.  PT/OT/Speech/PMR consults when able  DVT PPx: SCDs and heparin 5000 q8h   CV:   Diagnosis: sinus bradycardia  Plan:   SBP goal 110 - 140, Cardene as needed  Continue to monitor on  Telemetry     Pulm:  Diagnosis: Mechanical ventillation  Plan: Vt - 500ml, peep 6, rate 14, FiO2 50%  Keep on spontaneous as tolerated during the day time, rest overnight     GI:   Diagnosis: Hepatosplenomegaly, hepatic lesions, splenic lesions, transaminitis  Hepatitis panel - negative  Plan:   GI consult  Oncology consult  IR consulted - plan for biopsy of a hepatic lesion vs iliac bone lesion     :   Diagnosis: Barclay in place  Plan:   Continue for acute illness  Monitor I&Os     F/E/N:   F: Albumin 25% for suspected intravascular hypovolemia  E: replenish as indicated for Magnesium >2, K >4  N: consider starting NG tube for tube feeds if patient unable to be extubated     Heme/Onc:   Diagnosis: suspected metastatic cancer, hypocoagulability in the setting of hepatic malignancy, lytic bone lesions, hepatic coagulopathy  MRI Abdomen w/wo contrast - 10/11/24: Findings concerning for metastatic disease. Enhancing osseous lesions throughout the majority of the visualized spine and bony pelvis. Markedly enlarged spleen much of which is occupied by large confluent hypoenhancing lesions suspected to represent metastases. Hepatomegaly. Multifocal hepatic lesions which correlate to areas of hypoenhancement on CT are also suspected to represent metastasis in the given setting. Anasarca and moderate volume ascites  Fibrinogen level - 161  PT-INR - 21.6/1.87 - 10/13 - 0800 - repeat postop pending  Plan:   Factor 5 level  PT-INR  Kcentra 2000 units  DDAVP  Vitamin K  In the OR due to anemia and coagulopathy patient was given: 4 units of pRBCs, 4 units of FFP and 1 unit of platelets  IR consulted for biopsy  Oncology consult  Tumor markers ordered - CEA, B-HCG, LDH, CA 19-9  B12, Folate, reticulocyte count        Endo:   Diagnosis: Hypothyroidism, hyponatremia, hypothermia  Plan:   Continue home Levothyroxine 200mcg  Suspect hyponatremia of malignancy  Bare hugger     ID:   Diagnosis: Sepsis  Sepsis alert - hyperthermia,  tachycardia, leukocytosis, bandemia  Plan:   Sepsis pathway  Blood cultures pending  Sputum culture collected  4L IV Lactated ringers  Rocephin and Vancomycin initiated     MSK/Skin:   Diagnosis: Anasarca  Plan:   Monitor fluid status     Disposition: Critical care    ICU Core Measures     Vented Patient  VAP Bundle  VAP bundle ordered     A: Assess, Prevent, and Manage Pain Has pain been assessed? Yes  Need for changes to pain regimen? No   B: Both Spontaneous Awakening Trials (SATs) and Spontaneous Breathing Trials (SBTs) Plan to perform spontaneous awakening trial today? Yes   Plan to perform spontaneous breathing trial today? Yes   Obvious barriers to extubation? No   C: Choice of Sedation RASS Goal: 0 Alert and Calm  Need for changes to sedation or analgesia regimen? No   D: Delirium CAM-ICU: Negative   E: Early Mobility  Plan for early mobility? Yes   F: Family Engagement Plan for family engagement today? Yes       Antibiotic Review: Awaiting culture results.     Review of Invasive Devices:    Barclay Plan: Continue for accurate I/O monitoring for 48 hours and Voiding trial after improvement in ambulation     Linda Plan: Keep arterial line for hemodynamic monitoring    Prophylaxis:  VTE SDH, IPH, Stroke   Stress Ulcer  covered byfamotidine (PEPCID) tablet 20 mg [[id number]]         24 Hour Events : No acute overnight events. IR plan for biopsy on Thursday. Patient   Subjective   Review of Systems: See HPI for Review of Systems    Objective :                   Vitals I/O      Most Recent Min/Max in 24hrs   Temp 98.2 °F (36.8 °C) Temp  Min: 97.9 °F (36.6 °C)  Max: 101.5 °F (38.6 °C)   Pulse 100 Pulse  Min: 60  Max: 132   Resp 17 Resp  Min: 12  Max: 28   /90 BP  Min: 101/76  Max: 146/93   O2 Sat 100 % SpO2  Min: 92 %  Max: 100 %      Intake/Output Summary (Last 24 hours) at 10/15/2024 0767  Last data filed at 10/15/2024 0555  Gross per 24 hour   Intake 609.57 ml   Output 1675 ml   Net -1065.43 ml       Diet  NPO    Invasive Monitoring   Arterial Line  Linda /68  Arterial Line BP  Min: 90/56  Max: 132/70   MAP 80 mmHg  Arterial Line MAP (mmHg)  Min: 68 mmHg  Max: 90 mmHg           Physical Exam   Physical Exam  Vitals reviewed.   Eyes:      General: Dysconjugate gaze.      Conjunctiva/sclera: Conjunctivae normal.   Skin:     General: Skin is warm.   HENT:      Head: Normocephalic.      Comments: Surgical scar present, drain present  Cardiovascular:      Rate and Rhythm: Normal rate and regular rhythm.      Pulses: Normal pulses.   Abdominal:      Palpations: Abdomen is soft.   Constitutional:       Appearance: He is well-developed.   Pulmonary:      Effort: Pulmonary effort is normal.      Breath sounds: Rhonchi present.   Neurological:      Comments: Patient opens eyes.  Patient nods head appropriately to answer questions.  Follows directions. Sticks tongue out. Attempts to touch chin to chest.  Patient gives thumbs up and squeezes hands in b/l upper extremities  Antigravity movement in RUE, no antigravity movement in LUE.  Antigravity movement in RLE, no antigravity in LLE.   Genitourinary/Anorectal:  Barclay present.        Diagnostic Studies        Lab Results: I have reviewed the following results:     Medications:  Scheduled PRN   acetylcysteine, 3 mL, Q6H  calcium gluconate, 2 g, Once  cefTRIAXone, 1,000 mg, Q24H  chlorhexidine, 15 mL, Q12H KAYLIE  Cholecalciferol, 5,000 Units, Daily  famotidine, 20 mg, BID  ferrous sulfate, 325 mg, Daily With Breakfast  ipratropium, 0.5 mg, Q6H  levalbuterol, 1.25 mg, Q6H  levETIRAcetam, 500 mg, Q12H KAYLIE  levothyroxine, 200 mcg, Early Morning      acetaminophen, 1,000 mg, Q6H PRN  benzonatate, 100 mg, TID PRN  fentaNYL, 50 mcg, Q1H PRN  hydrALAZINE, 10 mg, Q4H PRN  HYDROmorphone, 1 mg, Q3H PRN  ondansetron, 4 mg, Q8H PRN  polyethylene glycol, 17 g, Daily PRN       Continuous    niCARdipine, 1-15 mg/hr  norepinephrine, 1-30 mcg/min, Last Rate: Stopped (10/14/24 0907)          Labs:   CBC    Recent Labs     10/14/24  0435 10/14/24  0438 10/15/24  0008 10/15/24  0426   WBC 9.22  --   --  11.39*   HGB 7.5*   < > 10.4* 9.7*   HCT 22.6*   < > 31.4* 29.2*   *  --   --  120*   BANDSPCT  --   --   --  18*    < > = values in this interval not displayed.     BMP    Recent Labs     10/14/24  0435 10/15/24  0426   SODIUM 132* 131*   K 4.4 4.3    101   CO2 23 23   AGAP 7 7   BUN 21 29*   CREATININE 0.60 0.82   CALCIUM 7.5* 7.6*       Coags    Recent Labs     10/13/24  1450 10/14/24  0435   INR 1.27* 1.35*        Additional Electrolytes  Recent Labs     10/14/24  0435 10/15/24  0426   MG 1.7* 2.0   PHOS 2.9 2.6*   CAIONIZED 1.04* 1.11*          Blood Gas    Recent Labs     10/14/24  0735   PHART 7.440   [id number] 34.7*   PO2ART 151.4*   [id number] 23.0   BEART -0.8   SOURCE Line, Arterial     Recent Labs     10/14/24  0735   SOURCE Line, Arterial    LFTs  Recent Labs     10/14/24  0435 10/15/24  0426   * 211*   * 449*   ALKPHOS 223* 699*   ALB 2.3* 2.5*   TBILI 2.13* 2.31*       Infectious  Recent Labs     10/14/24  0121   PROCALCITONI 1.14*     Glucose  Recent Labs     10/13/24  1252 10/14/24  0435 10/15/24  0426   GLUC 110 86 91

## 2024-10-15 NOTE — PHYSICAL THERAPY NOTE
Physical Therapy Cancellation Note    PT orders received chart review completed. Pt is currently intubated/sedated and not appropriate to participate in skilled PT at this time. PT will follow and eval as medically appropriate.     10/15/24 1100   Note Type   Note type Cancelled Session;Evaluation   Cancel Reasons Intubated/sedated   Cognition   Orientation Level Unable to assess       Shahrzad Longo, PT

## 2024-10-15 NOTE — CONSULTS
NEUROLOGY RESIDENCY CONSULT NOTE     Name: Clayton Duval   Age & Sex: 44 y.o. male   MRN: [id number]  Unit/Bed#: ICU 05   Encounter: [id number]  Length of Stay: 2    Recommendations for outpatient neurological follow up have yet to be determined.      Pending for discharge: Continued Stroke and Malignancy workup     ASSESSMENT & PLAN     Stroke (HCC)  Assessment & Plan  Assessment:  Patient is a 44-year-old male with past medical history hypertension, malnutrition, gastric bypass surgery, VILLALOBOS, hypothyroidism, and anemia that presented with with acute altered mental status and imaging demonstrating left-sided subdural hemorrhage with 1.5 cm rightward midline shift.  Patient underwent left-sided craniectomy for hematoma evacuation, and needed transfusion of multiple blood products after anemia was found in setting of suspected coagulopathy.  Itching studies thus far have also been concerning for metastatic disease having demonstrated enhancing osseous lesions throughout the majority of the visualized spine, bony pelvis, spleen, and liver.  MRI brain demonstrated multifocal early ischemia including right anterior thalamus, anterior occipital lobes left greater than right.  Repeat noncontrast CT head on 10/15 demonstrated continued redistribution of blood products and hemorrhage along with evolving areas of ischemia with punctate areas seen better on recent MRI.    Impression:  Considering patient's imaging findings suggestive of metastatic disease, and laboratory studies suggestive of coagulopathy, further stroke workup with MRI brain with and without contrast, vascular imaging with CTA head and neck, 2D echocardiogram continued evaluation of coagulopathy/metastatic disease is recommended.    Plan:  - Case discussed with attending neurologist Dr. Gonzáles  - Recommend stroke workup  - Recommend MRI brain with and without contrast  - Recommend CTA head and neck with and without contrast  - Recommend 2D  "echocardiogram (TTE)  - Recommend aspirin and atorvastatin when able per primary team  - AC recommendations per NCC/neurosurgical teams   - Continue following hypercoagulable workup  - Recommend systolic blood pressure goals of 110-<140  - Recommend continue cardiopulmonary monitoring  - Neurochecks per primary team  - Recommend STAT noncontrast CT head for any acute change in mental status/neurologic  - Seizure precautions  - Delirium precautions  - Continue to evaluate and treat any metabolic, infectious, inflammatory derangements  - Rest per primary team appreciated      SUBJECTIVE     Reason for Consult / Principal Problem: \"Stroke\"  Hx and PE limited by: Patient intubated and sedated    HPI: Clayton Duval is a 44 y.o.  male with past medical history of hypertension, malnutrition, gastric bypass surgery, VILLALOBOS, hypothyroidism, anemia who initially presented to [facility] after 2 weeks of feeling ill with fevers, chills, cough, and headache.  After seeing his primary care provider on October 1 for the same symptoms, he was diagnosed with a viral illness and was prescribed prednisone.  Patient was noted to have elevated transaminases.  Patient then presented on 10/11/2024 due to cutting his index ED with persistent symptoms.  Patient was found to have increased transaminases and elevated TSH.  Patient was also found to be hyponatremic with a sodium of 127 in the emergency department.  Patient was admitted and had imaging completed of the abdomen with MR imaging that demonstrated multiple lesions in the liver, spleen, and lytic lesions in the spine.  Patient was then reported per chart review to have become acutely altered from 10/13/2024.  Imaging completed as below.  Patient subsequently transferred to [facility] for neurosurgical intervention.  A left sided craniectomy for hematoma evacuation was performed.  During surgery patient was found to be anemic in the setting of suspected " coagulopathy, and was transfused with 4 units of packed RBCs, 4 units of fresh frozen plasma, and 1 unit of platelets.  Patient was also provided vitamin K, DDAVP, and PCC in attempt to reverse hypercoagulable state.    Review of Imaging:  - CT CAP: Small bilateral pleural effusions. Moderate ascites and anasarca. Stable hepatosplenomegaly. Hypoenhancing partially confluent lesions in the spleen concerning for malignancy. Ill-defined areas of hypoenhancement in the right lobe of the liver, not visualized on the prior exam. Malignancy not excluded. Large sclerotic and lytic lesion in the left iliac bone and smaller lytic lesions in the lumbar spine, also concerning for malignancy.  - MRI ABD wwo: Findings concerning for metastatic disease. Enhancing osseous lesions throughout the majority of the visualized spine and bony pelvis. Markedly enlarged spleen much of which is occupied by large confluent hypoenhancing lesions suspected to represent metastases. Hepatomegaly. Multifocal hepatic lesions which correlate to areas of hypoenhancement on CT are also suspected to represent metastasis in the given setting. Anasarca and moderate volume ascites.  - [facility], 10/13/2024 0559: Mixed density left convexity subdural hemorrhage (1.2 cm thickness) with by 1.5 cm rightward midline shift. Trace subdural hemorrhage along the left tentorium. Mass effect with low-lying cerebellar tonsils, effacement of the suprasellar cistern, and partial effacement of the quadrigeminal plate cistern.  - [facility], 10/13/2024 1157: New area of hypodensity within the left thalamus and basal ganglia compared to earlier day exam, suggestive of acute infarct. Expected postsurgical changes from left hemispheric craniectomy with decreased size of mixed density subdural hematoma, improved 4 mm left to right midline shift and improved effacement of the basilar cisterns. Improved mass effect on the ventricular system and dilatation of the right temporal horn of  the lateral ventricle as detailed above.  - MRI Brain wo: Multifocal early ischemia including right anterior thalamus, anterior occipital lobes left greater than right. On the left this tracks into the posterior medial temporal lobe. There is also a punctate focus of cortical ischemia within the posterior parafalcine frontal lobe. Cortical petechial hemorrhage noted within the central aspect of the right thalamus and left anterior occipital lobe. No sofía hemorrhagic transformation. Evaluation of the cervical and intracranial vasculature is recommended. Patient is status post left hemicraniectomy for surgical decompression of acute subdural hemorrhage. Mixed signal subdural hemorrhage, fluid and air remains within the periphery of the left hemisphere with mild mass effect and 4 mm of left to right shift. There is a very small subdural hemorrhage noted within the right middle cranial fossa extending posteriorly lateral to the occipital lobe, less than 2 mm in thickness. Focal intraventricular hemorrhage within the occipital horn of the right greater than left. There is increased T2 and FLAIR signal change surrounding the occipital horn of the lateral ventricle suggesting focal transependymal flow of CSF.  - [facility] 10/15/2024: Status post decompressive left-sided hemicraniectomy with mild interval increase in the degree of mixed attenuation subdural as well as subgaleal hemorrhage along the craniectomy site. Grossly stable mild rightward midline shift. Thin subdural hemorrhage tracking along the right tentorium likely represents redistributed blood products. Small amount of hemorrhage also noted layering in the occipital horns. Evolving areas of ischemia as described above with punctate areas seen to better advantage on recent MRI. No definite CT evidence for new large acute vascular distribution infarct.    Review of recent lab studies:  - Protein S antigen total: 14  - Protein S antigen free: 51  - Protein S  activity: 30  - Protein C activity: 35.0  - Sputum culture and Gram stain demonstrated 2+ growth of staph aureus, 2+ polys, 2+ gram-negative rods, 1+ gram-positive cocci in pairs  - Blood cultures negative after 4 days  - Legionella antigen in process  - Beta-2 glycoprotein antibodies: Negative  - Anticardiolipin antibody: Negative  - WBC 11.39, hemoglobin 9.7, hematocrit 29.2, platelets 120  - Phosphorus 2.9  - Sodium 131, BUN 29, creatinine 0.82, calcium 7.6, , , alk phos 69, albumin 2.5, T. bili 2.31, EGFR 106.  - Ionized calcium 1 0.11  - hCG quantitative: 7.5  - CEA: 2.4  - LD serum: 638  - Vitamin B12: 2000 107  - Vitamin B 9: 14.1    Inpatient consult to Neurology  Consult performed by: Elvin Adams DO  Consult ordered by: Maria Handley        Historical Information   No past medical history on file.  Past Surgical History:   Procedure Laterality Date    BRAIN HEMATOMA EVACUATION Left 10/13/2024    Procedure: CRANIOTOMY FOR SUBDURAL HEMATOMA;  Surgeon: Son Mendoza MD;  Location: [facility];  Service: Neurosurgery    GASTRIC BYPASS  2012    GASTRIC BYPASS       Social History   Social History     Substance and Sexual Activity   Alcohol Use Never     Social History     Substance and Sexual Activity   Drug Use Never     E-Cigarette/Vaping    E-Cigarette Use Never User      E-Cigarette/Vaping Substances     Social History     Tobacco Use   Smoking Status Never   Smokeless Tobacco Current     Family History: No family history on file.  Meds/Allergies   current meds:   Current Facility-Administered Medications:     acetaminophen (Ofirmev) injection 1,000 mg, Q6H PRN, Last Rate: 1,000 mg (10/15/24 1124)    acetylcysteine (MUCOMYST) 200 mg/mL inhalation solution 600 mg, Q6H    benzonatate (TESSALON PERLES) capsule 100 mg, TID PRN    cefTRIAXone (ROCEPHIN) 2,000 mg in dextrose 5 % 50 mL IVPB, Q24H, Last Rate: 2,000 mg (10/15/24 1502)    chlorhexidine (PERIDEX) 0.12 % oral rinse 15 mL, Q12H KAYLIE     Cholecalciferol (VITAMIN D3) tablet 5,000 Units, Daily    famotidine (PEPCID) tablet 20 mg, BID    fentaNYL injection 50 mcg, Q1H PRN    [Held by provider] heparin (porcine) subcutaneous injection 5,000 Units, Q8H KAYLIE    HYDROmorphone (DILAUDID) injection 1 mg, Q3H PRN    ipratropium (ATROVENT) 0.02 % inhalation solution 0.5 mg, Q6H    levalbuterol (XOPENEX) inhalation solution 1.25 mg, Q6H    levETIRAcetam (KEPPRA) injection 500 mg, Q12H KAYLIE    levothyroxine tablet 200 mcg, Early Morning    norepinephrine (LEVOPHED) 4 mg (STANDARD CONCENTRATION) IV in sodium chloride 0.9% 250 mL, Titrated, Last Rate: 2 mcg/min (10/15/24 1445)    ondansetron (ZOFRAN) injection 4 mg, Q8H PRN    polyethylene glycol (MIRALAX) packet 17 g, Daily PRN    sodium phosphate 15 mmol in dextrose 5 % 250 mL Infusion, Once, Last Rate: 15 mmol (10/15/24 1234)    vancomycin (VANCOCIN) 1500 mg in sodium chloride 0.9% 250 mL IVPB, Q12H and PTA meds:   Prior to Admission Medications   Prescriptions Last Dose Informant Patient Reported? Taking?   Cholecalciferol 125 MCG (5000 UT) capsule   Yes No   Sig: Take 5,000 Units by mouth daily   cyanocobalamin 1,000 mcg/mL   Yes No   Sig: Inject 1,000 mcg into a muscle every 30 (thirty) days   docusate sodium (COLACE) 100 mg capsule   No No   Sig: Take 1 capsule (100 mg total) by mouth every 12 (twelve) hours   Patient not taking: Reported on 10/11/2024   ferrous sulfate 325 (65 Fe) mg tablet   Yes No   Sig: Take 325 mg by mouth daily with breakfast   furosemide (LASIX) 20 mg tablet   Yes No   Sig: Take 20 mg by mouth daily   hydrocortisone (ANUSOL-HC) 2.5 % rectal cream   No No   Sig: Apply topically 2 (two) times a day for 5 days   levothyroxine 200 mcg tablet   Yes No   Sig: Take 200 mcg by mouth daily   polyethylene glycol (MIRALAX) 17 g packet   Yes No   Sig: Take 17 g by mouth daily      Facility-Administered Medications: None     No Known Allergies    Review of previous medical records was completed.      Review of Systems   Unable to perform ROS: Intubated     OBJECTIVE     Vitals:   Vitals:    10/15/24 1120 10/15/24 1220 10/15/24 1300 10/15/24 1335   BP:   118/79    BP Location:       Pulse:  (!) 114 (!) 108    Resp:  15 16    Temp:  100 °F (37.8 °C) 99.3 °F (37.4 °C)    TempSrc:  Esophageal     SpO2: 99% 97% 100% 100%   Weight:          Body mass index is 30.28 kg/m².     Intake/Output Summary (Last 24 hours) at 10/15/2024 1509  Last data filed at 10/15/2024 1400  Gross per 24 hour   Intake 533.4 ml   Output 1045 ml   Net -511.6 ml     Temperature:   Temp (24hrs), Av.4 °F (37.4 °C), Min:97.9 °F (36.6 °C), Max:101.5 °F (38.6 °C)    Temperature: 99.3 °F (37.4 °C)    Invasive Devices:   Invasive Devices       Peripheral Intravenous Line  Duration             Long-Dwell Peripheral IV (Midline) 10/13/24 Basilic 2 days    Long-Dwell Peripheral IV (Midline) 10/13/24 Basilic 2 days    Peripheral IV 10/12/24 Dorsal (posterior);Right Hand 2 days              Arterial Line  Duration             Arterial Line 10/14/24 Radial 1 day              Drain  Duration             Closed/Suction Drain Left;Lateral Head Bulb 7 Fr. 2 days    Closed/Suction Drain Left;Lateral Head Bulb 7 Fr. 2 days    NG/OG/Enteral Tube Orogastric 16 Fr Right mouth 2 days    Urethral Catheter Latex;Temperature probe 16 Fr. 2 days              Airway  Duration             ETT  Oral 7 mm 2 days                  Physical Exam  Constitutional:       Interventions: He is intubated.   HENT:      Nose: Nose normal.   Eyes:      General:         Right eye: No discharge.         Left eye: No discharge.   Cardiovascular:      Rate and Rhythm: Normal rate.   Pulmonary:      Effort: He is intubated.   Abdominal:      General: There is no distension.   Musculoskeletal:      Right lower leg: Edema present.      Left lower leg: Edema present.   Skin:     General: Skin is warm and dry.      Coloration: Skin is not jaundiced.   Neurological:      Mental Status: He  is alert.        Neurologic Exam     Mental Status   Patient is able to open eyes spontaneously, follows simple commands such as thumbs up on bilateral upper extremities, sticking out of tongue, and tracking examiner to the left and the right.     Cranial Nerves   Pupils are equal and reactive bilaterally, 3 mm, regular in shape, brisk reactivity, no nystagmus noted,.  Patient did not blink to bilateral blink to threat.  Hearing appears to be grossly intact.  Patient is able to protrude tongue.     Motor Exam Patient was able to demonstrate thumbs up on bilateral upper extremities, was able to demonstrate 4/5 in flexion extension of bilateral upper extremities, and demonstrated 4+/5 handgrip bilaterally.  Patient is able to demonstrate momentary antigravity in bilateral lower extremities, demonstrate plantar and dorsiflexion of feet at 4/5 bilaterally, and demonstrating wiggling of toes.     Sensory Exam   Patient demonstrated withdrawal to noxious stimuli in bilateral upper extremities.     Gait, Coordination, and Reflexes Patient did not demonstrate resting tremor.  Brachioradialis, biceps 2+ bilaterally.  Patellar on left 2+, patellar on right 1+.  Downgoing toes bilaterally.  No Michael's bilaterally.  No ankle clonus bilaterally.        LABORATORY DATA     Labs: Results Review Statement: I reviewed radiology reports from this admission including: CT abdomen/pelvis, CT head, MRI brain, and MRI abdomen/MRCP.  Results from last 7 days   Lab Units 10/15/24  0426 10/15/24  0008 10/14/24  2046 10/14/24  0438 10/14/24  0435 10/13/24  1450 10/13/24  1252 10/11/24  0447 10/10/24  2128   WBC Thousand/uL 11.39*  --   --   --  9.22  --  13.82*   < > 6.42   HEMOGLOBIN g/dL 9.7* 10.4* 10.8*   < > 7.5*   < > 8.2*   < > 10.8*   I STAT HEMOGLOBIN   --   --   --   --   --   --   --    < >  --    HEMATOCRIT % 29.2* 31.4* 32.0*   < > 22.6*   < > 24.8*   < > 33.1*   HEMATOCRIT, ISTAT   --   --   --   --   --   --   --    < >  --     PLATELETS Thousands/uL 120*  --   --   --  116*  --  142*   < > 189   MONO PCT % 1*  --   --   --   --   --   --   --  4   EOS PCT % 0  --   --   --   --   --   --   --  0    < > = values in this interval not displayed.      Results from last 7 days   Lab Units 10/15/24  0426 10/14/24  0435 10/13/24  1252 10/13/24  1031 10/13/24  0853   POTASSIUM mmol/L 4.3 4.4 4.2  --   --    CHLORIDE mmol/L 101 102 102  --   --    CO2 mmol/L 23 23 23  --   --    CO2, I-STAT mmol/L  --   --   --  22 24   BUN mg/dL 29* 21 21  --   --    CREATININE mg/dL 0.82 0.60 0.65  --   --    CALCIUM mg/dL 7.6* 7.5* 8.5  --   --    ALK PHOS U/L 699* 223* 289*  --   --    ALT U/L 211* 189* 189*  --   --    AST U/L 449* 437* 392*  --   --    GLUCOSE, ISTAT mg/dl  --   --   --  107 98     Results from last 7 days   Lab Units 10/15/24  0426 10/14/24  0435 10/13/24  0534   MAGNESIUM mg/dL 2.0 1.7* 1.8*     Results from last 7 days   Lab Units 10/15/24  0426 10/14/24  0435 10/11/24  0447   PHOSPHORUS mg/dL 2.6* 2.9 3.6      Results from last 7 days   Lab Units 10/14/24  0435 10/13/24  1450 10/13/24  0850 10/13/24  0534 10/12/24  0449 10/11/24  0447 10/10/24  2128   INR  1.35* 1.27* 1.87*   < >  --    < > 1.74*   PTT seconds  --   --   --   --  63*  --  54*    < > = values in this interval not displayed.     Results from last 7 days   Lab Units 10/14/24  0435   LACTIC ACID mmol/L 3.5*           IMAGING & DIAGNOSTIC TESTING     Radiology Results: Results Review Statement: I reviewed radiology reports from this admission including: CT head, MRI brain, and MRI abdomen/MRCP.  CT head wo contrast   Final Result by Fabricio Almeida MD (10/15 1324)      Status post decompressive left-sided hemicraniectomy with mild interval increase in the degree of mixed attenuation subdural as well as subgaleal hemorrhage along the craniectomy site. Grossly stable mild rightward midline shift.      Thin subdural hemorrhage tracking along the right tentorium likely represents  "redistributed blood products. Small amount of hemorrhage also noted layering in the occipital horns.      Evolving areas of ischemia as described above with punctate areas seen to better advantage on recent MRI. No definite CT evidence for new large acute vascular distribution infarct.      The study was marked in EPIC for immediate notification.                  Workstation performed: [id number]         MRI brain wo contrast   Final Result by Wil Brennan DO (10/14 2317)      Multifocal early ischemia including right anterior thalamus, anterior occipital lobes left greater than right. On the left this tracks into the posterior medial temporal lobe. There is also a punctate focus of cortical ischemia within the posterior    parafalcine frontal lobe. Cortical petechial hemorrhage noted within the central aspect of the right thalamus and left anterior occipital lobe. No sofía hemorrhagic transformation. Evaluation of the cervical and intracranial vasculature is recommended.      Patient is status post left hemicraniectomy for surgical decompression of acute subdural hemorrhage. Mixed signal subdural hemorrhage, fluid and air remains within the periphery of the left hemisphere with mild mass effect and 4 mm of left to right    shift.      There is a very small subdural hemorrhage noted within the right middle cranial fossa extending posteriorly lateral to the occipital lobe, less than 2 mm in thickness.      Focal intraventricular hemorrhage within the occipital horn of the right greater than left. There is increased T2 and FLAIR signal change surrounding the occipital horn of the lateral ventricle suggesting focal transependymal flow of CSF.      This examination was marked \"immediate notification\" in Epic in order to begin the standard process by which the radiology reading room liaison alerts the referring practitioner.      Workstation performed: [id number]         CT head wo contrast   Final Result by " Pradip Lovett MD (10/13 1323)      -New area of hypodensity within the left thalamus and basal ganglia compared to earlier day exam, suggestive of acute infarct.      -Expected postsurgical changes from left hemispheric craniectomy with decreased size of mixed density subdural hematoma, improved 4 mm left to right midline shift and improved effacement of the basilar cisterns.      -Improved mass effect on the ventricular system and dilatation of the right temporal horn of the lateral ventricle as detailed above.      I personally communicated the findings via telephone with Son Jazzmine at 12:56 pm. on 10/13/2024.                  Workstation performed: [id number]         IR biopsy bone    (Results Pending)     Other Diagnostic Testing: Results Review Statement: I reviewed radiology reports from this admission including: CT head, MRI brain, and MRI abdomen/MRCP.    ACTIVE MEDICATIONS       Current Facility-Administered Medications:     acetaminophen (Ofirmev) injection 1,000 mg, Q6H PRN, Last Rate: 1,000 mg (10/15/24 1124)    acetylcysteine (MUCOMYST) 200 mg/mL inhalation solution 600 mg, Q6H    benzonatate (TESSALON PERLES) capsule 100 mg, TID PRN    cefTRIAXone (ROCEPHIN) 2,000 mg in dextrose 5 % 50 mL IVPB, Q24H, Last Rate: 2,000 mg (10/15/24 1502)    chlorhexidine (PERIDEX) 0.12 % oral rinse 15 mL, Q12H KAYLIE    Cholecalciferol (VITAMIN D3) tablet 5,000 Units, Daily    famotidine (PEPCID) tablet 20 mg, BID    fentaNYL injection 50 mcg, Q1H PRN    [Held by provider] heparin (porcine) subcutaneous injection 5,000 Units, Q8H KAYLIE    HYDROmorphone (DILAUDID) injection 1 mg, Q3H PRN    ipratropium (ATROVENT) 0.02 % inhalation solution 0.5 mg, Q6H    levalbuterol (XOPENEX) inhalation solution 1.25 mg, Q6H    levETIRAcetam (KEPPRA) injection 500 mg, Q12H KAYLIE    levothyroxine tablet 200 mcg, Early Morning    norepinephrine (LEVOPHED) 4 mg (STANDARD CONCENTRATION) IV in sodium chloride 0.9% 250 mL, Titrated, Last Rate: 2  mcg/min (10/15/24 1445)    ondansetron (ZOFRAN) injection 4 mg, Q8H PRN    polyethylene glycol (MIRALAX) packet 17 g, Daily PRN    sodium phosphate 15 mmol in dextrose 5 % 250 mL Infusion, Once, Last Rate: 15 mmol (10/15/24 1234)    vancomycin (VANCOCIN) 1500 mg in sodium chloride 0.9% 250 mL IVPB, Q12H    Prior to Admission medications    Medication Sig Start Date End Date Taking? Authorizing Provider   Cholecalciferol 125 MCG (5000 UT) capsule Take 5,000 Units by mouth daily    Historical Provider, MD   cyanocobalamin 1,000 mcg/mL Inject 1,000 mcg into a muscle every 30 (thirty) days 10/1/24   Historical Provider, MD   docusate sodium (COLACE) 100 mg capsule Take 1 capsule (100 mg total) by mouth every 12 (twelve) hours  Patient not taking: Reported on 10/11/2024 6/30/23   Brendon Luz MD   ferrous sulfate 325 (65 Fe) mg tablet Take 325 mg by mouth daily with breakfast 2/26/24   Historical Provider, MD   furosemide (LASIX) 20 mg tablet Take 20 mg by mouth daily 3/12/24   Historical Provider, MD   hydrocortisone (ANUSOL-HC) 2.5 % rectal cream Apply topically 2 (two) times a day for 5 days 6/30/23 7/5/23  Brendon Luz MD   levothyroxine 200 mcg tablet Take 200 mcg by mouth daily 3/14/24   Historical Provider, MD   polyethylene glycol (MIRALAX) 17 g packet Take 17 g by mouth daily    Historical Provider, MD     CODE STATUS & ADVANCED DIRECTIVES     Code Status: Level 1 - Full Code  Advance Directive and Living Will:      Power of :    POLST:      VTE Pharmacologic Prophylaxis: Per primary team  VTE Mechanical Prophylaxis: Per primary team    ======    I have discussed the patient's history, physical exam findings, assessment, and plan in detail with attending, Dr. Gonzáles    Thank you for allowing me to participate in the care of your patient, Clayton Duval.    Elvin Adams,   [facility], PGY-4

## 2024-10-15 NOTE — ASSESSMENT & PLAN NOTE
Assessment:  Patient is a 44-year-old male with past medical history hypertension, malnutrition, gastric bypass surgery, VILLALOBOS, hypothyroidism, and anemia that presented with with acute altered mental status and imaging demonstrating left-sided subdural hemorrhage with 1.5 cm rightward midline shift.  Patient underwent left-sided craniectomy for hematoma evacuation, and needed transfusion of multiple blood products after anemia was found in setting of suspected coagulopathy.  Itching studies thus far have also been concerning for metastatic disease having demonstrated enhancing osseous lesions throughout the majority of the visualized spine, bony pelvis, spleen, and liver.  MRI brain demonstrated multifocal early ischemia including right anterior thalamus, anterior occipital lobes left greater than right.  Repeat noncontrast CT head on 10/15 demonstrated continued redistribution of blood products and hemorrhage along with evolving areas of ischemia with punctate areas seen better on recent MRI.    Impression:  Considering patient's imaging findings suggestive of metastatic disease, and laboratory studies suggestive of coagulopathy, further stroke workup with MRI brain with and without contrast, vascular imaging with CTA head and neck, 2D echocardiogram and continued evaluation of coagulopathy/metastatic disease is recommended.    Plan:  - Case discussed with attending neurologist Dr. Gonzáles  - Recommend stroke workup  - Recommend MRI brain with and without contrast  - Recommend CTA head and neck with and without contrast  - Recommend 2D echocardiogram (TTE)  - Recommend aspirin and atorvastatin when able per primary team  - AC recommendations per NCC/neurosurgical teams   - Recommend vEEG with waxing and waning mental status  - Continue following hypercoagulable workup  - Recommend systolic blood pressure goals of 110-<140  - Recommend continue cardiopulmonary monitoring  - Neurochecks per primary team  - Recommend  STAT noncontrast CT head for any acute change in mental status/neurologic  - Seizure precautions  - Delirium precautions  - Continue to evaluate and treat any metabolic, infectious, inflammatory derangements  - Rest per primary team appreciated

## 2024-10-15 NOTE — OCCUPATIONAL THERAPY NOTE
OT CANCEL NOTE    OT orders received. Chart reviewed. Pt is currently intubated/sedated and not appropriate to engage in skilled OT services at this time. Will hold initial OT evaluation. Will continue to follow pt on caseload and see pt when medically stable and as clinically appropriate.       10/15/24 1226   OT Last Visit   OT Visit Date 10/15/24   Note Type   Note type Evaluation;Cancelled Session   Cancel Reasons Intubated/sedated       Mariela Matso MS, OTR/L

## 2024-10-15 NOTE — RESPIRATORY THERAPY NOTE
RT Ventilator Management Note  Clayton Duval 44 y.o. male MRN: [id number]  Unit/Bed#: ICU 05 Encounter: [id number]      Daily Screen         10/14/2024  1127 10/15/2024  0719          Patient safety screen outcome:: Passed Passed (P)                 Physical Exam:   Assessment Type: During-treatment  General Appearance: Sleeping  Respiratory Pattern: Assisted  Chest Assessment: Chest expansion symmetrical  Bilateral Breath Sounds: Clear  Suction: Oral, ET Tube  O2 Device: vent      Resp Comments: Received on CMV settings. placed pt into SPONT 6/8. BIlateral BS clear. ETT in correct postion and repostioned. Suctioned for sm,thin yellow secretionhs

## 2024-10-15 NOTE — PROGRESS NOTES
Progress Note - Neurosurgery   Name: Clayton Duval 44 y.o. male I MRN: [id number]  Unit/Bed#: ICU 05 I Date of Admission: 10/13/2024   Date of Service: 10/15/2024 I Hospital Day: 2    Assessment & Plan  Non-traumatic acute L subdural hematoma (HCC)  POD#2 left [facility] for evacuation of SDH (Dr. Dover, 10/13)  Found unresponsive in hospital 10/13.  Recently w/ c/o generalized fatigue/SOB x several weeks - hospitalized at [facility] for this. Noted on workup with lesions to liver, spleen, pelvis and lumbar spine concerning for metastatic disease.  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 Expected postsurgical changes from left hemispheric craniectomy with decreased size of mixed density subdural hematoma, improved 4 mm left to right midline shift and improved effacement of the basilar cisterns. Improved mass effect on the ventricular system and dilatation of the right temporal horn of the lateral ventricle as detailed above.  MRI brain wo, 10/13/1024: Multifocal early ischemia including right anterior thalamus, anterior occipital lobes left greater than right. On the left this tracks into the posterior medial temporal lobe. There is also a punctate focus of cortical ischemia within the posterior parafalcine frontal lobe. Cortical petechial hemorrhage noted within the central aspect of the right thalamus and left anterior occipital lobe. No sofía hemorrhagic transformation. Evaluation of the cervical and intracranial vasculature is recommended. Patient is status post left hemicraniectomy for surgical decompression of acute subdural hemorrhage. Mixed signal subdural hemorrhage, fluid and air remains within the periphery of the left hemisphere with mild mass effect and 4 mm of left to right shift. There is a very small subdural  hemorrhage noted within the right middle cranial fossa extending posteriorly lateral to the occipital lobe, less than 2 mm in thickness. Focal intraventricular hemorrhage within the occipital horn of the right greater than left. There is increased T2 and FLAIR signal change surrounding the occipital horn of the lateral ventricle suggesting focal transependymal flow of CSF.    Plan:  Continue to monitor neuro exam closely.  STAT CT head with decline in GCS > 2 pts in 1 hour.  Concern for infarcts on MRI brain - recommend neurology consultation.  Hem/onc consulted - ongoing workup for unknown primary planning for IR biopsy.  2 SAIRA drains:  Left lateral anterior #1: 100 mL/24 hrs  Left lateral posterior #2: discontinued 10/15  Wean to extubate per CCM.  Maintain plts > 100, s/p transfusion 1 unit plts intrapop, RBCs, PCC.  Mobilize with PT/OT.  DVT ppx: SCDs, continue to hold any pharmacologic DVT ppx.    Neurosurgery will continue to follow. Call with questions.      Neurosurgery service will follow.  Please contact the SecureChat role for the Neurosurgery service with any questions/concerns.    Subjective   Patient opens eyes to stimulus. Tracks. Follows commands in all 4 extremities.  Incision clean and dry with telfa dressing.  1 anterior SAIRA drain in place.   Flap full but soft.  1 posterior SAIRA drain discontinued, site tied closed with stay suture.    Objective :  Temp:  [97.9 °F (36.6 °C)-101.5 °F (38.6 °C)] 99.7 °F (37.6 °C)  HR:  [] 122  BP: (101-146)/(74-93) 125/90  Resp:  [15-28] 18  SpO2:  [92 %-100 %] 100 %  O2 Device: Ventilator    I/O         10/13 0701  10/14 0700 10/14 0701  10/15 0700 10/15 0701  10/16 0700    P.O. 0 60     I.V. (mL/kg) 2815 (26.1) 282.1 (2.7)     Blood 2600      NG/ 110     IV Piggyback 4803 157.5     Total Intake(mL/kg) 84534 (96.1) 609.6 (5.9)     Urine (mL/kg/hr) 3065 1540 (0.6)     Emesis/NG output 50 0     Drains 70 135     Stool  0     Blood 500      Total Output 3685  1675     Net +6698 -1065.4            Unmeasured Stool Occurrence  2 x           Physical Exam  Vitals and nursing note reviewed.   Constitutional:       General: He is not in acute distress.     Appearance: He is well-developed. He is ill-appearing.   HENT:      Head: Normocephalic and atraumatic.   Eyes:      General: Scleral icterus present.   Cardiovascular:      Rate and Rhythm: Normal rate and regular rhythm.      Heart sounds: Murmur heard.   Pulmonary:      Comments: Mechanically ventilated via ETT  Abdominal:      Tenderness: There is no abdominal tenderness.   Musculoskeletal:         General: No swelling.      Cervical back: Neck supple.   Skin:     General: Skin is warm and dry.      Capillary Refill: Capillary refill takes less than 2 seconds.      Coloration: Skin is pale.      Neurological Exam  Mental Status  Arousable to tactile stimuli.    Motor    CARVAJAL to command.        Lab Results: I have reviewed the following results:  Recent Labs     10/12/24  2115 10/12/24  2337 10/14/24  0435 10/14/24  0438 10/15/24  0426   WBC  --    < > 9.22  --  11.39*   HGB 8.2*   < > 7.5*   < > 9.7*   HCT 25.6*   < > 22.6*   < > 29.2*   PLT  --    < > 116*  --  120*   BANDSPCT  --   --   --   --  18*   SODIUM  --    < > 132*  --  131*   K  --    < > 4.4  --  4.3   CL  --    < > 102  --  101   CO2  --    < > 23  --  23   BUN  --    < > 21  --  29*   CREATININE  --    < > 0.60  --  0.82   GLUC  --    < > 86  --  91   CAIONIZED  --    < > 1.04*  --  1.11*   MG  --    < > 1.7*  --  2.0   PHOS  --    < > 2.9  --  2.6*   AST  --    < > 437*  --  449*   ALT  --    < > 189*  --  211*   ALB  --    < > 2.3*  --  2.5*   TBILI  --    < > 2.13*  --  2.31*   ALKPHOS  --    < > 223*  --  699*   INR  --    < > 1.35*  --   --    BNP 46  --   --   --   --    LACTICACID 3.9*   < > 3.5*  --   --     < > = values in this interval not displayed.       Imaging Results Review: I personally reviewed the following image studies in PACS and  associated radiology reports: CT head and MRI brain. My interpretation of the radiology images/reports is: as above.  Other Study Results Review: No additional pertinent studies reviewed.    VTE Pharmacologic Prophylaxis: Sequential compression device (Venodyne)

## 2024-10-15 NOTE — RESPIRATORY THERAPY NOTE
10/15/24 1540   Respiratory Assessment   Resp Comments Pt orally and endotracheally suctioned. Pt postive for cuff leak., pt then extubated to NC without incident. No stridor present. Bilateral Bs coarse. pt does have strong cough   Vent Information   Ventilator End Yes

## 2024-10-15 NOTE — PLAN OF CARE
Problem: PAIN - ADULT  Goal: Verbalizes/displays adequate comfort level or baseline comfort level  Description: Interventions:  - Encourage patient to monitor pain and request assistance  - Assess pain using appropriate pain scale  - Administer analgesics based on type and severity of pain and evaluate response  - Implement non-pharmacological measures as appropriate and evaluate response  - Consider cultural and social influences on pain and pain management  - Notify physician/advanced practitioner if interventions unsuccessful or patient reports new pain  Outcome: Progressing     Problem: INFECTION - ADULT  Goal: Absence or prevention of progression during hospitalization  Description: INTERVENTIONS:  - Assess and monitor for signs and symptoms of infection  - Monitor lab/diagnostic results  - Monitor all insertion sites, i.e. indwelling lines, tubes, and drains  - Monitor endotracheal if appropriate and nasal secretions for changes in amount and color  - [place] appropriate cooling/warming therapies per order  - Administer medications as ordered  - Instruct and encourage patient and family to use good hand hygiene technique  - Identify and instruct in appropriate isolation precautions for identified infection/condition  Outcome: Progressing  Goal: Absence of fever/infection during neutropenic period  Description: INTERVENTIONS:  - Monitor WBC    Outcome: Progressing     Problem: SAFETY ADULT  Goal: Patient will remain free of falls  Description: INTERVENTIONS:  - Educate patient/family on patient safety including physical limitations  - Instruct patient to call for assistance with activity   - Consult OT/PT to assist with strengthening/mobility   - Keep Call bell within reach  - Keep bed low and locked with side rails adjusted as appropriate  - Keep care items and personal belongings within reach  - Initiate and maintain comfort rounds  - Make Fall Risk Sign visible to staff  - Offer Toileting every  Hours,  in advance of need  - Initiate/Maintain alarm  - Obtain necessary fall risk management equipment:   - Apply yellow socks and bracelet for high fall risk patients  - Consider moving patient to room near nurses station  Outcome: Progressing  Goal: Maintain or return to baseline ADL function  Description: INTERVENTIONS:  -  Assess patient's ability to carry out ADLs; assess patient's baseline for ADL function and identify physical deficits which impact ability to perform ADLs (bathing, care of mouth/teeth, toileting, grooming, dressing, etc.)  - Assess/evaluate cause of self-care deficits   - Assess range of motion  - Assess patient's mobility; develop plan if impaired  - Assess patient's need for assistive devices and provide as appropriate  - Encourage maximum independence but intervene and supervise when necessary  - Involve family in performance of ADLs  - Assess for home care needs following discharge   - Consider OT consult to assist with ADL evaluation and planning for discharge  - Provide patient education as appropriate  Outcome: Progressing  Goal: Maintains/Returns to pre admission functional level  Description: INTERVENTIONS:  - Perform AM-PAC 6 Click Basic Mobility/ Daily Activity assessment daily.  - Set and communicate daily mobility goal to care team and patient/family/caregiver.   - Collaborate with rehabilitation services on mobility goals if consulted  - Perform Range of Motion  times a day.  - Reposition patient every  hours.  - Dangle patient  times a day  - Stand patient  times a day  - Ambulate patient  times a day  - Out of bed to chair  times a day   - Out of bed for meal times a day  - Out of bed for toileting  - Record patient progress and toleration of activity level   Outcome: Progressing     Problem: DISCHARGE PLANNING  Goal: Discharge to home or other facility with appropriate resources  Description: INTERVENTIONS:  - Identify barriers to discharge w/patient and caregiver  - Arrange for  needed discharge resources and transportation as appropriate  - Identify discharge learning needs (meds, wound care, etc.)  - Arrange for interpretive services to assist at discharge as needed  - Refer to Case Management Department for coordinating discharge planning if the patient needs post-hospital services based on physician/advanced practitioner order or complex needs related to functional status, cognitive ability, or social support system  Outcome: Progressing     Problem: Knowledge Deficit  Goal: Patient/family/caregiver demonstrates understanding of disease process, treatment plan, medications, and discharge instructions  Description: Complete learning assessment and assess knowledge base.  Interventions:  - Provide teaching at level of understanding  - Provide teaching via preferred learning methods  Outcome: Progressing     Problem: Prexisting or High Potential for Compromised Skin Integrity  Goal: Skin integrity is maintained or improved  Description: INTERVENTIONS:  - Identify patients at risk for skin breakdown  - Assess and monitor skin integrity  - Assess and monitor nutrition and hydration status  - Monitor labs   - Assess for incontinence   - Turn and reposition patient  - Assist with mobility/ambulation  - Relieve pressure over bony prominences  - Avoid friction and shearing  - Provide appropriate hygiene as needed including keeping skin clean and dry  - Evaluate need for skin moisturizer/barrier cream  - Collaborate with interdisciplinary team   - Patient/family teaching  - Consider wound care consult   Outcome: Progressing     Problem: Nutrition/Hydration-ADULT  Goal: Nutrient/Hydration intake appropriate for improving, restoring or maintaining nutritional needs  Description: Monitor and assess patient's nutrition/hydration status for malnutrition. Collaborate with interdisciplinary team and initiate plan and interventions as ordered.  Monitor patient's weight and dietary intake as ordered or  per policy. Utilize nutrition screening tool and intervene as necessary. Determine patient's food preferences and provide high-protein, high-caloric foods as appropriate.     INTERVENTIONS:  - Monitor oral intake, urinary output, labs, and treatment plans  - Assess nutrition and hydration status and recommend course of action  - Evaluate amount of meals eaten  - Assist patient with eating if necessary   - Allow adequate time for meals  - Recommend/ encourage appropriate diets, oral nutritional supplements, and vitamin/mineral supplements  - Order, calculate, and assess calorie counts as needed  - Recommend, monitor, and adjust tube feedings and TPN/PPN based on assessed needs  - Assess need for intravenous fluids  - Provide specific nutrition/hydration education as appropriate  - Include patient/family/caregiver in decisions related to nutrition  Outcome: Progressing

## 2024-10-16 ENCOUNTER — TELEPHONE (OUTPATIENT)
Dept: NEUROSURGERY | Facility: CLINIC | Age: 44
End: 2024-10-16

## 2024-10-16 LAB
BACTERIA BLD CULT: NORMAL
BACTERIA BLD CULT: NORMAL

## 2024-10-16 NOTE — SEPSIS NOTE
"  Sepsis Note   Clayton Duval 44 y.o. male MRN: [id number]  Unit/Bed#: ICU 05 Encounter: [id number]       Initial Sepsis Screening       Row Name 10/16/24 0107 10/14/24 0054             Is the patient's history suggestive of a new or worsening infection? Yes (Proceed)  -JZ Yes (Proceed)  -TB       Suspected source of infection pneumonia  - pneumonia  -TB       Indicate SIRS criteria Hyperthemia > 38.3C (100.9F) OR Hypothermia <36C (96.8F);Tachycardia > 90 bpm;Tachypnea > 20 resp per min  -JZ Hyperthemia > 38.3C (100.9F) OR Hypothermia <36C (96.8F);Tachycardia > 90 bpm;Leukocytosis (WBC > 26781 IJL) OR Leukopenia (WBC <4000 IJL) OR Bandemia (WBC >10% bands)  -TB       Are two or more of the above signs & symptoms of infection both present and new to the patient? Yes (Proceed)  -JZ Yes (Proceed)  -TB       Assess for evidence of organ dysfunction: Are any of the below criteria present within 6 hours of suspected infection and SIRS criteria that are NOT considered to be chronic conditions? -- SBP < 90;MAP < 65;New need for invasive/non-invasive ventilation  -TB       Date of presentation of severe sepsis -- 10/15/24  -TB       Time of presentation of severe sepsis -- 0054  -TB       Date of presentation of septic shock -- 10/14/24  -TB       Time of presentation of septic shock -- 0055  -TB       Fluid Resuscitation: -- 30 ml/kg IV fluid bolus will be given based on actual body weight  -TB       Is the patient is persistently hypotensive in the hour after fluid bolus administration? If yes, patient meets criteria for vasopressor use. -- YES  -TB       Sepsis Note: Click \"NEXT\" below (NOT \"close\") to generate sepsis note based on above information. -- YES (proceed by clicking \"NEXT\")  -TB                 User Key  (r) = Recorded By, (t) = Taken By, (c) = Cosigned By      Initials Name Provider Type    MARINA Angulo PA-C Physician Assistant    TB Igor Fowler PA-C Physician Assistant              " "      Default Flowsheet Data (Last 720 Hours)       Sepsis Reassess       Row Name 10/14/24 0321                   Repeat Volume Status and Tissue Perfusion Assessment Performed    Date of Reassessment: 10/14/24  -        Time of Reassessment: 0321  -TB        Sepsis Reassessment Note: Click \"NEXT\" below (NOT \"close\") to generate sepsis reassessment note. YES (proceed by clicking \"NEXT\")  -TB        Repeat Volume Status and Tissue Perfusion Assessment Performed --                  User Key  (r) = Recorded By, (t) = Taken By, (c) = Cosigned By      Initials Name Provider Type    TB Igor Fowler PA-C Physician Assistant                    Body mass index is 30.28 kg/m².  Wt Readings from Last 1 Encounters:   10/15/24 104 kg (229 lb 8 oz)        Ideal body weight: 79.9 kg (176 lb 2.4 oz)  Adjusted ideal body weight: 89.6 kg (197 lb 7.8 oz)    Daniel Angulo PA-C      "

## 2024-10-16 NOTE — NUTRITION
Consult- tube feeding      10/16/24 1124   Recommendations/Interventions   Interventions/Recommendations Adjust EN/ PN   Recommendations to Provider Current EN regimen not meeting estimated protein needs.   Rec adjusting goal to Vital AF 1.2 @ 50ml/hr (x22hrs) with 3 packs ProSource No Carb liquid protein.   Provides 1100ml volume, 1500kcal, 128gPRO, 892ml free water.   Continue free water flushes. Replete electrolytes

## 2024-10-16 NOTE — PLAN OF CARE
Problem: PAIN - ADULT  Goal: Verbalizes/displays adequate comfort level or baseline comfort level  Description: Interventions:  - Encourage patient to monitor pain and request assistance  - Assess pain using appropriate pain scale  - Administer analgesics based on type and severity of pain and evaluate response  - Implement non-pharmacological measures as appropriate and evaluate response  - Consider cultural and social influences on pain and pain management  - Notify physician/advanced practitioner if interventions unsuccessful or patient reports new pain  Outcome: Progressing     Problem: INFECTION - ADULT  Goal: Absence or prevention of progression during hospitalization  Description: INTERVENTIONS:  - Assess and monitor for signs and symptoms of infection  - Monitor lab/diagnostic results  - Monitor all insertion sites, i.e. indwelling lines, tubes, and drains  - Monitor endotracheal if appropriate and nasal secretions for changes in amount and color  - [place] appropriate cooling/warming therapies per order  - Administer medications as ordered  - Instruct and encourage patient and family to use good hand hygiene technique  - Identify and instruct in appropriate isolation precautions for identified infection/condition  Outcome: Progressing     Problem: SAFETY ADULT  Goal: Patient will remain free of falls  Description: INTERVENTIONS:  - Educate patient/family on patient safety including physical limitations  - Instruct patient to call for assistance with activity   - Consult OT/PT to assist with strengthening/mobility   - Keep Call bell within reach  - Keep bed low and locked with side rails adjusted as appropriate  - Keep care items and personal belongings within reach  - Initiate and maintain comfort rounds  - Make Fall Risk Sign visible to staff  - Offer Toileting every 2 Hours, in advance of need  - Apply yellow socks and bracelet for high fall risk patients  - Consider moving patient to room near nurses  station  Outcome: Progressing  Goal: Maintain or return to baseline ADL function  Description: INTERVENTIONS:  -  Assess patient's ability to carry out ADLs; assess patient's baseline for ADL function and identify physical deficits which impact ability to perform ADLs (bathing, care of mouth/teeth, toileting, grooming, dressing, etc.)  - Assess/evaluate cause of self-care deficits   - Assess range of motion  - Assess patient's mobility; develop plan if impaired  - Assess patient's need for assistive devices and provide as appropriate  - Encourage maximum independence but intervene and supervise when necessary  - Involve family in performance of ADLs  - Assess for home care needs following discharge   - Consider OT consult to assist with ADL evaluation and planning for discharge  - Provide patient education as appropriate  Outcome: Progressing  Goal: Maintains/Returns to pre admission functional level  Description: INTERVENTIONS:  - Perform AM-PAC 6 Click Basic Mobility/ Daily Activity assessment daily.  - Set and communicate daily mobility goal to care team and patient/family/caregiver.   - Collaborate with rehabilitation services on mobility goals if consulted  - Reposition patient every 2 hours.  - Out of bed for toileting  - Record patient progress and toleration of activity level   Outcome: Progressing     Problem: DISCHARGE PLANNING  Goal: Discharge to home or other facility with appropriate resources  Description: INTERVENTIONS:  - Identify barriers to discharge w/patient and caregiver  - Arrange for needed discharge resources and transportation as appropriate  - Identify discharge learning needs (meds, wound care, etc.)  - Arrange for interpretive services to assist at discharge as needed  - Refer to Case Management Department for coordinating discharge planning if the patient needs post-hospital services based on physician/advanced practitioner order or complex needs related to functional status, cognitive  ability, or social support system  Outcome: Progressing     Problem: Knowledge Deficit  Goal: Patient/family/caregiver demonstrates understanding of disease process, treatment plan, medications, and discharge instructions  Description: Complete learning assessment and assess knowledge base.  Interventions:  - Provide teaching at level of understanding  - Provide teaching via preferred learning methods  Outcome: Progressing     Problem: Prexisting or High Potential for Compromised Skin Integrity  Goal: Skin integrity is maintained or improved  Description: INTERVENTIONS:  - Identify patients at risk for skin breakdown  - Assess and monitor skin integrity  - Assess and monitor nutrition and hydration status  - Monitor labs   - Assess for incontinence   - Turn and reposition patient  - Assist with mobility/ambulation  - Relieve pressure over bony prominences  - Avoid friction and shearing  - Provide appropriate hygiene as needed including keeping skin clean and dry  - Evaluate need for skin moisturizer/barrier cream  - Collaborate with interdisciplinary team   - Patient/family teaching  - Consider wound care consult   Outcome: Progressing     Problem: Nutrition/Hydration-ADULT  Goal: Nutrient/Hydration intake appropriate for improving, restoring or maintaining nutritional needs  Description: Monitor and assess patient's nutrition/hydration status for malnutrition. Collaborate with interdisciplinary team and initiate plan and interventions as ordered.  Monitor patient's weight and dietary intake as ordered or per policy. Utilize nutrition screening tool and intervene as necessary. Determine patient's food preferences and provide high-protein, high-caloric foods as appropriate.     INTERVENTIONS:  - Monitor oral intake, urinary output, labs, and treatment plans  - Assess nutrition and hydration status and recommend course of action  - Evaluate amount of meals eaten  - Assist patient with eating if necessary   - Allow  adequate time for meals  - Recommend/ encourage appropriate diets, oral nutritional supplements, and vitamin/mineral supplements  - Order, calculate, and assess calorie counts as needed  - Recommend, monitor, and adjust tube feedings and TPN/PPN based on assessed needs  - Assess need for intravenous fluids  - Provide specific nutrition/hydration education as appropriate  - Include patient/family/caregiver in decisions related to nutrition  Outcome: Progressing

## 2024-10-16 NOTE — RESTORATIVE TECHNICIAN NOTE
Restorative Technician Note      Patient Name: Clayton Duval     Restorative Tech Visit Date: 10/16/24    Pt order received for craniectomy helmet. Pt currently not following commands and not medically appropriate to be fit. Will continue to follow and don as appropriate.    Please contact [facility] Restorative tech on Epic Secure Chat  in regards to bracing instruction and/or adjustment.    Jessica Bee, Restorative Tech

## 2024-10-16 NOTE — TELEPHONE ENCOUNTER
10/16/24 - PT IN [facility]  11/1/24 2 WK POV W/CT HEAD *[facility]*  12/3/24 6 WK POV W/JA *[facility]*    [facility]er[facility]  Good morning!    Patient needs appointment in 2 weeks with AP with CT head. He also needs 6 week POV with Dr. Dover. Thanks.

## 2024-10-16 NOTE — DISCHARGE INSTR - AVS FIRST PAGE
Discharge Instructions  Craniectomy     Activity:  Do not lift, push or pull more than 10 pounds for 2 weeks.  Avoid bending, lifting and twisting for 2 weeks. No running. No athletic activities until cleared.   No driving for at least 2 weeks or until cleared by Neurosurgery.   When able to shower, continue to use clean towel and washcloth for 2 weeks post-op.  Do not use a hair dryer, and avoid hair products such as mousse, oils, and gels. Do not brush your hair away from the incision since this will put strain on the suture line.  Do not dye or perm hair for 6 weeks or until cleared by physician.  Continue to change bed linens and pajamas more frequently. Wear clean clothes daily.   May walk as tolerated. Recommend 4 short walks daily.     Surgical incision care:  Keep dressings in place for 3 days.  After 3 days, incisions may be left open to air, but should remain clean.  Keep incisions dry for 3 days.  May shower after 3 days using a baby shampoo including head incision. Rinse off shampoo and pat dry.   Avoid rubbing the incision but gently massage hair.   Do not immerse the incisions in water for 6 weeks.  Staples/suture will be removed at your 2 week postoperative visit.   Do not apply any creams or ointments to the incision, unless otherwise instructed by [facility].  Contact office if increasing redness, drainage, pain or swelling occurs around the incisions or if you develop a fever greater than 101F  Do not dye/perm hair or use any hair products until cleared by Neurosurgery.     Postoperative medication:  [facility] will provide pain medication in the postoperative period. All prescriptions must come from a single practice.   Take medications as prescribed.  Call office with any questions/concerns.  May use over the counter Tylenol. No NSAIDs (ie. Ibuprofen, Aleve, Advil, Naproxen)  Please contact office for questions regarding dosage and  modifications.  No antiplatelet or anticoagulation medication (ie. Coumadin, Aspirin, Plavix) until cleared by [facility], unless otherwise instructed. Please contact [facility] if you have any questions about the effects of any of your medications on blood clotting.  Do not operate heavy machinery or vehicles while taking sedating medications.  Use a bowel regimen while on opioids as they induce constipation. Ie. Senokot-S, Miralax, Colace, etc. Increase fiber and water intake.     Helmet care:  Wear helmet at all times when out of bed and walking around. YOU DO NOT NEED TO WEAR IT WHILE SLEEPING.  Contact our office with any issues with your helmet or if you need replacement liners.        Follow-up as scheduled for a 2 week post-operative visit for an incision check.    ** Please notify the office if incision becomes red, swollen, tender, or has increased drainage, and temp>101.  Return to the ER if you experience increased headache, drowsiness, weakness, nausea/vomiting, or seizures.**

## 2024-10-16 NOTE — PLAN OF CARE
Problem: PAIN - ADULT  Goal: Verbalizes/displays adequate comfort level or baseline comfort level  Description: Interventions:  - Encourage patient to monitor pain and request assistance  - Assess pain using appropriate pain scale  - Administer analgesics based on type and severity of pain and evaluate response  - Implement non-pharmacological measures as appropriate and evaluate response  - Consider cultural and social influences on pain and pain management  - Notify physician/advanced practitioner if interventions unsuccessful or patient reports new pain  Outcome: Progressing     Problem: INFECTION - ADULT  Goal: Absence or prevention of progression during hospitalization  Description: INTERVENTIONS:  - Assess and monitor for signs and symptoms of infection  - Monitor lab/diagnostic results  - Monitor all insertion sites, i.e. indwelling lines, tubes, and drains  - Monitor endotracheal if appropriate and nasal secretions for changes in amount and color  - [place] appropriate cooling/warming therapies per order  - Administer medications as ordered  - Instruct and encourage patient and family to use good hand hygiene technique  - Identify and instruct in appropriate isolation precautions for identified infection/condition  Outcome: Progressing     Problem: SAFETY ADULT  Goal: Patient will remain free of falls  Description: INTERVENTIONS:  - Educate patient/family on patient safety including physical limitations  - Instruct patient to call for assistance with activity   - Consult OT/PT to assist with strengthening/mobility   - Keep Call bell within reach  - Keep bed low and locked with side rails adjusted as appropriate  - Keep care items and personal belongings within reach  - Initiate and maintain comfort rounds  - Make Fall Risk Sign visible to staff  - Offer Toileting every 2 Hours, in advance of need  - Apply yellow socks and bracelet for high fall risk patients  - Consider moving patient to room near nurses  station  Outcome: Progressing  Goal: Maintain or return to baseline ADL function  Description: INTERVENTIONS:  -  Assess patient's ability to carry out ADLs; assess patient's baseline for ADL function and identify physical deficits which impact ability to perform ADLs (bathing, care of mouth/teeth, toileting, grooming, dressing, etc.)  - Assess/evaluate cause of self-care deficits   - Assess range of motion  - Assess patient's mobility; develop plan if impaired  - Assess patient's need for assistive devices and provide as appropriate  - Encourage maximum independence but intervene and supervise when necessary  - Involve family in performance of ADLs  - Assess for home care needs following discharge   - Consider OT consult to assist with ADL evaluation and planning for discharge  - Provide patient education as appropriate  Outcome: Progressing  Goal: Maintains/Returns to pre admission functional level  Description: INTERVENTIONS:  - Perform AM-PAC 6 Click Basic Mobility/ Daily Activity assessment daily.  - Set and communicate daily mobility goal to care team and patient/family/caregiver.   - Collaborate with rehabilitation services on mobility goals if consulted  - Reposition patient every 2 hours.  - Out of bed for toileting  - Record patient progress and toleration of activity level   Outcome: Progressing     Problem: DISCHARGE PLANNING  Goal: Discharge to home or other facility with appropriate resources  Description: INTERVENTIONS:  - Identify barriers to discharge w/patient and caregiver  - Arrange for needed discharge resources and transportation as appropriate  - Identify discharge learning needs (meds, wound care, etc.)  - Arrange for interpretive services to assist at discharge as needed  - Refer to Case Management Department for coordinating discharge planning if the patient needs post-hospital services based on physician/advanced practitioner order or complex needs related to functional status, cognitive  ability, or social support system  Outcome: Progressing     Problem: Knowledge Deficit  Goal: Patient/family/caregiver demonstrates understanding of disease process, treatment plan, medications, and discharge instructions  Description: Complete learning assessment and assess knowledge base.  Interventions:  - Provide teaching at level of understanding  - Provide teaching via preferred learning methods  Outcome: Progressing     Problem: Prexisting or High Potential for Compromised Skin Integrity  Goal: Skin integrity is maintained or improved  Description: INTERVENTIONS:  - Identify patients at risk for skin breakdown  - Assess and monitor skin integrity  - Assess and monitor nutrition and hydration status  - Monitor labs   - Assess for incontinence   - Turn and reposition patient  - Assist with mobility/ambulation  - Relieve pressure over bony prominences  - Avoid friction and shearing  - Provide appropriate hygiene as needed including keeping skin clean and dry  - Evaluate need for skin moisturizer/barrier cream  - Collaborate with interdisciplinary team   - Patient/family teaching  - Consider wound care consult   Outcome: Progressing     Problem: Nutrition/Hydration-ADULT  Goal: Nutrient/Hydration intake appropriate for improving, restoring or maintaining nutritional needs  Description: Monitor and assess patient's nutrition/hydration status for malnutrition. Collaborate with interdisciplinary team and initiate plan and interventions as ordered.  Monitor patient's weight and dietary intake as ordered or per policy. Utilize nutrition screening tool and intervene as necessary. Determine patient's food preferences and provide high-protein, high-caloric foods as appropriate.     INTERVENTIONS:  - Monitor oral intake, urinary output, labs, and treatment plans  - Assess nutrition and hydration status and recommend course of action  - Evaluate amount of meals eaten  - Assist patient with eating if necessary   - Allow  adequate time for meals  - Recommend/ encourage appropriate diets, oral nutritional supplements, and vitamin/mineral supplements  - Order, calculate, and assess calorie counts as needed  - Recommend, monitor, and adjust tube feedings and TPN/PPN based on assessed needs  - Assess need for intravenous fluids  - Provide specific nutrition/hydration education as appropriate  - Include patient/family/caregiver in decisions related to nutrition  Outcome: Progressing     Problem: SAFETY,RESTRAINT: NV/NON-SELF DESTRUCTIVE BEHAVIOR  Goal: Remains free of harm/injury (restraint for non violent/non self-detsructive behavior)  Description: INTERVENTIONS:  - Instruct patient/family regarding restraint use   - Assess and monitor physiologic and psychological status   - Provide interventions and comfort measures to meet assessed patient needs   - Identify and implement measures to help patient regain control  - Assess readiness for release of restraint   Outcome: Progressing  Goal: Returns to optimal restraint-free functioning  Description: INTERVENTIONS:  - Assess the patient's behavior and symptoms that indicate continued need for restraint  - Identify and implement measures to help patient regain control  - Assess readiness for release of restraint   Outcome: Progressing

## 2024-10-16 NOTE — PROGRESS NOTES
Vancomycin IV Pharmacy-to-Dose Consultation    Clayton Duval is a 44 y.o. male who is currently receiving Vancomycin IV with management by the Pharmacy Consult service.    Relevant clinical data and objective / subjective history reviewed.      Vancomycin Assessment:  Indication: Soft tissue (goal -600, trough >10) broad spectrum for pending cultures  Status: critically ill  Micro:   - Attempting to culture sacral wound  - 10/14 Sputum: S. Aureus  Procalcitonin: 1.13 on 10/16  Renal Function:     Lab Results   Component Value Date    CREATININE 0.69 10/16/2024     Estimated Creatinine Clearance: 171.1 mL/min (by C-G formula based on SCr of 0.69 mg/dL).  Dialysis: no  Days of Therapy: 3  Current Dose: 1500 mg IV q12h   Goal AUC / Trough: -600, trough >10   Last Level: 11.5 on 10/16  Model Fit:  Good  Assessment: Sputum likely will result as MSSA, but patient has significant sacral wound and team wishes to continue broad spectrum while waiting for culture results. WBC wnl, febrile.       Vancomycin Plan:  New Dosing: change to 1250 mg IV q8h   Predicted Trough / AUC: 12.7 / 453  Next Level: on 10/23 at 0600  Renal Function Monitoring: Daily BMP and UOP      Pharmacy will continue to follow closely for s/sx of nephrotoxicity, infusion reactions and appropriateness of therapy.  BMP and CBC will be ordered per protocol. We will continue to follow the patient’s culture results and clinical progress daily.       Jonah Mendoza, PharmD, BCCCP  Critical Care Clinical Pharmacist  Available via [employer]

## 2024-10-16 NOTE — QUICK NOTE
Notified by RN that posterior drain site from drain that was discontinued yesterday continuing to drain consistently serosanguinous fluid onto claudia despite multiple pressure dressing.    Site cleansed thoroughly with chlorhexadine swabs. Stay suture discontinued. Under sterile technique, local field block to incision with 1 mL lidocaine 2%. When adequate anesthesia was obtained, 1 x figure-of-eight 3-0 ethilon sutures placed to leaking drain site.    Patient tolerated procedure well.

## 2024-10-16 NOTE — PROGRESS NOTES
NEUROLOGY RESIDENCY PROGRESS NOTE     Name: Clayton Duval   Age & Sex: 44 y.o. male   MRN: [id number]  Unit/Bed#: ICU 05   Encounter: [id number]    Recommendations for outpatient neurological follow up have yet to be determined.     Pending for discharge: Stroke workup    ASSESSMENT & PLAN     Stroke (HCC)  Assessment & Plan  Assessment:  Patient is a 44-year-old male with past medical history hypertension, malnutrition, gastric bypass surgery, VILLALOBOS, hypothyroidism, and anemia that presented with with acute altered mental status and imaging demonstrating left-sided subdural hemorrhage with 1.5 cm rightward midline shift.  Patient underwent left-sided craniectomy for hematoma evacuation, and needed transfusion of multiple blood products after anemia was found in setting of suspected coagulopathy.  Itching studies thus far have also been concerning for metastatic disease having demonstrated enhancing osseous lesions throughout the majority of the visualized spine, bony pelvis, spleen, and liver.  MRI brain demonstrated multifocal early ischemia including right anterior thalamus, anterior occipital lobes left greater than right.  Repeat noncontrast CT head on 10/15 demonstrated continued redistribution of blood products and hemorrhage along with evolving areas of ischemia with punctate areas seen better on recent MRI.    Impression:  Considering patient's imaging findings suggestive of metastatic disease, and laboratory studies suggestive of coagulopathy, further stroke workup with MRI brain with and without contrast, vascular imaging with CTA head and neck, 2D echocardiogram and continued evaluation of coagulopathy/metastatic disease is recommended.    Plan:  - Case discussed with attending neurologist Dr. Gonzáles  - Recommend stroke workup  - Recommend MRI brain with and without contrast  - Recommend CTA head and neck with and without contrast  - Recommend 2D echocardiogram (TTE)  - Recommend aspirin and  atorvastatin when able per primary team  - AC recommendations per NCC/neurosurgical teams   - Recommend vEEG with waxing and waning mental status  - Continue following hypercoagulable workup  - Recommend systolic blood pressure goals of 110-<140  - Recommend continue cardiopulmonary monitoring  - Neurochecks per primary team  - Recommend STAT noncontrast CT head for any acute change in mental status/neurologic  - Seizure precautions  - Delirium precautions  - Continue to evaluate and treat any metabolic, infectious, inflammatory derangements  - Rest per primary team appreciated          SUBJECTIVE     Patient was seen and examined. No acute events overnight. Patient was febrile overnight, continued showing signs of lethargy.  Follows limited commands.  He is now extubated.            Review of Systems   Unable to perform ROS: Mental status change       OBJECTIVE     Patient ID: Clayton Duval is a 44 y.o. male.    Vitals:    10/16/24 0753 10/16/24 0800 10/16/24 0900 10/16/24 1000   BP:  155/74 142/74 (!) 146/101   Pulse:  (!) 120 (!) 124 (!) 136   Resp:  (!) 23 (!) 11 (!) 27   Temp:       TempSrc:       SpO2: 99% 99% 96% 94%   Weight:          Temperature:   Temp (24hrs), Av.6 °F (37.6 °C), Min:98 °F (36.7 °C), Max:103.2 °F (39.6 °C)    Temperature: 98.4 °F (36.9 °C)      Physical Exam  HENT:      Nose: Nose normal.   Eyes:      General:         Right eye: No discharge.         Left eye: No discharge.   Cardiovascular:      Rate and Rhythm: Normal rate.   Abdominal:      General: There is no distension.   Musculoskeletal:      Right lower leg: Edema present.      Left lower leg: Edema present.   Skin:     General: Skin is warm and dry.      Coloration: Skin is not jaundiced.   Neurological:      Mental Status: He is alert.          Neurologic Exam     Mental Status   Level of consciousness: responsive to painful stimuli  Patient is able to follow very limited commands.  Now is extubated.  Not speaking.        Cranial Nerves     CN II   Visual fields full to confrontation.   Pupils are equal and reactive bilaterally, 3 mm, regular in shape, sluggish.   Hearing appears to be grossly intact.       Motor Exam Patient was able to demonstrate thumbs up on bilateral upper extremities, was able to demonstrate 5/5 with RUE but LUE shows decreased strength with  strength, push and pull, and demonstrated 4+/5 handgrip bilaterally.  Patient is able to demonstrate some movement of the proximal LE muscles but no antigravity and also demonstrated wiggling of toes.     Sensory Exam   Patient demonstrated withdrawal to noxious stimuli in bilateral upper and lower extremities.     Gait, Coordination, and Reflexes Patient did not demonstrate resting tremor.  Brachioradialis, biceps 2+ bilaterally.  Patellar on left 2+, patellar on right 1+.  No Michael's bilaterally.  No ankle clonus bilaterally.            LABORATORY DATA     Labs: I have personally reviewed pertinent reports.    Results from last 7 days   Lab Units 10/16/24  0444 10/15/24  0426 10/15/24  0008 10/14/24  0438 10/14/24  0435 10/11/24  0447 10/10/24  2128   WBC Thousand/uL 9.03 11.39*  --   --  9.22   < > 6.42   HEMOGLOBIN g/dL 9.2* 9.7* 10.4*   < > 7.5*   < > 10.8*   I STAT HEMOGLOBIN   --   --   --   --   --    < >  --    HEMATOCRIT % 28.1* 29.2* 31.4*   < > 22.6*   < > 33.1*   HEMATOCRIT, ISTAT   --   --   --   --   --    < >  --    PLATELETS Thousands/uL 120* 120*  --   --  116*   < > 189   MONO PCT %  --  1*  --   --   --   --  4   EOS PCT %  --  0  --   --   --   --  0    < > = values in this interval not displayed.      Results from last 7 days   Lab Units 10/16/24  0444 10/15/24  0426 10/14/24  0435 10/13/24  1252 10/13/24  1031 10/13/24  0853   SODIUM mmol/L 134* 131* 132* 131*  --   --    POTASSIUM mmol/L 3.9 4.3 4.4 4.2  --   --    CHLORIDE mmol/L 102 101 102 102  --   --    CO2 mmol/L 23 23 23 23  --   --    CO2, I-STAT mmol/L  --   --   --   --  22 24    BUN mg/dL 25 29* 21 21  --   --    CREATININE mg/dL 0.69 0.82 0.60 0.65  --   --    CALCIUM mg/dL 7.2* 7.6* 7.5* 8.5  --   --    ALK PHOS U/L  --  699* 223* 289*  --   --    ALT U/L  --  211* 189* 189*  --   --    AST U/L  --  449* 437* 392*  --   --    GLUCOSE, ISTAT mg/dl  --   --   --   --  107 98     Results from last 7 days   Lab Units 10/16/24  0444 10/15/24  0426 10/14/24  0435   MAGNESIUM mg/dL 1.8* 2.0 1.7*     Results from last 7 days   Lab Units 10/16/24  0444 10/15/24  0426 10/14/24  0435   PHOSPHORUS mg/dL 2.3* 2.6* 2.9      Results from last 7 days   Lab Units 10/14/24  0435 10/13/24  1450 10/13/24  0850 10/13/24  0534 10/12/24  0449 10/11/24  0447 10/10/24  2128   INR  1.35* 1.27* 1.87*   < >  --    < > 1.74*   PTT seconds  --   --   --   --  63*  --  54*    < > = values in this interval not displayed.     Results from last 7 days   Lab Units 10/16/24  0444   LACTIC ACID mmol/L 3.2*           IMAGING & DIAGNOSTIC TESTING     Radiology Results: I have personally reviewed pertinent reports.      XR abdomen 1 view kub   Final Result by Gen Mims MD (10/16 1844)      Nasogastric tube terminates in the stomach.               Workstation performed: [id number]         CT head wo contrast   Final Result by Fabricio Almeida MD (10/15 4950)      Status post decompressive left-sided hemicraniectomy with mild interval increase in the degree of mixed attenuation subdural as well as subgaleal hemorrhage along the craniectomy site. Grossly stable mild rightward midline shift.      Thin subdural hemorrhage tracking along the right tentorium likely represents redistributed blood products. Small amount of hemorrhage also noted layering in the occipital horns.      Evolving areas of ischemia as described above with punctate areas seen to better advantage on recent MRI. No definite CT evidence for new large acute vascular distribution infarct.      The study was marked in EPIC for immediate notification.        "           Workstation performed: [id number]         MRI brain wo contrast   Final Result by Wil Brennan DO (10/14 9185)      Multifocal early ischemia including right anterior thalamus, anterior occipital lobes left greater than right. On the left this tracks into the posterior medial temporal lobe. There is also a punctate focus of cortical ischemia within the posterior    parafalcine frontal lobe. Cortical petechial hemorrhage noted within the central aspect of the right thalamus and left anterior occipital lobe. No sofía hemorrhagic transformation. Evaluation of the cervical and intracranial vasculature is recommended.      Patient is status post left hemicraniectomy for surgical decompression of acute subdural hemorrhage. Mixed signal subdural hemorrhage, fluid and air remains within the periphery of the left hemisphere with mild mass effect and 4 mm of left to right    shift.      There is a very small subdural hemorrhage noted within the right middle cranial fossa extending posteriorly lateral to the occipital lobe, less than 2 mm in thickness.      Focal intraventricular hemorrhage within the occipital horn of the right greater than left. There is increased T2 and FLAIR signal change surrounding the occipital horn of the lateral ventricle suggesting focal transependymal flow of CSF.      This examination was marked \"immediate notification\" in Epic in order to begin the standard process by which the radiology reading room liaison alerts the referring practitioner.      Workstation performed: [id number]         CT head wo contrast   Final Result by Pradip Lovett MD (10/13 4203)      -New area of hypodensity within the left thalamus and basal ganglia compared to earlier day exam, suggestive of acute infarct.      -Expected postsurgical changes from left hemispheric craniectomy with decreased size of mixed density subdural hematoma, improved 4 mm left to right midline shift and improved effacement of " the basilar cisterns.      -Improved mass effect on the ventricular system and dilatation of the right temporal horn of the lateral ventricle as detailed above.      I personally communicated the findings via telephone with Son Dover at 12:56 pm. on 10/13/2024.                  Workstation performed: [id number]         IR biopsy bone    (Results Pending)   CT head wo contrast    (Results Pending)       Other Diagnostic Testing: I have personally reviewed pertinent reports.      ACTIVE MEDICATIONS       Current Facility-Administered Medications:     acetaminophen (Ofirmev) injection 1,000 mg, Q6H PRN, Last Rate: Stopped (10/16/24 0142)    acetylcysteine (MUCOMYST) 200 mg/mL inhalation solution 600 mg, Q6H    benzonatate (TESSALON PERLES) capsule 100 mg, TID PRN    chlorhexidine (PERIDEX) 0.12 % oral rinse 15 mL, Q12H KAYLIE    Cholecalciferol (VITAMIN D3) tablet 5,000 Units, Daily    famotidine (PEPCID) tablet 20 mg, BID    fentaNYL injection 50 mcg, Q1H PRN    heparin (porcine) subcutaneous injection 5,000 Units, Q8H KAYLIE    HYDROmorphone (DILAUDID) injection 1 mg, Q3H PRN    ipratropium (ATROVENT) 0.02 % inhalation solution 0.5 mg, Q6H    levalbuterol (XOPENEX) inhalation solution 1.25 mg, Q6H    levETIRAcetam (KEPPRA) injection 750 mg, Q12H KAYLIE    levothyroxine tablet 200 mcg, Early Morning    ondansetron (ZOFRAN) injection 4 mg, Q8H PRN    [COMPLETED] piperacillin-tazobactam (ZOSYN) 4.5 g in sodium chloride 0.9 % 100 mL IV LOADING DOSE, Once, Last Rate: Stopped (10/16/24 0247) **FOLLOWED BY** piperacillin-tazobactam (ZOSYN) 4.5 g in sodium chloride 0.9 % 100 mL IVPB (EXTENDED INFUSION), Q8H, Last Rate: 4.5 g (10/16/24 0542)    polyethylene glycol (MIRALAX) packet 17 g, Daily PRN    vancomycin (VANCOCIN) 1500 mg in sodium chloride 0.9% 250 mL IVPB, Q12H, Last Rate: 0 mg (10/16/24 0023)    Prior to Admission medications    Medication Sig Start Date End Date Taking? Authorizing Provider   Cholecalciferol 125 MCG  (5000 UT) capsule Take 5,000 Units by mouth daily    Historical Provider, MD   cyanocobalamin 1,000 mcg/mL Inject 1,000 mcg into a muscle every 30 (thirty) days 10/1/24   Historical Provider, MD   docusate sodium (COLACE) 100 mg capsule Take 1 capsule (100 mg total) by mouth every 12 (twelve) hours  Patient not taking: Reported on 10/11/2024 6/30/23   Brendon Luz MD   ferrous sulfate 325 (65 Fe) mg tablet Take 325 mg by mouth daily with breakfast 2/26/24   Historical Provider, MD   furosemide (LASIX) 20 mg tablet Take 20 mg by mouth daily 3/12/24   Historical Provider, MD   hydrocortisone (ANUSOL-HC) 2.5 % rectal cream Apply topically 2 (two) times a day for 5 days 6/30/23 7/5/23  Brendon Luz MD   levothyroxine 200 mcg tablet Take 200 mcg by mouth daily 3/14/24   Historical Provider, MD   polyethylene glycol (MIRALAX) 17 g packet Take 17 g by mouth daily    Historical Provider, MD         VTE Pharmacologic Prophylaxis: VTE covered by:  heparin (porcine), Subcutaneous, 5,000 Units at 10/15/24 1409      VTE Mechanical Prophylaxis: sequential compression device    ======    I have discussed the patient's history, physical exam findings, assessment, and plan in detail with attending, Dr. Gonzáles    Thank you for allowing me to participate in the care of your patient, Clayton Duval.    Albino Pugh DO  [facility], PGY-2

## 2024-10-16 NOTE — WOUND OSTOMY CARE
Progress Note - Wound   Clayton Duval 44 y.o. male MRN: [id number]  Unit/Bed#: ICU 05 Encounter: [id number]        Assessment:   Patient's primary nurse sent secure message about concerns of worsening sacral/buttocks wound. Patient is in ICU on critical care low air loss mattress, ATR in place. Patient has indwelling plasencia catheter and is incontinent of bowel. Patient is dependent for all care at this time.    POA evolving DTI sacral/buttocks- wound is full thickness with beefy red and yellow slough tissue, undermining noted, wound bed is still evolving with purple/black intact nonblanchable erythema. Patient has moderate amount of foul-smelling purulent drainage coming from wound. Obtained wound culture per order. Recommend surgery consult for possible debridement. Wound care will continue to follow.      Skin Care Plan:  1-Sacral wound: Cleanse with saline, apply aqualcel ag, gently packed, and then over wound bed, cover with silicone bordered foam. Change daily and PRN soilage/displacement.  2-Turn/reposition q2h for pressure re-distribution on skin.  3-EHOB  cushion when out of bed  4-Elevate heels to offload pressure  5-Moisturize skin daily with skin nourishing cream.  6. Kreg low air loss mattress   7. ATR turning system   8. Silicone foam to bilateral heels summer with a P and date peel and check skin integrity every shift change every 3 days   9. Prevalon boots     Wound 10/13/24 Head Left (Active)   Wound Description Bleeding 10/16/24 1200   Ximena-wound Assessment Dry;Intact 10/16/24 0400   Drainage Amount Moderate 10/16/24 1200   Drainage Description Bloody 10/16/24 1200   Dressing Gauze 10/16/24 1200   Dressing Status Intact;New drainage 10/16/24 1200       Wound 10/13/24 Pressure Injury Perineum (Active)   Wound Image   10/16/24 1303   Wound Description Beefy red;Black;Necrotic;Non-blanchable erythema;Slough 10/16/24 1303   Pressure Injury Stage DTPI 10/16/24 1303   Ximena-wound Assessment Fragile  10/16/24 1303   Wound Length (cm) 12 cm 10/16/24 1303   Wound Width (cm) 13.5 cm 10/16/24 1303   Wound Depth (cm) 1.4 cm 10/16/24 1303   Wound Surface Area (cm^2) 162 cm^2 10/16/24 1303   Wound Volume (cm^3) 226.8 cm^3 10/16/24 1303   Calculated Wound Volume (cm^3) 226.8 cm^3 10/16/24 1303   Change in Wound Size % -656 10/16/24 1303   Number of underminings 1 10/16/24 1303   Undermining 1 3.4 10/16/24 1303   Undermining 1 is depth extending from 10-2 10/16/24 1303   Wound Site Closure ISAIAH 10/16/24 1303   Drainage Amount Moderate 10/16/24 1303   Drainage Description Purulent;Foul smelling 10/16/24 1303   Non-staged Wound Description Full thickness 10/16/24 1303   Treatments Cleansed;Irrigation with NSS 10/16/24 1303   Dressing Foam, Silicon (eg. Allevyn, etc);Silver dressing 10/16/24 1303   Wound packed? Yes 10/16/24 1303   Packing- # removed 0 10/16/24 1303   Packing- # inserted 1 10/16/24 1303   Dressing Changed New 10/16/24 1303   Patient Tolerance Tolerated well 10/16/24 1303   Dressing Status Clean;Dry;Intact 10/16/24 1303               Renetta Davila BSN, RN, CWOCN

## 2024-10-16 NOTE — PROGRESS NOTES
Progress Note - Critical Care/ICU   Name: Clayton Duval 44 y.o. male I MRN: [id number]  Unit/Bed#: ICU 05 I Date of Admission: 10/13/2024   Date of Service: 10/16/2024 I Hospital Day: 3       Assessment & Plan   Neuro:   Diagnosis: Subdural hematoma s/p left hemicraniectomy for evacuation of SDH POD#3, AMS, Acute ischemic stroke  CT head - 10/13 0616 - preop: Mixed density left convexity subdural hemorrhage (1.2 cm thickness) with by 1.5 cm rightward midline shift. Trace subdural hemorrhage along the left tentorium. Mass effect with low-lying cerebellar tonsils, effacement of the suprasellar cistern, and partial effacement of the quadrigeminal plate cistern.  CT head - 10/13 - post op: New area of hypodensity within the left thalamus and basal ganglia compared to earlier day exam, suggestive of acute infarct. Expected postsurgical changes from left hemispheric craniectomy with decreased size of mixed density subdural hematoma, improved 4 mm left to right midline shift and improved effacement of the basilar cisterns. Improved mass effect on the ventricular system and dilatation of the right temporal horn of the lateral ventricle  Plan:   MRI brain w/wo contrast  CTH repeat on 10/17  Continue monitoring neurological exam closely with frequent neuro checks, obtain stat CTH if any acute changes  Continuous vEEG for AMS.  Monitor for signs of ICP increase (bradycardia, HTN, changes in neuro exam, increased tone, pupillary changes)  Blood Pressure: SBP goal 110 - 140, cardene gtt or pressers/fluids as needed to keep within range.  AC/AP: Holding AP and AC at this time. Consider lovenox for dvt prophylaxis after biopsy   Imaging/Diagnostic Studies: MRI Brain w/o contrast  Labs Pending:  Seizure Prophylaxis: Keppra 1g load followed by 750mg BID for 7 days   Tight glycemic control, goal <180. Monitor temperature, tylenol PRN.  PT/OT/Speech/PMR consults when able  DVT PPx: SCDs and heparin 5000 q8h   CV:   Diagnosis:  sinus bradycardia  Plan:   SBP goal 110 - 140, Cardene as needed  Continue to monitor on Telemetry     Pulm:  Diagnosis: NC  Plan:      GI:   Diagnosis: Hepatosplenomegaly, hepatic lesions, splenic lesions, transaminitis  Hepatitis panel - negative  Plan:   Oncology following along  IR consulted - plan for biopsy of a hepatic lesion vs iliac bone lesion - 10/17/24     :   Diagnosis: Barclay in place  Plan:   Continue for acute illness  Monitor I&Os     F/E/N:   F:   E: replenish as indicated for Magnesium >2, K >4  N: NG tube for tube feeds - NPO after midnight     Heme/Onc:   Diagnosis: suspected metastatic cancer, hypocoagulability in the setting of hepatic malignancy, lytic bone lesions, hepatic coagulopathy  MRI Abdomen w/wo contrast - 10/11/24: Findings concerning for metastatic disease. Enhancing osseous lesions throughout the majority of the visualized spine and bony pelvis. Markedly enlarged spleen much of which is occupied by large confluent hypoenhancing lesions suspected to represent metastases. Hepatomegaly. Multifocal hepatic lesions which correlate to areas of hypoenhancement on CT are also suspected to represent metastasis in the given setting. Anasarca and moderate volume ascites  Fibrinogen level - 161  PT-INR - 21.6/1.87 - 10/13 - 0800 - repeat postop pending  Plan:   Factor 5 level  PT-INR  Kcentra 2000 units  DDAVP  Vitamin K  In the OR due to anemia and coagulopathy patient was given: 4 units of pRBCs, 4 units of FFP and 1 unit of platelets  IR consulted for biopsy  Oncology consult  Tumor markers ordered - CEA, B-HCG, LDH, CA 19-9  B12, Folate, reticulocyte count     Endo:   Diagnosis: Hypothyroidism, hyponatremia, hypothermia, SIADH  Plan:   Continue home Levothyroxine 200mcg  Suspect hyponatremia of malignancy  Bare hugger  Hyponatremia due to water retention likely in the setting of the patient's intracranial pathologies as well as      ID:   Diagnosis: Sepsis  Sepsis alert - hyperthermia,  tachycardia, leukocytosis, bandemia  Blood cultures 10/11 - no growth  Blood cultures 10/14 - no growth  Sputum cultures 10/14 - MSSA  4L IV Lactated ringers  Zosyn and Vancomycin initiated  Plan:   Sepsis pathway  Sacral decubitus ulcer cultured  MSK/Skin:   Diagnosis: Anasarca, sacral decubitus ulcer  Plan:   Monitor fluid status  Wound care consulted  Red surgery consult for debridement     Disposition: Critical care    ICU Core Measures     A: Assess, Prevent, and Manage Pain Has pain been assessed? Yes  Need for changes to pain regimen? No   B: Both SAT/SAT  N/A   C: Choice of Sedation RASS Goal: -1 Drowsy  Need for changes to sedation or analgesia regimen? No   D: Delirium CAM-ICU: Negative   E: Early Mobility  Plan for early mobility? Yes   F: Family Engagement Plan for family engagement today? Yes       Antibiotic Review: Awaiting culture results.     Review of Invasive Devices:    Barclay Plan: Continue for accurate I/O monitoring for 48 hours and Voiding trial after improvement in ambulation     Linda Plan: Keep arterial line for hemodynamic monitoring    Prophylaxis:  VTE VTE covered by:  [Held by provider] heparin (porcine), Subcutaneous, 5,000 Units at 10/15/24 1409       Stress Ulcer  covered byfamotidine (PEPCID) tablet 20 mg [[id number]]         24 Hour Events :  patient had a hypoglycemic episode overnight. The patient had a fever of 103.2 as well a lactic acid of 3.5 and procal of 1.1. Patient's antibiotic regimen was increased to zosyn and vancomycin. Sacral ulcer cultures collected.    Subjective   Review of Systems: See HPI for Review of Systems    Objective :                   Vitals I/O      Most Recent Min/Max in 24hrs   Temp 98 °F (36.7 °C) Temp  Min: 98 °F (36.7 °C)  Max: 103.2 °F (39.6 °C)   Pulse (!) 108 Pulse  Min: 92  Max: 134   Resp 18 Resp  Min: 12  Max: 26   /76 BP  Min: 110/62  Max: 146/80   O2 Sat 91 % SpO2  Min: 90 %  Max: 100 %      Intake/Output Summary (Last 24 hours) at  10/16/2024 0635  Last data filed at 10/16/2024 0600  Gross per 24 hour   Intake 1854.59 ml   Output 1397 ml   Net 457.59 ml       Diet Enteral/Parenteral; Tube Feeding No Oral Diet; Jevity 1.2 Dionicio; Continuous; 75; 40; Water; Every 4 hours    Invasive Monitoring   Arterial Line  [place] /66  Arterial Line BP  Min: 98/60  Max: 134/76   MAP 86 mmHg  Arterial Line MAP (mmHg)  Min: 74 mmHg  Max: 108 mmHg           Physical Exam   Physical Exam  Vitals reviewed.   Eyes:      Comments: Opens eyes, minimally tracks   HENT:      Head:      Comments: Surgical scars w/ bandages present  Cardiovascular:      Rate and Rhythm: Normal rate and regular rhythm.      Pulses: Normal pulses.   Abdominal:      Palpations: Abdomen is soft.   Constitutional:       Appearance: He is well-developed.   Pulmonary:      Effort: Pulmonary effort is normal.      Breath sounds: Rhonchi present.   Neurological:      Comments: Follows simple directions. Opens eyes.  RUE - moves antigravity and squeezes hand.  LUE no movement  RLE moves antigravity  B/L LE wiggles toes   Genitourinary/Anorectal:  Barclay present.        Diagnostic Studies        Lab Results: I have reviewed the following results:     Medications:  Scheduled PRN   acetylcysteine, 3 mL, Q6H  chlorhexidine, 15 mL, Q12H KAYLIE  Cholecalciferol, 5,000 Units, Daily  famotidine, 20 mg, BID  [Held by provider] heparin (porcine), 5,000 Units, Q8H KAYLIE  ipratropium, 0.5 mg, Q6H  levalbuterol, 1.25 mg, Q6H  levETIRAcetam, 750 mg, Q12H KAYLIE  levothyroxine, 200 mcg, Early Morning  multi-electrolyte, 1,000 mL, Once  piperacillin-tazobactam, 4.5 g, Q8H  vancomycin, 15 mg/kg, Q12H      acetaminophen, 1,000 mg, Q6H PRN  benzonatate, 100 mg, TID PRN  fentaNYL, 50 mcg, Q1H PRN  HYDROmorphone, 1 mg, Q3H PRN  ondansetron, 4 mg, Q8H PRN  polyethylene glycol, 17 g, Daily PRN       Continuous          Labs:   CBC    Recent Labs     10/15/24  0426 10/16/24  0444   WBC 11.39* 9.03   HGB 9.7* 9.2*   HCT 29.2*  28.1*   * 120*   BANDSPCT 18*  --      BMP    Recent Labs     10/15/24  0426 10/16/24  0444   SODIUM 131* 134*   K 4.3 3.9    102   CO2 23 23   AGAP 7 9   BUN 29* 25   CREATININE 0.82 0.69   CALCIUM 7.6* 7.2*       Coags    No recent results     Additional Electrolytes  Recent Labs     10/15/24  0426 10/16/24  0444   MG 2.0 1.8*   PHOS 2.6* 2.3*   CAIONIZED 1.11* 1.12          Blood Gas    Recent Labs     10/14/24  0735   PHART 7.440   KZI0SAD 34.7*   PO2ART 151.4*   ZFS8QDP 23.0   BEART -0.8   SOURCE Line, Arterial     Recent Labs     10/14/24  0735   SOURCE Line, Arterial    LFTs  Recent Labs     10/15/24  0426   *   *   ALKPHOS 699*   ALB 2.5*   TBILI 2.31*       Infectious  Recent Labs     10/16/24  0133   PROCALCITONI 1.13*     Glucose  Recent Labs     10/15/24  0426 10/16/24  0444   GLUC 91 88

## 2024-10-16 NOTE — ASSESSMENT & PLAN NOTE
POD#3 left [facility] for evacuation of SDH (Dr. Dover, 10/13)  Found unresponsive in hospital 10/13.  Recently w/ c/o generalized fatigue/SOB x several weeks - hospitalized at [facility] for this. Noted on workup with lesions to liver, spleen, pelvis and lumbar spine concerning for metastatic disease.  History of gastric bypass and biliary duodenal switch procedure (2012)    Imaging:  CT head wo, 10/15/2024: Status post decompressive left-sided hemicraniectomy with mild interval increase in the degree of mixed attenuation subdural as well as subgaleal hemorrhage along the craniectomy site. Grossly stable mild rightward midline shift. Thin subdural hemorrhage tracking along the right tentorium likely represents redistributed blood products. Small amount of hemorrhage also noted layering in the occipital horns. Evolving areas of ischemia as described above with punctate areas seen to better advantage on recent MRI. No definite CT evidence for new large acute vascular distribution infarct.    Plan:  Continue to monitor neuro exam closely.  STAT CT head with decline in GCS > 2 pts in 1 hour.  Concern for infarcts on MRI brain - neurology consulted, appreciate recommendations.  Hem/onc consulted - ongoing workup for unknown primary planning for IR biopsy.  2 SAIRA drains:  Left lateral anterior #1: discontinued 10/16  Left lateral posterior #2: discontinued 10/15  Ongoing sepsis workup, Tmax 103.3F  Maintain plts > 100, s/p transfusion 1 unit plts intrapop, RBCs, PCC.  Mobilize with PT/OT.  DVT ppx: SCDs, heparin SQ.    Neurosurgery will sign off. Follow up in 2 weeks for incision check with CT head. Call with questions.

## 2024-10-16 NOTE — OCCUPATIONAL THERAPY NOTE
OT CANCEL NOTE:    Orders for OT evaluation received. Pt is currently not able to participate in OT evaluation due to medical status. Will continue to follow and see as able.

## 2024-10-16 NOTE — PROGRESS NOTES
Patient had a blood glucose of 56 at 12:06 AM.  One half amp of D50 administered.  Repeat blood glucose 89.  Continue to monitor patient's blood glucose closely.  Patient is on trickle feeds at this time.    At 1257 am temp 103.2. Tylenol administered and ice packs placed in axilla and groin. Plan for low dose Motrin 200mg if fever does not improve. Patient is on abx, ceftriaxone and vancomycin. Blood cx negative to date. Sputum +2 S. Aureus and GNR. MRSA negative.     Despite antibiotics patient continues to have fevers every night. Patient's prior imaging reviewed for possible source of infection.  In reviewing patient's CT of his abdomen he has hepatosplenomegaly and a large sclerotic and lytic mass of his left iliac bone and lytic lesions of his lumbar spine.  This is all concerning for possible malignancy.  MRI of the abdomen concerning for metastatic disease with a large confluent hypoenhancing lesions suspected to represent diastasis of the spleen and liver.  This may be contributing to his febrile events.  Patient's spleen is significantly enlarged, he has lytic lesions as well as hepatic lesions.  With the patient's presentation and imaging there would be concern for primary splenic etiology of malignancy such as lymphoma or possibly a primary splenic angiosarcoma.  In addition to the abnormal imaging findings patient's CBC with differential shows no lymphocytes.  This again may be secondary to malignant process.  Cannot rule out lymphopenia is related to infectious process.  Additional workup sent for etiology of underlying suspected malignancy including LDH, CEA, alpha-fetoprotein, SPEP/UPEP, sed rate, MADDISON and C-reactive protein.  COVID and flu which can lead to lymphopenia has been negative for this patient.  Hepatitis panel is negative.  Plan to check HIV.  Also check histoplasmosis antigen.  Consider additional workup.  Patient may benefit from biopsy.  If patient continues to have febrile events  consider reimaging.  Antibiotics were broadened with persistent febrile events.  Sepsis documentation completed.    Patient's mental status continues to wax and wane.  He does have spontaneous movement of his right upper and lower extremity.  Trend patient's ammonia level as it had been elevated prior.  Avoid sedating medications.  Attempt fever control.    Critical care time does not include procedures or family update.  Critical care time 45 minutes

## 2024-10-16 NOTE — PHYSICAL THERAPY NOTE
Physical Therapy Cancellation Note       10/16/24 1113   PT Last Visit   PT Visit Date 10/16/24   Note Type   Note type Evaluation;Cancelled Session   Cancel Reasons Medical status  (lethargic and not following commands. defer at this time per RN request. PT will continue  to follow and complete eval as indicated.)

## 2024-10-16 NOTE — PROGRESS NOTES
Progress Note - Neurosurgery   Name: Clayton Duval 44 y.o. male I MRN: [id number]  Unit/Bed#: ICU 05 I Date of Admission: 10/13/2024   Date of Service: 10/16/2024 I Hospital Day: 3    Assessment & Plan  Non-traumatic acute L subdural hematoma (HCC)  POD#3 left [facility] for evacuation of SDH (Dr. Dover, 10/13)  Found unresponsive in hospital 10/13.  Recently w/ c/o generalized fatigue/SOB x several weeks - hospitalized at [facility] for this. Noted on workup with lesions to liver, spleen, pelvis and lumbar spine concerning for metastatic disease.  History of gastric bypass and biliary duodenal switch procedure (2012)    Imaging:  CT head wo, 10/15/2024: Status post decompressive left-sided hemicraniectomy with mild interval increase in the degree of mixed attenuation subdural as well as subgaleal hemorrhage along the craniectomy site. Grossly stable mild rightward midline shift. Thin subdural hemorrhage tracking along the right tentorium likely represents redistributed blood products. Small amount of hemorrhage also noted layering in the occipital horns. Evolving areas of ischemia as described above with punctate areas seen to better advantage on recent MRI. No definite CT evidence for new large acute vascular distribution infarct.    Plan:  Continue to monitor neuro exam closely.  STAT CT head with decline in GCS > 2 pts in 1 hour.  Concern for infarcts on MRI brain - neurology consulted, appreciate recommendations.  Hem/onc consulted - ongoing workup for unknown primary planning for IR biopsy.  2 SAIRA drains:  Left lateral anterior #1: discontinued 10/16  Left lateral posterior #2: discontinued 10/15  Ongoing sepsis workup, Tmax 103.3F  Maintain plts > 100, s/p transfusion 1 unit plts intrapop, RBCs, PCC.  Mobilize with PT/OT.  DVT ppx: SCDs, heparin SQ.    Neurosurgery will sign off. Follow up in 2 weeks for incision check with CT head. Call with questions.    Sepsis (HCC)    Stroke (HCC)      I have discussed the  above management plan in detail with the primary service.   Neurosurgery service signing off.  Please contact the SecureChat role for the Neurosurgery service with any questions/concerns.    Subjective   Remains critically ill.  Extubated, on 4 L NC.  Following commands on R, no response to commands on left, withdrawing to pain.  More lethargic this am, not verbalizing.  R craniectomy incision clean and dry, well approximated. Flap full but soft.  Anterior SAIRA drain discontinued and stay-suture tied - tolerated well.    Objective :  Temp:  [98 °F (36.7 °C)-103.2 °F (39.6 °C)] 98.4 °F (36.9 °C)  HR:  [102-134] 124  BP: (110-155)/(56-87) 155/74  Resp:  [11-26] 11  SpO2:  [90 %-100 %] 96 %  O2 Device: Nasal cannula  Nasal Cannula O2 Flow Rate (L/min):  [4 L/min] 4 L/min    I/O         10/14 0701  10/15 0700 10/15 0701  10/16 0700 10/16 0701  10/17 0700    P.O. 60  0    I.V. (mL/kg) 282.1 (2.7) 1124.6 (10.8)     Blood       NG/ 180 60    IV Piggyback 157.5 1350     Feedings  200 65    Total Intake(mL/kg) 609.6 (5.9) 2854.6 (27.4) 125 (1.2)    Urine (mL/kg/hr) 1540 (0.6) 1327 (0.5) 170 (0.7)    Emesis/NG output 0 0 0    Drains 135 70 15    Stool 0 0     Blood       Total Output 1675 1397 185    Net -1065.4 +1457.6 -60           Unmeasured Stool Occurrence 2 x 1 x           Physical Exam  Constitutional:       Appearance: He is ill-appearing.   HENT:      Head:      Comments: R craniectomy Incision clean, dry and well-approximated. No drainage or dehiscence.  Flap full but soft.     Neurological Exam  Mental Status  Drowsy.    Motor    No response to commands LUE, LLE weaker response than R.        Lab Results: I have reviewed the following results:  Recent Labs     10/14/24  0435 10/14/24  0438 10/15/24  0426 10/16/24  0133 10/16/24  0444   WBC 9.22  --  11.39*  --  9.03   HGB 7.5*   < > 9.7*  --  9.2*   HCT 22.6*   < > 29.2*  --  28.1*   *  --  120*  --  120*   BANDSPCT  --   --  18*  --   --    SODIUM 132*   --  131*  --  134*   K 4.4  --  4.3  --  3.9     --  101  --  102   CO2 23  --  23  --  23   BUN 21  --  29*  --  25   CREATININE 0.60  --  0.82  --  0.69   GLUC 86  --  91  --  88   CAIONIZED 1.04*  --  1.11*  --  1.12   MG 1.7*  --  2.0  --  1.8*   PHOS 2.9  --  2.6*  --  2.3*   *  --  449*  --   --    *  --  211*  --   --    ALB 2.3*  --  2.5*  --   --    TBILI 2.13*  --  2.31*  --   --    ALKPHOS 223*  --  699*  --   --    INR 1.35*  --   --   --   --    LACTICACID 3.5*  --   --    < > 3.2*    < > = values in this interval not displayed.       Imaging Results Review: I personally reviewed the following image studies in PACS and associated radiology reports: CT head. My interpretation of the radiology images/reports is: as above.  Other Study Results Review: No additional pertinent studies reviewed.    VTE Pharmacologic Prophylaxis: Sequential compression device (Venodyne)  and Heparin

## 2024-10-17 PROBLEM — D73.89 SPLENIC LESION: Status: ACTIVE | Noted: 2024-10-17

## 2024-10-17 PROBLEM — K61.1 PERIRECTAL ABSCESS: Status: ACTIVE | Noted: 2024-10-17

## 2024-10-17 PROBLEM — B60.00 BABESIOSIS: Status: ACTIVE | Noted: 2024-10-17

## 2024-10-17 PROBLEM — S31.000A SACRAL WOUND: Status: ACTIVE | Noted: 2024-10-17

## 2024-10-17 NOTE — ASSESSMENT & PLAN NOTE
- Patient with large sacral wound, suspected to be pressure induced, and present on presentation to [facility].   - Exact onset/age of wound not entirely clear at this time.  - Recommend attempted bedside excisional debridement with backup plan to proceed to the OR for excisional debridement if patient does not tolerate bedside intervention.  - Will need strict and consistent offloading therapy.  - Continue local wound care with dressing changes daily post debridement with potential transition to VAC therapy in the future if wound deemed amendable to this; VAC therapy may be difficult due to the proximity of the patient's anus.

## 2024-10-17 NOTE — ASSESSMENT & PLAN NOTE
Assessment:  Patient is a 44-year-old male with past medical history hypertension, malnutrition, gastric bypass surgery, VILLALOBOS, hypothyroidism, and anemia that presented with with acute altered mental status and imaging demonstrating left-sided subdural hemorrhage with 1.5 cm rightward midline shift.  Patient underwent left-sided craniectomy for hematoma evacuation, and needed transfusion of multiple blood products after anemia was found in setting of suspected coagulopathy.  Itching studies thus far have also been concerning for metastatic disease having demonstrated enhancing osseous lesions throughout the majority of the visualized spine, bony pelvis, spleen, and liver.  MRI brain demonstrated multifocal early ischemia including right anterior thalamus, anterior occipital lobes left greater than right.  Repeat noncontrast CT head on 10/15 demonstrated continued redistribution of blood products and hemorrhage along with evolving areas of ischemia with punctate areas seen better on recent MRI.    TTE: 60% EF, PFO present  vEEG did not show any seizures  MRI brain w/wo contrast: Stable evolving recent infarcts with petechial hemorrhage.  Status post left hemispheric craniectomy for subdural drainage. Stable subgaleal epidural hematoma with small residual subdurals. Stable small volume of intraventricular hemorrhage.  Stable mass effect with 4 mm of left-to-right shift    Impression:  Considering patient's imaging findings suggestive of metastatic disease, and laboratory studies suggestive of coagulopathy, further stroke workup with vascular imaging with CTA head and neck vs MRA brain and continued evaluation of coagulopathy/metastatic disease is recommended.  Patient has guarded prognosis and is complicated with SDH preventing initiation of AP/AC.  Can consider further workup with CHRIS however if PFO is suspected to be involved in stroke etiology he likely would not be a surgical candidate due to medical condition.   He would benefit from [facility] discussion with family.      Plan:  - Case discussed with attending neurologist Dr. Gonzáles  - Recommend stroke workup  - Recommend CTA head and neck with and without contrast or MRA brain  - Recommend aspirin and atorvastatin when able per primary team  - vEEG discontinued   - AC recommendations per NCC/neurosurgical teams, hematology recommending repeat thrombosis panel after infection is cleared.  - Recommend systolic blood pressure goals of 110-<140  - Recommend continue cardiopulmonary monitoring  - Neurochecks per primary team  - Recommend STAT noncontrast CT head for any acute change in mental status/neurologic  - Seizure precautions  - Delirium precautions  - Continue to evaluate and treat any metabolic, infectious, inflammatory derangements  - Rest per primary team appreciated

## 2024-10-17 NOTE — CASE MANAGEMENT
Case Management Discharge Planning Note    Patient name Clayton Duval  Location ICU 05/ICU 05 MRN [id number]  : 1980 Date 10/17/2024       Current Admission Date: 10/13/2024  Current Admission Diagnosis:Non-traumatic acute L subdural hematoma (HCC)   Patient Active Problem List    Diagnosis Date Noted Date Diagnosed    Sacral wound 10/17/2024     Perirectal abscess 10/17/2024     Sepsis (HCC) 10/15/2024     Stroke (HCC) 10/15/2024     Non-traumatic acute L subdural hematoma (HCC) 10/13/2024     Acute hypoxic respiratory failure (HCC) 10/13/2024     HTN (hypertension) 10/13/2024     Hyponatremia 10/11/2024     Hypomagnesemia 10/11/2024     Hypothyroidism (acquired) 10/11/2024     Elevation of levels of liver transaminase levels 10/11/2024     Malnutrition (HCC) 10/11/2024     Lactic acidosis 10/11/2024     Abnormal CT scan, chest 10/11/2024     Moderate protein-calorie malnutrition (HCC) 10/11/2024       LOS (days): 4  Geometric Mean LOS (GMLOS) (days):   Days to GMLOS:     OBJECTIVE:  Risk of Unplanned Readmission Score: 20.34         Current admission status: Inpatient   Preferred Pharmacy:   [id number] - [place] - [address]  [address]  [place] [address]  Phone: [phone] Fax: [phone]    Primary Care Provider: Marcio Ann DO    Primary Insurance: BLUE CROSS  Secondary Insurance:     DISCHARGE DETAILS:           Other Referral/Resources/Interventions Provided:  Referral Comments: Per chart review: pt responds to voice. GCS = 10.  On 2L NC. IV Vanco. Keppra IV.  Perirectal abascess I&D at bedside 10/17. Plan for OR for sacral wound debridement.  Palliative consulted.  CM to follow for dcp needs pending medical course.

## 2024-10-17 NOTE — SPEECH THERAPY NOTE
Speech-Language Pathology Bedside Swallow Evaluation      Patient Name: Clayton Duval    Today's Date: 10/17/2024     Problem List  Principal Problem:    Non-traumatic acute L subdural hematoma (HCC)  Active Problems:    Sepsis (HCC)    Stroke (HCC)    Past Medical History  No past medical history on file.    Past Surgical History  Past Surgical History:   Procedure Laterality Date    BRAIN HEMATOMA EVACUATION Left 10/13/2024    Procedure: CRANIOTOMY FOR SUBDURAL HEMATOMA;  Surgeon: Son Mendoza MD;  Location: [facility];  Service: Neurosurgery    GASTRIC BYPASS  2012    GASTRIC BYPASS         Summary  Pt presented with s/s suggestive of mild-moderate oropharyngeal dysphagia, currently exacerbated by decreased alertness. Pt was able to tolerate upright positioning and respond to some simple questions despite limited eye opening. He was able to say his name and voice on request. Following oral care pt was able to take several trials of ice chips and tsp sips of water x3. He was able to masticate the ice with functional appearing oral control. Swallow initiation appeared prompt, suspect some weakness with audible swallows. Delayed cough noted x1 with thin via tsp, with no other s/s aspiration across trials. Overall swallow function is promising, however will proceed with caution until alertness improves. Continue NGT feeds now, okay to begin with ice chips after oral care. ST will continue to follow.     Risk/s for Aspiration: mild-mod    Recommended Diet: NPO except ice chips   Recommended Form of Meds: non-oral if possible   Aspiration precautions and swallowing strategies: upright posture  Other Recommendations: Continue frequent oral care, likely will need MBS once alertness improves      Current Medical Status per H&P 10/13/26  This is a 44-year-old male with a past medical history of super morbid obesity status post gastric sleeve and biliary duodenal switch procedure, VILLALOBOS, hypothyroidism, and  anemia.  Per the patient's spouse, he has been not been feeling well for several weeks.  He was developing shortness of breath and severe fatigue.  He had seen his PCP and was placed on a course of steroids with transient improvement.  Patient symptom worsened and he presented to [facility] where he was found on CT to have multiple lesions in the liver, marked splenomegaly with splenic lesions, and pelvic bone lytic lesions as well as lytic lesions in the lumbar spine.  He was noted to have elevated LFTs, hyperbilirubinemia, elevated alk phos, and coagulopathy.  This morning the patient was found unresponsive taken for head CT which demonstrated a 1.5 cm left subdural hematoma with brain compression secondary to a hematoma, cerebral edema, and hydrocephalus.   He was taken to the ICU Genosyl, intubated, received transfusion of packed red blood cells, PCC's, and vitamin K.  He was transferred to [facility] where he was taken to the operating room and he presents to the ICU status post decompressive crani and evacuation of hematoma.   Per report, at time of arrival the patient was unresponsive with a blown left pupil.  Seen in the ICU postoperatively and after resolution of neuromuscular blockade, the patient is responsive to painful stimuli with localization of the right upper lower extremity right lower lower extremity and withdrawal of the left lower extremity.  Nursing reports that the patient was following commands as well.  His left pupil was 3 mm, right pupil 1 mm, sluggishly reactive.  Face symmetric, otherwise cranial nerve exams were not observable.      Special Studies:  CT head 10/17/24 IMPRESSION:  1.  No significant change in the left subdural hemorrhage or evolving right thalamic and left temporo-occipital infarctions.  CXR 10/13/24 IMPRESSION:  Mild to moderate pulmonary edema.  Endotracheal tube with the tip above the nadia in appropriate position.      Social/Education/Vocational Hx:  Pt  lives with his wife    Swallow Information   Current Risks for Dysphagia & Aspiration:  new L crani  Current Diet: NPO with tube feeds   Baseline Diet: regular diet and thin liquids      Baseline Assessment   Behavior/Cognition: lethargic, but responsive  Speech/Language Status: able to follow commands inconsistently and limited verbal output  Patient Positioning: upright in bed  Pain Status/Interventions/Response to Interventions: No report of or nonverbal indications of pain.       Swallow Mechanism Exam  Facial: masked facies  Labial: decreased strength  Lingual: bilateral decreased strength and decreased coordination  Velum: symmetrical  Mandible: adequate ROM  Dentition: adequate  Vocal quality:weak   Volitional Cough: strong/productive   Respiratory Status: on 2L O2     Consistencies Assessed and Performance   Consistencies Administered: ice chips and thin liquids    Oral Stage:  slow but functional bolus manipulation, good mastication of ice chips, rapid transfer of sips of water    Pharyngeal Stage: suspected decreased hyolaryngeal elevation upon palpation, prompt swallows, audible    Esophageal Concerns: none reported      Summary and Recommendations (see above)    Results Reviewed with: patient, RN, and MD     Treatment Recommended: yes     Frequency of treatment: 3-5x/week    Dysphagia LTG  -Patient will demonstrate safe and effective oral intake (without overt s/s significant oral/pharyngeal dysphagia including s/s penetration or aspiration) for the highest appropriate diet level.     Short Term Goals:  -Pt will tolerate the least restrictive diet with the least restrictive liquid consistency without s/s of aspiration x72hrs.    -Patient will tolerate trials of upgraded food and/or liquid texture with no significant s/s of oral or pharyngeal dysphagia including aspiration across 1-3 diagnostic sessions.     -Patient will comply with a Video/Modified Barium Swallow study for more complete assessment of  swallowing anatomy/physiology/aspiration risk and to assess efficacy of treatment techniques so as to best guide treatment plan.

## 2024-10-17 NOTE — PLAN OF CARE
Problem: PAIN - ADULT  Goal: Verbalizes/displays adequate comfort level or baseline comfort level  Description: Interventions:  - Encourage patient to monitor pain and request assistance  - Assess pain using appropriate pain scale  - Administer analgesics based on type and severity of pain and evaluate response  - Implement non-pharmacological measures as appropriate and evaluate response  - Consider cultural and social influences on pain and pain management  - Notify physician/advanced practitioner if interventions unsuccessful or patient reports new pain  Outcome: Progressing     Problem: INFECTION - ADULT  Goal: Absence or prevention of progression during hospitalization  Description: INTERVENTIONS:  - Assess and monitor for signs and symptoms of infection  - Monitor lab/diagnostic results  - Monitor all insertion sites, i.e. indwelling lines, tubes, and drains  - Monitor endotracheal if appropriate and nasal secretions for changes in amount and color  - [place] appropriate cooling/warming therapies per order  - Administer medications as ordered  - Instruct and encourage patient and family to use good hand hygiene technique  - Identify and instruct in appropriate isolation precautions for identified infection/condition  Outcome: Progressing     Problem: SAFETY ADULT  Goal: Patient will remain free of falls  Description: INTERVENTIONS:  - Educate patient/family on patient safety including physical limitations  - Instruct patient to call for assistance with activity   - Consult OT/PT to assist with strengthening/mobility   - Keep Call bell within reach  - Keep bed low and locked with side rails adjusted as appropriate  - Keep care items and personal belongings within reach  - Initiate and maintain comfort rounds  - Make Fall Risk Sign visible to staff  - Offer Toileting every 2 Hours, in advance of need  - Apply yellow socks and bracelet for high fall risk patients  - Consider moving patient to room near nurses  station  Outcome: Progressing  Goal: Maintain or return to baseline ADL function  Description: INTERVENTIONS:  -  Assess patient's ability to carry out ADLs; assess patient's baseline for ADL function and identify physical deficits which impact ability to perform ADLs (bathing, care of mouth/teeth, toileting, grooming, dressing, etc.)  - Assess/evaluate cause of self-care deficits   - Assess range of motion  - Assess patient's mobility; develop plan if impaired  - Assess patient's need for assistive devices and provide as appropriate  - Encourage maximum independence but intervene and supervise when necessary  - Involve family in performance of ADLs  - Assess for home care needs following discharge   - Consider OT consult to assist with ADL evaluation and planning for discharge  - Provide patient education as appropriate  Outcome: Progressing  Goal: Maintains/Returns to pre admission functional level  Description: INTERVENTIONS:  - Perform AM-PAC 6 Click Basic Mobility/ Daily Activity assessment daily.  - Set and communicate daily mobility goal to care team and patient/family/caregiver.   - Collaborate with rehabilitation services on mobility goals if consulted  - Reposition patient every 2 hours.  - Out of bed for toileting  - Record patient progress and toleration of activity level   Outcome: Progressing     Problem: DISCHARGE PLANNING  Goal: Discharge to home or other facility with appropriate resources  Description: INTERVENTIONS:  - Identify barriers to discharge w/patient and caregiver  - Arrange for needed discharge resources and transportation as appropriate  - Identify discharge learning needs (meds, wound care, etc.)  - Arrange for interpretive services to assist at discharge as needed  - Refer to Case Management Department for coordinating discharge planning if the patient needs post-hospital services based on physician/advanced practitioner order or complex needs related to functional status, cognitive  ability, or social support system  Outcome: Progressing     Problem: Knowledge Deficit  Goal: Patient/family/caregiver demonstrates understanding of disease process, treatment plan, medications, and discharge instructions  Description: Complete learning assessment and assess knowledge base.  Interventions:  - Provide teaching at level of understanding  - Provide teaching via preferred learning methods  Outcome: Progressing     Problem: Prexisting or High Potential for Compromised Skin Integrity  Goal: Skin integrity is maintained or improved  Description: INTERVENTIONS:  - Identify patients at risk for skin breakdown  - Assess and monitor skin integrity  - Assess and monitor nutrition and hydration status  - Monitor labs   - Assess for incontinence   - Turn and reposition patient  - Assist with mobility/ambulation  - Relieve pressure over bony prominences  - Avoid friction and shearing  - Provide appropriate hygiene as needed including keeping skin clean and dry  - Evaluate need for skin moisturizer/barrier cream  - Collaborate with interdisciplinary team   - Patient/family teaching  - Consider wound care consult   Outcome: Progressing     Problem: Nutrition/Hydration-ADULT  Goal: Nutrient/Hydration intake appropriate for improving, restoring or maintaining nutritional needs  Description: Monitor and assess patient's nutrition/hydration status for malnutrition. Collaborate with interdisciplinary team and initiate plan and interventions as ordered.  Monitor patient's weight and dietary intake as ordered or per policy. Utilize nutrition screening tool and intervene as necessary. Determine patient's food preferences and provide high-protein, high-caloric foods as appropriate.     INTERVENTIONS:  - Monitor oral intake, urinary output, labs, and treatment plans  - Assess nutrition and hydration status and recommend course of action  - Evaluate amount of meals eaten  - Assist patient with eating if necessary   - Allow  adequate time for meals  - Recommend/ encourage appropriate diets, oral nutritional supplements, and vitamin/mineral supplements  - Order, calculate, and assess calorie counts as needed  - Recommend, monitor, and adjust tube feedings and TPN/PPN based on assessed needs  - Assess need for intravenous fluids  - Provide specific nutrition/hydration education as appropriate  - Include patient/family/caregiver in decisions related to nutrition  Outcome: Progressing

## 2024-10-17 NOTE — ASSESSMENT & PLAN NOTE
Patient has spontaneous drainage.  He is being followed by general surgery service, with no plan for surgical I&D at present.  Patient has no history of MRSA, has negative MRSA screen and has MSSA growing in respiratory culture.  With absence of MRSA, vancomycin can be discontinued.  Continue IV Zosyn.  No further need for vancomycin.  Follow-up on wound culture.  General surgery follow-up.

## 2024-10-17 NOTE — CONSULTS
Consultation - Infectious Disease   Name: Clayton Duval 44 y.o. male I MRN: [id number]  Unit/Bed#: ICU 05 I Date of Admission: 10/13/2024   Date of Service: 10/17/2024 I Hospital Day: 4   Inpatient consult to Infectious Diseases  Consult performed by: Roman Crowe MD  Consult ordered by: Myranda White MD        Physician Requesting Evaluation: Myranda Wihte MD   Reason for Evaluation / Principal Problem: Sepsis with abnormal peripheral smear.    Assessment & Plan  Sepsis (HCC)  Source of sepsis is secondary to possible babesiosis and perirectal abscess.  Despite sepsis, patient remains systemically well, without toxicity and hemodynamically stable, without hypotension.  Admission blood cultures have no growth thus far.  Antibiotic plan as seen below.  Monitor temperature/WBC.    Monitor hemodynamics.    Follow-up on all pending blood cultures.  Babesiosis  Peripheral smear with evidence of multiple organisms extracellularly and in RBC.  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  Extracellular organism appears kim-like.  Patient does have possible tick exposure.  For now, we will check Babesia PCR and start patient on azithromycin/atovaquone and monitor serial parasite smears.  HIV screen negative.  Start azithromycin/atovaquone empirically.  Check Babesia PCR.  Monitor serial parasite smear.  Monitor temperature/WBC.  Perirectal abscess  Patient has spontaneous drainage.  He is being followed by general surgery service, with no plan for surgical I&D at present.  Patient has no history of MRSA, has negative MRSA screen and has MSSA growing in respiratory culture.  With absence of MRSA, vancomycin can be discontinued.  Continue IV Zosyn.  No further need for vancomycin.  Follow-up on wound culture.  General surgery follow-up.  Sacral wound  Patient is status post bedside I&D and being followed by general surgery.  Antibiotic plan as in above.  Wound care per surgery.  Non-traumatic acute L subdural hematoma  (HCC)  Patient is status post craniectomy and evacuation of subdural hematoma.  He also has ischemia in the brainstem.  Neurosurgery follow-up.  Stroke (HCC)  Head MRI showed ischemia involving brainstem.  Patient had decreased responsiveness on presentation.  Mental status with some improvement.  Neurology follow-up.  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  Plan for biopsy of hepatic lesion noted.  Follow-up on pathology from biopsy.  Check urine histoplasma antigen.    Prior records reviewed in detail.  Discussed with patient's wife via telephone.    I have discussed with Dr. White from critical care service regarding the above plan to start empiric treatment for babesiosis.  She agrees with the plan.    Antibiotics:  Vancomycin/Zosyn    History of Present Illness   Clyaton Duval is a 44 y.o. year old male, with history of morbid obesity, status post gastric bypass and VILLALOBOS who admitted to [facility] on 10/11 with 2-week history of malaise with fatigue and fever/chills.  He was given prednisone by PCP on 10/1 and treated with prednisone, with some improvement of symptoms.  On presentation, patient did not have fever or leukocytosis.  He had mild lactic acidosis.  He also had elevated LFTs.  Abdomen/pelvis CT showed multiple osseous lesion concerning for metastases.  Abdominal MRI showed multiple hepatic lesions, also concerning for metastatic malignancy.  In early a.m. 10/13, patient was found unresponsive.  Head CT showed left subdural hemorrhage.  Patient was transferred to ICU and intubated.  He was transferred to [facility] for higher level of care.  He had left hemicraniectomy for evacuation of the subdural hematoma.  Soft, patient developed fever.  He was started on vancomycin/ceftriaxone empirically.  2 days ago, ceftriaxone was changed to Zosyn for worsening fever.  Today, peripheral smear showed evidence of  organisms/parasite extracellularly and an RBC.  For these reasons, we are asked to evaluate the patient.  Of note, patient has a sacral wound, status post bedside I&D per surgery, with finding of perirectal abscess also.    At present, patient is awake and alert, able to answer simple question regarding his symptoms but is unable to answer complex questions.  At present, he complains of pain in his sacral area.  No other acute complaints.    Per discussion with patient's wife, they live in a semiwooded lot in [place].  Patient usually does not leave the house often but did go to a [employer] patch in [facility] area in early October.  No obvious tick bite.  No history of foreign travel.  A complete review of systems is negative other than that noted in the HPI.    I have reviewed the patient's PMH, PSH, Social History, Family History, Meds, and Allergies    Objective :  Temp:  [98.3 °F (36.8 °C)-103 °F (39.4 °C)] 101 °F (38.3 °C)  HR:  [104-134] 122  BP: (106-155)/() 106/91  Resp:  [3-27] 20  SpO2:  [94 %-100 %] 97 %  O2 Device: Nasal cannula  Nasal Cannula O2 Flow Rate (L/min):  [2 L/min-3 L/min] 2 L/min    General: No acute and chronic ill-appearing, nontoxic, simple questions  Psychiatric:  Awake and alert answer simple questions  Pulmonary:  Normal respiratory excursion, mild basilar rhonchi, no rales, without accessory muscle use  Abdomen:  Soft, nontender  Extremities: 1+ leg edema  Skin:  No rashes      Lab Results: I have reviewed the following results:  Results from last 7 days   Lab Units 10/17/24  0444 10/16/24  0444 10/15/24  0426   WBC Thousand/uL 10.51* 9.03 11.39*   HEMOGLOBIN g/dL 10.0* 9.2* 9.7*   PLATELETS Thousands/uL 123* 120* 120*     Results from last 7 days   Lab Units 10/17/24  0444 10/16/24  0444 10/15/24  0426 10/14/24  0435 10/13/24  1252 10/13/24  1031   SODIUM mmol/L 136 134* 131* 132*   < >  --    POTASSIUM mmol/L 4.1 3.9 4.3 4.4   < >  --    CHLORIDE mmol/L 104 102 101 102   < >  --     CO2 mmol/L 22 23 23 23   < >  --    CO2, I-STAT mmol/L  --   --   --   --   --  22   BUN mg/dL 25 25 29* 21   < >  --    CREATININE mg/dL 0.62 0.69 0.82 0.60   < >  --    EGFR ml/min/1.73sq m 119 114 106 121   < >  --    GLUCOSE, ISTAT mg/dl  --   --   --   --   --  107   CALCIUM mg/dL 7.1* 7.2* 7.6* 7.5*   < >  --    AST U/L 356*  --  449* 437*   < >  --    ALT U/L 170*  --  211* 189*   < >  --    ALK PHOS U/L 708*  --  699* 223*   < >  --    ALBUMIN g/dL 2.4*  --  2.5* 2.3*   < >  --     < > = values in this interval not displayed.     Results from last 7 days   Lab Units 10/16/24  1321 10/16/24  0134 10/15/24  1135 10/14/24  1419 10/14/24  1154 10/14/24  0121 10/11/24  1522 10/11/24  0013 10/11/24  0008   BLOOD CULTURE   --  No Growth at 24 hrs.  No Growth at 24 hrs.  --   --   --  No Growth at 72 hrs.  --  No Growth After 5 Days. No Growth After 5 Days.   SPUTUM CULTURE   --   --   --  2+ Growth of Staphylococcus aureus*  2+ Growth of  --   --   --   --   --    GRAM STAIN RESULT  2+ Polys*  2+ Gram positive rods*  1+ Gram positive cocci in pairs*  1+ Gram negative rods*  --   --  2+ Polys*  2+ Gram negative rods*  1+ Gram positive cocci in pairs*  --   --   --   --   --    URINE CULTURE   --   --   --   --   --   --  No Growth <1000 cfu/mL  --   --    WOUND CULTURE  Culture too young- will reincubate  --   --   --   --   --   --   --   --    MRSA CULTURE ONLY   --   --   --   --  No Methicillin Resistant Staphlyococcus aureus (MRSA) isolated  --   --   --   --    LEGIONELLA URINARY ANTIGEN   --   --  Negative  --   --   --   --   --   --      Results from last 7 days   Lab Units 10/17/24  0444 10/16/24  0133 10/14/24  0121   PROCALCITONIN ng/ml 1.21* 1.13* 1.14*     Results from last 7 days   Lab Units 10/16/24  0444   CRP mg/L 82.3*     Results from last 7 days   Lab Units 10/11/24  0447   FERRITIN ng/mL >7,500*           Imaging Results Review: I personally reviewed the following image studies in PACS  and associated radiology reports: chest xray, CT chest, CT abdomen/pelvis, CT head, and MRI brain. My interpretation of the radiology images/reports is: CXR without consolidation, chest CT with small bilateral pleural effusions but no consolidation, abdomen/pelvis CT with multiple hepatic and splenic lesions, abdominal MRI with multiple hepatic and splenic lesions and also with multiple osseous lesions, CT with left-sided subdural hemorrhage, had MRI with left-sided subdural hemorrhage and also with ischemic changes in thalamus and occipital lobes..      Administrative Statements   I have spent a total time of 60 minutes in caring for this patient on the day of the visit/encounter including Diagnostic results, Reviewing / ordering tests, medicine, procedures  , and Communicating with other healthcare professionals .

## 2024-10-17 NOTE — ASSESSMENT & PLAN NOTE
Head MRI showed ischemia involving brainstem.  Patient had decreased responsiveness on presentation.  Mental status with some improvement.  Neurology follow-up.

## 2024-10-17 NOTE — PROGRESS NOTES
Vancomycin IV Pharmacy-to-Dose Consultation    Clayton Duval is a 44 y.o. male who is currently receiving Vancomycin IV with management by the Pharmacy Consult service.    Relevant clinical data and objective / subjective history reviewed.      Vancomycin Assessment:  Indication: Soft tissue (goal -600, trough >10) broad spectrum for pending cultures  Status: critically ill  Micro:   - 10/16 Sacral wound: GPC + GPR + GNR  - 10/14 Sputum: MSSA  Renal Function:     Lab Results   Component Value Date    CREATININE 0.62 10/17/2024     Estimated Creatinine Clearance: 190.5 mL/min (by C-G formula based on SCr of 0.62 mg/dL).  Dialysis: no  Days of Therapy: 4  Current Dose: 1250 mg IV q8h   Goal AUC / Trough: -600, trough >10   Last Level: 11.5 on 10/16  Model Fit:  Good  Assessment: Sputum likely will result as MSSA, but patient has significant sacral wound and team wishes to continue broad spectrum while waiting for culture results. WBC wnl, febrile.       Vancomycin Plan:  New Dosing: continue current regimen   Predicted Trough / AUC: 12.9 / 456  Next Level: on 10/23 at 0600  Renal Function Monitoring: Daily BMP and UOP      Pharmacy will continue to follow closely for s/sx of nephrotoxicity, infusion reactions and appropriateness of therapy.  BMP and CBC will be ordered per protocol. We will continue to follow the patient’s culture results and clinical progress daily.       Jonah Mendoza, PharmD, BCCCP  Critical Care Clinical Pharmacist  Available via [employer]

## 2024-10-17 NOTE — CONSULTS
Consultation - Palliative & Supportive Care   Clayton Duval 44 y.o. male MRN: [id number]  Unit/Bed#: ICU 05 Encounter: [id number]      Physician Requesting Consult: Myranda White MD  Reason for Consult / Principal Problem: goals of care      Assessment/Plan:  Palliative Care Encounter  L SDH s/p evacuation  Encephalopathy  Liver lesions  Concern for malignancy    Attempted to reach out to patient's spouse Liane x1. Left voicemail introducing myself and Palliative Medicine.  We will continue to follow.       Code Status: Level 1 - Full Code  Advance Directive and Living Will:    None  Power of :    POLST:          History of Present Illness   HPI: Clayton Duval is a 44 y.o. year old male who presented to ED 10/10/24 with fever, chills, cough, fatigue, nausea after being diagnosed by PCP on 10/1/24 with viral illness and prescribed prednisone, found to have elevated LFTs with anasarca on exam. CT c/a/p revealed small bilateral pleural effusions, moderate ascites, anasarca, hepatosplenomegaly, hypoenhancing partially confluent lesions in the spleen concerning for malignancy, ill-defined areas of hypoenhancement in liver, large sclerotic and lytic lesion in L iliac bone and smaller lytic lesions in lumber spine concerning for malignancy.     Patient seen in his ICU room.  Currently minimally responsive but will attempt to follow commands. No family at bedside.         Historical Information   No past medical history on file.  Patient Active Problem List   Diagnosis    Hyponatremia    Hypomagnesemia    Hypothyroidism (acquired)    Elevation of levels of liver transaminase levels    Malnutrition (HCC)    Lactic acidosis    Abnormal CT scan, chest    Moderate protein-calorie malnutrition (HCC)    Non-traumatic acute L subdural hematoma (HCC)    Acute hypoxic respiratory failure (HCC)    HTN (hypertension)    Sepsis (HCC)    Stroke (HCC)     Past Surgical History:   Procedure Laterality Date     BRAIN HEMATOMA EVACUATION Left 10/13/2024    Procedure: CRANIOTOMY FOR SUBDURAL HEMATOMA;  Surgeon: Son Mendoza MD;  Location: [facility];  Service: Neurosurgery    GASTRIC BYPASS  2012    GASTRIC BYPASS       Social History     Socioeconomic History    Marital status: /Civil Union     Spouse name: Not on file    Number of children: Not on file    Years of education: Not on file    Highest education level: Not on file   Occupational History    Not on file   Tobacco Use    Smoking status: Never    Smokeless tobacco: Current   Vaping Use    Vaping status: Never Used   Substance and Sexual Activity    Alcohol use: Never    Drug use: Never    Sexual activity: Not on file   Other Topics Concern    Not on file   Social History Narrative    Not on file     Social Determinants of Health     Financial Resource Strain: Not on file   Food Insecurity: No Food Insecurity (10/14/2024)    Hunger Vital Sign     Worried About Running Out of Food in the Last Year: Never true     Ran Out of Food in the Last Year: Never true   Transportation Needs: No Transportation Needs (10/14/2024)    PRAPARE - Transportation     Lack of Transportation (Medical): No     Lack of Transportation (Non-Medical): No   Physical Activity: Not on file   Stress: Not on file   Social Connections: Unknown (6/18/2024)    Received from [employer]     How often do you feel lonely or isolated from those around you? (Adult - for ages 18 years and over): Not on file   Intimate Partner Violence: Not on file   Housing Stability: Low Risk  (10/14/2024)    Housing Stability Vital Sign     Unable to Pay for Housing in the Last Year: No     Number of Times Moved in the Last Year: 0     Homeless in the Last Year: No     No family history on file.    Meds/Allergies   all current active meds have been reviewed and current meds:   Current Facility-Administered Medications:     acetaminophen (Ofirmev) injection 1,000 mg, Q6H PRN, Last Rate:  Stopped (10/17/24 1000)    acetylcysteine (MUCOMYST) 200 mg/mL inhalation solution 600 mg, Q6H    benzonatate (TESSALON PERLES) capsule 100 mg, TID PRN    calcium gluconate 1 g in sodium chloride 0.9% 50 mL (premix), Once    chlorhexidine (PERIDEX) 0.12 % oral rinse 15 mL, Q12H KAYLIE    Cholecalciferol (VITAMIN D3) tablet 5,000 Units, Daily    famotidine (PEPCID) tablet 20 mg, BID    fentaNYL injection 100 mcg, Once    fentaNYL injection 50 mcg, Q1H PRN    folic acid (FOLVITE) tablet 1 mg, Daily    hydrALAZINE (APRESOLINE) injection 10 mg, Q6H PRN    HYDROmorphone (DILAUDID) injection 1 mg, Q3H PRN    ipratropium (ATROVENT) 0.02 % inhalation solution 0.5 mg, Q6H    labetalol (NORMODYNE) injection 10 mg, Q6H PRN    levalbuterol (XOPENEX) inhalation solution 1.25 mg, Q6H    levETIRAcetam (KEPPRA) injection 750 mg, Q12H KAYLIE    levothyroxine tablet 200 mcg, Early Morning    lidocaine (PF) (XYLOCAINE-MPF) 1 % injection **ADS Override Pull**,     lidocaine (PF) (XYLOCAINE-MPF) 1 % injection 20 mL, Once    ondansetron (ZOFRAN) injection 4 mg, Q8H PRN    [COMPLETED] piperacillin-tazobactam (ZOSYN) 4.5 g in sodium chloride 0.9 % 100 mL IV LOADING DOSE, Once, Last Rate: Stopped (10/16/24 0247) **FOLLOWED BY** piperacillin-tazobactam (ZOSYN) 4.5 g in sodium chloride 0.9 % 100 mL IVPB (EXTENDED INFUSION), Q8H, Last Rate: Stopped (10/17/24 0930)    polyethylene glycol (MIRALAX) packet 17 g, Daily PRN    sodium phosphate 30 mmol in dextrose 5 % 250 mL Infusion, Once, Last Rate: 30 mmol (10/17/24 0801)    thiamine tablet 100 mg, Daily    vancomycin (VANCOCIN) 1,250 mg in sodium chloride 0.9 % 250 mL IVPB, Q8H, Last Rate: 1,250 mg (10/17/24 1016)    No Known Allergies    I have reviewed the patient's controlled substance dispensing history in the Prescription Drug Monitoring Program in compliance with the [place] regulations before prescribing any controlled substances.     Objective   /91   Pulse (!) 134   Temp (!) 102.3 °F  "(39.1 °C) (Axillary)   Resp (!) 24   Ht 6' 1\" (1.854 m)   Wt 104 kg (229 lb)   SpO2 96%   BMI 30.21 kg/m²   Physical Exam  Vitals and nursing note reviewed.   Constitutional:       General: He is not in acute distress.     Appearance: He is ill-appearing. He is not toxic-appearing or diaphoretic.      Comments: Opens eyes to my interaction   HENT:      Head:      Comments: EEG leads and bandaging in place     Left Ear: External ear normal.      Nose: Nose normal.   Eyes:      General:         Right eye: No discharge.         Left eye: No discharge.   Cardiovascular:      Rate and Rhythm: Normal rate.   Pulmonary:      Effort: No respiratory distress.   Abdominal:      General: There is no distension.      Palpations: Abdomen is soft.   Musculoskeletal:         General: Swelling present. No deformity.      Right lower leg: Edema present.      Left lower leg: Edema present.   Skin:     General: Skin is warm and dry.      Coloration: Skin is not jaundiced or pale.   Neurological:      Comments: Arousable.  Non-verbal.  Does appear to follow simple commands.          Lab Results: I have personally reviewed pertinent labs., CBC:   Lab Results   Component Value Date    WBC 10.51 (H) 10/17/2024    HGB 10.0 (L) 10/17/2024    HCT 31.5 (L) 10/17/2024    MCV 95 10/17/2024     (L) 10/17/2024    RBC 3.33 (L) 10/17/2024    MCH 30.0 10/17/2024    MCHC 31.7 10/17/2024    RDW 19.1 (H) 10/17/2024    MPV 12.7 10/17/2024    NRBC 1 10/17/2024   , CMP:   Lab Results   Component Value Date    SODIUM 136 10/17/2024    K 4.1 10/17/2024     10/17/2024    CO2 22 10/17/2024    BUN 25 10/17/2024    CREATININE 0.62 10/17/2024    CALCIUM 7.1 (L) 10/17/2024     (H) 10/17/2024     (H) 10/17/2024    ALKPHOS 708 (H) 10/17/2024    EGFR 119 10/17/2024   , BMP:  Lab Results   Component Value Date    SODIUM 136 10/17/2024    K 4.1 10/17/2024     10/17/2024    CO2 22 10/17/2024    BUN 25 10/17/2024    CREATININE 0.62 " "10/17/2024    GLUC 91 10/17/2024    CALCIUM 7.1 (L) 10/17/2024    AGAP 10 10/17/2024    EGFR 119 10/17/2024   , PT/PTT:No results found for: \"PT\", \"PTT\"  Imaging Studies: Results Review Statement: I reviewed radiology reports from this admission including: CT chest, CT abdomen/pelvis, CT head, and MRI brain.  EKG, Pathology, and Other Studies: Results Review Statement: I reviewed radiology reports from this admission including: ekg, chest xray, CT chest, CT abdomen/pelvis, CT head, MRI brain, and Echocardiogram.  QTC: 423    Counseling / Coordination of Care  Total floor / unit time spent today 40 minutes. Greater than 50% of total time was spent with the patient and / or family counseling and / or coordination of care. A description of the counseling / coordination of care: chart review, med review, imaging review, documenting, discussion with care team    Hunter Corbin MD    "

## 2024-10-17 NOTE — ASSESSMENT & PLAN NOTE
Patient with multiple splenic, hepatic and osseous lesions, concerning for metastasis.  Plan for biopsy of hepatic lesion noted.  Follow-up on pathology from biopsy.  Check urine histoplasma antigen.    Prior records reviewed in detail.  Discussed with patient's wife via telephone.

## 2024-10-17 NOTE — PHYSICAL THERAPY NOTE
Physical Therapy Cancellation Note    Patient's Name: Clayton Duval       10/17/24 1238   PT Last Visit   PT Visit Date 10/17/24   Note Type   Note type Cancelled Session   Additional Comments Pt pending helmet fitting until after removal of vEEG. Will continue to follow for PT evaluation as appropriate + able.       Floridalma Harmon, PT, DPT

## 2024-10-17 NOTE — PROCEDURES
Debridement    Universal Protocol:  Procedure performed by: (MARJAN Lin)  Consent: Written consent obtained.  Risks and benefits: risks, benefits and alternatives were discussed  Consent given by: patient  Procedure consent: procedure consent matches procedure scheduled  Relevant documents: relevant documents present and verified  Test results: test results available and properly labeled  Radiology Images displayed and confirmed. If images not available, report reviewed: imaging studies available  Required items: required blood products, implants, devices, and special equipment available  Patient identity confirmed: arm band    Debridement Details  Performed by: PA  Debridement type: surgical  Level of debridement: muscle  Pain control: lidocaine 1% (20 mL of 1% lidocaine was used for local anesthetic)      Post-debridement measurements  Length (cm): 12  Width (cm): 8  Depth (cm): 5  Percent debrided: 80%  Surface Area (cm^2): 96  Area Debrided (cm^2): 76.8  Volume (cm^3): 480    Tissue and other material debrided: adipose, dermis, epidermis, muscle and subcutaneous tissue  Devitalized tissue debrided: exudate, fibrin, necrotic debris, slough and eschar  Instrument(s) utilized: blade (Hemostat)  Technique utilized: excisionalBleeding: small  Hemostasis obtained with: pressure  Procedural pain (0-10): 3  Post-procedural pain: 1   Response to treatment: procedure was tolerated well  Debridement Comments: A large amount of necrotic tissue overlying the wound base was excisionally debrided with a #10 blade scalpel and hemostat down to bleeding/healthy tissue.  There was an area of tissue that may still not be viable directly overlying the sacrum that was not excisionally debrided at this time.  Additionally, there is some evolving skin changes overlying the right buttock with what appeared to still be viable underlying tissue that was not yet debrided, but will require reevaluation in the coming  days.    Additionally during this wound assessment, a right lower medial buttock perirectal abscess was encountered that was draining foul-smelling purulent fluid spontaneously.  No incision was made over this area as a moderate amount of purulent drainage was able to be expressed with compression and pressure to the surrounding tissue.  Following drainage of this abscess through the spontaneous opening, no additional underlying fluid collection or significant induration was noted and I made the decision to forego further incision and drainage at this time.  Will need to continue to monitor this area and potentially consider I&D in the future if indicated.

## 2024-10-17 NOTE — CONSULTS
Consultation - Surgery-General   Name: Clayton Duval 44 y.o. male I MRN: [id number]  Unit/Bed#: ICU 05 I Date of Admission: 10/13/2024   Date of Service: 10/17/2024 I Hospital Day: 4   Consults  Physician Requesting Evaluation: Myranda White MD   Reason for Evaluation / Principal Problem: Sacral wound    Assessment & Plan  Sacral wound  - Patient with large sacral wound, suspected to be pressure induced, and present on presentation to [facility].   - Exact onset/age of wound not entirely clear at this time.  - Recommend attempted bedside excisional debridement with backup plan to proceed to the OR for excisional debridement if patient does not tolerate bedside intervention.  - Will need strict and consistent offloading therapy.  - Continue local wound care with dressing changes daily post debridement with potential transition to VAC therapy in the future if wound deemed amendable to this; VAC therapy may be difficult due to the proximity of the patient's anus.  Perirectal abscess  - Right sided buttock/perirectal abscess identified during sacral wound assessment on 10/17/2024.   - This was identified to be spontaneously draining purulent fluid and a moderate amount of purulent fluid was expressed from the wound with repetitive pressure to the surrounding area.   - There did not appear to be any communication with the rectum on digital rectal exam on 10/17/2024.  - Continue to monitor and consider further I&D if spontaneous drainage is inadequate or stops.  Please contact the SecureChat role for the Surgery-General service with any questions/concerns.    History of Present Illness   Clayton Duval is a 44 y.o. male who presented with a 2-week history of feeling ill with reported fevers, chills, cough and fatigue.  After being evaluated by his PCP as an outpatient on October 1, he was diagnosed with a viral illness and prescribed prednisone with initial subjective improvement.  He then  presented to the ER at [facility] with the same symptoms after his outpatient medication was exhausted.  During his ER workup, he was found to have multiple abnormalities in his lab work as well as CT imaging demonstrating multiple lesions concerning for malignancy of unknown known etiology.  After being admitted for further workup and supportive care, he became acutely unresponsive and further workup identified a large left-sided subdural hematoma.  He was subsequently transferred to [facility] for urgent neurosurgical debridement and has been noted to have an evolving sacral wound felt to be due to pressure throughout his hospital encounter.  At this time, he continues to be encephalopathic, though is somewhat alert and interactive at the time of this evaluation.    Per his chart review and discussion with the nursing staff and primary service, his sacral wound has evolved and developed some necrotic tissue with foul odor in the wound care service believes he may benefit from debridement.    Review of Systems   Unable to perform ROS: Mental status change     I have reviewed the patient's PMH, PSH, Social History, Family History, Meds, and Allergies  Historical Information   No past medical history on file.  Past Surgical History:   Procedure Laterality Date    BRAIN HEMATOMA EVACUATION Left 10/13/2024    Procedure: CRANIOTOMY FOR SUBDURAL HEMATOMA;  Surgeon: Son Mendoza MD;  Location: [facility];  Service: Neurosurgery    GASTRIC BYPASS  2012    GASTRIC BYPASS       Social History     Tobacco Use    Smoking status: Never    Smokeless tobacco: Current   Vaping Use    Vaping status: Never Used   Substance and Sexual Activity    Alcohol use: Never    Drug use: Never    Sexual activity: Not on file     E-Cigarette/Vaping    E-Cigarette Use Never User      E-Cigarette/Vaping Substances     Family history non-contributory  Social History     Tobacco Use    Smoking status: Never    Smokeless  tobacco: Current   Vaping Use    Vaping status: Never Used   Substance and Sexual Activity    Alcohol use: Never    Drug use: Never    Sexual activity: Not on file       Current Facility-Administered Medications:     acetaminophen (Ofirmev) injection 1,000 mg, Q6H PRN, Last Rate: Stopped (10/17/24 1000)    acetylcysteine (MUCOMYST) 200 mg/mL inhalation solution 600 mg, Q6H    benzonatate (TESSALON PERLES) capsule 100 mg, TID PRN    calcium gluconate 1 g in sodium chloride 0.9% 50 mL (premix), Once    chlorhexidine (PERIDEX) 0.12 % oral rinse 15 mL, Q12H KAYLIE    Cholecalciferol (VITAMIN D3) tablet 5,000 Units, Daily    famotidine (PEPCID) tablet 20 mg, BID    fentaNYL injection 100 mcg, Once    fentaNYL injection 50 mcg, Q1H PRN    folic acid (FOLVITE) tablet 1 mg, Daily    hydrALAZINE (APRESOLINE) injection 10 mg, Q6H PRN    HYDROmorphone (DILAUDID) injection 1 mg, Q3H PRN    ipratropium (ATROVENT) 0.02 % inhalation solution 0.5 mg, Q6H    labetalol (NORMODYNE) injection 10 mg, Q6H PRN    levalbuterol (XOPENEX) inhalation solution 1.25 mg, Q6H    levETIRAcetam (KEPPRA) injection 750 mg, Q12H KAYLIE    levothyroxine tablet 200 mcg, Early Morning    ondansetron (ZOFRAN) injection 4 mg, Q8H PRN    [COMPLETED] piperacillin-tazobactam (ZOSYN) 4.5 g in sodium chloride 0.9 % 100 mL IV LOADING DOSE, Once, Last Rate: Stopped (10/16/24 0247) **FOLLOWED BY** piperacillin-tazobactam (ZOSYN) 4.5 g in sodium chloride 0.9 % 100 mL IVPB (EXTENDED INFUSION), Q8H, Last Rate: Stopped (10/17/24 0930)    polyethylene glycol (MIRALAX) packet 17 g, Daily PRN    sodium phosphate 30 mmol in dextrose 5 % 250 mL Infusion, Once, Last Rate: 30 mmol (10/17/24 0801)    thiamine tablet 100 mg, Daily    vancomycin (VANCOCIN) 1,250 mg in sodium chloride 0.9 % 250 mL IVPB, Q8H, Last Rate: 1,250 mg (10/17/24 1016)  Prior to Admission Medications   Prescriptions Last Dose Informant Patient Reported? Taking?   Cholecalciferol 125 MCG (5000 UT) capsule   Yes  No   Sig: Take 5,000 Units by mouth daily   cyanocobalamin 1,000 mcg/mL   Yes No   Sig: Inject 1,000 mcg into a muscle every 30 (thirty) days   docusate sodium (COLACE) 100 mg capsule   No No   Sig: Take 1 capsule (100 mg total) by mouth every 12 (twelve) hours   Patient not taking: Reported on 10/11/2024   ferrous sulfate 325 (65 Fe) mg tablet   Yes No   Sig: Take 325 mg by mouth daily with breakfast   furosemide (LASIX) 20 mg tablet   Yes No   Sig: Take 20 mg by mouth daily   hydrocortisone (ANUSOL-HC) 2.5 % rectal cream   No No   Sig: Apply topically 2 (two) times a day for 5 days   levothyroxine 200 mcg tablet   Yes No   Sig: Take 200 mcg by mouth daily   polyethylene glycol (MIRALAX) 17 g packet   Yes No   Sig: Take 17 g by mouth daily      Facility-Administered Medications: None     Patient has no known allergies.    Objective :  Temp:  [98.3 °F (36.8 °C)-103 °F (39.4 °C)] 102.3 °F (39.1 °C)  HR:  [104-134] 134  BP: (114-161)/() 142/91  Resp:  [3-27] 24  SpO2:  [94 %-100 %] 96 %  O2 Device: Nasal cannula  Nasal Cannula O2 Flow Rate (L/min):  [2 L/min-3 L/min] 2 L/min    Lines/Drains/Airways       Active Status       Name Placement date Placement time Site Days    Arterial Line 10/14/24 Radial 10/14/24  0425  Radial  3    NG/OG/Enteral Tube Nasogastric 12 Fr Right nare 10/15/24  1800  Right nare  1                  Physical Exam  Vitals and nursing note reviewed. Exam conducted with a chaperone present.   Constitutional:       General: He is not in acute distress.     Appearance: He is ill-appearing. He is not toxic-appearing or diaphoretic.   HENT:      Head:      Comments: Intact healing vertex scalp incision with staples in place and left cranial defect noted status post craniectomy     Right Ear: External ear normal.      Left Ear: External ear normal.      Nose: Nose normal.      Comments: NG tube in place     Mouth/Throat:      Mouth: Mucous membranes are moist.      Pharynx: Oropharynx is clear.    Eyes:      Conjunctiva/sclera: Conjunctivae normal.   Cardiovascular:      Rate and Rhythm: Regular rhythm. Tachycardia present.      Pulses: Normal pulses.      Heart sounds: Normal heart sounds. No murmur heard.     No friction rub. No gallop.   Pulmonary:      Effort: Pulmonary effort is normal. No respiratory distress.      Breath sounds: Normal breath sounds. No stridor. No wheezing, rhonchi or rales.   Abdominal:      General: Bowel sounds are normal. There is no distension.      Palpations: Abdomen is soft.      Tenderness: There is no abdominal tenderness. There is no guarding or rebound.   Musculoskeletal:      Cervical back: Neck supple.   Skin:     Capillary Refill: Capillary refill takes less than 2 seconds.      Coloration: Skin is not jaundiced or pale.      Findings: Wound (Wounds as noted) present. No bruising, erythema or rash.          Neurological:      Mental Status: He is alert. He is disoriented.      Motor: Weakness (Generalized weakness noted) present.         Lab Results: I have reviewed the following results:  Recent Labs     10/15/24  0426 10/16/24  0133 10/16/24  0444 10/17/24  0444   WBC 11.39*  --  9.03 10.51*   HGB 9.7*  --  9.2* 10.0*   HCT 29.2*  --  28.1* 31.5*   *  --  120* 123*   BANDSPCT 18*  --   --   --    SODIUM 131*  --  134* 136   K 4.3  --  3.9 4.1     --  102 104   CO2 23  --  23 22   BUN 29*  --  25 25   CREATININE 0.82  --  0.69 0.62   GLUC 91  --  88 91   CAIONIZED 1.11*  --  1.12 1.09*   MG 2.0  --  1.8* 2.0   PHOS 2.6*  --  2.3* 2.5*   *  --   --  356*   *  --   --  170*   ALB 2.5*  --   --  2.4*   TBILI 2.31*  --   --  2.64*   ALKPHOS 699*  --   --  708*   LACTICACID  --    < > 3.2*  --     < > = values in this interval not displayed.       Imaging Results Review: I reviewed radiology reports from this admission including: CT head and xray(s).  Other Study Results Review: No additional pertinent studies reviewed.    VTE Pharmacologic  Prophylaxis: VTE covered by:    None     VTE Mechanical Prophylaxis: sequential compression device    Administrative Statements   I have spent a total time of 50 minutes in caring for this patient on the day of the visit/encounter including Instructions for management, Patient and family education, Impressions, Counseling / Coordination of care, Documenting in the medical record, Reviewing / ordering tests, medicine, procedures  , Obtaining or reviewing history  , and Communicating with other healthcare professionals .

## 2024-10-17 NOTE — PROGRESS NOTES
Progress Note: Medical Oncology:  Clayton Duval 44 y.o. 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:  Multifocal Embolic Infarcts with Abnormal Thrombosis Panel:  Acute Subdural Hemorrhage with Midline Shift Status-Post Left Hemicraniectomy:  Presumed Metastatic Disease of Unknown Primary:  Multifocal Hepatic Lesions:  Splenomegaly with Large Confluent Enhancing Lesions:  Anasarca with Moderate-Volume Ascites, and Small Bilateral Pleural Effusions:  Osseous Lesions Throughout the Spine and Bony Pelvis:  Acute-on-Chronic Normocytic Anemia:  Mild Thrombocytopenia:  Hepatocellular-Predominant Transaminitis with Hyperbilirubinemia:  Mild Coagulopathy Likely Secondary to Infiltrative Hepatic Disease:  Patient is a 44-year-old male, with an established history of non-alcoholic steatohepatitis; who presented to [facility] on October 11th, 2024, as a referral for hepatocellular-predominant transaminitis with hyperbilirubinemia identified on outpatient laboratory-studies obtained for work-up of an Influenza-like syndrome. Inpatient work-up has revealed evidence of diffuse metastatic disease, including enhancing osseous lesions throughout the majority of the visualized spine and bony pelvis, splenomegaly with large confluent hypoenhancing lesions, and hepatomegaly with multifocal lesions. Differential diagnosis of the above-mentioned includes but is not limited to: Metastatic melanoma, versus testicular (e.g. Choriocarcinoma), lung cancer, or lymphoma. Will need tissue-sampling for definitive-diagnosis and further recommendations. Placed consultation to Interventional Radiology, who is anticipating CT-guided bone biopsy tomorrow, October 17th, 2024. Further recommendations are pending final pathology. Of note, interval events include development of a sepsis-syndrome secondary  to presumed babesiosis, as well as perirectal abscess. Infectious Disease has been consulted, and patient is presently on broad-spectrum antibiotic-therapy with Piperacillin-Tazobactam, Azithromycin, and Atovaquone. Additionally, MRI Brain (Without Contrast) was obtained and demonstrated multifocal embolic infarcts, preferentially involving the right thalamus. MRI Brain with and without contrast is ordered at this time. Thrombosis Panel was obtained and demonstrated abnormal Antithrombin III, Protein C, and Protein S activity levels. The above-mentioned is likely multifactorial, secondary to severe hepatic-dysfunction, as well as babesiosis with possible secondary disseminated intravascular coagulation. Would recommend repeating Antithrombin III, Protein C, and Protein S levels pending treatment of the above-mentioned. At this time, patient is not a candidate for anticoagulation or antiplatelet therapy in the setting of recent subdural hemorrhage. Will continue to follow. Discussed the above-mentioned with patient's primary team.    Summary of Recommendations:  Anticipate tissue-sampling via Interventional Radiology tomorrow, October 18th, 2024.  Regarding multifocal embolic infarcts identified on non-contrast MRI Brain:  Agree with MRI Brain With and Without Contrast.  Thrombosis Panel with low Antithrombin III, Protein C, and Protein S levels is multifactorial:  Recommend repeating Antithrombin III, Protein C, and Protein S on clinical stabilization.    Subjective:  Interval History: Patient was seen and examined in the Neurologic Intensive Care Unit. He has since been extubated. Patient remains minimally participatory on my examination; and is unable to provide a complete review of systems. Interval events are remarkable for finding of multiple organisms, extracellularly and within red-blood cells on peripheral smear (e.g. Highly suggestive of Babesiosis). Given the above-mentioned, Infectious Disease was  consulted. Recommended empiric initiation of Azithromycin and Atovaquone. Patient is also on Piperacillin-Tazobactam for perirectal-abscess, as well. Hematology was asked to re-evaluate patient given abnormal Thrombosis Panel. Recommendations, as outlined above.    Review of Systems:  All systems reviewed and negative except otherwise listed in the History of Present Illness.    Objective:  Vitals:    10/17/24 1400   BP: 143/96   Pulse: (!) 122   Resp: 12   Temp:    SpO2: 100%     Physical Exam:  General: Extubated - Minimally participatory on examination  HEENT: Atraumatic, and normocephalic; PERRLA; EOMI; Moist mucosal membranes  Neck: Trachea midline; No neck masses, thyromegaly, or cervical lymphadenopathy present  Cardiovascular: Regular rate and rhythm; No murmurs, rubs, or gallops  Respiratory: Extubated - Diminished breathsounds; Otherwise, clear to auscultation  Abdomen: Soft/Non-Distended, Obese - Difficult to Appreciate Organomegaly; Bowel sounds present  Extremities: No obvious deformities; 2+ Pitting Edema Bilaterally; Synchronous Compressive Devices in Place; Arterial Line (Left Upper Extremity)    WBC   Date Value Ref Range Status   10/17/2024 10.51 (H) 4.31 - 10.16 Thousand/uL Final   10/16/2024 9.03 4.31 - 10.16 Thousand/uL Final   10/15/2024 11.39 (H) 4.31 - 10.16 Thousand/uL Final     Hemoglobin   Date Value Ref Range Status   10/17/2024 10.0 (L) 12.0 - 17.0 g/dL Final   10/16/2024 9.2 (L) 12.0 - 17.0 g/dL Final   10/15/2024 9.7 (L) 12.0 - 17.0 g/dL Final     Platelets   Date Value Ref Range Status   10/17/2024 123 (L) 149 - 390 Thousands/uL Final   10/16/2024 120 (L) 149 - 390 Thousands/uL Final   10/15/2024 120 (L) 149 - 390 Thousands/uL Final     MCV   Date Value Ref Range Status   10/17/2024 95 82 - 98 fL Final   10/16/2024 94 82 - 98 fL Final   10/15/2024 90 82 - 98 fL Final      Potassium   Date Value Ref Range Status   10/17/2024 4.1 3.5 - 5.3 mmol/L Final   10/16/2024 3.9 3.5 - 5.3 mmol/L  Final   10/15/2024 4.3 3.5 - 5.3 mmol/L Final   10/01/2024 4.5 3.5 - 5.1 mmol/L Final   05/09/2024 4.5 3.5 - 5.1 mmol/L Final   03/25/2024 4.6 3.5 - 5.1 mmol/L Final     Chloride   Date Value Ref Range Status   10/17/2024 104 96 - 108 mmol/L Final   10/16/2024 102 96 - 108 mmol/L Final   10/15/2024 101 96 - 108 mmol/L Final   10/01/2024 95 (L) 98 - 107 mmol/L Final   05/09/2024 105 98 - 107 mmol/L Final   03/25/2024 100 98 - 107 mmol/L Final     Carbon Dioxide   Date Value Ref Range Status   10/01/2024 24 22 - 32 mmol/L Final   05/09/2024 26 22 - 32 mmol/L Final   03/25/2024 27 22 - 32 mmol/L Final     CO2   Date Value Ref Range Status   10/17/2024 22 21 - 32 mmol/L Final   10/16/2024 23 21 - 32 mmol/L Final   10/15/2024 23 21 - 32 mmol/L Final     CO2, i-STAT   Date Value Ref Range Status   10/13/2024 22 21 - 32 mmol/L Final   10/13/2024 24 21 - 32 mmol/L Final     BUN   Date Value Ref Range Status   10/17/2024 25 5 - 25 mg/dL Final   10/16/2024 25 5 - 25 mg/dL Final   10/15/2024 29 (H) 5 - 25 mg/dL Final   10/01/2024 18 6 - 20 mg/dL Final   05/09/2024 21 (H) 6 - 20 mg/dL Final   03/25/2024 20 6 - 20 mg/dL Final   10/11/2019 18 7 - 18 mg/dL Final     Creatinine   Date Value Ref Range Status   10/17/2024 0.62 0.60 - 1.30 mg/dL Final     Comment:     Standardized to IDMS reference method   10/16/2024 0.69 0.60 - 1.30 mg/dL Final     Comment:     Standardized to IDMS reference method   10/15/2024 0.82 0.60 - 1.30 mg/dL Final     Comment:     Standardized to IDMS reference method   10/01/2024 0.9 0.6 - 1.2 mg/dL Final   05/09/2024 1.0 0.6 - 1.2 mg/dL Final   03/25/2024 1.0 0.6 - 1.2 mg/dL Final     Glucose   Date Value Ref Range Status   10/17/2024 91 65 - 140 mg/dL Final     Comment:     If the patient is fasting, the ADA then defines impaired fasting glucose as > 100 mg/dL and diabetes as > or equal to 123 mg/dL.   10/16/2024 88 65 - 140 mg/dL Final     Comment:     If the patient is fasting, the ADA then defines  impaired fasting glucose as > 100 mg/dL and diabetes as > or equal to 123 mg/dL.   10/15/2024 91 65 - 140 mg/dL Final     Comment:     If the patient is fasting, the ADA then defines impaired fasting glucose as > 100 mg/dL and diabetes as > or equal to 123 mg/dL.   10/01/2024 79 70 - 120 mg/dL Final   05/09/2024 105 70 - 120 mg/dL Final   03/25/2024 97 70 - 120 mg/dL Final     Calcium   Date Value Ref Range Status   10/17/2024 7.1 (L) 8.4 - 10.2 mg/dL Final   10/16/2024 7.2 (L) 8.4 - 10.2 mg/dL Final   10/15/2024 7.6 (L) 8.4 - 10.2 mg/dL Final   10/01/2024 7.5 (L) 8.4 - 10.2 mg/dL Final   05/09/2024 8.4 8.4 - 10.2 mg/dL Final   03/25/2024 8.5 8.4 - 10.2 mg/dL Final     Albumin   Date Value Ref Range Status   10/17/2024 2.4 (L) 3.5 - 5.0 g/dL Final   10/15/2024 2.5 (L) 3.5 - 5.0 g/dL Final   10/14/2024 2.3 (L) 3.5 - 5.0 g/dL Final   10/01/2024 2.7 (L) 3.8 - 5.0 g/dL Final   05/09/2024 3.4 (L) 3.8 - 5.0 g/dL Final   03/25/2024 3.5 (L) 3.8 - 5.0 g/dL Final     Total Bilirubin   Date Value Ref Range Status   10/17/2024 2.64 (H) 0.20 - 1.00 mg/dL Final     Comment:     Use of this assay is not recommended for patients undergoing treatment with eltrombopag due to the potential for falsely elevated results.  N-acetyl-p-benzoquinone imine (metabolite of Acetaminophen) will generate erroneously low results in samples for patients that have taken an overdose of Acetaminophen.   10/15/2024 2.31 (H) 0.20 - 1.00 mg/dL Final     Comment:     Use of this assay is not recommended for patients undergoing treatment with eltrombopag due to the potential for falsely elevated results.  N-acetyl-p-benzoquinone imine (metabolite of Acetaminophen) will generate erroneously low results in samples for patients that have taken an overdose of Acetaminophen.   10/14/2024 2.13 (H) 0.20 - 1.00 mg/dL Final     Comment:     Use of this assay is not recommended for patients undergoing treatment with eltrombopag due to the potential for falsely  "elevated results.  N-acetyl-p-benzoquinone imine (metabolite of Acetaminophen) will generate erroneously low results in samples for patients that have taken an overdose of Acetaminophen.   10/01/2024 2.0 (H) <=1.2 mg/dL Final   05/09/2024 1.2 <=1.2 mg/dL Final   03/12/2024 1.1 <=1.2 mg/dL Final     Alkaline Phosphatase   Date Value Ref Range Status   10/17/2024 708 (H) 34 - 104 U/L Final   10/15/2024 699 (H) 34 - 104 U/L Final   10/14/2024 223 (H) 34 - 104 U/L Final   10/01/2024 352 (H) 35 - 130 U/L Final   05/09/2024 148 (H) 35 - 130 U/L Final   03/12/2024 165 (H) 35 - 130 U/L Final     AST   Date Value Ref Range Status   10/17/2024 356 (H) 13 - 39 U/L Final   10/15/2024 449 (H) 13 - 39 U/L Final   10/14/2024 437 (H) 13 - 39 U/L Final   10/01/2024 385 (H) 10 - 50 U/L Final   05/09/2024 67 (H) 10 - 50 U/L Final   03/12/2024 60 (H) 10 - 50 U/L Final     ALT   Date Value Ref Range Status   10/17/2024 170 (H) 7 - 52 U/L Final     Comment:     Specimen collection should occur prior to Sulfasalazine administration due to the potential for falsely depressed results.    10/15/2024 211 (H) 7 - 52 U/L Final     Comment:     Specimen collection should occur prior to Sulfasalazine administration due to the potential for falsely depressed results.    10/14/2024 189 (H) 7 - 52 U/L Final     Comment:     Specimen collection should occur prior to Sulfasalazine administration due to the potential for falsely depressed results.    10/01/2024 226 (H) 10 - 50 U/L Final   05/09/2024 75 (H) 10 - 50 U/L Final   03/12/2024 46 10 - 50 U/L Final      LD   Date Value Ref Range Status   10/16/2024 556 (H) 140 - 271 U/L Final   10/14/2024 638 (H) 140 - 271 U/L Final   10/11/2024 868 (H) 140 - 271 U/L Final      TSH   Date Value Ref Range Status   05/09/2024 18.70 (H) 0.27 - 4.20 uIU/mL Final   03/25/2024 16.60 (H) 0.27 - 4.20 uIU/mL Final   03/12/2024 23.10 (H) 0.27 - 4.20 uIU/mL Final    No results found for: \"P1MKKLQ\"   Free T4   Date Value " Ref Range Status   10/12/2024 0.68 0.61 - 1.12 ng/dL Final     Comment:     Specimens with biotin concentrations > 10 ng/mL can lead to significant (> 10%) positive bias in result.   10/10/2024 0.86 0.61 - 1.12 ng/dL Final     Comment:     Specimens with biotin concentrations > 10 ng/mL can lead to significant (> 10%) positive bias in result.   03/15/2024 0.59 (L) 0.61 - 1.12 ng/dL Final     Comment:     Specimens with biotin concentrations > 10 ng/mL can lead to significant (> 10%) positive bias in result.      Patient was seen and discussed with attending physician, Kem Patel M.D.    Estefani Monique D.O.  Hematology-Oncology Fellow (PGY-5)

## 2024-10-17 NOTE — ASSESSMENT & PLAN NOTE
Patient is status post craniectomy and evacuation of subdural hematoma.  He also has ischemia in the brainstem.  Neurosurgery follow-up.

## 2024-10-17 NOTE — CONSULTS
Vancomycin IV Pharmacy-to-Dose Consultation    Clayton Duval is a 44 y.o. male who was receiving Vancomycin IV with management by the Pharmacy Consult service for treatment of Soft tissue (goal -600, trough >10) broad spectrum for pending cultures.       The patient’s Vancomycin therapy has been discontinued. Thank you for allowing us to take part in this patient's care. Pharmacy will sign-off now; please call or re-consult if there are any questions.        Jonah Mendoza, PharmD, BCCCP  Critical Care Clinical Pharmacist  Available via [employer]

## 2024-10-17 NOTE — ASSESSMENT & PLAN NOTE
Source of sepsis is secondary to possible babesiosis and perirectal abscess.  Despite sepsis, patient remains systemically well, without toxicity and hemodynamically stable, without hypotension.  Admission blood cultures have no growth thus far.  Antibiotic plan as seen below.  Monitor temperature/WBC.    Monitor hemodynamics.    Follow-up on all pending blood cultures.

## 2024-10-17 NOTE — ASSESSMENT & PLAN NOTE
- Right sided buttock/perirectal abscess identified during sacral wound assessment on 10/17/2024.   - This was identified to be spontaneously draining purulent fluid and a moderate amount of purulent fluid was expressed from the wound with repetitive pressure to the surrounding area.   - There did not appear to be any communication with the rectum on digital rectal exam on 10/17/2024.  - Continue to monitor and consider further I&D if spontaneous drainage is inadequate or stops.

## 2024-10-17 NOTE — PROGRESS NOTES
NEUROLOGY RESIDENCY PROGRESS NOTE     Name: Clayton Duval   Age & Sex: 44 y.o. male   MRN: [id number]  Unit/Bed#: ICU 05   Encounter: [id number]    Recommendations for outpatient neurological follow up have yet to be determined.     Pending for discharge: Stroke workup    ASSESSMENT & PLAN     Stroke (HCC)  Assessment & Plan  Assessment:  Patient is a 44-year-old male with past medical history hypertension, malnutrition, gastric bypass surgery, VILLALOBOS, hypothyroidism, and anemia that presented with with acute altered mental status and imaging demonstrating left-sided subdural hemorrhage with 1.5 cm rightward midline shift.  Patient underwent left-sided craniectomy for hematoma evacuation, and needed transfusion of multiple blood products after anemia was found in setting of suspected coagulopathy.  Itching studies thus far have also been concerning for metastatic disease having demonstrated enhancing osseous lesions throughout the majority of the visualized spine, bony pelvis, spleen, and liver.  MRI brain demonstrated multifocal early ischemia including right anterior thalamus, anterior occipital lobes left greater than right.  Repeat noncontrast CT head on 10/15 demonstrated continued redistribution of blood products and hemorrhage along with evolving areas of ischemia with punctate areas seen better on recent MRI.    TTE: 60% EF, PFO present  vEEG did not show any seizures    Impression:  Considering patient's imaging findings suggestive of metastatic disease, and laboratory studies suggestive of coagulopathy, further stroke workup with MRI brain with and without contrast, vascular imaging with CTA head and neck, 2D echocardiogram and continued evaluation of coagulopathy/metastatic disease is recommended.    Plan:  - Case discussed with attending neurologist Dr. Gonzáles  - Recommend stroke workup  - Recommend MRI brain with and without contrast  - Recommend CTA head and neck with and without contrast or  MRA brain  - Recommend aspirin and atorvastatin when able per primary team  - vEEG discontinued   - AC recommendations per NCC/neurosurgical teams, recommend hematology to discuss thrombosis panel  - Continue following hypercoagulable workup  - Recommend systolic blood pressure goals of 110-<140  - Recommend continue cardiopulmonary monitoring  - Neurochecks per primary team  - Recommend STAT noncontrast CT head for any acute change in mental status/neurologic  - Seizure precautions  - Delirium precautions  - Continue to evaluate and treat any metabolic, infectious, inflammatory derangements  - Rest per primary team appreciated          SUBJECTIVE     Patient was seen and examined. No acute events overnight. He remains lethargic today, nonverbal but follows commands.        Review of Systems   Unable to perform ROS: Mental status change       OBJECTIVE     Patient ID: Clayton Duval is a 44 y.o. male.    Vitals:    10/17/24 0800 10/17/24 0900 10/17/24 1000 10/17/24 1100   BP: 155/90 142/91 142/91 117/65   Pulse: (!) 134 (!) 130 (!) 134 (!) 132   Resp: (!) 3 17 (!) 24 (!) 25   Temp:  (!) 102.3 °F (39.1 °C)     TempSrc:  Axillary     SpO2: 100% 94% 96% 94%   Weight:       Height:          Temperature:   Temp (24hrs), Av.1 °F (37.8 °C), Min:98.3 °F (36.8 °C), Max:103 °F (39.4 °C)    Temperature: (!) 102.3 °F (39.1 °C)      Physical Exam  HENT:      Nose: Nose normal.   Eyes:      General:         Right eye: No discharge.         Left eye: No discharge.   Cardiovascular:      Rate and Rhythm: Normal rate.   Abdominal:      General: There is no distension.   Musculoskeletal:      Right lower leg: Edema present.      Left lower leg: Edema present.   Skin:     General: Skin is warm and dry.      Coloration: Skin is not jaundiced.   Neurological:      Mental Status: He is alert.          Neurologic Exam     Mental Status   Level of consciousness: arousable by verbal stimuli  Patient is able to follow very  limited commands.   Not speaking.       Cranial Nerves     CN II   Visual fields full to confrontation.   Pupils are equal and reactive bilaterally, 3 mm, regular in shape, sluggish.   Weakly smiles equally b/l.  Hearing appears to be grossly intact.       Motor Exam Patient was able to demonstrate thumbs up on bilateral upper extremities, was able to demonstrate 5/5 with RUE but LUE shows decreased strength with  strength, push and pull, and demonstrated 4+/5 handgrip bilaterally.    Pt shows antigravity with RLE but not LLE.  He was able to wiggle toes b/l but certainly more limited with the left.     Gait, Coordination, and Reflexes Patient did not demonstrate resting tremor.  Brachioradialis, biceps 2+ bilaterally.  Patellar on left 2+, patellar on right 1+.  No Michael's bilaterally.  No ankle clonus bilaterally.            LABORATORY DATA     Labs: I have personally reviewed pertinent reports.    Results from last 7 days   Lab Units 10/17/24  0444 10/16/24  0444 10/15/24  0426 10/11/24  0447 10/10/24  2128   WBC Thousand/uL 10.51* 9.03 11.39*   < > 6.42   HEMOGLOBIN g/dL 10.0* 9.2* 9.7*   < > 10.8*   I STAT HEMOGLOBIN   --   --   --    < >  --    HEMATOCRIT % 31.5* 28.1* 29.2*   < > 33.1*   HEMATOCRIT, ISTAT   --   --   --    < >  --    PLATELETS Thousands/uL 123* 120* 120*   < > 189   MONO PCT %  --   --  1*  --  4   EOS PCT %  --   --  0  --  0    < > = values in this interval not displayed.      Results from last 7 days   Lab Units 10/17/24  0444 10/16/24  0444 10/15/24  0426 10/14/24  0435 10/13/24  1252 10/13/24  1031 10/13/24  0853   SODIUM mmol/L 136 134* 131* 132*   < >  --   --    POTASSIUM mmol/L 4.1 3.9 4.3 4.4   < >  --   --    CHLORIDE mmol/L 104 102 101 102   < >  --   --    CO2 mmol/L 22 23 23 23   < >  --   --    CO2, I-STAT mmol/L  --   --   --   --   --  22 24   BUN mg/dL 25 25 29* 21   < >  --   --    CREATININE mg/dL 0.62 0.69 0.82 0.60   < >  --   --    CALCIUM mg/dL 7.1* 7.2* 7.6*  7.5*   < >  --   --    ALK PHOS U/L 708*  --  699* 223*   < >  --   --    ALT U/L 170*  --  211* 189*   < >  --   --    AST U/L 356*  --  449* 437*   < >  --   --    GLUCOSE, ISTAT mg/dl  --   --   --   --   --  107 98    < > = values in this interval not displayed.     Results from last 7 days   Lab Units 10/17/24  0444 10/16/24  0444 10/15/24  0426   MAGNESIUM mg/dL 2.0 1.8* 2.0     Results from last 7 days   Lab Units 10/17/24  0444 10/16/24  0444 10/15/24  0426   PHOSPHORUS mg/dL 2.5* 2.3* 2.6*      Results from last 7 days   Lab Units 10/14/24  0435 10/13/24  1450 10/13/24  0850 10/13/24  0534 10/12/24  0449 10/11/24  0447 10/10/24  2128   INR  1.35* 1.27* 1.87*   < >  --    < > 1.74*   PTT seconds  --   --   --   --  63*  --  54*    < > = values in this interval not displayed.     Results from last 7 days   Lab Units 10/16/24  0444   LACTIC ACID mmol/L 3.2*           IMAGING & DIAGNOSTIC TESTING     Radiology Results: I have personally reviewed pertinent reports.      CT head wo contrast   Final Result by Trenton Stanley MD (10/17 0805)      1.  No significant change in the left subdural hemorrhage or evolving right thalamic and left temporo-occipital infarctions.                  Workstation performed: [id number]         XR abdomen 1 view kub   Final Result by Gen Mims MD (10/16 2723)      Nasogastric tube terminates in the stomach.               Workstation performed: [id number]         CT head wo contrast   Final Result by Fabricio Almeida MD (10/15 5822)      Status post decompressive left-sided hemicraniectomy with mild interval increase in the degree of mixed attenuation subdural as well as subgaleal hemorrhage along the craniectomy site. Grossly stable mild rightward midline shift.      Thin subdural hemorrhage tracking along the right tentorium likely represents redistributed blood products. Small amount of hemorrhage also noted layering in the occipital horns.      Evolving areas of  "ischemia as described above with punctate areas seen to better advantage on recent MRI. No definite CT evidence for new large acute vascular distribution infarct.      The study was marked in EPIC for immediate notification.                  Workstation performed: [id number]         MRI brain wo contrast   Final Result by Wil Brennan DO (10/14 1849)      Multifocal early ischemia including right anterior thalamus, anterior occipital lobes left greater than right. On the left this tracks into the posterior medial temporal lobe. There is also a punctate focus of cortical ischemia within the posterior    parafalcine frontal lobe. Cortical petechial hemorrhage noted within the central aspect of the right thalamus and left anterior occipital lobe. No sofía hemorrhagic transformation. Evaluation of the cervical and intracranial vasculature is recommended.      Patient is status post left hemicraniectomy for surgical decompression of acute subdural hemorrhage. Mixed signal subdural hemorrhage, fluid and air remains within the periphery of the left hemisphere with mild mass effect and 4 mm of left to right    shift.      There is a very small subdural hemorrhage noted within the right middle cranial fossa extending posteriorly lateral to the occipital lobe, less than 2 mm in thickness.      Focal intraventricular hemorrhage within the occipital horn of the right greater than left. There is increased T2 and FLAIR signal change surrounding the occipital horn of the lateral ventricle suggesting focal transependymal flow of CSF.      This examination was marked \"immediate notification\" in Epic in order to begin the standard process by which the radiology reading room liaison alerts the referring practitioner.      Workstation performed: [id number]         CT head wo contrast   Final Result by Pradip Lovett MD (10/13 4933)      -New area of hypodensity within the left thalamus and basal ganglia compared to earlier day " exam, suggestive of acute infarct.      -Expected postsurgical changes from left hemispheric craniectomy with decreased size of mixed density subdural hematoma, improved 4 mm left to right midline shift and improved effacement of the basilar cisterns.      -Improved mass effect on the ventricular system and dilatation of the right temporal horn of the lateral ventricle as detailed above.      I personally communicated the findings via telephone with Son Dover at 12:56 pm. on 10/13/2024.                  Workstation performed: [id number]         IR biopsy bone    (Results Pending)   CT head wo contrast    (Results Pending)   MRI brain w wo contrast    (Results Pending)       Other Diagnostic Testing: I have personally reviewed pertinent reports.      ACTIVE MEDICATIONS       Current Facility-Administered Medications:     acetaminophen (Ofirmev) injection 1,000 mg, Q6H PRN, Last Rate: Stopped (10/17/24 1000)    acetylcysteine (MUCOMYST) 200 mg/mL inhalation solution 600 mg, Q6H    atovaquone (MEPRON) oral suspension 750 mg, BID    azithromycin (ZITHROMAX) oral suspension 500 mg, Q24H    benzonatate (TESSALON PERLES) capsule 100 mg, TID PRN    calcium gluconate 1 g in sodium chloride 0.9% 50 mL (premix), Once, Last Rate: 1 g (10/17/24 1236)    chlorhexidine (PERIDEX) 0.12 % oral rinse 15 mL, Q12H KAYLIE    Cholecalciferol (VITAMIN D3) tablet 5,000 Units, Daily    famotidine (PEPCID) tablet 20 mg, BID    fentaNYL injection 50 mcg, Q1H PRN    folic acid (FOLVITE) tablet 1 mg, Daily    hydrALAZINE (APRESOLINE) injection 10 mg, Q6H PRN    HYDROmorphone (DILAUDID) injection 1 mg, Q3H PRN    ipratropium (ATROVENT) 0.02 % inhalation solution 0.5 mg, Q6H    labetalol (NORMODYNE) injection 10 mg, Q6H PRN    levalbuterol (XOPENEX) inhalation solution 1.25 mg, Q6H    levETIRAcetam (KEPPRA) injection 750 mg, Q12H KAYLIE    levothyroxine tablet 200 mcg, Early Morning    ondansetron (ZOFRAN) injection 4 mg, Q8H PRN    [COMPLETED]  piperacillin-tazobactam (ZOSYN) 4.5 g in sodium chloride 0.9 % 100 mL IV LOADING DOSE, Once, Last Rate: Stopped (10/16/24 0247) **FOLLOWED BY** piperacillin-tazobactam (ZOSYN) 4.5 g in sodium chloride 0.9 % 100 mL IVPB (EXTENDED INFUSION), Q8H, Last Rate: Stopped (10/17/24 0930)    polyethylene glycol (MIRALAX) packet 17 g, Daily PRN    sodium phosphate 30 mmol in dextrose 5 % 250 mL Infusion, Once, Last Rate: 30 mmol (10/17/24 0801)    thiamine tablet 100 mg, Daily    vancomycin (VANCOCIN) 1,250 mg in sodium chloride 0.9 % 250 mL IVPB, Q8H, Last Rate: 1,250 mg (10/17/24 1016)    Prior to Admission medications    Medication Sig Start Date End Date Taking? Authorizing Provider   Cholecalciferol 125 MCG (5000 UT) capsule Take 5,000 Units by mouth daily    Historical Provider, MD   cyanocobalamin 1,000 mcg/mL Inject 1,000 mcg into a muscle every 30 (thirty) days 10/1/24   Historical Provider, MD   docusate sodium (COLACE) 100 mg capsule Take 1 capsule (100 mg total) by mouth every 12 (twelve) hours  Patient not taking: Reported on 10/11/2024 6/30/23   Brendon Luz MD   ferrous sulfate 325 (65 Fe) mg tablet Take 325 mg by mouth daily with breakfast 2/26/24   Historical Provider, MD   furosemide (LASIX) 20 mg tablet Take 20 mg by mouth daily 3/12/24   Historical Provider, MD   hydrocortisone (ANUSOL-HC) 2.5 % rectal cream Apply topically 2 (two) times a day for 5 days 6/30/23 7/5/23  Brendon Luz MD   levothyroxine 200 mcg tablet Take 200 mcg by mouth daily 3/14/24   Historical Provider, MD   polyethylene glycol (MIRALAX) 17 g packet Take 17 g by mouth daily    Historical Provider, MD         VTE Pharmacologic Prophylaxis: VTE covered by:    None      VTE Mechanical Prophylaxis: sequential compression device    ======    I have discussed the patient's history, physical exam findings, assessment, and plan in detail with attending, Dr. Gonzáles    Thank you for allowing me to participate in the care of your patient,  Clayton Duval.    Albino Pugh DO  [facility] Neurology Residency, PGY-2

## 2024-10-17 NOTE — ASSESSMENT & PLAN NOTE
Peripheral smear with evidence of multiple organisms extracellularly and in RBC.  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  Extracellular organism appears kim-like.  Patient does have possible tick exposure.  For now, we will check Babesia PCR and start patient on azithromycin/atovaquone and monitor serial parasite smears.  HIV screen negative.  Start azithromycin/atovaquone empirically.  Check Babesia PCR.  Monitor serial parasite smear.  Monitor temperature/WBC.

## 2024-10-17 NOTE — PLAN OF CARE
Problem: PAIN - ADULT  Goal: Verbalizes/displays adequate comfort level or baseline comfort level  Description: Interventions:  - Encourage patient to monitor pain and request assistance  - Assess pain using appropriate pain scale  - Administer analgesics based on type and severity of pain and evaluate response  - Implement non-pharmacological measures as appropriate and evaluate response  - Consider cultural and social influences on pain and pain management  - Notify physician/advanced practitioner if interventions unsuccessful or patient reports new pain  Outcome: Progressing     Problem: INFECTION - ADULT  Goal: Absence or prevention of progression during hospitalization  Description: INTERVENTIONS:  - Assess and monitor for signs and symptoms of infection  - Monitor lab/diagnostic results  - Monitor all insertion sites, i.e. indwelling lines, tubes, and drains  - Monitor endotracheal if appropriate and nasal secretions for changes in amount and color  - [place] appropriate cooling/warming therapies per order  - Administer medications as ordered  - Instruct and encourage patient and family to use good hand hygiene technique  - Identify and instruct in appropriate isolation precautions for identified infection/condition  Outcome: Progressing  Goal: Absence of fever/infection during neutropenic period  Description: INTERVENTIONS:  - Monitor WBC    Outcome: Progressing     Problem: SAFETY ADULT  Goal: Patient will remain free of falls  Description: INTERVENTIONS:  - Educate patient/family on patient safety including physical limitations  - Instruct patient to call for assistance with activity   - Consult OT/PT to assist with strengthening/mobility   - Keep Call bell within reach  - Keep bed low and locked with side rails adjusted as appropriate  - Keep care items and personal belongings within reach  - Initiate and maintain comfort rounds  - Make Fall Risk Sign visible to staff  - Offer Toileting every 2 Hours,  in advance of need  - Initiate/Maintain bed alarm  - Obtain necessary fall risk management equipment:   - Apply yellow socks and bracelet for high fall risk patients  - Consider moving patient to room near nurses station  Outcome: Progressing  Goal: Maintain or return to baseline ADL function  Description: INTERVENTIONS:  -  Assess patient's ability to carry out ADLs; assess patient's baseline for ADL function and identify physical deficits which impact ability to perform ADLs (bathing, care of mouth/teeth, toileting, grooming, dressing, etc.)  - Assess/evaluate cause of self-care deficits   - Assess range of motion  - Assess patient's mobility; develop plan if impaired  - Assess patient's need for assistive devices and provide as appropriate  - Encourage maximum independence but intervene and supervise when necessary  - Involve family in performance of ADLs  - Assess for home care needs following discharge   - Consider OT consult to assist with ADL evaluation and planning for discharge  - Provide patient education as appropriate  Outcome: Progressing  Goal: Maintains/Returns to pre admission functional level  Description: INTERVENTIONS:  - Perform AM-PAC 6 Click Basic Mobility/ Daily Activity assessment daily.  - Set and communicate daily mobility goal to care team and patient/family/caregiver.   - Collaborate with rehabilitation services on mobility goals if consulted  - Perform Range of Motion 4 times a day.  - Reposition patient every 2 hours.  - Record patient progress and toleration of activity level   Outcome: Progressing     Problem: DISCHARGE PLANNING  Goal: Discharge to home or other facility with appropriate resources  Description: INTERVENTIONS:  - Identify barriers to discharge w/patient and caregiver  - Arrange for needed discharge resources and transportation as appropriate  - Identify discharge learning needs (meds, wound care, etc.)  - Arrange for interpretive services to assist at discharge as  needed  - Refer to Case Management Department for coordinating discharge planning if the patient needs post-hospital services based on physician/advanced practitioner order or complex needs related to functional status, cognitive ability, or social support system  Outcome: Progressing     Problem: Knowledge Deficit  Goal: Patient/family/caregiver demonstrates understanding of disease process, treatment plan, medications, and discharge instructions  Description: Complete learning assessment and assess knowledge base.  Interventions:  - Provide teaching at level of understanding  - Provide teaching via preferred learning methods  Outcome: Progressing     Problem: Prexisting or High Potential for Compromised Skin Integrity  Goal: Skin integrity is maintained or improved  Description: INTERVENTIONS:  - Identify patients at risk for skin breakdown  - Assess and monitor skin integrity  - Assess and monitor nutrition and hydration status  - Monitor labs   - Assess for incontinence   - Turn and reposition patient  - Assist with mobility/ambulation  - Relieve pressure over bony prominences  - Avoid friction and shearing  - Provide appropriate hygiene as needed including keeping skin clean and dry  - Evaluate need for skin moisturizer/barrier cream  - Collaborate with interdisciplinary team   - Patient/family teaching  - Consider wound care consult   Outcome: Progressing     Problem: Nutrition/Hydration-ADULT  Goal: Nutrient/Hydration intake appropriate for improving, restoring or maintaining nutritional needs  Description: Monitor and assess patient's nutrition/hydration status for malnutrition. Collaborate with interdisciplinary team and initiate plan and interventions as ordered.  Monitor patient's weight and dietary intake as ordered or per policy. Utilize nutrition screening tool and intervene as necessary. Determine patient's food preferences and provide high-protein, high-caloric foods as appropriate.      INTERVENTIONS:  - Monitor oral intake, urinary output, labs, and treatment plans  - Assess nutrition and hydration status and recommend course of action  - Evaluate amount of meals eaten  - Assist patient with eating if necessary   - Allow adequate time for meals  - Recommend/ encourage appropriate diets, oral nutritional supplements, and vitamin/mineral supplements  - Order, calculate, and assess calorie counts as needed  - Recommend, monitor, and adjust tube feedings and TPN/PPN based on assessed needs  - Assess need for intravenous fluids  - Provide specific nutrition/hydration education as appropriate  - Include patient/family/caregiver in decisions related to nutrition  Outcome: Progressing     Problem: SAFETY,RESTRAINT: NV/NON-SELF DESTRUCTIVE BEHAVIOR  Goal: Remains free of harm/injury (restraint for non violent/non self-detsructive behavior)  Description: INTERVENTIONS:  - Instruct patient/family regarding restraint use   - Assess and monitor physiologic and psychological status   - Provide interventions and comfort measures to meet assessed patient needs   - Identify and implement measures to help patient regain control  - Assess readiness for release of restraint   Outcome: Progressing  Goal: Returns to optimal restraint-free functioning  Description: INTERVENTIONS:  - Assess the patient's behavior and symptoms that indicate continued need for restraint  - Identify and implement measures to help patient regain control  - Assess readiness for release of restraint   Outcome: Progressing

## 2024-10-17 NOTE — ASSESSMENT & PLAN NOTE
Patient is status post bedside I&D and being followed by general surgery.  Antibiotic plan as in above.  Wound care per surgery.

## 2024-10-17 NOTE — OCCUPATIONAL THERAPY NOTE
OT CANCEL NOTE    OT orders received. Chart reviewed. Pt is pending removal of vEEG for craniectomy helmet. Will hold initial OT evaluation. Will continue to follow pt on caseload and see pt when medically stable and as clinically appropriate.       10/17/24 1237   OT Last Visit   OT Visit Date 10/17/24   Note Type   Note type Evaluation;Cancelled Session   Cancel Reasons Medical status       Mariela Matos MS, OTR/L

## 2024-10-17 NOTE — PROGRESS NOTES
Progress Note - Critical Care/ICU   Name: Clayton Duval 44 y.o. male I MRN: [id number]  Unit/Bed#: ICU 05 I Date of Admission: 10/13/2024   Date of Service: 10/17/2024 I Hospital Day: 4       Assessment & Plan   Neuro:   Diagnosis: Subdural hematoma s/p left hemicraniectomy for evacuation of SDH POD#3, AMS, Acute ischemic stroke  CT head - 10/13 0616 - preop: Mixed density left convexity subdural hemorrhage (1.2 cm thickness) with by 1.5 cm rightward midline shift. Trace subdural hemorrhage along the left tentorium. Mass effect with low-lying cerebellar tonsils, effacement of the suprasellar cistern, and partial effacement of the quadrigeminal plate cistern.  CT head - 10/13 - post op: New area of hypodensity within the left thalamus and basal ganglia compared to earlier day exam, suggestive of acute infarct. Expected postsurgical changes from left hemispheric craniectomy with decreased size of mixed density subdural hematoma, improved 4 mm left to right midline shift and improved effacement of the basilar cisterns. Improved mass effect on the ventricular system and dilatation of the right temporal horn of the lateral ventricle  Plan:   MRI brain w/wo contrast  CTH repeat on 10/17  Continue monitoring neurological exam closely with frequent neuro checks, obtain stat CTH if any acute changes  Continuous vEEG for AMS.  Monitor for signs of ICP increase (bradycardia, HTN, changes in neuro exam, increased tone, pupillary changes)  Blood Pressure: SBP goal 110 - 140, cardene gtt or pressers/fluids as needed to keep within range.  AC/AP: Holding AP and AC at this time. Consider lovenox for dvt prophylaxis after biopsy   Imaging/Diagnostic Studies: MRI Brain w/o contrast  Labs Pending:  Seizure Prophylaxis: Keppra 1g load followed by 750mg BID for 7 days   Tight glycemic control, goal <180. Monitor temperature, tylenol PRN.  PT/OT/Speech/PMR consults when able  DVT PPx: SCDs and heparin 5000 q8h   CV:   Diagnosis:  sinus bradycardia  Plan:   SBP goal 110 - 140, Cardene as needed  Continue to monitor on Telemetry     Pulm:  Diagnosis: NC  Plan:      GI:   Diagnosis: Hepatosplenomegaly, hepatic lesions, splenic lesions, transaminitis  Hepatitis panel - negative  Plan:   Oncology following along  IR consulted - plan for biopsy of a hepatic lesion vs iliac bone lesion - 10/17/24     :   Diagnosis: plasencia discontinued  Plan:   Monitor I&Os  Retention protocol     F/E/N:   F:   E: replenish as indicated for Magnesium >2, K >4  N: NPO for procedures    Heme/Onc:   Diagnosis: suspected metastatic cancer, hypocoagulability in the setting of hepatic malignancy, lytic bone lesions, hepatic coagulopathy  MRI Abdomen w/wo contrast - 10/11/24: Findings concerning for metastatic disease. Enhancing osseous lesions throughout the majority of the visualized spine and bony pelvis. Markedly enlarged spleen much of which is occupied by large confluent hypoenhancing lesions suspected to represent metastases. Hepatomegaly. Multifocal hepatic lesions which correlate to areas of hypoenhancement on CT are also suspected to represent metastasis in the given setting. Anasarca and moderate volume ascites  Fibrinogen level - 161  PT-INR - 21.6/1.87 - 10/13 - 0800 -  In the OR due to anemia and coagulopathy patient was given: 4 units of pRBCs, 4 units of FFP and 1 unit of platelets  Discussed with oncology - believe thrombosis panel derangements likely due to hepatic lesions.  Plan:   IR consulted for biopsy - plan for 10/18/24  Oncology consult  Tumor markers ordered - CEA, B-HCG, LDH, CA 19-9       Endo:   Diagnosis: Hypothyroidism, hyponatremia, hypothermia, SIADH  Plan:   Continue home Levothyroxine 200mcg  Suspect hyponatremia of malignancy  Bare hugger  Hyponatremia due to water retention likely in the setting of the patient's intracranial pathologies as well as      ID:   Diagnosis: Sepsis, sacral decubitus ulcer, Babesiosis  Sepsis alert -  hyperthermia, tachycardia, leukocytosis, bandemia  Blood cultures 10/11 - no growth  Blood cultures 10/14 - no growth  Sputum cultures 10/14 - MSSA  Hematology lab - peripheral blood smear with evidence of multiple organisms extracellularly and in RBC. History of possible tick exposure. Suspect Babesia.  4L IV Lactated ringers  Plan:   Sepsis pathway  Continue Zosyn  Azithromycin, Atovaquone initiated for babesiosis  MSK/Skin:   Diagnosis: Anasarca, sacral decubitus ulcer  Plan:   Monitor fluid status  Wound care consulted  Red surgery consult for debridement     Disposition: Critical care    ICU Core Measures     A: Assess, Prevent, and Manage Pain Has pain been assessed? Yes  Need for changes to pain regimen? No   B: Both SAT/SAT  N/A   C: Choice of Sedation RASS Goal: 0 Alert and Calm  Need for changes to sedation or analgesia regimen? No   D: Delirium CAM-ICU: Negative   E: Early Mobility  Plan for early mobility? Yes   F: Family Engagement Plan for family engagement today? Yes       Antibiotic Review: Patient on appropriate coverage based on culture data.  and Awaiting culture results.     Review of Invasive Devices:        Linda Plan: Keep arterial line for hemodynamic monitoring    Prophylaxis:  VTE Re-initiated heparin 5000 units q8h   Stress Ulcer  covered byfamotidine (PEPCID) tablet 20 mg [[id number]]         24 Hour Events : Patient had a fever overnight and received   Subjective   Review of Systems: See HPI for Review of Systems    Objective :                   Vitals I/O      Most Recent Min/Max in 24hrs   Temp (!) 101 °F (38.3 °C) Temp  Min: 98.3 °F (36.8 °C)  Max: 103 °F (39.4 °C)   Pulse (!) 122 Pulse  Min: 110  Max: 134   Resp (!) 8 Resp  Min: 3  Max: 27   /98 BP  Min: 106/91  Max: 155/90   O2 Sat 96 % SpO2  Min: 94 %  Max: 100 %      Intake/Output Summary (Last 24 hours) at 10/17/2024 1541  Last data filed at 10/17/2024 1400  Gross per 24 hour   Intake 2405.5 ml   Output 935 ml   Net 1470.5  ml       Diet NPO  Diet Enteral/Parenteral; Tube Feeding No Oral Diet; Jevity 1.2 Dionicio; Continuous; 75; 50; Saline; Every 4 hours    Invasive Monitoring   Arterial Line  Linda /78  Arterial Line BP  Min: 100/72  Max: 132/70   MAP 94 mmHg  Arterial Line MAP (mmHg)  Min: 80 mmHg  Max: 96 mmHg           Physical Exam   Physical Exam  Vitals reviewed.   Eyes:      General: Dysconjugate gaze.      Conjunctiva/sclera: Conjunctivae normal.      Pupils: Pupils are equal, round, and reactive to light.      Comments: Tracks left and right.  Slightly dysconjugate.   Cardiovascular:      Rate and Rhythm: Normal rate and regular rhythm.      Pulses: Normal pulses.   Pulmonary:      Effort: Pulmonary effort is normal.      Breath sounds: Rhonchi present.   Neurological:      Comments: Able to follow directions.  Thumbs up in LUE.  Wiggles toes in LLE.  Antigravity in RUE/RLE.     Genitourinary/Anorectal:  Barclay present.        Diagnostic Studies        Lab Results: I have reviewed the following results:     Medications:  Scheduled PRN   acetylcysteine, 3 mL, Q6H  atovaquone, 750 mg, BID  azithromycin, 500 mg, Q24H  chlorhexidine, 15 mL, Q12H KAYLIE  Cholecalciferol, 5,000 Units, Daily  famotidine, 20 mg, BID  folic acid, 1 mg, Daily  heparin (porcine), 5,000 Units, Q8H KAYLIE  ipratropium, 0.5 mg, Q6H  levalbuterol, 1.25 mg, Q6H  levETIRAcetam, 750 mg, Q12H KAYLIE  levothyroxine, 200 mcg, Early Morning  piperacillin-tazobactam, 4.5 g, Q8H  thiamine, 100 mg, Daily      acetaminophen, 1,000 mg, Q6H PRN  benzonatate, 100 mg, TID PRN  fentaNYL, 50 mcg, Q1H PRN  hydrALAZINE, 10 mg, Q6H PRN  HYDROmorphone, 1 mg, Q3H PRN  labetalol, 10 mg, Q6H PRN  ondansetron, 4 mg, Q8H PRN  polyethylene glycol, 17 g, Daily PRN       Continuous          Labs:   CBC    Recent Labs     10/16/24  0444 10/17/24  0444   WBC 9.03 10.51*   HGB 9.2* 10.0*   HCT 28.1* 31.5*   * 123*     BMP    Recent Labs     10/16/24  0444 10/17/24  0444   SODIUM 134* 136    K 3.9 4.1    104   CO2 23 22   AGAP 9 10   BUN 25 25   CREATININE 0.69 0.62   CALCIUM 7.2* 7.1*       Coags    No recent results     Additional Electrolytes  Recent Labs     10/16/24  0444 10/17/24  0444   MG 1.8* 2.0   PHOS 2.3* 2.5*   CAIONIZED 1.12 1.09*          Blood Gas    No recent results  No recent results LFTs  Recent Labs     10/17/24  0444   *   *   ALKPHOS 708*   ALB 2.4*   TBILI 2.64*       Infectious  Recent Labs     10/16/24  0133 10/17/24  0444   PROCALCITONI 1.13* 1.21*     Glucose  Recent Labs     10/16/24  0444 10/17/24  0444   GLUC 88 91

## 2024-10-17 NOTE — CONSULTS
Consultation - Surgery-General   Name: Clayton Duval 44 y.o. male I MRN: [id number]  Unit/Bed#: ICU 05 I Date of Admission: 10/13/2024   Date of Service: 10/17/2024 I Hospital Day: 4   Inpatient consult to Acute Care Surgery  Consult performed by: Martina Major MD  Consult ordered by: Maria Handley        Physician Requesting Evaluation: Myranda White MD   Reason for Evaluation / Principal Problem: Sacral wound        Please see consult note by Nick Eid PA-C on 10/17/2024 at 11:28am.

## 2024-10-18 PROBLEM — R79.9 ABNORMAL BLOOD SMEAR: Status: ACTIVE | Noted: 2024-10-18

## 2024-10-18 NOTE — ASSESSMENT & PLAN NOTE
Source of sepsis is most likely perirectal abscess.  Despite sepsis, patient remains systemically well, without toxicity and hemodynamically stable, without hypotension.  Patient has persistent fever.  Admission blood cultures have no growth thus far.  Antibiotic plan as in below.  Monitor temperature/WBC.    Monitor hemodynamics.    Follow-up on all pending blood cultures.

## 2024-10-18 NOTE — PLAN OF CARE
PT is NPO.     Problem: Nutrition/Hydration-ADULT  Goal: Nutrient/Hydration intake appropriate for improving, restoring or maintaining nutritional needs  Description: Monitor and assess patient's nutrition/hydration status for malnutrition. Collaborate with interdisciplinary team and initiate plan and interventions as ordered.  Monitor patient's weight and dietary intake as ordered or per policy. Utilize nutrition screening tool and intervene as necessary. Determine patient's food preferences and provide high-protein, high-caloric foods as appropriate.     INTERVENTIONS:  - Monitor oral intake, urinary output, labs, and treatment plans  - Assess nutrition and hydration status and recommend course of action  - Evaluate amount of meals eaten  - Assist patient with eating if necessary   - Allow adequate time for meals  - Recommend/ encourage appropriate diets, oral nutritional supplements, and vitamin/mineral supplements  - Order, calculate, and assess calorie counts as needed  - Recommend, monitor, and adjust tube feedings and TPN/PPN based on assessed needs  - Assess need for intravenous fluids  - Provide specific nutrition/hydration education as appropriate  - Include patient/family/caregiver in decisions related to nutrition  Outcome: Not Progressing

## 2024-10-18 NOTE — PROGRESS NOTES
NEUROLOGY RESIDENCY PROGRESS NOTE     Name: Clayton Duval   Age & Sex: 44 y.o. male   MRN: [id number]  Unit/Bed#: ICU 05   Encounter: [id number]    Recommendations for outpatient neurological follow up have yet to be determined.     ASSESSMENT & PLAN     Stroke (HCC)  Assessment & Plan  Assessment:  Patient is a 44-year-old male with past medical history hypertension, malnutrition, gastric bypass surgery, VILLALOBOS, hypothyroidism, and anemia that presented with with acute altered mental status and imaging demonstrating left-sided subdural hemorrhage with 1.5 cm rightward midline shift.  Patient underwent left-sided craniectomy for hematoma evacuation, and needed transfusion of multiple blood products after anemia was found in setting of suspected coagulopathy.  Itching studies thus far have also been concerning for metastatic disease having demonstrated enhancing osseous lesions throughout the majority of the visualized spine, bony pelvis, spleen, and liver.  MRI brain demonstrated multifocal early ischemia including right anterior thalamus, anterior occipital lobes left greater than right.  Repeat noncontrast CT head on 10/15 demonstrated continued redistribution of blood products and hemorrhage along with evolving areas of ischemia with punctate areas seen better on recent MRI.    TTE: 60% EF, PFO present  vEEG did not show any seizures  MRI brain w/wo contrast: Stable evolving recent infarcts with petechial hemorrhage.  Status post left hemispheric craniectomy for subdural drainage. Stable subgaleal epidural hematoma with small residual subdurals. Stable small volume of intraventricular hemorrhage.  Stable mass effect with 4 mm of left-to-right shift    Impression:  Considering patient's imaging findings suggestive of metastatic disease, and laboratory studies suggestive of coagulopathy, further stroke workup with vascular imaging with CTA head and neck vs MRA brain and continued evaluation of  coagulopathy/metastatic disease is recommended.  Patient has guarded prognosis and is complicated with SDH preventing initiation of AP/AC.  Can consider further workup with CHRIS however if PFO is suspected to be involved in stroke etiology he likely would not be a surgical candidate due to medical condition.  He would benefit from C discussion with family.      Plan:  - Case discussed with attending neurologist Dr. Gonzáles  - Recommend stroke workup  - Recommend CTA head and neck with and without contrast or MRA brain  - Recommend aspirin and atorvastatin when able per primary team  - vEEG discontinued   - AC recommendations per NCC/neurosurgical teams, hematology recommending repeat thrombosis panel after infection is cleared.  - Recommend systolic blood pressure goals of 110-<140  - Recommend continue cardiopulmonary monitoring  - Neurochecks per primary team  - Recommend STAT noncontrast CT head for any acute change in mental status/neurologic  - Seizure precautions  - Delirium precautions  - Continue to evaluate and treat any metabolic, infectious, inflammatory derangements  - Rest per primary team appreciated          SUBJECTIVE     Patient was seen and examined. No acute events overnight. Patient remains lethargic, similar to yesterday, but able to       Review of Systems   Unable to perform ROS: Mental status change       OBJECTIVE     Patient ID: Clayton Duval is a 44 y.o. male.    Vitals:    10/18/24 0900 10/18/24 0945 10/18/24 1000 10/18/24 1054   BP: 123/82 131/91 143/94    Pulse: (!) 128 (!) 128 (!) 132    Resp: (!) 23 (!) 23 (!) 24    Temp:  (!) 104.5 °F (40.3 °C)  (!) 102.2 °F (39 °C)   TempSrc:  Rectal  Bladder   SpO2: 99% 98% 97%    Weight:       Height:          Temperature:   Temp (24hrs), Av.8 °F (38.2 °C), Min:99 °F (37.2 °C), Max:104.5 °F (40.3 °C)    Temperature: (!) 102.2 °F (39 °C)      Physical Exam  HENT:      Nose: Nose normal.   Eyes:      General:         Right eye: No  discharge.         Left eye: No discharge.      Extraocular Movements: EOM normal.      Pupils: Pupils are equal, round, and reactive to light.   Cardiovascular:      Rate and Rhythm: Normal rate.   Abdominal:      General: There is no distension.   Musculoskeletal:      Right lower leg: Edema present.      Left lower leg: Edema present.   Skin:     General: Skin is warm and dry.      Coloration: Skin is not jaundiced.   Neurological:      Mental Status: He is alert.          Neurologic Exam     Mental Status   Level of consciousness: arousable by verbal stimuli  Patient is able to follow very limited commands.   Not speaking.       Cranial Nerves     CN II   Visual fields full to confrontation.     CN III, IV, VI   Pupils are equal, round, and reactive to light.  Extraocular motions are normal.   Pupils are equal and reactive bilaterally, 3 mm, regular in shape, sluggish.   Hearing appears to be grossly intact.       Motor Exam Patient was able to move both hands, was able to demonstrate 5/5 with RUE but LUE shows decreased strength with  strength, unable to push or pull.    Pt shows antigravity with RLE but not LLE.  He was able to wiggle toes b/l but certainly more limited with the left.     Gait, Coordination, and Reflexes Patient did not demonstrate resting tremor.  Brachioradialis, biceps 2+ bilaterally.  Patellar on left 2+, patellar on right 1+.  No Michael's bilaterally.  No ankle clonus bilaterally.            LABORATORY DATA     Labs: I have personally reviewed pertinent reports.    Results from last 7 days   Lab Units 10/18/24  0506 10/17/24  0444 10/16/24  0444 10/15/24  0426   WBC Thousand/uL 8.48 10.51* 9.03 11.39*   HEMOGLOBIN g/dL 9.8* 10.0* 9.2* 9.7*   HEMATOCRIT % 30.0* 31.5* 28.1* 29.2*   PLATELETS Thousands/uL 140* 123* 120* 120*   MONO PCT % 4  --   --  1*   EOS PCT % 0  --   --  0      Results from last 7 days   Lab Units 10/18/24  0506 10/17/24  0444 10/16/24  0444 10/15/24  0426  10/14/24  0435 10/13/24  1252 10/13/24  1031 10/13/24  0853   SODIUM mmol/L 137 136 134* 131* 132*   < >  --   --    POTASSIUM mmol/L 4.0 4.1 3.9 4.3 4.4   < >  --   --    CHLORIDE mmol/L 105 104 102 101 102   < >  --   --    CO2 mmol/L 24 22 23 23 23   < >  --   --    CO2, I-STAT mmol/L  --   --   --   --   --   --  22 24   BUN mg/dL 30* 25 25 29* 21   < >  --   --    CREATININE mg/dL 0.75 0.62 0.69 0.82 0.60   < >  --   --    CALCIUM mg/dL 7.1* 7.1* 7.2* 7.6* 7.5*   < >  --   --    ALK PHOS U/L  --  708*  --  699* 223*   < >  --   --    ALT U/L  --  170*  --  211* 189*   < >  --   --    AST U/L  --  356*  --  449* 437*   < >  --   --    GLUCOSE, ISTAT mg/dl  --   --   --   --   --   --  107 98    < > = values in this interval not displayed.     Results from last 7 days   Lab Units 10/18/24  0506 10/17/24  0444 10/16/24  0444   MAGNESIUM mg/dL 1.9 2.0 1.8*     Results from last 7 days   Lab Units 10/18/24  0506 10/17/24  0444 10/16/24  0444   PHOSPHORUS mg/dL 2.9 2.5* 2.3*      Results from last 7 days   Lab Units 10/18/24  0506 10/14/24  0435 10/13/24  1450 10/13/24  0534 10/12/24  0449   INR  1.56* 1.35* 1.27*   < >  --    PTT seconds  --   --   --   --  63*    < > = values in this interval not displayed.     Results from last 7 days   Lab Units 10/16/24  0444   LACTIC ACID mmol/L 3.2*           IMAGING & DIAGNOSTIC TESTING     Radiology Results: I have personally reviewed pertinent reports.      MRI brain w wo contrast   Final Result by E. Alec Schoenberger, MD (10/18 6507)      No significant change. Stable evolving recent infarcts with petechial hemorrhage.   Status post left hemispheric craniectomy for subdural drainage. Stable subgaleal epidural hematoma with small residual subdurals. Stable small volume of intraventricular hemorrhage.   Stable mass effect with 4 mm of left-to-right shift.      Workstation performed: [id number]         CT head wo contrast   Final Result by Trenton Stanley MD (10/17  0805)      1.  No significant change in the left subdural hemorrhage or evolving right thalamic and left temporo-occipital infarctions.                  Workstation performed: [id number]         XR abdomen 1 view kub   Final Result by Gen Mims MD (10/16 4126)      Nasogastric tube terminates in the stomach.               Workstation performed: [id number]         CT head wo contrast   Final Result by Fabricio Almeida MD (10/15 5970)      Status post decompressive left-sided hemicraniectomy with mild interval increase in the degree of mixed attenuation subdural as well as subgaleal hemorrhage along the craniectomy site. Grossly stable mild rightward midline shift.      Thin subdural hemorrhage tracking along the right tentorium likely represents redistributed blood products. Small amount of hemorrhage also noted layering in the occipital horns.      Evolving areas of ischemia as described above with punctate areas seen to better advantage on recent MRI. No definite CT evidence for new large acute vascular distribution infarct.      The study was marked in EPIC for immediate notification.                  Workstation performed: [id number]         MRI brain wo contrast   Final Result by Wil Brennan DO (10/14 0572)      Multifocal early ischemia including right anterior thalamus, anterior occipital lobes left greater than right. On the left this tracks into the posterior medial temporal lobe. There is also a punctate focus of cortical ischemia within the posterior    parafalcine frontal lobe. Cortical petechial hemorrhage noted within the central aspect of the right thalamus and left anterior occipital lobe. No sofía hemorrhagic transformation. Evaluation of the cervical and intracranial vasculature is recommended.      Patient is status post left hemicraniectomy for surgical decompression of acute subdural hemorrhage. Mixed signal subdural hemorrhage, fluid and air remains within the periphery of the  "left hemisphere with mild mass effect and 4 mm of left to right    shift.      There is a very small subdural hemorrhage noted within the right middle cranial fossa extending posteriorly lateral to the occipital lobe, less than 2 mm in thickness.      Focal intraventricular hemorrhage within the occipital horn of the right greater than left. There is increased T2 and FLAIR signal change surrounding the occipital horn of the lateral ventricle suggesting focal transependymal flow of CSF.      This examination was marked \"immediate notification\" in Epic in order to begin the standard process by which the radiology reading room liaison alerts the referring practitioner.      Workstation performed: [id number]         CT head wo contrast   Final Result by Pradip Lovett MD (10/13 1323)      -New area of hypodensity within the left thalamus and basal ganglia compared to earlier day exam, suggestive of acute infarct.      -Expected postsurgical changes from left hemispheric craniectomy with decreased size of mixed density subdural hematoma, improved 4 mm left to right midline shift and improved effacement of the basilar cisterns.      -Improved mass effect on the ventricular system and dilatation of the right temporal horn of the lateral ventricle as detailed above.      I personally communicated the findings via telephone with Son Dover at 12:56 pm. on 10/13/2024.                  Workstation performed: [id number]         IR biopsy bone    (Results Pending)   CT head wo contrast    (Results Pending)       Other Diagnostic Testing: I have personally reviewed pertinent reports.      ACTIVE MEDICATIONS       Current Facility-Administered Medications:     acetaminophen (Ofirmev) injection 1,000 mg, Q6H PRN, Last Rate: Stopped (10/18/24 1000)    atovaquone (MEPRON) oral suspension 750 mg, BID    azithromycin (ZITHROMAX) oral suspension 500 mg, Q24H    benzonatate (TESSALON PERLES) capsule 100 mg, TID PRN    chlorhexidine " (PERIDEX) 0.12 % oral rinse 15 mL, Q12H KAYLIE    Cholecalciferol (VITAMIN D3) tablet 5,000 Units, Daily    famotidine (PEPCID) tablet 20 mg, BID    fentaNYL injection 50 mcg, Q1H PRN    folic acid (FOLVITE) tablet 1 mg, Daily    heparin (porcine) subcutaneous injection 5,000 Units, Q8H KAYLIE    hydrALAZINE (APRESOLINE) injection 10 mg, Q6H PRN    HYDROmorphone (DILAUDID) injection 1 mg, Q3H PRN    labetalol (NORMODYNE) injection 10 mg, Q6H PRN    levETIRAcetam (KEPPRA) injection 750 mg, Q12H KAYLIE    levothyroxine tablet 200 mcg, Early Morning    ondansetron (ZOFRAN) injection 4 mg, Q8H PRN    [COMPLETED] piperacillin-tazobactam (ZOSYN) 4.5 g in sodium chloride 0.9 % 100 mL IV LOADING DOSE, Once, Last Rate: Stopped (10/16/24 0247) **FOLLOWED BY** piperacillin-tazobactam (ZOSYN) 4.5 g in sodium chloride 0.9 % 100 mL IVPB (EXTENDED INFUSION), Q8H, Last Rate: Stopped (10/18/24 1000)    polyethylene glycol (MIRALAX) packet 17 g, Daily PRN    sodium chloride 0.9 % infusion, Continuous, Last Rate: 75 mL/hr (10/18/24 3764)    thiamine tablet 100 mg, Daily    Prior to Admission medications    Medication Sig Start Date End Date Taking? Authorizing Provider   Cholecalciferol 125 MCG (5000 UT) capsule Take 5,000 Units by mouth daily    Historical Provider, MD   cyanocobalamin 1,000 mcg/mL Inject 1,000 mcg into a muscle every 30 (thirty) days 10/1/24   Historical Provider, MD   docusate sodium (COLACE) 100 mg capsule Take 1 capsule (100 mg total) by mouth every 12 (twelve) hours  Patient not taking: Reported on 10/11/2024 6/30/23   Brendon Luz MD   ferrous sulfate 325 (65 Fe) mg tablet Take 325 mg by mouth daily with breakfast 2/26/24   Historical Provider, MD   furosemide (LASIX) 20 mg tablet Take 20 mg by mouth daily 3/12/24   Historical Provider, MD   hydrocortisone (ANUSOL-HC) 2.5 % rectal cream Apply topically 2 (two) times a day for 5 days 6/30/23 7/5/23  Brendon Luz MD   levothyroxine 200 mcg tablet Take 200 mcg by  mouth daily 3/14/24   Historical Provider, MD   polyethylene glycol (MIRALAX) 17 g packet Take 17 g by mouth daily    Historical Provider, MD         VTE Pharmacologic Prophylaxis: VTE covered by:  heparin (porcine), Subcutaneous      VTE Mechanical Prophylaxis: sequential compression device    ======    I have discussed the patient's history, physical exam findings, assessment, and plan in detail with attending, Dr. Gonzáles    Thank you for allowing me to participate in the care of your patient, Clayton HERNANDEZ Mukund.    Albino Pugh DO  [facility] Neurology Residency, PGY-2

## 2024-10-18 NOTE — PROGRESS NOTES
Progress Note - Infectious Disease   Name: Clayton Duval 44 y.o. male I MRN: [id number]  Unit/Bed#: ICU 05 I Date of Admission: 10/13/2024   Date of Service: 10/18/2024 I Hospital Day: 5    Assessment & Plan  Sepsis (HCC)  Source of sepsis is most likely perirectal abscess.  Despite sepsis, patient remains systemically well, without toxicity and hemodynamically stable, without hypotension.  Patient has persistent fever.  Admission blood cultures have no growth thus far.  Antibiotic plan as in below.  Monitor temperature/WBC.    Monitor hemodynamics.    Follow-up on all pending blood cultures.  Perirectal abscess  Patient has spontaneous drainage.  He is being followed by general surgery service, with no plan for surgical I&D previously.  However, at present, there is concern of persistent abscess.  Repeat CT imaging pending.  Wound culture with growth of mixed alli, not helpful.  Patient has no history of MRSA, has negative MRSA screen and has MSSA growing in respiratory culture.   Continue IV Zosyn.  Follow-up on CT.  General surgery follow-up.  Sacral wound  Patient is status post bedside I&D and being followed by general surgery.  Concern for adjacent perirectal abscess noted.  Antibiotic plan as in above.  Wound care per surgery.  Abnormal blood smear  Yesterday, initial blood smear was read as presence of extracellular organism and also parasites in RBC, suggestive of Babesia.  However, repeat parasite smear did not show any evidence of any organism or parasite.  Another repeat parasite smear this morning also did not show any parasites.  Babesiosis would be unlikely.  Discontinue azithromycin/atovaquone:.  Non-traumatic acute L subdural hematoma (HCC)  Patient is status post craniectomy and evacuation of subdural hematoma.  He also has ischemia in the brainstem.  Neurosurgery follow-up.  Stroke (HCC)  Head MRI showed ischemia involving brainstem.  Patient had decreased responsiveness on presentation.   Mental status with some improvement.  Neurology follow-up.  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  Plan for biopsy of hepatic lesion noted.  This is being postponed due to persistent fever.  Check urine histoplasma antigen.    I have discussed with critical care service the above plan to continue antibiotic pending repeat CT.  Team agrees with the plan.    Antibiotics:  Zosyn # 3  Azithromycin/atovaquone # 2    Subjective   Patient remains in ICU.  Temperature remains up, with patient on cooling blanket.  He is not as responsive today..    Objective :  Temp:  [99 °F (37.2 °C)-104.5 °F (40.3 °C)] 102.2 °F (39 °C)  HR:  [100-132] 122  BP: (111-144)/(67-98) 116/83  Resp:  [8-24] 20  SpO2:  [86 %-100 %] 98 %  O2 Device: Nasal cannula  Nasal Cannula O2 Flow Rate (L/min):  [5 L/min-6 L/min] 5 L/min    General:  No acute distress  Psychiatric: Sleepy/lethargic but arousable, not as interactive today  Pulmonary:  Normal respiratory excursion without accessory muscle use  Abdomen:  Soft, nontender  Extremities: Stable mild leg edema  Skin:  No rashes      Lab Results: I have reviewed the following results:  Results from last 7 days   Lab Units 10/18/24  0506 10/17/24  0444 10/16/24  0444   WBC Thousand/uL 8.48 10.51* 9.03   HEMOGLOBIN g/dL 9.8* 10.0* 9.2*   PLATELETS Thousands/uL 140* 123* 120*     Results from last 7 days   Lab Units 10/18/24  0506 10/17/24  0444 10/16/24  0444 10/15/24  0426 10/14/24  0435 10/13/24  1252 10/13/24  1031   SODIUM mmol/L 137 136 134* 131* 132*   < >  --    POTASSIUM mmol/L 4.0 4.1 3.9 4.3 4.4   < >  --    CHLORIDE mmol/L 105 104 102 101 102   < >  --    CO2 mmol/L 24 22 23 23 23   < >  --    CO2, I-STAT mmol/L  --   --   --   --   --   --  22   BUN mg/dL 30* 25 25 29* 21   < >  --    CREATININE mg/dL 0.75 0.62 0.69 0.82 0.60   < >  --    EGFR ml/min/1.73sq m 110 119 114 106 121   < >  --    GLUCOSE, ISTAT mg/dl  --   --   --    --   --   --  107   CALCIUM mg/dL 7.1* 7.1* 7.2* 7.6* 7.5*   < >  --    AST U/L  --  356*  --  449* 437*   < >  --    ALT U/L  --  170*  --  211* 189*   < >  --    ALK PHOS U/L  --  708*  --  699* 223*   < >  --    ALBUMIN g/dL  --  2.4*  --  2.5* 2.3*   < >  --     < > = values in this interval not displayed.     Results from last 7 days   Lab Units 10/17/24  1216 10/16/24  1321 10/16/24  0134 10/15/24  1135 10/14/24  1419 10/14/24  1154 10/14/24  0121 10/11/24  1522   BLOOD CULTURE  Received in Microbiology Lab. Culture in Progress.  Received in Microbiology Lab. Culture in Progress.  --  No Growth at 48 hrs.  No Growth at 48 hrs.  --   --   --  No Growth After 4 Days.  --    SPUTUM CULTURE   --   --   --   --  2+ Growth of Staphylococcus aureus*  2+ Growth of  --   --   --    GRAM STAIN RESULT   --  2+ Polys*  2+ Gram positive rods*  1+ Gram positive cocci in pairs*  1+ Gram negative rods*  --   --  2+ Polys*  2+ Gram negative rods*  1+ Gram positive cocci in pairs*  --   --   --    URINE CULTURE   --   --   --   --   --   --   --  No Growth <1000 cfu/mL   WOUND CULTURE   --  1+ Growth of  --   --   --   --   --   --    MRSA CULTURE ONLY   --   --   --   --   --  No Methicillin Resistant Staphlyococcus aureus (MRSA) isolated  --   --    LEGIONELLA URINARY ANTIGEN   --   --   --  Negative  --   --   --   --      Results from last 7 days   Lab Units 10/17/24  0444 10/16/24  0133 10/14/24  0121   PROCALCITONIN ng/ml 1.21* 1.13* 1.14*     Results from last 7 days   Lab Units 10/16/24  0444   CRP mg/L 82.3*

## 2024-10-18 NOTE — ASSESSMENT & PLAN NOTE
Patient has spontaneous drainage.  He is being followed by general surgery service, with no plan for surgical I&D previously.  However, at present, there is concern of persistent abscess.  Repeat CT imaging pending.  Wound culture with growth of mixed alli, not helpful.  Patient has no history of MRSA, has negative MRSA screen and has MSSA growing in respiratory culture.   Continue IV Zosyn.  Follow-up on CT.  General surgery follow-up.

## 2024-10-18 NOTE — TREATMENT PLAN
Patient seen and reevaluated for reassessment of his sacral wound and perirectal abscess.  On review of his chart and in discussion with the medical critical care service, he has continued to have tachycardia and has been having high fevers over the last 24 hours.  There is some concern that he lacks source control related to these 2 issues.    On reassessment of the patient's sacral wound, it appears relatively unchanged following excisional debridement at bedside yesterday with some ongoing evolving skin necrosis extending to the bilateral buttocks, but the underlying tissue still appears alive and viable with no evidence of progressive ischemia or surrounding skin changes concerning for underlying abscess or infection.  The right perirectal abscess has further opened spontaneously and was further examined at bedside with finger dissection.  The perirectal abscess wound does communicate with the large open sacral wound with a viable overlying skin bridge.  Additionally, it did track towards the perineum and undermined approximately 2 cm with an additional small purulent fluid collection encountered and drained with finger dissection.  No additional collections were identifiable at this time.  This wound was irrigated with NSS and packed with 1 piece of gauze dressing.  The midline sacral wound was packed with saline moistened Kerlix gauze dressing and covered with a silicone bordered foam dressing.    Wound image:        Due to the patient's ongoing febrile illness and concern for lack of source control with no additional identifiable skin sources other than the potential for undrained perirectal or perineal abscesses, CT scan was discussed with medical critical care and recommended.  Will follow-up on CT results and continue with ongoing daily wound care and as well as further surgical assessment and intervention as indicated.    Nick Eid PA-C  10/18/2024 11:41 AM

## 2024-10-18 NOTE — ASSESSMENT & PLAN NOTE
Patient with multiple splenic, hepatic and osseous lesions, concerning for metastasis.  Plan for biopsy of hepatic lesion noted.  This is being postponed due to persistent fever.  Check urine histoplasma antigen.

## 2024-10-18 NOTE — RESTORATIVE TECHNICIAN NOTE
Restorative Technician Note      Patient Name: Clayton Duval     Restorative Tech Visit Date: 10/18/24  Note Type: Bracing, Initial consult  Patient Position Upon Consult: Supine  Brace Applied: Danmar Protective Helmet (size Large)  Additional Brace Ordered: No  Patient Position When Brace Applied: Seated  Bracing Recommendations: None  Education Provided: Yes  Patient Position at End of Consult: Bed/Chair alarm activated; All needs within reach; Other (comment) (chair position in Kreg Bed)  Nurse Communication: Nurse aware of consult, application of brace    Please contact [facility] Restorative tech on Epic Secure Chat  in regards to bracing instruction and/or adjustment.    Jessica Bee Restorative Tech

## 2024-10-18 NOTE — PROGRESS NOTES
Progress Note - Critical Care/ICU   Name: Clayton Duval 44 y.o. male I MRN: [id number]  Unit/Bed#: ICU 05 I Date of Admission: 10/13/2024   Date of Service: 10/18/2024 I Hospital Day: 5       Assessment & Plan   Neuro:   Diagnosis: Subdural hematoma s/p left hemicraniectomy for evacuation of SDH POD#3, AMS, Acute ischemic stroke  CT head - 10/13 0616 - preop: Mixed density left convexity subdural hemorrhage (1.2 cm thickness) with by 1.5 cm rightward midline shift. Trace subdural hemorrhage along the left tentorium. Mass effect with low-lying cerebellar tonsils, effacement of the suprasellar cistern, and partial effacement of the quadrigeminal plate cistern.  CT head - 10/13 - post op: New area of hypodensity within the left thalamus and basal ganglia compared to earlier day exam, suggestive of acute infarct. Expected postsurgical changes from left hemispheric craniectomy with decreased size of mixed density subdural hematoma, improved 4 mm left to right midline shift and improved effacement of the basilar cisterns. Improved mass effect on the ventricular system and dilatation of the right temporal horn of the lateral ventricle  Plan:   MRI brain w/wo contrast  CTH repeat on 10/17  Continue monitoring neurological exam closely with frequent neuro checks, obtain stat CTH if any acute changes  Continuous vEEG for AMS.  Monitor for signs of ICP increase (bradycardia, HTN, changes in neuro exam, increased tone, pupillary changes)  Blood Pressure: SBP goal 110 - 140, cardene gtt or pressers/fluids as needed to keep within range.  AC/AP: Holding AP and AC at this time. Consider lovenox for dvt prophylaxis after biopsy   Imaging/Diagnostic Studies: MRI Brain w/o contrast  Labs Pending:  Seizure Prophylaxis: Keppra 1g load followed by 750mg BID for 7 days   Tight glycemic control, goal <180. Monitor temperature, tylenol PRN.  PT/OT/Speech/PMR consults when able  DVT PPx: SCDs and heparin 5000 q8h   CV:   Diagnosis:  sinus bradycardia  Plan:   SBP goal 110 - 140, Cardene as needed  Continue to monitor on Telemetry     Pulm:  Diagnosis: NC  Plan: currently requiring 6L, monitor SpO2       GI:   Diagnosis: Hepatosplenomegaly, hepatic lesions, splenic lesions, transaminitis  Hepatitis panel - negative  Plan:   Oncology following along  IR consulted - plan for biopsy of a hepatic lesion vs iliac bone lesion originally 10/18, postponed due to febrile process     :   Diagnosis: plasencia catheter  Plan:   Monitor I&Os  Retention protocol     F/E/N:   F: 75 ml/hr Sodium Chloride  E: replenish as indicated for Magnesium >2, K >4  N: NPO for procedures     Heme/Onc:   Diagnosis: suspected metastatic cancer, hypocoagulability in the setting of hepatic malignancy, lytic bone lesions, hepatic coagulopathy  MRI Abdomen w/wo contrast - 10/11/24: Findings concerning for metastatic disease. Enhancing osseous lesions throughout the majority of the visualized spine and bony pelvis. Markedly enlarged spleen much of which is occupied by large confluent hypoenhancing lesions suspected to represent metastases. Hepatomegaly. Multifocal hepatic lesions which correlate to areas of hypoenhancement on CT are also suspected to represent metastasis in the given setting. Anasarca and moderate volume ascites  Fibrinogen level - 161  PT-INR - 21.6/1.87 - 10/13 - 0800 -  In the OR due to anemia and coagulopathy patient was given: 4 units of pRBCs, 4 units of FFP and 1 unit of platelets  Discussed with oncology - believe thrombosis panel derangements likely due to hepatic lesions.  Plan:   IR consulted for biopsy - plan for 10/18/24  Oncology consult  Tumor markers ordered - CEA (normal), AFP (normal), B-HCG (elevated- 7.5) , LDH (elevated- 556), CA 19-9 (pending)  Spleen and liver lesions 2/2 metastasis vs abscesses        Endo:   Diagnosis: Hypothyroidism, hyponatremia, hypothermia, SIADH  Plan:   Continue home Levothyroxine 200mcg  Suspect hyponatremia of  malignancy  Bare hugger  Hyponatremia due to water retention likely in the setting of the patient's intracranial pathologies as well as      ID:   Diagnosis: Sepsis, sacral decubitus ulcer, Babesiosis  Sepsis alert - hyperthermia, tachycardia, leukocytosis, bandemia  Blood cultures 10/11 - no growth  Blood cultures 10/14 - no growth  Sputum cultures 10/14 - MSSA  Hematology lab - peripheral blood smear with evidence of multiple organisms extracellularly and in RBC. History of possible tick exposure. Suspect Babesia.  Blood parasite negative for extracellular or intracellular organisms 10/18  4L IV Lactated ringers  Plan:   ID following  Sepsis pathway  Continue Zosyn - day 3  Azithromycin - day 2, Atovaquone initiated for suspected babesiosis on 10/17  MSK/Skin:   Diagnosis: Anasarca, sacral decubitus ulcer  Plan:   Monitor fluid status  Wound care consulted  Red surgery debridement 10/17  Diagnosis: perirectal abscess  Plan:   Some drainage was performed at bedside, but will obtain CT to check for deeper infection and f/u with I&D if needed  Surgery following       Disposition: Critical care    ICU Core Measures     A: Assess, Prevent, and Manage Pain Has pain been assessed? Yes  Need for changes to pain regimen? No   B: Both SAT/SAT  N/A   C: Choice of Sedation RASS Goal: 0 Alert and Calm  Need for changes to sedation or analgesia regimen? No   D: Delirium CAM-ICU: Unable to perform secondary to Acute cognitive dysfunction   E: Early Mobility  Plan for early mobility? Yes   F: Family Engagement Plan for family engagement today? Yes       Antibiotic Review: Continue broad spectrum secondary to severity of illness.     Review of Invasive Devices:    [place] Plan: Continue for accurate I/O monitoring for 48 hours    [place] Plan: Keep arterial line for hemodynamic monitoring    Prophylaxis:  VTE Contraindicated secondary to: N/A   Stress Ulcer  covered byfamotidine (PEPCID) tablet 20 mg [[id number]]         24 Hour Events  : Overnight, patient desaturated to 70s after being taken for his MRI. Improved on NC.  Subjective   Review of Systems: See HPI for Review of Systems    Objective :                   Vitals I/O      Most Recent Min/Max in 24hrs   Temp (!) 102.2 °F (39 °C) Temp  Min: 99 °F (37.2 °C)  Max: 104.5 °F (40.3 °C)   Pulse (!) 122 Pulse  Min: 100  Max: 132   Resp 20 Resp  Min: 8  Max: 24   /83 BP  Min: 106/91  Max: 144/95   O2 Sat 98 % SpO2  Min: 86 %  Max: 100 %      Intake/Output Summary (Last 24 hours) at 10/18/2024 1250  Last data filed at 10/18/2024 1200  Gross per 24 hour   Intake 2685.15 ml   Output 955 ml   Net 1730.15 ml       Diet NPO    Invasive Monitoring           Physical Exam   Physical Exam  Vitals and nursing note reviewed.   Eyes:      Conjunctiva/sclera: Conjunctivae normal.      Pupils: Pupils are equal, round, and reactive to light.      Comments: Patient would open right eye slightly to his name and nod his head in response to questions, but he would not track with his eyes.   Skin:     General: Skin is warm.   Cardiovascular:      Rate and Rhythm: Regular rhythm. Tachycardia present.      Heart sounds: Normal heart sounds.   Constitutional:       Appearance: He is ill-appearing.      Interventions: He is not sedated and not intubated.  Pulmonary:      Effort: No accessory muscle usage or accessory muscle usage. He is not intubated.      Breath sounds: Normal breath sounds. No wheezing, rhonchi or rales.   Neurological:      Comments: Patient is able to move feet bilaterally, will squeeze right hand on command, but not in left   Genitourinary/Anorectal:  [place] present.        Diagnostic Studies        Lab Results: I have reviewed the following results:     Medications:  Scheduled PRN   chlorhexidine, 15 mL, Q12H KAYLIE  Cholecalciferol, 5,000 Units, Daily  famotidine, 20 mg, BID  folic acid, 1 mg, Daily  heparin (porcine), 5,000 Units, Q8H KAYLIE  levETIRAcetam, 750 mg, Q12H KAYLIE  levothyroxine, 200 mcg,  Early Morning  piperacillin-tazobactam, 4.5 g, Q8H  thiamine, 100 mg, Daily      acetaminophen, 1,000 mg, Q6H PRN  benzonatate, 100 mg, TID PRN  fentaNYL, 50 mcg, Q1H PRN  hydrALAZINE, 10 mg, Q6H PRN  HYDROmorphone, 1 mg, Q3H PRN  labetalol, 10 mg, Q6H PRN  ondansetron, 4 mg, Q8H PRN  polyethylene glycol, 17 g, Daily PRN       Continuous    sodium chloride, 75 mL/hr, Last Rate: 75 mL/hr (10/18/24 0513)         Labs:   CBC    Recent Labs     10/17/24  0444 10/18/24  0506   WBC 10.51* 8.48   HGB 10.0* 9.8*   HCT 31.5* 30.0*   * 140*   BANDSPCT  --  14*     BMP    Recent Labs     10/17/24  0444 10/18/24  0506   SODIUM 136 137   K 4.1 4.0    105   CO2 22 24   AGAP 10 8   BUN 25 30*   CREATININE 0.62 0.75   CALCIUM 7.1* 7.1*       Coags    Recent Labs     10/18/24  0506   INR 1.56*        Additional Electrolytes  Recent Labs     10/17/24  0444 10/18/24  0506   MG 2.0 1.9   PHOS 2.5* 2.9   CAIONIZED 1.09* 1.07*          Blood Gas    No recent results  No recent results LFTs  Recent Labs     10/17/24  0444   *   *   ALKPHOS 708*   ALB 2.4*   TBILI 2.64*       Infectious  Recent Labs     10/17/24  0444   PROCALCITONI 1.21*     Glucose  Recent Labs     10/17/24  0444 10/18/24  0506   GLUC 91 82

## 2024-10-18 NOTE — PLAN OF CARE
Problem: OCCUPATIONAL THERAPY ADULT  Goal: Performs self-care activities at highest level of function for planned discharge setting.  See evaluation for individualized goals.  Description: Treatment Interventions: ADL retraining, Functional transfer training, UE strengthening/ROM, Endurance training, Patient/family training, Cognitive reorientation, Equipment evaluation/education, Continued evaluation, Energy conservation, Activityengagement, Neuromuscular reeducation, Fine motor coordination activities, Compensatory technique education          See flowsheet documentation for full assessment, interventions and recommendations.   Note: Limitation: Decreased ADL status, Decreased UE ROM, Decreased UE strength, Decreased Safe judgement during ADL, Decreased cognition, Decreased endurance, Decreased self-care trans, Decreased high-level ADLs  Prognosis: Fair  Assessment: Pt is a 44 y.o. male admitted to [facility] on 10/13/2024 w/ Non-traumatic acute subdural hematoma, acute ischemic stroke, perirectal abscess, liver and spleen lesions. Now s/p L hemicraniectomy for evacuation.  has no past medical history on file. Pt with active OT orders and up and OOB as tolerated orders. Pt seen as a co-evaluation with PT due to the patient's co-morbidities, clinically unstable presentation/clinical complexity, and present impairments. Pt unable to provide PLOF. Per chart, pta, pt resides with his wife in a 2STH, 3STE. Pt was I w/  ADLS and IADLS, (+) drove. Upon evaluation, pt currently requires MAX A x 3 for sup <> sit and lateral sit pivot transfers. Exhibits decreased strength to B/L UE's limiting performance in ADLs/transfers. Pt currently requires MOD A eating, MOD A grooming, MAX A UB ADLs, MAX A LB ADLs, and TOTAL A toileting. Pt is limited at this time 2*: pain, endurance, activity tolerance, functional mobility, balance, functional standing tolerance, decreased I w/ ADLS/IADLS, strength, ROM, cognitive impairments, and  impaired fine motor skills.The following Occupational Performance Areas to address include: eating, grooming, bathing/shower, toilet hygiene, dressing, health maintenance, functional mobility, community mobility, and clothing management. Pt to benefit from immediate acute skilled OT to address above deficits, improve overall functional independence, maximize fnxl mobility and reduce caregiver burden. From OT standpoint, recommendation at time of d/c would be STR.  Pt was left after session with all current needs met. The patient's raw score on the -PAC Daily Activity Inpatient Short Form is 11. A raw score of less than 19 suggests the patient may benefit from discharge to post-acute rehabilitation services. Please refer to the recommendation of the Occupational Therapist for safe discharge planning.     Rehab Resource Intensity Level, OT: I (Maximum Resource Intensity) (pending progress)

## 2024-10-18 NOTE — ACP (ADVANCE CARE PLANNING)
Inpatient Progress note - Palliative and Supportive Care   Clayton Duval 44 y.o. male [id number]    Patient Active Problem List   Diagnosis    Hyponatremia    Hypomagnesemia    Hypothyroidism (acquired)    Elevation of levels of liver transaminase levels    Malnutrition (HCC)    Lactic acidosis    Abnormal CT scan, chest    Moderate protein-calorie malnutrition (HCC)    Non-traumatic acute L subdural hematoma (HCC)    Acute hypoxic respiratory failure (HCC)    HTN (hypertension)    Sepsis (HCC)    Stroke (HCC)    Sacral wound    Perirectal abscess    Babesiosis    Splenic lesion    Abnormal blood smear     Active issues specifically addressed today include:   CVA  Sacral wound  PCM  Multiple lesions concerning for malignancy  Palliative Care Encounter  Goals of Care    Plan:  1. Symptom management -    - per primary    2. Goals - Discussed with spouse.  Full cares requested.  Will be present at the hospital this evening and requests an update.  Primary team informed.  She is struggling with how best to include her 20 year old child with autism, and her 17 year old child.  Support provided and informed her that if she chooses a time to bring them to bedside, that she should inform the ICU team so that the best support possible can be available (physician team, chaplains, social work, etc).  To this end, also reviewed case with [facility] social work team.  We will follow.    -     Code Status: level 1   Decisional apparatus:  Patient is not competent on my exam today.  If competence is lost, patient's substitute decision maker would default to spouse by [place] Act 169.   Advance Directive / Living Will / POLST:  none on file    NARRATIVE AND INTERVAL HISTORY:       Patient febrile overnight and less responsive today.  No family at bedside.  Called spouse as above.     MEDICATIONS / ALLERGIES:     all current active meds have been reviewed and current meds:   Current Facility-Administered Medications:     acetaminophen  (Ofirmev) injection 1,000 mg, Q6H PRN, Last Rate: Stopped (10/18/24 1000)    benzonatate (TESSALON PERLES) capsule 100 mg, TID PRN    chlorhexidine (PERIDEX) 0.12 % oral rinse 15 mL, Q12H KAYLIE    Cholecalciferol (VITAMIN D3) tablet 5,000 Units, Daily    famotidine (PEPCID) tablet 20 mg, BID    fentaNYL injection 50 mcg, Q1H PRN    folic acid (FOLVITE) tablet 1 mg, Daily    heparin (porcine) subcutaneous injection 5,000 Units, Q8H KAYLIE    hydrALAZINE (APRESOLINE) injection 10 mg, Q6H PRN    HYDROmorphone (DILAUDID) injection 1 mg, Q3H PRN    labetalol (NORMODYNE) injection 10 mg, Q6H PRN    levETIRAcetam (KEPPRA) injection 750 mg, Q12H KAYLIE    levothyroxine tablet 200 mcg, Early Morning    multi-electrolyte (ISOLYTE-S PH 7.4) bolus 1,000 mL, Once    ondansetron (ZOFRAN) injection 4 mg, Q8H PRN    [COMPLETED] piperacillin-tazobactam (ZOSYN) 4.5 g in sodium chloride 0.9 % 100 mL IV LOADING DOSE, Once, Last Rate: Stopped (10/16/24 0247) **FOLLOWED BY** piperacillin-tazobactam (ZOSYN) 4.5 g in sodium chloride 0.9 % 100 mL IVPB (EXTENDED INFUSION), Q8H, Last Rate: 4.5 g (10/18/24 9155)    polyethylene glycol (MIRALAX) packet 17 g, Daily PRN    sodium chloride 0.9 % infusion, Continuous, Last Rate: 75 mL/hr (10/18/24 5809)    thiamine tablet 100 mg, Daily    No Known Allergies    OBJECTIVE:    Physical Exam  Physical Exam  Vitals and nursing note reviewed.   Constitutional:       General: He is not in acute distress.     Appearance: He is ill-appearing. He is not toxic-appearing or diaphoretic.   HENT:      Head: Normocephalic and atraumatic.      Right Ear: External ear normal.      Left Ear: External ear normal.      Nose: Nose normal.      Mouth/Throat:      Mouth: Mucous membranes are moist.   Eyes:      General:         Right eye: No discharge.         Left eye: No discharge.   Cardiovascular:      Rate and Rhythm: Normal rate.   Pulmonary:      Effort: No respiratory distress.   Abdominal:      General: There is no  distension.   Musculoskeletal:         General: Swelling and deformity present.   Skin:     General: Skin is warm and dry.   Neurological:      Mental Status: He is alert.      Comments: Unresponsive to voice/exam         Lab Results: I have personally reviewed pertinent labs., CBC:   Lab Results   Component Value Date    WBC 8.48 10/18/2024    HGB 9.8 (L) 10/18/2024    HCT 30.0 (L) 10/18/2024    MCV 94 10/18/2024     (L) 10/18/2024    RBC 3.20 (L) 10/18/2024    MCH 30.6 10/18/2024    MCHC 32.7 10/18/2024    RDW 19.2 (H) 10/18/2024    MPV 13.0 (H) 10/18/2024   , CMP:   Lab Results   Component Value Date    SODIUM 137 10/18/2024    K 4.0 10/18/2024     10/18/2024    CO2 24 10/18/2024    BUN 30 (H) 10/18/2024    CREATININE 0.75 10/18/2024    CALCIUM 7.1 (L) 10/18/2024    EGFR 110 10/18/2024   , BMP:  Lab Results   Component Value Date    SODIUM 137 10/18/2024    K 4.0 10/18/2024     10/18/2024    CO2 24 10/18/2024    BUN 30 (H) 10/18/2024    CREATININE 0.75 10/18/2024    GLUC 82 10/18/2024    CALCIUM 7.1 (L) 10/18/2024    AGAP 8 10/18/2024    EGFR 110 10/18/2024         I have spent a total time of 25 minutes in caring for this patient on the day of the visit/encounter including Instructions for management, Patient and family education, Counseling / Coordination of care, Documenting in the medical record, Reviewing / ordering tests, medicine, procedures  , Obtaining or reviewing history  , and Communicating with other healthcare professionals .

## 2024-10-18 NOTE — QUICK NOTE
IR Quick Note    Contacted by primary team regarding patient's planned biopsy today. They would like to hold off given febrile process. Will cancel IR biopsy for now. Please reach back out to IR when patient's clinical status has improved and would like to move forward with biopsy.    Lesa Bermudez PA-C

## 2024-10-18 NOTE — PLAN OF CARE
Problem: PHYSICAL THERAPY ADULT  Goal: Performs mobility at highest level of function for planned discharge setting.  See evaluation for individualized goals.  Description: Treatment/Interventions: OT, Spoke to case management, Spoke to nursing, Gait training, Bed mobility, Patient/family training, Endurance training, LE strengthening/ROM, Functional transfer training          See flowsheet documentation for full assessment, interventions and recommendations.  Note: Prognosis: Guarded  Problem List: Decreased strength, Decreased range of motion, Decreased endurance, Impaired balance, Decreased coordination, Decreased mobility, Decreased cognition, Impaired judgement, Decreased safety awareness, Pain  Assessment: Pt is 44 y.o. male seen for PT evaluation s/p admit to [facility] on 10/13/2024 w/ Non-traumatic acute subdural hematoma (HCC). PT consulted to assess pt's functional mobility and d/c needs. Order placed for PT eval and tx, w/ up w/ A order. Comorbidities affecting pt's physical performance at time of assessment include:  has no past medical history on file. PTA, pt was lives in multi-level home. Personal factors affecting pt at time of IE include: inability to ambulate household distances, decreased cognition, limited home support, positive fall history, decreased initiation and engagement, impulsivity, unable to perform physical activity, limited insight into impairments, inability to perform IADLs, and inability to perform ADLs. Please find objective findings from PT assessment regarding body systems outlined above with impairments and limitations including weakness, decreased ROM, impaired balance, decreased endurance, impaired coordination, gait deviations, pain, decreased activity tolerance, decreased functional mobility tolerance, decreased safety awareness, impaired judgement, fall risk, SOB upon exertion, impaired tone, decreased skin integrity, and decreased cognition. Pt required LE  management and A to upright trunk. Required constant A for sitting balance although able to decrease level of A with verbal cues. Unable to achieve full clearance during standing trial. The following objective measures performed on IE also reveal limitations: The patient's AM-PAC Basic Mobility Inpatient Short Form Raw Score is 6. A standardized score less than 42.9 suggests the patient may benefit from discharge to post-acute rehabilitation services. Please also refer to the recommendation of the Physical Therapist for safe discharge planning. Pt's clinical presentation is currently unstable/unpredictable seen in pt's presentation of critical care monitoring. Pt to benefit from continued PT tx to address deficits as defined above and maximize level of functional independent mobility and consistency. From PT/mobility standpoint, recommendation at time of d/c would be level I pending progress in order to facilitate return to PLOF.  Barriers to Discharge: Inaccessible home environment, Decreased caregiver support          See flowsheet documentation for full assessment.

## 2024-10-18 NOTE — PLAN OF CARE
Problem: PAIN - ADULT  Goal: Verbalizes/displays adequate comfort level or baseline comfort level  Description: Interventions:  - Encourage patient to monitor pain and request assistance  - Assess pain using appropriate pain scale  - Administer analgesics based on type and severity of pain and evaluate response  - Implement non-pharmacological measures as appropriate and evaluate response  - Consider cultural and social influences on pain and pain management  - Notify physician/advanced practitioner if interventions unsuccessful or patient reports new pain  Outcome: Progressing     Problem: INFECTION - ADULT  Goal: Absence or prevention of progression during hospitalization  Description: INTERVENTIONS:  - Assess and monitor for signs and symptoms of infection  - Monitor lab/diagnostic results  - Monitor all insertion sites, i.e. indwelling lines, tubes, and drains  - Monitor endotracheal if appropriate and nasal secretions for changes in amount and color  - Lyman appropriate cooling/warming therapies per order  - Administer medications as ordered  - Instruct and encourage patient and family to use good hand hygiene technique  - Identify and instruct in appropriate isolation precautions for identified infection/condition  Outcome: Progressing  Goal: Absence of fever/infection during neutropenic period  Description: INTERVENTIONS:  - Monitor WBC    Outcome: Progressing     Problem: SAFETY ADULT  Goal: Patient will remain free of falls  Description: INTERVENTIONS:  - Educate patient/family on patient safety including physical limitations  - Instruct patient to call for assistance with activity   - Consult OT/PT to assist with strengthening/mobility   - Keep Call bell within reach  - Keep bed low and locked with side rails adjusted as appropriate  - Keep care items and personal belongings within reach  - Initiate and maintain comfort rounds  - Make Fall Risk Sign visible to staff  - Offer Toileting every 2 Hours,  in advance of need  - Initiate/Maintain bed alarm  - Obtain necessary fall risk management equipment:   - Apply yellow socks and bracelet for high fall risk patients  - Consider moving patient to room near nurses station  Outcome: Progressing  Goal: Maintain or return to baseline ADL function  Description: INTERVENTIONS:  -  Assess patient's ability to carry out ADLs; assess patient's baseline for ADL function and identify physical deficits which impact ability to perform ADLs (bathing, care of mouth/teeth, toileting, grooming, dressing, etc.)  - Assess/evaluate cause of self-care deficits   - Assess range of motion  - Assess patient's mobility; develop plan if impaired  - Assess patient's need for assistive devices and provide as appropriate  - Encourage maximum independence but intervene and supervise when necessary  - Involve family in performance of ADLs  - Assess for home care needs following discharge   - Consider OT consult to assist with ADL evaluation and planning for discharge  - Provide patient education as appropriate  Outcome: Progressing  Goal: Maintains/Returns to pre admission functional level  Description: INTERVENTIONS:  - Perform AM-PAC 6 Click Basic Mobility/ Daily Activity assessment daily.  - Set and communicate daily mobility goal to care team and patient/family/caregiver.   - Collaborate with rehabilitation services on mobility goals if consulted  - Perform Range of Motion 4 times a day.  - Reposition patient every 2 hours.  - Record patient progress and toleration of activity level   Outcome: Progressing     Problem: DISCHARGE PLANNING  Goal: Discharge to home or other facility with appropriate resources  Description: INTERVENTIONS:  - Identify barriers to discharge w/patient and caregiver  - Arrange for needed discharge resources and transportation as appropriate  - Identify discharge learning needs (meds, wound care, etc.)  - Arrange for interpretive services to assist at discharge as  needed  - Refer to Case Management Department for coordinating discharge planning if the patient needs post-hospital services based on physician/advanced practitioner order or complex needs related to functional status, cognitive ability, or social support system  Outcome: Progressing     Problem: Knowledge Deficit  Goal: Patient/family/caregiver demonstrates understanding of disease process, treatment plan, medications, and discharge instructions  Description: Complete learning assessment and assess knowledge base.  Interventions:  - Provide teaching at level of understanding  - Provide teaching via preferred learning methods  Outcome: Progressing     Problem: Prexisting or High Potential for Compromised Skin Integrity  Goal: Skin integrity is maintained or improved  Description: INTERVENTIONS:  - Identify patients at risk for skin breakdown  - Assess and monitor skin integrity  - Assess and monitor nutrition and hydration status  - Monitor labs   - Assess for incontinence   - Turn and reposition patient  - Assist with mobility/ambulation  - Relieve pressure over bony prominences  - Avoid friction and shearing  - Provide appropriate hygiene as needed including keeping skin clean and dry  - Evaluate need for skin moisturizer/barrier cream  - Collaborate with interdisciplinary team   - Patient/family teaching  - Consider wound care consult   Outcome: Progressing     Problem: Nutrition/Hydration-ADULT  Goal: Nutrient/Hydration intake appropriate for improving, restoring or maintaining nutritional needs  Description: Monitor and assess patient's nutrition/hydration status for malnutrition. Collaborate with interdisciplinary team and initiate plan and interventions as ordered.  Monitor patient's weight and dietary intake as ordered or per policy. Utilize nutrition screening tool and intervene as necessary. Determine patient's food preferences and provide high-protein, high-caloric foods as appropriate.      INTERVENTIONS:  - Monitor oral intake, urinary output, labs, and treatment plans  - Assess nutrition and hydration status and recommend course of action  - Evaluate amount of meals eaten  - Assist patient with eating if necessary   - Allow adequate time for meals  - Recommend/ encourage appropriate diets, oral nutritional supplements, and vitamin/mineral supplements  - Order, calculate, and assess calorie counts as needed  - Recommend, monitor, and adjust tube feedings and TPN/PPN based on assessed needs  - Assess need for intravenous fluids  - Provide specific nutrition/hydration education as appropriate  - Include patient/family/caregiver in decisions related to nutrition  Outcome: Progressing     Problem: SAFETY,RESTRAINT: NV/NON-SELF DESTRUCTIVE BEHAVIOR  Goal: Remains free of harm/injury (restraint for non violent/non self-detsructive behavior)  Description: INTERVENTIONS:  - Instruct patient/family regarding restraint use   - Assess and monitor physiologic and psychological status   - Provide interventions and comfort measures to meet assessed patient needs   - Identify and implement measures to help patient regain control  - Assess readiness for release of restraint   Outcome: Progressing  Goal: Returns to optimal restraint-free functioning  Description: INTERVENTIONS:  - Assess the patient's behavior and symptoms that indicate continued need for restraint  - Identify and implement measures to help patient regain control  - Assess readiness for release of restraint   Outcome: Progressing

## 2024-10-18 NOTE — PHYSICAL THERAPY NOTE
Physical Therapy Evaluation     Patient's Name: Clayton Duval    Admitting Diagnosis  No admission diagnoses are documented for this encounter.    Problem List  Patient Active Problem List   Diagnosis    Hyponatremia    Hypomagnesemia    Hypothyroidism (acquired)    Elevation of levels of liver transaminase levels    Malnutrition (HCC)    Lactic acidosis    Abnormal CT scan, chest    Moderate protein-calorie malnutrition (HCC)    Non-traumatic acute L subdural hematoma (HCC)    Acute hypoxic respiratory failure (HCC)    HTN (hypertension)    Sepsis (HCC)    Stroke (HCC)    Sacral wound    Perirectal abscess    Babesiosis    Splenic lesion       Past Medical History  No past medical history on file.    Past Surgical History  Past Surgical History:   Procedure Laterality Date    BRAIN HEMATOMA EVACUATION Left 10/13/2024    Procedure: CRANIOTOMY FOR SUBDURAL HEMATOMA;  Surgeon: Son Mendoza MD;  Location: [facility];  Service: Neurosurgery    GASTRIC BYPASS  2012    GASTRIC BYPASS          10/18/24 0915   PT Last Visit   PT Visit Date 10/18/24   Note Type   Note type Evaluation   Pain Assessment   Pain Assessment Tool FLACC   Pain Rating: FLACC (Rest) - Face 0   Pain Rating: FLACC (Rest) - Legs 0   Pain Rating: FLACC (Rest) - Activity 0   Pain Rating: FLACC (Rest) - Cry 0   Pain Rating: FLACC (Rest) - Consolability 0   Score: FLACC (Rest) 0   Pain Rating: FLACC (Activity) - Face 1   Pain Rating: FLACC (Activity) - Legs 1   Pain Rating: FLACC (Activity) - Activity 1   Pain Rating: FLACC (Activity) - Cry 1   Pain Rating: FLACC (Activity) - Consolability 1   Score: FLACC (Activity) 5   Restrictions/Precautions   Weight Bearing Precautions Per Order No   Braces or Orthoses Other (Comment)  (helmet)   Other Precautions Pain;Fall Risk;O2;Telemetry;Multiple lines;WBS;Chair Alarm;Bed Alarm;Cognitive;Impulsive   Home Living   Type of Home House   Home Layout Two level;Stairs to enter with rails  (3 PIERCE)   Prior  Function   Level of [place] Independent with functional mobility   Lives With Spouse   Receives Help From Family   General   Family/Caregiver Present No   Cognition   Orientation Level   (attends to name)   Subjective   Subjective Pt willing and agreeable to PT session   RLE Assessment   RLE Assessment X  (2-/5)   LLE Assessment   LLE Assessment   (0/5)   Coordination   Movements are Fluid and Coordinated 0   Bed Mobility   Supine to Sit 2  Maximal assistance   Additional items Assist x 3   Sit to Supine 2  Maximal assistance   Additional items Assist x 3   Transfers   Sit to Stand 2  Maximal assistance   Additional items Assist x 3   Stand to Sit 2  Maximal assistance   Additional items Assist x 3   Ambulation/Elevation   Gait pattern Not appropriate   Balance   Static Sitting Zero   Endurance Deficit   Endurance Deficit No   Activity Tolerance   Activity Tolerance Patient limited by fatigue;Patient limited by pain   Medical Staff Made Aware OT for D/C planning   Nurse Made Aware yes, nsg gave clearance to work with pt   Assessment   Prognosis Guarded   Problem List Decreased strength;Decreased range of motion;Decreased endurance;Impaired balance;Decreased coordination;Decreased mobility;Decreased cognition;Impaired judgement;Decreased safety awareness;Pain   Assessment Pt is 44 y.o. male seen for PT evaluation s/p admit to [facility] on 10/13/2024 w/ Non-traumatic acute subdural hematoma (HCC). PT consulted to assess pt's functional mobility and d/c needs. Order placed for PT eval and tx, w/ up w/ A order. Comorbidities affecting pt's physical performance at time of assessment include:  has no past medical history on file. PTA, pt was lives in multi-level home. Personal factors affecting pt at time of IE include: inability to ambulate household distances, decreased cognition, limited home support, positive fall history, decreased initiation and engagement, impulsivity, unable to perform physical  activity, limited insight into impairments, inability to perform IADLs, and inability to perform ADLs. Please find objective findings from PT assessment regarding body systems outlined above with impairments and limitations including weakness, decreased ROM, impaired balance, decreased endurance, impaired coordination, gait deviations, pain, decreased activity tolerance, decreased functional mobility tolerance, decreased safety awareness, impaired judgement, fall risk, SOB upon exertion, impaired tone, decreased skin integrity, and decreased cognition. Pt required LE management and A to upright trunk. Required constant A for sitting balance although able to decrease level of A with verbal cues. Unable to achieve full clearance during standing trial. The following objective measures performed on IE also reveal limitations: The patient's AM-PAC Basic Mobility Inpatient Short Form Raw Score is 6. A standardized score less than 42.9 suggests the patient may benefit from discharge to post-acute rehabilitation services. Please also refer to the recommendation of the Physical Therapist for safe discharge planning. Pt's clinical presentation is currently unstable/unpredictable seen in pt's presentation of critical care monitoring. Pt to benefit from continued PT tx to address deficits as defined above and maximize level of functional independent mobility and consistency. From PT/mobility standpoint, recommendation at time of d/c would be level I pending progress in order to facilitate return to PLOF.   Barriers to Discharge Inaccessible home environment;Decreased caregiver support   Goals   Patient Goals None stated   STG Expiration Date 10/30/24   Short Term Goal #1 1. Complete bed mobility and transfers Mod A to decrease need for caregiver burden. 2. W/C mobility 300' I to complete household and community mobility without A. 3. Improve dynamic balance to good to decrease need for UE support during ambulation. 4. Improve  LE strength to 3+/5 to improve transfers and prepare for gait. 5. PT to see for gait as appropriate.   Plan   Treatment/Interventions OT;Spoke to case management;Spoke to nursing;Gait training;Bed mobility;Patient/family training;Endurance training;LE strengthening/ROM;Functional transfer training   PT Frequency 3-5x/wk   AM-PAC Basic Mobility Inpatient   Turning in Flat Bed Without Bedrails 1   Lying on Back to Sitting on Edge of Flat Bed Without Bedrails 1   Moving Bed to Chair 1   Standing Up From Chair Using Arms 1   Walk in Room 1   Climb 3-5 Stairs With Railing 1   Basic Mobility Inpatient Raw Score 6   Turning Head Towards Sound 2   Follow Simple Instructions 2   Low Function Basic Mobility Raw Score  10   Low Function Basic Mobility Standardized Score  14.65   [facility] Highest Level Of Mobility   -HLM Goal 2: Bed activities/Dependent transfer   -HLM Achieved 2: Bed activities/Dependent transfer           Shahrzad Longo, PT

## 2024-10-18 NOTE — RESPIRATORY THERAPY NOTE
RT Protocol Note  Clayton Duval 44 y.o. male MRN: [id number]  Unit/Bed#: ICU 05 Encounter: [id number]    Assessment    Principal Problem:    Non-traumatic acute L subdural hematoma (HCC)  Active Problems:    Sepsis (HCC)    Stroke (HCC)    Sacral wound    Perirectal abscess    Babesiosis    Splenic lesion      Home Pulmonary Medications:  None        No past medical history on file.  Social History     Socioeconomic History    Marital status: /Civil Union     Spouse name: Not on file    Number of children: Not on file    Years of education: Not on file    Highest education level: Not on file   Occupational History    Not on file   Tobacco Use    Smoking status: Never    Smokeless tobacco: Current   Vaping Use    Vaping status: Never Used   Substance and Sexual Activity    Alcohol use: Never    Drug use: Never    Sexual activity: Not on file   Other Topics Concern    Not on file   Social History Narrative    Not on file     Social Determinants of Health     Financial Resource Strain: Not on file   Food Insecurity: No Food Insecurity (10/14/2024)    Hunger Vital Sign     Worried About Running Out of Food in the Last Year: Never true     Ran Out of Food in the Last Year: Never true   Transportation Needs: No Transportation Needs (10/14/2024)    PRAPARE - Transportation     Lack of Transportation (Medical): No     Lack of Transportation (Non-Medical): No   Physical Activity: Not on file   Stress: Not on file   Social Connections: Unknown (6/18/2024)    Received from [facility]    Social [employer]     How often do you feel lonely or isolated from those around you? (Adult - for ages 18 years and over): Not on file   Intimate Partner Violence: Not on file   Housing Stability: Low Risk  (10/14/2024)    Housing Stability Vital Sign     Unable to Pay for Housing in the Last Year: No     Number of Times Moved in the Last Year: 0     Homeless in the Last Year: No       Subjective         Objective    Physical  "Exam:   Assessment Type: Pre-treatment  General Appearance: Sleeping  Respiratory Pattern: Normal  Chest Assessment: Chest expansion symmetrical  Bilateral Breath Sounds: Clear  O2 Device: NC    Vitals:  Blood pressure 133/84, pulse (!) 126, temperature 99.8 °F (37.7 °C), temperature source Oral, resp. rate 22, height 6' 1\" (1.854 m), weight 104 kg (229 lb), SpO2 97%.    Results from last 7 days   Lab Units 10/14/24  0735 10/13/24  1250 10/13/24  0544   PH ART  7.440   < > 7.498*   PCO2 ART mm Hg 34.7*   < > 32.5*   PO2 ART mm Hg 151.4*   < > 63.2*   HCO3 ART mmol/L 23.0   < > 24.7   BASE EXC ART mmol/L -0.8   < > 1.5   O2 CONTENT ART mL/dL 12.0*   < > 9.9*   O2 HGB, ARTERIAL % 96.8   < > 89.4*   ABG SOURCE  Line, Arterial   < > Radial, Left   FRANCISCO JAVIER TEST  No  --  Yes   NON VENT ROOM AIR %  --   --  room air    < > = values in this interval not displayed.       Imaging and other studies: Results Review Statement: No pertinent imaging studies reviewed.    O2 Device: NC     Plan    Respiratory Plan: No distress/Pulmonary history  Airway Clearance Plan: Discontinue Protocol     Resp Comments: (P) Pt on NC, no acute distress, bbs clear. Pt has no pulm hx, no home bronchodilators, was placed on RCP when intubated. Pt's resp status has improved so d/c scheduled udn tx and CPT. Will cont to monitor pt 24 hrs per RCP.   "

## 2024-10-18 NOTE — ASSESSMENT & PLAN NOTE
Yesterday, initial blood smear was read as presence of extracellular organism and also parasites in RBC, suggestive of Babesia.  However, repeat parasite smear did not show any evidence of any organism or parasite.  Another repeat parasite smear this morning also did not show any parasites.  Babesiosis would be unlikely.  Discontinue azithromycin/atovaquone:.

## 2024-10-18 NOTE — OCCUPATIONAL THERAPY NOTE
Occupational Therapy Evaluation     Patient Name: Clayton Duval  Today's Date: 10/18/2024  Problem List  Principal Problem:    Non-traumatic acute L subdural hematoma (HCC)  Active Problems:    Sepsis (HCC)    Stroke (HCC)    Sacral wound    Perirectal abscess    Babesiosis    Splenic lesion    Past Medical History  No past medical history on file.  Past Surgical History  Past Surgical History:   Procedure Laterality Date    BRAIN HEMATOMA EVACUATION Left 10/13/2024    Procedure: CRANIOTOMY FOR SUBDURAL HEMATOMA;  Surgeon: Son Mendoza MD;  Location: [facility];  Service: Neurosurgery    GASTRIC BYPASS  2012    GASTRIC BYPASS             10/18/24 0920   OT Last Visit   OT Visit Date 10/18/24   Note Type   Note type Evaluation   Pain Assessment   Pain Assessment Tool FLACC   Pain Rating: FLACC (Rest) - Face 0   Pain Rating: FLACC (Rest) - Legs 0   Pain Rating: FLACC (Rest) - Activity 0   Pain Rating: FLACC (Rest) - Cry 0   Pain Rating: FLACC (Rest) - Consolability 0   Score: FLACC (Rest) 0   Pain Rating: FLACC (Activity) - Face 1   Pain Rating: FLACC (Activity) - Legs 0   Pain Rating: FLACC (Activity) - Activity 0   Pain Rating: FLACC (Activity) - Cry 0   Pain Rating: FLACC (Activity) - Consolability 0   Score: FLACC (Activity) 1   Restrictions/Precautions   Weight Bearing Precautions Per Order No   Braces or Orthoses (S)  Other (Comment)  (craniectomy helmet)   Other Precautions Cognitive;Bed Alarm;Chair Alarm;Multiple lines;Telemetry;O2;Fall Risk;Pain  (s/p hemicraniectmy, NGT, 6LO2, limited verbalizations, L-sided weakness)   Home Living   Type of Home House   Home Layout Two level  (3STE)   Additional Comments Pt unable to provide information, above obtained from chart   Prior Function   Level of [place] Independent with ADLs;Independent with IADLS   Lives With Spouse   Receives Help From Family   IADLs Independent with driving;Independent with meal prep;Independent with medication  management   Vocational Full time employment   Comments Pt unable to provide information, above obtained from chart   Lifestyle   Autonomy Per chart, PTA, pt was I with ADLs, IADLs, fnxl mobility, (+)    Reciprocal Relationships Spouse   Service to Others FTE   Intrinsic Gratification Will continue to assess   ADL   Where Assessed Edge of bed   Eating Assistance 3  Moderate Assistance   Grooming Assistance 3  Moderate Assistance   UB Bathing Assistance 2  Maximal Assistance   LB Bathing Assistance 2  Maximal Assistance   UB Dressing Assistance 2  Maximal Assistance   LB Dressing Assistance 2  Maximal Assistance   Toileting Assistance  1  Total Assistance   Additional Comments Pt able to lift RUE to aide in LB ADLs   Bed Mobility   Supine to Sit 2  Maximal assistance   Additional items Assist x 3;HOB elevated;Bedrails;Increased time required;LE management   Sit to Supine 2  Maximal assistance   Additional items Assist x 3;HOB elevated;Bedrails;Increased time required;LE management   Additional Comments MOD A to maintain EOB sitting position   Transfers   Sit pivot 2  Maximal assistance   Additional items Assist x 3;Increased time required   Additional Comments performed lateral sit pivot towards HOB   Activity Tolerance   Activity Tolerance Patient limited by fatigue;Patient limited by pain   Medical Staff Made Aware PT Shahrzad, SPT Dean   Nurse Made Aware RN clearance for session   RUE Assessment   RUE Assessment X  (Pt grossly WFL for AROM, does have slightly decreased shoulder external rotation. Strength ~ 3/5, grasp slightly weak)   LUE Assessment   LUE Assessment X  (grossly 1/5 distally, slight digit movement noted when asked to grasp, no proximal movement)   Vision - Complex Assessment   Additional Comments pt unable to attend to visual assessment   Cognition   Overall Cognitive Status Impaired   Arousal/Participation Cooperative   Attention Attends with cues to redirect   Orientation Level Unable to  assess   Memory Unable to assess   Following Commands Follows one step commands with increased time or repetition   Comments Pt cooperative during session, increased fatigue/lethargy. Requires cues to redirect to task. Follows ~ 75% of commands. Pt with difficulty verbalizing   Assessment   Limitation Decreased ADL status;Decreased UE ROM;Decreased UE strength;Decreased Safe judgement during ADL;Decreased cognition;Decreased endurance;Decreased self-care trans;Decreased high-level ADLs   Prognosis Fair   Assessment Pt is a 44 y.o. male admitted to [facility] on 10/13/2024 w/ Non-traumatic acute subdural hematoma, acute ischemic stroke, perirectal abscess, liver and spleen lesions. Now s/p L hemicraniectomy for evacuation.  has no past medical history on file. Pt with active OT orders and up and OOB as tolerated orders. Pt seen as a co-evaluation with PT due to the patient's co-morbidities, clinically unstable presentation/clinical complexity, and present impairments. Pt unable to provide PLOF. Per chart, pta, pt resides with his wife in a 2STH, 3STE. Pt was I w/  ADLS and IADLS, (+) drove. Upon evaluation, pt currently requires MAX A x 3 for sup <> sit and lateral sit pivot transfers. Exhibits decreased strength to B/L UE's limiting performance in ADLs/transfers. Pt currently requires MOD A eating, MOD A grooming, MAX A UB ADLs, MAX A LB ADLs, and TOTAL A toileting. Pt is limited at this time 2*: pain, endurance, activity tolerance, functional mobility, balance, functional standing tolerance, decreased I w/ ADLS/IADLS, strength, ROM, cognitive impairments, and impaired fine motor skills.The following Occupational Performance Areas to address include: eating, grooming, bathing/shower, toilet hygiene, dressing, health maintenance, functional mobility, community mobility, and clothing management. Pt to benefit from immediate acute skilled OT to address above deficits, improve overall functional independence, maximize fnxl  mobility and reduce caregiver burden. From OT standpoint, recommendation at time of d/c would be STR.  Pt was left after session with all current needs met. The patient's raw score on the AM-PAC Daily Activity Inpatient Short Form is 11. A raw score of less than 19 suggests the patient may benefit from discharge to post-acute rehabilitation services. Please refer to the recommendation of the Occupational Therapist for safe discharge planning.   Goals   [facility] Time Frame 10-14   Plan   Treatment Interventions ADL retraining;Functional transfer training;UE strengthening/ROM;Endurance training;Patient/family training;Cognitive reorientation;Equipment evaluation/education;Continued evaluation;Energy conservation;Activityengagement;Neuromuscular reeducation;Fine motor coordination activities;Compensatory technique education   Goal Expiration Date 11/01/24   OT Frequency 2-3x/wk   Discharge Recommendation   Rehab Resource Intensity Level, OT I (Maximum Resource Intensity)  (pending progress)   AM-PAC Daily Activity Inpatient   Lower Body Dressing 2   Bathing 2   Toileting 1   Upper Body Dressing 2   Grooming 2   Eating 2   Daily Activity Raw Score 11   Daily Activity Standardized Score (Calc for Raw Score >=11) 29.04   [facility] Applied Cognition Inpatient   Following a Speech/Presentation 1   Understanding Ordinary Conversation 2   Taking Medications 1   Remembering Where Things Are Placed or Put Away 1   Remembering List of 4-5 Errands 1   Taking Care of Complicated Tasks 1   Applied Cognition Raw Score 7   Applied Cognition Standardized Score 15.17     GOALS    - Pt be oriented x4 for all OT sessions with use of environmental cues as appropriate.     - Pt will follow at least 75% of simple one step commands in order to complete a functional activity.     - Pt will engage in ongoing cognitive assessment w/ G participation to assist w/ safe d/c planning/recommendations (as appropriate, may be limited by speech deficits)     -  Pt will attend to a functional activity for at least 10 minutes with no more than 2 cues for attention/redirection.     - Pt will complete UB self care tasks w/ S w/ use of DME/AD as appropriate.     - Pt will complete LB self care tasks w/ MIN A with use of DME/AD as appropriate.     - Pt will improve bed mobility to MIN A with use of compensatory strategies as appropriate.     - Pt will demonstrate G carryover of pt/caregiver education and training as appropriate w/o cues w/ good tolerance to increase safety during functional tasks     - Pt will maintain sitting upright with F balance for at least 15 minutes while completing a dynamic functional activity with good balance and endurance.     - Pt will engage in ongoing visual perceptual assessments, screenings, and activities to improve ADL performance, as well as to assist in safety/d/c planning.     - Pt will participate in fine/gross motor coordination/strenthening/dexterity exercises in order to increase participation in functional activities.     - Pt will complete toileting w/ MIN A w/ G hygiene/thoroughness using DME as needed    - Pt will improve functional transfers to MIN A on/off all surfaces using DME as needed w/ G balance/safety     - Pt will improve functional mobility during ADL/IADL/leisure tasks to MIN A using DME as needed w/ G balance/safety     - Pt will improve B/L UE strength by 1 MMT grade to improve performance in ADL tasks    Mary Hager MS, OTR/L

## 2024-10-18 NOTE — ASSESSMENT & PLAN NOTE
Patient is status post bedside I&D and being followed by general surgery.  Concern for adjacent perirectal abscess noted.  Antibiotic plan as in above.  Wound care per surgery.

## 2024-10-19 NOTE — PROGRESS NOTES
Progress Note - Critical Care/ICU   Name: Clayton Duval 44 y.o. male I MRN: [id number]  Unit/Bed#: ICU 05 I Date of Admission: 10/13/2024   Date of Service: 10/19/2024 I Hospital Day: 6       Assessment & Plan   Neuro:   Diagnosis: Subdural hematoma s/p left hemicraniectomy for evacuation of SDH POD#3, AMS, Acute ischemic stroke  CT head - 10/13 0616 - preop: Mixed density left convexity subdural hemorrhage (1.2 cm thickness) with by 1.5 cm rightward midline shift. Trace subdural hemorrhage along the left tentorium. Mass effect with low-lying cerebellar tonsils, effacement of the suprasellar cistern, and partial effacement of the quadrigeminal plate cistern.  CT head - 10/13 - post op: New area of hypodensity within the left thalamus and basal ganglia compared to earlier day exam, suggestive of acute infarct. Expected postsurgical changes from left hemispheric craniectomy with decreased size of mixed density subdural hematoma, improved 4 mm left to right midline shift and improved effacement of the basilar cisterns. Improved mass effect on the ventricular system and dilatation of the right temporal horn of the lateral ventricle  MRI Brain w/wo contrast - 10/17 - No significant change. Stable evolving recent infarcts with petechial hemorrhage. Status post left hemispheric  craniectomy for subdural drainage. Stable subgaleal epidural hematoma with small residual subdurals. Stable small volume of intraventricular hemorrhage. Stable mass effect with 4 mm of left-to-right shift.  Plan:   Continue monitoring neurological exam closely with frequent neuro checks, obtain stat CTH if any acute changes  Continuous vEEG for AMS.  Monitor for signs of ICP increase (bradycardia, HTN, changes in neuro exam, increased tone, pupillary changes)  Blood Pressure: SBP goal 110 - 140, cardene gtt or pressers/fluids as needed to keep within range.  AC/AP: Holding AP and AC at this time. Consider lovenox for dvt prophylaxis after  biopsy   Imaging/Diagnostic Studies: MRI Brain w/o contrast  Labs Pending:  Seizure Prophylaxis: Keppra 1g load followed by 750mg BID for 7 days   Tight glycemic control, goal <180. Monitor temperature, tylenol PRN.  PT/OT/Speech/PMR consults when able  DVT PPx: SCDs and heparin 5000 q8h   CV:   Diagnosis: sinus bradycardia  Plan:   Continue to monitor on Telemetry     Pulm:  Diagnosis: NC  Plan: currently requiring 6L, monitor SpO2        GI:   Diagnosis: Hepatosplenomegaly, hepatic lesions, splenic lesions, transaminitis  Hepatitis panel - negative  Plan:   Oncology following along  IR consulted - plan for biopsy of a hepatic lesion vs iliac bone lesion originally 10/18, postponed due to febrile process - will re-contact IR when patient stable.     :   Diagnosis: plasencia catheter  Plan:   Monitor I&Os  Retention protocol     F/E/N:   F: 75 ml/hr Sodium Chloride  E: replenish as indicated for Magnesium >2, K >4  N: NPO for procedures     Heme/Onc:   Diagnosis: suspected metastatic cancer, hypocoagulability in the setting of hepatic malignancy, lytic bone lesions, hepatic coagulopathy  MRI Abdomen w/wo contrast - 10/11/24: Findings concerning for metastatic disease. Enhancing osseous lesions throughout the majority of the visualized spine and bony pelvis. Markedly enlarged spleen much of which is occupied by large confluent hypoenhancing lesions suspected to represent metastases. Hepatomegaly. Multifocal hepatic lesions which correlate to areas of hypoenhancement on CT are also suspected to represent metastasis in the given setting. Anasarca and moderate volume ascites  Fibrinogen level - 161  PT-INR - 21.6/1.87 - 10/13 - 0800 -  In the OR due to anemia and coagulopathy patient was given: 4 units of pRBCs, 4 units of FFP and 1 unit of platelets  Discussed with oncology - believe thrombosis panel derangements likely due to hepatic lesions.  Plan:   IR consulted for biopsy - plan for 10/18/24  Oncology consult  Tumor  markers ordered - CEA (normal), AFP (normal), B-HCG (elevated- 7.5) , LDH (elevated- 556), CA 19-9 (pending)  Spleen and liver lesions 2/2 metastasis vs abscesses    Endo:   Diagnosis: Hypothyroidism, hyponatremia, hypothermia, SIADH  Plan:   Continue home Levothyroxine 200mcg  Suspect hyponatremia of malignancy  Bare hugger  Hyponatremia due to water retention likely in the setting of the patient's intracranial pathologies as well as      ID:   Diagnosis: Sepsis, sacral decubitus ulcer, Babesiosis  Sepsis alert - hyperthermia, tachycardia, leukocytosis, bandemia  Blood cultures 10/11 - no growth  Blood cultures 10/14 - no growth  Sputum cultures 10/14 - MSSA  Hematology lab - peripheral blood smear with evidence of multiple organisms extracellularly and in RBC. History of possible tick exposure.  Blood parasite negative for extracellular or intracellular organisms 10/18  Plan:   ID following  Sepsis pathway  Continue Zosyn - day 3  MSK/Skin:   Diagnosis: Anasarca, sacral decubitus ulcer  Plan:   Monitor fluid status  Wound care consulted  Red surgery debridement 10/17  CT C/A/P 10/18 - Decubitus ulcer overlying the coccyx with suggestion of cortical thinning in the inferior most coccygeal segment, concerning for osteomyelitis. No associated rim-enhancing fluid collection to suggest an abscess.    Diagnosis: perirectal abscess  Plan:   Some drainage was performed at bedside, but will obtain CT to check for deeper infection and f/u with I&D if needed  Surgery following - plan for wound check on Monday 10/21        Disposition: Critical care    ICU Core Measures     A: Assess, Prevent, and Manage Pain Has pain been assessed? Yes  Need for changes to pain regimen? No   B: Both SAT/SAT  N/A   C: Choice of Sedation RASS Goal: 0 Alert and Calm or N/A patient not on sedation  Need for changes to sedation or analgesia regimen? No   D: Delirium CAM-ICU: Negative   E: Early Mobility  Plan for early mobility? Yes   F: Family  Engagement Plan for family engagement today? Yes       Antibiotic Review: Continue broad spectrum secondary to severity of illness.     Review of Invasive Devices:    Barclay Plan: Voiding trial after improvement in ambulation     Linda Plan: Keep arterial line for hemodynamic monitoring    Prophylaxis:  VTE VTE covered by:  heparin (porcine), Subcutaneous, 5,000 Units at 10/19/24 0540       Stress Ulcer  covered byfamotidine (PEPCID) tablet 20 mg [[id number]]         24 Hour Events : Patient with an episode of hypoglycemia overnight. Received dextrose. Patient also had a fever overnight of 102. Continues to have episodes of hypoglycemia. Discussed case with surgery and ID.   Subjective   Review of Systems: See HPI for Review of Systems    Objective :                   Vitals I/O      Most Recent Min/Max in 24hrs   Temp (!) 102 °F (38.9 °C) Temp  Min: 99.9 °F (37.7 °C)  Max: 104.5 °F (40.3 °C)   Pulse 104 Pulse  Min: 96  Max: 132   Resp 19 Resp  Min: 12  Max: 24   /81 BP  Min: 116/81  Max: 143/94   O2 Sat 99 % SpO2  Min: 97 %  Max: 100 %      Intake/Output Summary (Last 24 hours) at 10/19/2024 0614  Last data filed at 10/19/2024 0400  Gross per 24 hour   Intake 4164.58 ml   Output 1010 ml   Net 3154.58 ml       Diet Enteral/Parenteral; Tube Feeding No Oral Diet; Vital AF 1.2; Continuous; 50    Invasive Monitoring   Arterial Line  Linda /78  Arterial Line BP  Min: 96/68  Max: 146/90   MAP 92 mmHg  Arterial Line MAP (mmHg)  Min: 76 mmHg  Max: 106 mmHg           Physical Exam   Physical Exam  Vitals reviewed.   HENT:      Head:      Comments: Multiple surgical scars present  Cardiovascular:      Rate and Rhythm: Normal rate and regular rhythm.      Pulses: Normal pulses.   Abdominal:      Palpations: Abdomen is soft.   Constitutional:       Appearance: He is well-developed.   Pulmonary:      Effort: Pulmonary effort is normal.      Breath sounds: Rhonchi present.   Neurological:      Comments: Patient opens  eyes, tracks.  Patient able to follow directions.  Thumbs up weakly on the LUE.  RUE antigravity.  B/l LE wiggles toes.   Genitourinary/Anorectal:  Barclay present.        Diagnostic Studies        Lab Results: I have reviewed the following results:     Medications:  Scheduled PRN   chlorhexidine, 15 mL, Q12H KAYLIE  Cholecalciferol, 5,000 Units, Daily  famotidine, 20 mg, BID  folic acid, 1 mg, Daily  heparin (porcine), 5,000 Units, Q8H KAYLIE  levETIRAcetam, 750 mg, Q12H KAYLIE  levothyroxine, 200 mcg, Early Morning  piperacillin-tazobactam, 4.5 g, Q8H  thiamine, 100 mg, Daily      acetaminophen, 1,000 mg, Q6H PRN  benzonatate, 100 mg, TID PRN  fentaNYL, 50 mcg, Q1H PRN  hydrALAZINE, 10 mg, Q6H PRN  HYDROmorphone, 1 mg, Q3H PRN  labetalol, 10 mg, Q6H PRN  ondansetron, 4 mg, Q8H PRN  polyethylene glycol, 17 g, Daily PRN       Continuous    sodium chloride, 75 mL/hr, Last Rate: 75 mL/hr (10/19/24 0400)         Labs:   CBC    Recent Labs     10/18/24  0506   WBC 8.48   HGB 9.8*   HCT 30.0*   *   BANDSPCT 14*     BMP    Recent Labs     10/18/24  0506   SODIUM 137   K 4.0      CO2 24   AGAP 8   BUN 30*   CREATININE 0.75   CALCIUM 7.1*       Coags    Recent Labs     10/18/24  0506   INR 1.56*        Additional Electrolytes  Recent Labs     10/18/24  0506   MG 1.9   PHOS 2.9   CAIONIZED 1.07*          Blood Gas    No recent results  No recent results LFTs  No recent results    Infectious  No recent results  Glucose  Recent Labs     10/18/24  0506   GLUC 82

## 2024-10-19 NOTE — ASSESSMENT & PLAN NOTE
Patient is status post bedside I&D and being followed by general surgery.  Concern for adjacent perirectal abscess noted.  CT scan 10/18 also noting auricle thinning of coccyx below the decubitus ulcer may suggest osteomyelitis.  However they are all bone remodeling beneath the sacral ulcer can also appear similarly.  Definitive diagnosis of osteomized would require a bone biopsy/culture.  Even if osteomyelitis was confirmed however, long-term antibiotic therapy would be futile without a plan to aggressively debride the tissue and bone, send bone cultures and cover the wound with flap for closure.  Antibiotic plan as in above.  Wound care per surgery.

## 2024-10-19 NOTE — PLAN OF CARE
Problem: PAIN - ADULT  Goal: Verbalizes/displays adequate comfort level or baseline comfort level  Description: Interventions:  - Encourage patient to monitor pain and request assistance  - Assess pain using appropriate pain scale  - Administer analgesics based on type and severity of pain and evaluate response  - Implement non-pharmacological measures as appropriate and evaluate response  - Consider cultural and social influences on pain and pain management  - Notify physician/advanced practitioner if interventions unsuccessful or patient reports new pain  Outcome: Progressing     Problem: INFECTION - ADULT  Goal: Absence or prevention of progression during hospitalization  Description: INTERVENTIONS:  - Assess and monitor for signs and symptoms of infection  - Monitor lab/diagnostic results  - Monitor all insertion sites, i.e. indwelling lines, tubes, and drains  - Monitor endotracheal if appropriate and nasal secretions for changes in amount and color  - [place] appropriate cooling/warming therapies per order  - Administer medications as ordered  - Instruct and encourage patient and family to use good hand hygiene technique  - Identify and instruct in appropriate isolation precautions for identified infection/condition  Outcome: Progressing  Goal: Absence of fever/infection during neutropenic period  Description: INTERVENTIONS:  - Monitor WBC    Outcome: Progressing     Problem: SAFETY ADULT  Goal: Patient will remain free of falls  Description: INTERVENTIONS:  - Educate patient/family on patient safety including physical limitations  - Instruct patient to call for assistance with activity   - Consult OT/PT to assist with strengthening/mobility   - Keep Call bell within reach  - Keep bed low and locked with side rails adjusted as appropriate  - Keep care items and personal belongings within reach  - Initiate and maintain comfort rounds  - Make Fall Risk Sign visible to staff  - Offer Toileting every 2 Hours,  in advance of need  - Initiate/Maintain BED alarm    - Apply yellow socks and bracelet for high fall risk patients  - Consider moving patient to room near nurses station  Outcome: Progressing  Goal: Maintain or return to baseline ADL function  Description: INTERVENTIONS:  -  Assess patient's ability to carry out ADLs; assess patient's baseline for ADL function and identify physical deficits which impact ability to perform ADLs (bathing, care of mouth/teeth, toileting, grooming, dressing, etc.)  - Assess/evaluate cause of self-care deficits   - Assess range of motion  - Assess patient's mobility; develop plan if impaired  - Assess patient's need for assistive devices and provide as appropriate  - Encourage maximum independence but intervene and supervise when necessary  - Involve family in performance of ADLs  - Assess for home care needs following discharge   - Consider OT consult to assist with ADL evaluation and planning for discharge  - Provide patient education as appropriate  Outcome: Progressing  Goal: Maintains/Returns to pre admission functional level  Description: INTERVENTIONS:  - Perform AM-PAC 6 Click Basic Mobility/ Daily Activity assessment daily.  - Set and communicate daily mobility goal to care team and patient/family/caregiver.   - Collaborate with rehabilitation services on mobility goals if consulted  - Perform Range of Motion 3 times a day.  - Reposition patient every 2 hours.  - Dangle patient 3 times a day  - Stand patient 3 times a day  - Ambulate patient 3 times a day  - Out of bed to chair 3 times a day   - Out of bed for meals 3 times a day  - Out of bed for toileting  - Record patient progress and toleration of activity level   Outcome: Progressing     Problem: DISCHARGE PLANNING  Goal: Discharge to home or other facility with appropriate resources  Description: INTERVENTIONS:  - Identify barriers to discharge w/patient and caregiver  - Arrange for needed discharge resources and  transportation as appropriate  - Identify discharge learning needs (meds, wound care, etc.)  - Arrange for interpretive services to assist at discharge as needed  - Refer to Case Management Department for coordinating discharge planning if the patient needs post-hospital services based on physician/advanced practitioner order or complex needs related to functional status, cognitive ability, or social support system  Outcome: Progressing     Problem: Knowledge Deficit  Goal: Patient/family/caregiver demonstrates understanding of disease process, treatment plan, medications, and discharge instructions  Description: Complete learning assessment and assess knowledge base.  Interventions:  - Provide teaching at level of understanding  - Provide teaching via preferred learning methods  Outcome: Progressing     Problem: Prexisting or High Potential for Compromised Skin Integrity  Goal: Skin integrity is maintained or improved  Description: INTERVENTIONS:  - Identify patients at risk for skin breakdown  - Assess and monitor skin integrity  - Assess and monitor nutrition and hydration status  - Monitor labs   - Assess for incontinence   - Turn and reposition patient  - Assist with mobility/ambulation  - Relieve pressure over bony prominences  - Avoid friction and shearing  - Provide appropriate hygiene as needed including keeping skin clean and dry  - Evaluate need for skin moisturizer/barrier cream  - Collaborate with interdisciplinary team   - Patient/family teaching  - Consider wound care consult   Outcome: Progressing     Problem: Nutrition/Hydration-ADULT  Goal: Nutrient/Hydration intake appropriate for improving, restoring or maintaining nutritional needs  Description: Monitor and assess patient's nutrition/hydration status for malnutrition. Collaborate with interdisciplinary team and initiate plan and interventions as ordered.  Monitor patient's weight and dietary intake as ordered or per policy. Utilize nutrition  screening tool and intervene as necessary. Determine patient's food preferences and provide high-protein, high-caloric foods as appropriate.     INTERVENTIONS:  - Monitor oral intake, urinary output, labs, and treatment plans  - Assess nutrition and hydration status and recommend course of action  - Evaluate amount of meals eaten  - Assist patient with eating if necessary   - Allow adequate time for meals  - Recommend/ encourage appropriate diets, oral nutritional supplements, and vitamin/mineral supplements  - Order, calculate, and assess calorie counts as needed  - Recommend, monitor, and adjust tube feedings and TPN/PPN based on assessed needs  - Assess need for intravenous fluids  - Provide specific nutrition/hydration education as appropriate  - Include patient/family/caregiver in decisions related to nutrition  Outcome: Progressing     Problem: SAFETY,RESTRAINT: NV/NON-SELF DESTRUCTIVE BEHAVIOR  Goal: Remains free of harm/injury (restraint for non violent/non self-detsructive behavior)  Description: INTERVENTIONS:  - Instruct patient/family regarding restraint use   - Assess and monitor physiologic and psychological status   - Provide interventions and comfort measures to meet assessed patient needs   - Identify and implement measures to help patient regain control  - Assess readiness for release of restraint   Outcome: Progressing  Goal: Returns to optimal restraint-free functioning  Description: INTERVENTIONS:  - Assess the patient's behavior and symptoms that indicate continued need for restraint  - Identify and implement measures to help patient regain control  - Assess readiness for release of restraint   Outcome: Progressing

## 2024-10-19 NOTE — ASSESSMENT & PLAN NOTE
10/17 initial blood smear was read as presence of extracellular organism and also parasites in RBC, suggestive of Babesia.  However, repeat parasite smear did not show any evidence of any organism or parasite.  Another repeat parasite smear 10/18 also did not show any parasites.  Babesiosis would be unlikely.  Continue to monitor off azithromycin/atovaquone  Follow up Babesia PCR from 10/17

## 2024-10-19 NOTE — PROGRESS NOTES
Progress Note - Infectious Disease   Name: Clayton Duval 44 y.o. male I MRN: [id number]  Unit/Bed#: ICU 05 I Date of Admission: 10/13/2024   Date of Service: 10/19/2024 I Hospital Day: 6     Assessment & Plan  Sepsis (HCC)  Source of sepsis is most likely perirectal abscess.  Despite sepsis, patient remains systemically well, without toxicity and hemodynamically stable, without hypotension but does have persistent fever.  Admission blood cultures negative, repeat blood cultures 10/17 negative to date. As below, multiple parasite smears are negative. Repeat CT A/P without obvious residual abscess. LFTs elevated but overall improving.  Video EEG from 10/16 without any evidence of seizures, MRI brain 10/17 overall stable with evolving recent infarcts and petechial hemorrhage.  Consider if fevers may be due to metastatic disease/undiagnosed malignancy vs. Central from CNS infarcts/hemorrhage.  Antibiotic plan as in below.  Monitor temperature/WBC.    Monitor hemodynamics.    Follow-up on all pending blood cultures.  Perirectal abscess  Patient has spontaneous drainage.  He is being followed by general surgery service, with no plan for surgical I&D previously.  However, at present, there is concern of persistent abscess.  Repeat CT imaging 10/18 with incompletely imaged perennial region.  There is some air seen to the right of the anus, but no subcutaneous emphysema elsewhere.  Wound culture with growth of mixed alli, not helpful.  Patient has no history of MRSA, has negative MRSA screen and has MSSA growing in respiratory culture.   Continue IV Zosyn.  General surgery follow-up, appreciate updated recommendations and review of CT scan  Sacral wound  Patient is status post bedside I&D and being followed by general surgery.  Concern for adjacent perirectal abscess noted.  CT scan 10/18 also noting auricle thinning of coccyx below the decubitus ulcer may suggest osteomyelitis.  However they are all bone  remodeling beneath the sacral ulcer can also appear similarly.  Definitive diagnosis of osteomized would require a bone biopsy/culture.  Even if osteomyelitis was confirmed however, long-term antibiotic therapy would be futile without a plan to aggressively debride the tissue and bone, send bone cultures and cover the wound with flap for closure.  Antibiotic plan as in above.  Wound care per surgery.  Abnormal blood smear  10/17 initial blood smear was read as presence of extracellular organism and also parasites in RBC, suggestive of Babesia.  However, repeat parasite smear did not show any evidence of any organism or parasite.  Another repeat parasite smear 10/18 also did not show any parasites.  Babesiosis would be unlikely.  Continue to monitor off azithromycin/atovaquone  Follow up Babesia PCR from 10/17  Non-traumatic acute L subdural hematoma (HCC)  Patient is status post craniectomy and evacuation of subdural hematoma.  He also has ischemia in the brainstem.  Neurosurgery follow-up.  Stroke (HCC)  Head MRI showed ischemia involving brainstem.  Patient had decreased responsiveness on presentation.  Mental status with some improvement.  Neurology follow-up.  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  Plan for biopsy of hepatic lesion noted.  This is being postponed due to persistent fever.  Check urine histoplasma antigen.    I have discussed the above management plan in detail with the primary service.     Antibiotics:  Zosyn: 4    Subjective   Patient febrile again this morning, but no leukocytosis.  Remains hemodynamically stable, no pressors.  No other major clinical changes.    Is unable to participate in interview given mental status.    Objective :  Temp:  [98.7 °F (37.1 °C)-102 °F (38.9 °C)] 99.6 °F (37.6 °C)  HR:  [] 100  BP: (111-116)/(81-88) 111/86  Resp:  [10-20] 14  SpO2:  [97 %-100 %] 98 %  O2 Device: Nasal cannula  Nasal Cannula  O2 Flow Rate (L/min):  [3 L/min-6 L/min] 3 L/min    General:  No acute distress, lying in bed and is nonverbal, does not appear to be tracking  Psychiatric:  Awake   Pulmonary:  Normal respiratory excursion without accessory muscle use  Abdomen:  Soft, nontender  Extremities:  No edema  Skin:  No rashes        Lab Results: I have reviewed the following results:  Results from last 7 days   Lab Units 10/19/24  0540 10/18/24  0506 10/17/24  0444   WBC Thousand/uL 8.08 8.48 10.51*   HEMOGLOBIN g/dL 9.4* 9.8* 10.0*   PLATELETS Thousands/uL 135* 140* 123*     Results from last 7 days   Lab Units 10/19/24  0540 10/18/24  0506 10/17/24  0444 10/16/24  0444 10/15/24  0426 10/13/24  1252 10/13/24  1031   SODIUM mmol/L 139 137 136   < > 131*   < >  --    POTASSIUM mmol/L 4.0 4.0 4.1   < > 4.3   < >  --    CHLORIDE mmol/L 108 105 104   < > 101   < >  --    CO2 mmol/L 24 24 22   < > 23   < >  --    CO2, I-STAT mmol/L  --   --   --   --   --   --  22   BUN mg/dL 31* 30* 25   < > 29*   < >  --    CREATININE mg/dL 0.61 0.75 0.62   < > 0.82   < >  --    EGFR ml/min/1.73sq m 120 110 119   < > 106   < >  --    GLUCOSE, ISTAT mg/dl  --   --   --   --   --   --  107   CALCIUM mg/dL 6.9* 7.1* 7.1*   < > 7.6*   < >  --    AST U/L 286*  --  356*  --  449*   < >  --    ALT U/L 134*  --  170*  --  211*   < >  --    ALK PHOS U/L 599*  --  708*  --  699*   < >  --    ALBUMIN g/dL 2.1*  --  2.4*  --  2.5*   < >  --     < > = values in this interval not displayed.     Results from last 7 days   Lab Units 10/17/24  1216 10/16/24  1321 10/16/24  0134 10/15/24  1135 10/14/24  1419 10/14/24  1154 10/14/24  0121   BLOOD CULTURE  No Growth at 24 hrs.  No Growth at 24 hrs.  --  No Growth at 72 hrs.  No Growth at 72 hrs.  --   --   --  No Growth After 5 Days.   SPUTUM CULTURE   --   --   --   --  2+ Growth of Staphylococcus aureus*  2+ Growth of  --   --    GRAM STAIN RESULT   --  2+ Polys*  2+ Gram positive rods*  1+ Gram positive cocci in pairs*   1+ Gram negative rods*  --   --  2+ Polys*  2+ Gram negative rods*  1+ Gram positive cocci in pairs*  --   --    WOUND CULTURE   --  1+ Growth of  --   --   --   --   --    MRSA CULTURE ONLY   --   --   --   --   --  No Methicillin Resistant Staphlyococcus aureus (MRSA) isolated  --    LEGIONELLA URINARY ANTIGEN   --   --   --  Negative  --   --   --      Results from last 7 days   Lab Units 10/17/24  0444 10/16/24  0133 10/14/24  0121   PROCALCITONIN ng/ml 1.21* 1.13* 1.14*     Results from last 7 days   Lab Units 10/16/24  0444   CRP mg/L 82.3*

## 2024-10-19 NOTE — ASSESSMENT & PLAN NOTE
Source of sepsis is most likely perirectal abscess.  Despite sepsis, patient remains systemically well, without toxicity and hemodynamically stable, without hypotension but does have persistent fever.  Admission blood cultures negative, repeat blood cultures 10/17 negative to date. As below, multiple parasite smears are negative. Repeat CT A/P without obvious residual abscess. LFTs elevated but overall improving.  Video EEG from 10/16 without any evidence of seizures, MRI brain 10/17 overall stable with evolving recent infarcts and petechial hemorrhage.  Consider if fevers may be due to metastatic disease/undiagnosed malignancy vs. Central from CNS infarcts/hemorrhage.  Antibiotic plan as in below.  Monitor temperature/WBC.    Monitor hemodynamics.    Follow-up on all pending blood cultures.

## 2024-10-19 NOTE — ASSESSMENT & PLAN NOTE
Patient has spontaneous drainage.  He is being followed by general surgery service, with no plan for surgical I&D previously.  However, at present, there is concern of persistent abscess.  Repeat CT imaging 10/18 with incompletely imaged perennial region.  There is some air seen to the right of the anus, but no subcutaneous emphysema elsewhere.  Wound culture with growth of mixed alli, not helpful.  Patient has no history of MRSA, has negative MRSA screen and has MSSA growing in respiratory culture.   Continue IV Zosyn.  General surgery follow-up, appreciate updated recommendations and review of CT scan

## 2024-10-20 NOTE — PLAN OF CARE
Problem: INFECTION - ADULT  Goal: Absence or prevention of progression during hospitalization  Description: INTERVENTIONS:  - Assess and monitor for signs and symptoms of infection  - Monitor lab/diagnostic results  - Monitor all insertion sites, i.e. indwelling lines, tubes, and drains  - Monitor endotracheal if appropriate and nasal secretions for changes in amount and color  - [place] appropriate cooling/warming therapies per order  - Administer medications as ordered  - Instruct and encourage patient and family to use good hand hygiene technique  - Identify and instruct in appropriate isolation precautions for identified infection/condition  Outcome: Progressing

## 2024-10-20 NOTE — QUICK NOTE
Went to assess the patient's sacral wound.  Appears that there is a newer wound near the main sacral wound that is saturated in stool.  Concerning findings include possible fistula to the new wound.  Did a rectal exam and was able to appreciate a moderate amount of indurated and thick tissue along the left lateral aspect of the rectum.  Will plan for EUA and sacral wound debridement in the OR tomorrow on 10/21.  For now continue with as needed dressing changes for wound saturation.

## 2024-10-20 NOTE — PROGRESS NOTES
Progress Note - Critical Care/ICU   Name: Clayton Duval 44 y.o. male I MRN: [id number]  Unit/Bed#: ICU 05 I Date of Admission: 10/13/2024   Date of Service: 10/20/2024 I Hospital Day: 7       Assessment & Plan   Neuro:   Diagnosis: Subdural hematoma s/p left hemicraniectomy for evacuation of SDH POD#4, AMS, Acute ischemic stroke  CT head - 10/13 0616 - preop: Mixed density left convexity subdural hemorrhage (1.2 cm thickness) with by 1.5 cm rightward midline shift. Trace subdural hemorrhage along the left tentorium. Mass effect with low-lying cerebellar tonsils, effacement of the suprasellar cistern, and partial effacement of the quadrigeminal plate cistern.  CT head - 10/13 - post op: New area of hypodensity within the left thalamus and basal ganglia compared to earlier day exam, suggestive of acute infarct. Expected postsurgical changes from left hemispheric craniectomy with decreased size of mixed density subdural hematoma, improved 4 mm left to right midline shift and improved effacement of the basilar cisterns. Improved mass effect on the ventricular system and dilatation of the right temporal horn of the lateral ventricle  MRI Brain w/wo contrast - 10/17 - No significant change. Stable evolving recent infarcts with petechial hemorrhage. Status post left hemispheric  craniectomy for subdural drainage. Stable subgaleal epidural hematoma with small residual subdurals. Stable small volume of intraventricular hemorrhage. Stable mass effect with 4 mm of left-to-right shift.  Carotid US 10/19 - There is no evidence of disease throughout the extracranial carotid arteries   Plan:   Continue monitoring neurological exam closely with frequent neuro checks, obtain stat CTH if any acute changes  Continuous vEEG for AMS.  Monitor for signs of ICP increase (bradycardia, HTN, changes in neuro exam, increased tone, pupillary changes)  Blood Pressure: SBP goal 110 - 140, cardene gtt or pressers/fluids as needed to keep  within range.  AC/AP: Heparin 5000 units q8h  Imaging/Diagnostic Studies: MRI Brain w/o contrast  Labs Pending:  Seizure Prophylaxis: Keppra 1g load followed by 750mg BID for 7 days   Tight glycemic control, goal <180. Monitor temperature, tylenol PRN.  PT/OT/Speech/PMR consults when able  DVT PPx: SCDs and heparin 5000 q8h   CV:   Diagnosis: sinus bradycardia  Plan:   Continue to monitor on Telemetry     Pulm:  Diagnosis: NC  Plan: currently requiring 6L, monitor SpO2        GI:   Diagnosis: Hepatosplenomegaly, hepatic lesions, splenic lesions, transaminitis  Hepatitis panel - negative  Plan:   Oncology following along  IR consulted - plan for biopsy of a hepatic lesion vs iliac bone lesion originally 10/18, postponed due to febrile process - will re-contact IR when patient stable.     :   Diagnosis: plasencia catheter  Plan:   Monitor I&Os  Retention protocol     F/E/N:   F:   E: replenish as indicated for Magnesium >2, K >4  N: tube feeds 60cc/hr, FWF 100ml/4hrs     Heme/Onc:   Diagnosis: suspected metastatic cancer, hypocoagulability in the setting of hepatic malignancy, lytic bone lesions, hepatic coagulopathy  MRI Abdomen w/wo contrast - 10/11/24: Findings concerning for metastatic disease. Enhancing osseous lesions throughout the majority of the visualized spine and bony pelvis. Markedly enlarged spleen much of which is occupied by large confluent hypoenhancing lesions suspected to represent metastases. Hepatomegaly. Multifocal hepatic lesions which correlate to areas of hypoenhancement on CT are also suspected to represent metastasis in the given setting. Anasarca and moderate volume ascites  Fibrinogen level - 161  PT-INR - 21.6/1.87 - 10/13 - 0800 -  In the OR due to anemia and coagulopathy patient was given: 4 units of pRBCs, 4 units of FFP and 1 unit of platelets  Discussed with oncology - believe thrombosis panel derangements likely due to hepatic lesions.  Plan:   IR consulted for biopsy - plan for  10/18/24  Oncology consult  Tumor markers ordered - CEA (normal), AFP (normal), B-HCG (elevated- 7.5) , LDH (elevated- 556), CA 19-9 (pending)  Spleen and liver lesions 2/2 metastasis vs abscesses     Endo:   Diagnosis: Hypothyroidism, hyponatremia, hypothermia, SIADH  Plan:   Continue home Levothyroxine 200mcg  Suspect hyponatremia of malignancy  Bare hugger  Hyponatremia due to water retention likely in the setting of the patient's intracranial pathologies as well as      ID:   Diagnosis: Sepsis, sacral decubitus ulcer, Babesiosis  Sepsis alert - hyperthermia, tachycardia, leukocytosis, bandemia  Blood cultures 10/11 - no growth  Blood cultures 10/14 - no growth  Sputum cultures 10/14 - MSSA  Hematology lab - peripheral blood smear with evidence of multiple organisms extracellularly and in RBC. History of possible tick exposure.  Blood parasite negative for extracellular or intracellular organisms 10/18  Plan:   ID following  Sepsis pathway  Continue Zosyn     MSK/Skin:   Diagnosis: Anasarca, sacral decubitus ulcer  CT C/A/P 10/18 - Decubitus ulcer overlying the coccyx with suggestion of cortical thinning in the inferior most coccygeal segment, concerning for osteomyelitis. No associated rim-enhancing fluid collection to suggest an abscess.  Plan:   Monitor fluid status  Wound care consulted  Red surgery debridement 10/17  Patient to go to OR with acute surgery on 10/21/24     Diagnosis: perirectal abscess  Plan:   Some drainage was performed at bedside, but will obtain CT to check for deeper infection and f/u with I&D if needed  Surgery following - plan for wound check on Monday 10/21        Disposition: Critical care    ICU Core Measures     A: Assess, Prevent, and Manage Pain Has pain been assessed? Yes  Need for changes to pain regimen? No   B: Both SAT/SAT  N/A   C: Choice of Sedation RASS Goal: 0 Alert and Calm  Need for changes to sedation or analgesia regimen? No   D: Delirium CAM-ICU: Negative   E: Early  Mobility  Plan for early mobility? Yes   F: Family Engagement Plan for family engagement today? Yes       Antibiotic Review: Infectious disease consulted    Review of Invasive Devices:    Barclay Plan: Voiding trial after improvement in ambulation     Linda Plan: Keep arterial line for hemodynamic monitoring    Prophylaxis:  VTE VTE covered by:  heparin (porcine), Subcutaneous, 5,000 Units at 10/20/24 0544       Stress Ulcer  covered byfamotidine (PEPCID) tablet 20 mg [[id number]]         24 Hour Events : Patient showing signs of improvement.   Subjective   Review of Systems: See HPI for Review of Systems    Objective :                   Vitals I/O      Most Recent Min/Max in 24hrs   Temp 99.6 °F (37.6 °C) Temp  Min: 98.2 °F (36.8 °C)  Max: 101.1 °F (38.4 °C)   Pulse (!) 106 Pulse  Min: 84  Max: 112   Resp 13 Resp  Min: 10  Max: 24   /83 BP  Min: 109/86  Max: 136/78   O2 Sat 99 % SpO2  Min: 97 %  Max: 100 %      Intake/Output Summary (Last 24 hours) at 10/20/2024 0652  Last data filed at 10/20/2024 0600  Gross per 24 hour   Intake 2193.17 ml   Output 1034 ml   Net 1159.17 ml       Diet Enteral/Parenteral; Tube Feeding No Oral Diet; Vital AF 1.2; Continuous; 60; Prosource Protein Liquid - Two Packets    Invasive Monitoring           Physical Exam   Physical Exam  Vitals reviewed.   Eyes:      Comments: Opens eyes, tracks   HENT:      Head:      Comments: Surgical scars present   Cardiovascular:      Rate and Rhythm: Normal rate and regular rhythm.      Pulses: Normal pulses.   Abdominal:      Palpations: Abdomen is soft.   Constitutional:       Appearance: He is well-developed.   Pulmonary:      Effort: Pulmonary effort is normal.      Breath sounds: Rhonchi present.   Neurological:      Comments: Patient able to follow directions.  Attempting to speak with some verbal output.  Thumbs up in LUE. Wiggles toes LLE.  Antigravity movement RUE. 2+/5 strength RLE.    Genitourinary/Anorectal:  Barclay present.         Diagnostic Studies        Lab Results: I have reviewed the following results:     Medications:  Scheduled PRN   chlorhexidine, 15 mL, Q12H KAYLIE  Cholecalciferol, 5,000 Units, Daily  famotidine, 20 mg, BID  folic acid, 1 mg, Daily  heparin (porcine), 5,000 Units, Q8H KAYLIE  levETIRAcetam, 750 mg, Q12H KAYLIE  levothyroxine, 200 mcg, Early Morning  piperacillin-tazobactam, 4.5 g, Q8H  thiamine, 100 mg, Daily      acetaminophen, 1,000 mg, Q6H PRN  benzonatate, 100 mg, TID PRN  fentaNYL, 50 mcg, Q1H PRN  hydrALAZINE, 10 mg, Q6H PRN  HYDROmorphone, 1 mg, Q3H PRN  labetalol, 10 mg, Q6H PRN  ondansetron, 4 mg, Q8H PRN  polyethylene glycol, 17 g, Daily PRN       Continuous          Labs:   CBC    Recent Labs     10/19/24  0540 10/20/24  0537   WBC 8.08 7.92   HGB 9.4* 9.6*   HCT 29.9* 31.0*   * 137*     BMP    Recent Labs     10/19/24  0540 10/20/24  0537   SODIUM 139 140   K 4.0 3.4*    108   CO2 24 23   AGAP 7 9   BUN 31* 30*   CREATININE 0.61 0.58*   CALCIUM 6.9* 7.4*       Coags    No recent results     Additional Electrolytes  Recent Labs     10/19/24  0540 10/20/24  0537   MG 1.9 1.9   PHOS 2.9 2.7   CAIONIZED 1.09* 1.12          Blood Gas    Recent Labs     10/19/24  1453   PHART 7.452*   LSQ9DHC 34.7*   PO2ART 103.7   JWH5HAK 23.7   BEART 0.0   SOURCE Line, Arterial     Recent Labs     10/19/24  1453   SOURCE Line, Arterial    LFTs  Recent Labs     10/19/24  0540   *   *   ALKPHOS 599*   ALB 2.1*   TBILI 2.23*       Infectious  No recent results  Glucose  Recent Labs     10/19/24  0540 10/20/24  0537   GLUC 92 102

## 2024-10-20 NOTE — PLAN OF CARE
Problem: PAIN - ADULT  Goal: Verbalizes/displays adequate comfort level or baseline comfort level  Description: Interventions:  - Encourage patient to monitor pain and request assistance  - Assess pain using appropriate pain scale  - Administer analgesics based on type and severity of pain and evaluate response  - Implement non-pharmacological measures as appropriate and evaluate response  - Consider cultural and social influences on pain and pain management  - Notify physician/advanced practitioner if interventions unsuccessful or patient reports new pain  Outcome: Progressing     Problem: INFECTION - ADULT  Goal: Absence or prevention of progression during hospitalization  Description: INTERVENTIONS:  - Assess and monitor for signs and symptoms of infection  - Monitor lab/diagnostic results  - Monitor all insertion sites, i.e. indwelling lines, tubes, and drains  - Monitor endotracheal if appropriate and nasal secretions for changes in amount and color  - [place] appropriate cooling/warming therapies per order  - Administer medications as ordered  - Instruct and encourage patient and family to use good hand hygiene technique  - Identify and instruct in appropriate isolation precautions for identified infection/condition  Outcome: Progressing  Goal: Absence of fever/infection during neutropenic period  Description: INTERVENTIONS:  - Monitor WBC    Outcome: Progressing     Problem: SAFETY ADULT  Goal: Patient will remain free of falls  Description: INTERVENTIONS:  - Educate patient/family on patient safety including physical limitations  - Instruct patient to call for assistance with activity   - Consult OT/PT to assist with strengthening/mobility   - Keep Call bell within reach  - Keep bed low and locked with side rails adjusted as appropriate  - Keep care items and personal belongings within reach  - Initiate and maintain comfort rounds  - Make Fall Risk Sign visible to staff  - Offer Toileting every 2 Hours,  in advance of need  - Initiate/Maintain  bed alarm  - Apply yellow socks and bracelet for high fall risk patients  - Consider moving patient to room near nurses station  Outcome: Progressing  Goal: Maintain or return to baseline ADL function  Description: INTERVENTIONS:  -  Assess patient's ability to carry out ADLs; assess patient's baseline for ADL function and identify physical deficits which impact ability to perform ADLs (bathing, care of mouth/teeth, toileting, grooming, dressing, etc.)  - Assess/evaluate cause of self-care deficits   - Assess range of motion  - Assess patient's mobility; develop plan if impaired  - Assess patient's need for assistive devices and provide as appropriate  - Encourage maximum independence but intervene and supervise when necessary  - Involve family in performance of ADLs  - Assess for home care needs following discharge   - Consider OT consult to assist with ADL evaluation and planning for discharge  - Provide patient education as appropriate  Outcome: Progressing  Goal: Maintains/Returns to pre admission functional level  Description: INTERVENTIONS:  - Perform AM-PAC 6 Click Basic Mobility/ Daily Activity assessment daily.  - Set and communicate daily mobility goal to care team and patient/family/caregiver.   - Collaborate with rehabilitation services on mobility goals if consulted  - Perform Range of Motion 3 times a day.  - Reposition patient every 2 hours.  - Dangle patient 3 times a day  - Stand patient 3 times a day  - Ambulate patient 3 times a day  - Out of bed to chair 3 times a day   - Out of bed for meals 3 times a day  - Out of bed for toileting  - Record patient progress and toleration of activity level   Outcome: Progressing     Problem: DISCHARGE PLANNING  Goal: Discharge to home or other facility with appropriate resources  Description: INTERVENTIONS:  - Identify barriers to discharge w/patient and caregiver  - Arrange for needed discharge resources and  transportation as appropriate  - Identify discharge learning needs (meds, wound care, etc.)  - Arrange for interpretive services to assist at discharge as needed  - Refer to Case Management Department for coordinating discharge planning if the patient needs post-hospital services based on physician/advanced practitioner order or complex needs related to functional status, cognitive ability, or social support system  Outcome: Progressing     Problem: Knowledge Deficit  Goal: Patient/family/caregiver demonstrates understanding of disease process, treatment plan, medications, and discharge instructions  Description: Complete learning assessment and assess knowledge base.  Interventions:  - Provide teaching at level of understanding  - Provide teaching via preferred learning methods  Outcome: Progressing     Problem: Prexisting or High Potential for Compromised Skin Integrity  Goal: Skin integrity is maintained or improved  Description: INTERVENTIONS:  - Identify patients at risk for skin breakdown  - Assess and monitor skin integrity  - Assess and monitor nutrition and hydration status  - Monitor labs   - Assess for incontinence   - Turn and reposition patient  - Assist with mobility/ambulation  - Relieve pressure over bony prominences  - Avoid friction and shearing  - Provide appropriate hygiene as needed including keeping skin clean and dry  - Evaluate need for skin moisturizer/barrier cream  - Collaborate with interdisciplinary team   - Patient/family teaching  - Consider wound care consult   Outcome: Progressing     Problem: Nutrition/Hydration-ADULT  Goal: Nutrient/Hydration intake appropriate for improving, restoring or maintaining nutritional needs  Description: Monitor and assess patient's nutrition/hydration status for malnutrition. Collaborate with interdisciplinary team and initiate plan and interventions as ordered.  Monitor patient's weight and dietary intake as ordered or per policy. Utilize nutrition  screening tool and intervene as necessary. Determine patient's food preferences and provide high-protein, high-caloric foods as appropriate.     INTERVENTIONS:  - Monitor oral intake, urinary output, labs, and treatment plans  - Assess nutrition and hydration status and recommend course of action  - Evaluate amount of meals eaten  - Assist patient with eating if necessary   - Allow adequate time for meals  - Recommend/ encourage appropriate diets, oral nutritional supplements, and vitamin/mineral supplements  - Order, calculate, and assess calorie counts as needed  - Recommend, monitor, and adjust tube feedings and TPN/PPN based on assessed needs  - Assess need for intravenous fluids  - Provide specific nutrition/hydration education as appropriate  - Include patient/family/caregiver in decisions related to nutrition  Outcome: Progressing     Problem: SAFETY,RESTRAINT: NV/NON-SELF DESTRUCTIVE BEHAVIOR  Goal: Remains free of harm/injury (restraint for non violent/non self-detsructive behavior)  Description: INTERVENTIONS:  - Instruct patient/family regarding restraint use   - Assess and monitor physiologic and psychological status   - Provide interventions and comfort measures to meet assessed patient needs   - Identify and implement measures to help patient regain control  - Assess readiness for release of restraint   Outcome: Progressing  Goal: Returns to optimal restraint-free functioning  Description: INTERVENTIONS:  - Assess the patient's behavior and symptoms that indicate continued need for restraint  - Identify and implement measures to help patient regain control  - Assess readiness for release of restraint   Outcome: Progressing

## 2024-10-20 NOTE — ASSESSMENT & PLAN NOTE
Patient with multiple splenic, hepatic and osseous lesions, concerning for metastasis.  Plan for biopsy of hepatic lesion noted.  This is being postponed due to persistent fever.  Follow-up urine histoplasma antigen from 10/18

## 2024-10-20 NOTE — ASSESSMENT & PLAN NOTE
Source of sepsis is most likely perirectal abscess.  Despite sepsis, patient remains systemically well, without toxicity and hemodynamically stable, without hypotension but does have persistent fever.  Admission blood cultures negative, repeat blood cultures 10/17 negative to date. As below, multiple parasite smears are negative. Repeat CT A/P without obvious residual abscess. LFTs elevated but overall improving.  Video EEG from 10/16 without any evidence of seizures, MRI brain 10/17 overall stable with evolving recent infarcts and petechial hemorrhage.  Consider if fevers may be due to metastatic disease/undiagnosed malignancy vs. Central from CNS infarcts/hemorrhage.  Antibiotic plan as in below.  Consider obtaining Doppler ultrasounds to assess for DVT as part of fever workup  Eventually will require biopsy of lesions seen on bone/spleen/liver as these are likely cause of reactive fevers  Monitor temperature/WBC.    Monitor hemodynamics.    Follow-up on all pending blood cultures, including fungal from 10/17

## 2024-10-20 NOTE — PROGRESS NOTES
Progress Note - Infectious Disease   Name: Clayton Duval 44 y.o. male I MRN: [id number]  Unit/Bed#: ICU 05 I Date of Admission: 10/13/2024   Date of Service: 10/20/2024 I Hospital Day: 7     Assessment & Plan  Sepsis (HCC)  Source of sepsis is most likely perirectal abscess.  Despite sepsis, patient remains systemically well, without toxicity and hemodynamically stable, without hypotension but does have persistent fever.  Admission blood cultures negative, repeat blood cultures 10/17 negative to date. As below, multiple parasite smears are negative. Repeat CT A/P without obvious residual abscess. LFTs elevated but overall improving.  Video EEG from 10/16 without any evidence of seizures, MRI brain 10/17 overall stable with evolving recent infarcts and petechial hemorrhage.  Consider if fevers may be due to metastatic disease/undiagnosed malignancy vs. Central from CNS infarcts/hemorrhage.  Antibiotic plan as in below.  Consider obtaining Doppler ultrasounds to assess for DVT as part of fever workup  Eventually will require biopsy of lesions seen on bone/spleen/liver as these are likely cause of reactive fevers  Monitor temperature/WBC.    Monitor hemodynamics.    Follow-up on all pending blood cultures, including fungal from 10/17  Perirectal abscess  Patient has spontaneous drainage.  He is being followed by general surgery service, with no plan for surgical I&D previously.  However, at present, there is concern of persistent abscess.  Repeat CT imaging 10/18 with incompletely imaged perennial region.  There is some air seen to the right of the anus, but no subcutaneous emphysema elsewhere.  Wound culture with growth of mixed alli, not helpful.  Patient has no history of MRSA, has negative MRSA screen and has MSSA growing in respiratory culture.   Continue IV Zosyn.  General surgery follow-up, appreciate updated recommendations and review of CT scan  Sacral wound  Patient is status post bedside I&D and  being followed by general surgery.  Concern for adjacent perirectal abscess noted.  CT scan 10/18 also noting auricle thinning of coccyx below the decubitus ulcer may suggest osteomyelitis.  However they are all bone remodeling beneath the sacral ulcer can also appear similarly.  Definitive diagnosis of osteomized would require a bone biopsy/culture.  Even if osteomyelitis was confirmed however, long-term antibiotic therapy would be futile without a plan to aggressively debride the tissue and bone, send bone cultures and cover the wound with flap for closure.  Antibiotic plan as in above.  Wound care per surgery.  Abnormal blood smear  10/17 initial blood smear was read as presence of extracellular organism and also parasites in RBC, suggestive of Babesia.  However, repeat parasite smear did not show any evidence of any organism or parasite.  Another repeat parasite smear 10/18 also did not show any parasites.  Babesiosis would be unlikely.  Continue to monitor off azithromycin/atovaquone  Follow up Babesia PCR from 10/17  Non-traumatic acute L subdural hematoma (HCC)  Patient is status post craniectomy and evacuation of subdural hematoma.  He also has ischemia in the brainstem.  Neurosurgery follow-up.  Stroke (HCC)  Head MRI showed ischemia involving brainstem.  Patient had decreased responsiveness on presentation.  Mental status with some improvement.  Neurology follow-up.  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  Plan for biopsy of hepatic lesion noted.  This is being postponed due to persistent fever.  Follow-up urine histoplasma antigen from 10/18    I have discussed the above management plan in detail with the primary service.     Antibiotics:  Zosyn: 5    Subjective   Patient febrile overnight/early this morning to 101, though still remains without any leukocytosis.  Fever curve may be slightly improving overall.  All blood cultures currently  remain negative.  Hemodynamically stable and not requiring any pressors.    He is not able to participate in interview due to mental status, sleeping comfortably in bed.    Objective :  Temp:  [98.2 °F (36.8 °C)-101.1 °F (38.4 °C)] 99.3 °F (37.4 °C)  HR:  [] 106  BP: (106-136)/(63-99) 106/63  Resp:  [13-24] 15  SpO2:  [97 %-100 %] 100 %  O2 Device: Nasal cannula  Nasal Cannula O2 Flow Rate (L/min):  [1 L/min-3 L/min] 1 L/min    General:  Sleeping in bed, no acute distress  Psychiatric:  Unable to assess due to mental status  Pulmonary:  Normal respiratory excursion without accessory muscle use  Abdomen:  Soft, nontender  Extremities:  Pitting edema of bilateral legs 4+, edema of bilateral arms  Skin:  No rashes        Lab Results: I have reviewed the following results:  Results from last 7 days   Lab Units 10/20/24  0537 10/19/24  0540 10/18/24  0506   WBC Thousand/uL 7.92 8.08 8.48   HEMOGLOBIN g/dL 9.6* 9.4* 9.8*   PLATELETS Thousands/uL 137* 135* 140*     Results from last 7 days   Lab Units 10/20/24  0537 10/19/24  0540 10/18/24  0506 10/17/24  0444 10/16/24  0444 10/15/24  0426   SODIUM mmol/L 140 139 137 136   < > 131*   POTASSIUM mmol/L 3.4* 4.0 4.0 4.1   < > 4.3   CHLORIDE mmol/L 108 108 105 104   < > 101   CO2 mmol/L 23 24 24 22   < > 23   BUN mg/dL 30* 31* 30* 25   < > 29*   CREATININE mg/dL 0.58* 0.61 0.75 0.62   < > 0.82   EGFR ml/min/1.73sq m 123 120 110 119   < > 106   CALCIUM mg/dL 7.4* 6.9* 7.1* 7.1*   < > 7.6*   AST U/L  --  286*  --  356*  --  449*   ALT U/L  --  134*  --  170*  --  211*   ALK PHOS U/L  --  599*  --  708*  --  699*   ALBUMIN g/dL  --  2.1*  --  2.4*  --  2.5*    < > = values in this interval not displayed.     Results from last 7 days   Lab Units 10/17/24  1216 10/16/24  1321 10/16/24  0134 10/15/24  1135 10/14/24  1419 10/14/24  1154 10/14/24  0121   BLOOD CULTURE  No Growth at 48 hrs.  No Growth at 48 hrs.  --  No Growth After 4 Days.  No Growth After 4 Days.  --   --    --  No Growth After 5 Days.   SPUTUM CULTURE   --   --   --   --  2+ Growth of Staphylococcus aureus*  2+ Growth of  --   --    GRAM STAIN RESULT   --  2+ Polys*  2+ Gram positive rods*  1+ Gram positive cocci in pairs*  1+ Gram negative rods*  --   --  2+ Polys*  2+ Gram negative rods*  1+ Gram positive cocci in pairs*  --   --    WOUND CULTURE   --  1+ Growth of  --   --   --   --   --    MRSA CULTURE ONLY   --   --   --   --   --  No Methicillin Resistant Staphlyococcus aureus (MRSA) isolated  --    LEGIONELLA URINARY ANTIGEN   --   --   --  Negative  --   --   --      Results from last 7 days   Lab Units 10/17/24  0444 10/16/24  0133 10/14/24  0121   PROCALCITONIN ng/ml 1.21* 1.13* 1.14*     Results from last 7 days   Lab Units 10/16/24  0444   CRP mg/L 82.3*             Imaging Results Review: No pertinent imaging studies reviewed.  Other Study Results Review: No additional pertinent studies reviewed.

## 2024-10-20 NOTE — SPEECH THERAPY NOTE
Speech-Language Pathology Progress Note      Patient Name: Clayton Duval    Today's Date: 10/20/2024    Subjective:  Pt was lethargic with waxing/waning mentation. He was sitting upright in bed. Wife and sister present in room.    Objective:  Pt was seen today for dysphagia therapy. He is currently NPO with NGT. Pt was on 1L O2 via nasal cannula. Oral care was completed with use of oral care kits. Focus of today's session was to assess PO trials and determine readiness for MBS. Textures offered today included ice chips, thin trials via tsp, and apple sauce x2.  Swallow function:   Bolus retrieval was adequate, weak labial seal. Manipulation and transfer were slow, suspect reduced oral control with liquids. Mastication was present with ice chips. Multiple swallows (x3-4) per ice chip noted. Cough occurred with thin liquids via tsp, and delayed cough x1 present with ice chip trial. No s/s aspiration with apple sauce.     Assessment:  Some coughing occurred with thin/ice trials however cough is strong and productive. Vocal quality improved with PO trials, some mucous removed with yankauer suction.     Plan:  NPO with NGT now.  Continue ST follow up. Plan for MBS tentatively early this week pending alertness and ability to participate.

## 2024-10-21 ENCOUNTER — ANESTHESIA (INPATIENT)
Dept: PERIOP | Facility: HOSPITAL | Age: 44
DRG: 025 | End: 2024-10-21
Payer: COMMERCIAL

## 2024-10-21 ENCOUNTER — ANESTHESIA EVENT (INPATIENT)
Dept: PERIOP | Facility: HOSPITAL | Age: 44
DRG: 025 | End: 2024-10-21
Payer: COMMERCIAL

## 2024-10-21 PROBLEM — Z51.5 PALLIATIVE CARE BY SPECIALIST: Status: ACTIVE | Noted: 2024-10-21

## 2024-10-21 RX ORDER — ROCURONIUM BROMIDE 10 MG/ML
INJECTION, SOLUTION INTRAVENOUS AS NEEDED
Status: DISCONTINUED | OUTPATIENT
Start: 2024-10-21 | End: 2024-10-21

## 2024-10-21 RX ORDER — SODIUM CHLORIDE, SODIUM LACTATE, POTASSIUM CHLORIDE, CALCIUM CHLORIDE 600; 310; 30; 20 MG/100ML; MG/100ML; MG/100ML; MG/100ML
INJECTION, SOLUTION INTRAVENOUS CONTINUOUS PRN
Status: DISCONTINUED | OUTPATIENT
Start: 2024-10-21 | End: 2024-10-21

## 2024-10-21 RX ORDER — PROPOFOL 10 MG/ML
INJECTION, EMULSION INTRAVENOUS AS NEEDED
Status: DISCONTINUED | OUTPATIENT
Start: 2024-10-21 | End: 2024-10-21

## 2024-10-21 RX ORDER — ONDANSETRON 2 MG/ML
INJECTION INTRAMUSCULAR; INTRAVENOUS AS NEEDED
Status: DISCONTINUED | OUTPATIENT
Start: 2024-10-21 | End: 2024-10-21

## 2024-10-21 RX ORDER — FENTANYL CITRATE 50 UG/ML
INJECTION, SOLUTION INTRAMUSCULAR; INTRAVENOUS AS NEEDED
Status: DISCONTINUED | OUTPATIENT
Start: 2024-10-21 | End: 2024-10-21

## 2024-10-21 RX ORDER — LIDOCAINE HYDROCHLORIDE 10 MG/ML
INJECTION, SOLUTION EPIDURAL; INFILTRATION; INTRACAUDAL; PERINEURAL AS NEEDED
Status: DISCONTINUED | OUTPATIENT
Start: 2024-10-21 | End: 2024-10-21

## 2024-10-21 RX ADMIN — PROPOFOL 200 MG: 10 INJECTION, EMULSION INTRAVENOUS at 12:35

## 2024-10-21 RX ADMIN — ROCURONIUM BROMIDE 30 MG: 10 INJECTION, SOLUTION INTRAVENOUS at 12:35

## 2024-10-21 RX ADMIN — SODIUM CHLORIDE, SODIUM LACTATE, POTASSIUM CHLORIDE, AND CALCIUM CHLORIDE: .6; .31; .03; .02 INJECTION, SOLUTION INTRAVENOUS at 12:07

## 2024-10-21 RX ADMIN — ONDANSETRON 4 MG: 2 INJECTION INTRAMUSCULAR; INTRAVENOUS at 13:51

## 2024-10-21 RX ADMIN — SUGAMMADEX 250 MG: 100 INJECTION, SOLUTION INTRAVENOUS at 14:00

## 2024-10-21 RX ADMIN — FENTANYL CITRATE 50 MCG: 50 INJECTION INTRAMUSCULAR; INTRAVENOUS at 13:41

## 2024-10-21 RX ADMIN — FENTANYL CITRATE 50 MCG: 50 INJECTION INTRAMUSCULAR; INTRAVENOUS at 12:35

## 2024-10-21 RX ADMIN — LIDOCAINE HYDROCHLORIDE 100 MG: 10 INJECTION, SOLUTION EPIDURAL; INFILTRATION; INTRACAUDAL; PERINEURAL at 12:35

## 2024-10-21 RX ADMIN — ROCURONIUM BROMIDE 20 MG: 10 INJECTION, SOLUTION INTRAVENOUS at 12:53

## 2024-10-21 RX ADMIN — FENTANYL CITRATE 50 MCG: 50 INJECTION INTRAMUSCULAR; INTRAVENOUS at 13:31

## 2024-10-21 RX ADMIN — FENTANYL CITRATE 50 MCG: 50 INJECTION INTRAMUSCULAR; INTRAVENOUS at 12:53

## 2024-10-21 NOTE — ANESTHESIA POSTPROCEDURE EVALUATION
Post-Op Assessment Note    CV Status:  Stable  Pain Score: 0    Pain management: adequate       Mental Status:  Sleepy and arousable   Hydration Status:  Euvolemic   PONV Controlled:  Controlled   Airway Patency:  Patent     Post Op Vitals Reviewed: Yes    No anethesia notable event occurred.    Staff: Anesthesiologist, CRNA         Pt transported to ICU on monitor with emergency meds available. Report given to Ebenezer ICU RN.     Last Filed PACU Vitals:  Vitals Value Taken Time   Temp     Pulse 102 10/21/24 1436   /77 10/21/24 1434   Resp 15 10/21/24 1436   SpO2 100 % 10/21/24 1436   Vitals shown include unfiled device data.    Modified Venus:  No data recorded

## 2024-10-21 NOTE — OCCUPATIONAL THERAPY NOTE
Occupational Therapy         Patient Name: Clayton Estevezkman  Today's Date: 10/21/2024           10/21/24 1300   OT Last Visit   OT Visit Date 10/21/24   Note Type   Note Type Cancelled Session   Cancel Reasons Patient to operating room         Daysi Nogueira

## 2024-10-21 NOTE — ASSESSMENT & PLAN NOTE
Managed by Surg-Gen  Large sacral wound which is suspected to be pressure induced  Potential perirectal fistula  OR on 10/21 for EUA

## 2024-10-21 NOTE — PROGRESS NOTES
Vascular consult for PICC line completed at this time patient currently has a midline that was recently placed 10/13/2024 that gives the patient enough access.  Spoke with medical critical care team Dr Tasha Huynh MD states will hold off on PICC for now as he does not need a PICC line.

## 2024-10-21 NOTE — ASSESSMENT & PLAN NOTE
Patient has spontaneous drainage.  He is being followed by general surgery service, with no plan for surgical I&D previously.  However, at present, there is concern of persistent abscess.  Repeat CT imaging without obvious abscess.  Wound culture with growth of mixed alli, not helpful.  Patient has no history of MRSA, has negative MRSA screen and has MSSA growing in respiratory culture.  Plan for I&D in OR today noted.  Continue IV Zosyn.  Follow-up on OR findings

## 2024-10-21 NOTE — PROGRESS NOTES
Progress Note - Infectious Disease   Name: Clayton Duval 44 y.o. male I MRN: [id number]  Unit/Bed#: OR POOL I Date of Admission: 10/13/2024   Date of Service: 10/21/2024 I Hospital Day: 8    Assessment & Plan  Sepsis (HCC)  Source of sepsis is most likely perirectal abscess.  Despite sepsis, patient remains systemically well, without toxicity and hemodynamically stable, without hypotension.  Patient has persistent fever.  This may be secondary to ongoing rectal wound/abscess.  Also, consider noninfectious etiology, especially central fever.  Admission blood cultures have no growth.   Antibiotic plan as in below.  Monitor temperature/WBC.    Monitor hemodynamics.    Follow-up on all pending blood cultures  Perirectal abscess  Patient has spontaneous drainage.  He is being followed by general surgery service, with no plan for surgical I&D previously.  However, at present, there is concern of persistent abscess.  Repeat CT imaging without obvious abscess.  Wound culture with growth of mixed alli, not helpful.  Patient has no history of MRSA, has negative MRSA screen and has MSSA growing in respiratory culture.  Plan for I&D in OR today noted.  Continue IV Zosyn.  Follow-up on OR findings  Sacral wound  Patient is status post bedside I&D and being followed by general surgery.  Concern for adjacent perirectal abscess noted.  CT scan 10/18 also noting auricle thinning of coccyx below the decubitus ulcer may suggest osteomyelitis.  However they are all bone remodeling beneath the sacral ulcer can also appear similarly.  Definitive diagnosis of osteomized would require a bone biopsy/culture.  Even if osteomyelitis was confirmed however, long-term antibiotic therapy would be futile without a plan to aggressively debride the tissue and bone, send bone cultures and cover the wound with flap for closure.  Antibiotic plan as in above.  Wound care per surgery.  Non-traumatic acute L subdural hematoma (HCC)  Patient is  status post craniectomy and evacuation of subdural hematoma.  He also has ischemia in the brainstem.  Neurosurgery follow-up.  Stroke (HCC)  Head MRI showed ischemia involving brainstem.  Patient had decreased responsiveness on presentation.  Mental status with some improvement.  Neurology follow-up.  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  Plan for biopsy of hepatic lesion noted.  This is being postponed due to persistent fever.  Follow-up urine histoplasma antigen from 10/18        Antibiotics:  Zosyn # 6    Subjective   Patient's mental status is unchanged.  He is arousable, able to mumble a few words but not highly communicative.  Temperature intermittently, but trending down.    Objective :  Temp:  [97.9 °F (36.6 °C)-101.1 °F (38.4 °C)] 98.8 °F (37.1 °C)  HR:  [] 104  BP: ()/(69-94) 117/81  Resp:  [6-24] 19  SpO2:  [96 %-100 %] 96 %  O2 Device: None (Room air)  Nasal Cannula O2 Flow Rate (L/min):  [1 L/min] 1 L/min    General:  No acute distress  Psychiatric: Arousable but not highly communicative  Pulmonary:  Normal respiratory excursion without accessory muscle use  Abdomen:  Soft, nontender  Extremities: Stable mild leg edema.  No draining wound.  No erythema.  No obvious tenderness.  Skin:  No rashes      Lab Results: I have reviewed the following results:  Results from last 7 days   Lab Units 10/21/24  0520 10/20/24  0537 10/19/24  0540   WBC Thousand/uL 8.36 7.92 8.08   HEMOGLOBIN g/dL 8.9* 9.6* 9.4*   PLATELETS Thousands/uL 149 137* 135*     Results from last 7 days   Lab Units 10/21/24  0520 10/20/24  0537 10/19/24  0540 10/18/24  0506 10/17/24  0444   SODIUM mmol/L 139 140 139   < > 136   POTASSIUM mmol/L 3.6 3.4* 4.0   < > 4.1   CHLORIDE mmol/L 108 108 108   < > 104   CO2 mmol/L 24 23 24   < > 22   BUN mg/dL 31* 30* 31*   < > 25   CREATININE mg/dL 0.56* 0.58* 0.61   < > 0.62   EGFR ml/min/1.73sq m 124 123 120   < > 119    CALCIUM mg/dL 7.4* 7.4* 6.9*   < > 7.1*   AST U/L 293*  --  286*  --  356*   ALT U/L 139*  --  134*  --  170*   ALK PHOS U/L 1,247*  --  599*  --  708*   ALBUMIN g/dL 2.1*  --  2.1*  --  2.4*    < > = values in this interval not displayed.     Results from last 7 days   Lab Units 10/17/24  1216 10/16/24  1321 10/16/24  0134 10/15/24  1135 10/14/24  1419   BLOOD CULTURE  No Growth at 72 hrs.  No Growth at 72 hrs.  --  No Growth After 5 Days.  No Growth After 5 Days.  --   --    SPUTUM CULTURE   --   --   --   --  2+ Growth of Staphylococcus aureus*  2+ Growth of   GRAM STAIN RESULT   --  2+ Polys*  2+ Gram positive rods*  1+ Gram positive cocci in pairs*  1+ Gram negative rods*  --   --  2+ Polys*  2+ Gram negative rods*  1+ Gram positive cocci in pairs*   WOUND CULTURE   --  1+ Growth of  --   --   --    LEGIONELLA URINARY ANTIGEN   --   --   --  Negative  --      Results from last 7 days   Lab Units 10/17/24  0444 10/16/24  0133   PROCALCITONIN ng/ml 1.21* 1.13*     Results from last 7 days   Lab Units 10/16/24  0444   CRP mg/L 82.3*

## 2024-10-21 NOTE — SOCIAL WORK
"Palliative Inpatient Assessment Note    LSW appreciates the opportunity to provide patient/family with inpatient emotional support and guidance while they continue to receive medical attention from a Palliative provider.    LSW completed an assessment of need which was completed with Spouse Liane    Relationship status:     Duration of relationship: 20 years. Together since highschool     Name of significant other: Liane    Children and Ages: Dtr 17; Son 20    Pets:    Other important family information: Dtr is in her senior yr of high school.     Living situation (place and with whom): Spouse, Son and Dtr. 2 story home in [place], [place].      Patient's primary caregiver:  Spouse Liane. Self at baseline.    Any limitations: Currently Pt is being managed by critical care.     Environmental concerns or barriers: None at baseline.      history: None    Employment history/ Source of income: Pt is employed. Spouse Liane has completed and submitted FMLA (short term) on behalf of Patient.     Disability: None at baseline.    Concerns regarding literacy: No    Spirituality/ Zoroastrian:  None noted    Cultural information:     Mental Health and/or Drug and Alcohol history: Nn History    Advanced Directives:  No documents in EMR. Pt is currently Level 1 / Full Code    Patient's strengths, social supports, and resources    Patient/family current level of coping:  Spouse Liane expresses feelings of guilt for not preparing her children enough prior to bringing them to see their father, for not being as available as she was prior to her husbands hospitalisation. Liane expresses guilt for her childrens' emotional suffering. Liane demonstrates strength and coping through recognition of her Son's response to be \"the rock\", to be present when his father can not be present. Liane cristian through gratitude, knowing that her Dtr has weekly discussions with a counselor. Liane identifies her Mother and a close " "friend who are supportive and available to listen. Level of understanding: Liane expresses understanding of her 's medical needs, and the challenges she is facing to get herself through this experience. Liane states she sometimes struggles to find peace, but chooses to find some peace with \"little \" her  is taking.  Lastly, Liane states she is coping by doing her best to take this day by day.    Patient/family concerns and areas of need: At this time Liane feels there is not too much she needs. She and Pt work for the same employer, Liane completed FMLA for herself (intermittent) as well. Throughout this encounter, Liane identified her challenge with time, being available for her childre, her , her job, and herself.   Spouse is agreeable to SW visits on her visit days, which are typically Wednesdays and Thursdays.     I have spent 45 minutes with Spouse today in which greater than 50% of this time was spent in counseling/coordination of care regarding  Self-Care, identifying coping strategies, identifying supports that are helping her, active listening, validation, emotional support.     *All questions may not be answered due to constraints.  Follow-up discussions may need to occur         "

## 2024-10-21 NOTE — CASE MANAGEMENT
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     Splenic lesion 10/17/2024     Sepsis (HCC) 10/15/2024     Stroke (HCC) 10/15/2024     Non-traumatic acute L subdural hematoma (HCC) 10/13/2024     Acute hypoxic respiratory failure (HCC) 10/13/2024     HTN (hypertension) 10/13/2024     Hyponatremia 10/11/2024     Hypomagnesemia 10/11/2024     Hypothyroidism (acquired) 10/11/2024     Elevation of levels of liver transaminase levels 10/11/2024     Malnutrition (HCC) 10/11/2024     Lactic acidosis 10/11/2024     Abnormal CT scan, chest 10/11/2024     Moderate protein-calorie malnutrition (HCC) 10/11/2024       LOS (days): 8  Geometric Mean LOS (GMLOS) (days):   Days to GMLOS:     OBJECTIVE:  Risk of Unplanned Readmission Score: 23.91         Current admission status: Inpatient   Preferred Pharmacy:   [id number] - [place] - [address]  [address]  [place] [address]  Phone: [phone] Fax: [phone]    Primary Care Provider: Marcio Ann DO    Primary Insurance: BLUE CROSS  Secondary Insurance:     DISCHARGE DETAILS:                                          Other Referral/Resources/Interventions Provided:  Referral Comments: Pt discussed for care coordination. For OR today. Will need rehab referrals., Cm to follow up.

## 2024-10-21 NOTE — ASSESSMENT & PLAN NOTE
Source of sepsis is most likely perirectal abscess.  Despite sepsis, patient remains systemically well, without toxicity and hemodynamically stable, without hypotension.  Patient has persistent fever.  This may be secondary to ongoing rectal wound/abscess.  Also, consider noninfectious etiology, especially central fever.  Admission blood cultures have no growth.   Antibiotic plan as in below.  Monitor temperature/WBC.    Monitor hemodynamics.    Follow-up on all pending blood cultures

## 2024-10-21 NOTE — PROGRESS NOTES
Progress Note - Palliative Care   Name: Clayton Duval 44 y.o. male I MRN: [id number]  Unit/Bed#: ICU 05 I Date of Admission: 10/13/2024   Date of Service: 10/21/2024 I Hospital Day: 8    Assessment & Plan  Non-traumatic acute L subdural hematoma (HCC)  Managed by Neuro ICU  Sacral wound  Managed by Surg-Gen  Large sacral wound which is suspected to be pressure induced  Potential perirectal fistula  OR on 10/21 for EUA  Palliative care by specialist  Goals of care  Level 1 code status  Disease focused care with no limits in place.   Concerns introduced include:  Spoke w/ patient's wife, feels guilty about spreading herself thin between work, her children and her    Needs significant emotional support  Will continue discussions regarding GOC as patient's clinical presentation evolves.  Encouraged follow up with Palliative Medicine on an outpatient basis after discharge for continued symptom management.  Our office will contact patient to schedule a hospital follow up.    Social support:  Supportive listening provided  Normalized experience of patient/family  Provided anxiety containment  Provided anticipatory guidance  Encouraged self care    Follow up  Palliative Care will continue to follow and goals of care discussions will be ongoing.    Please reach out via [employer] Secure Chat if questions or concerns arise.    Care Coordination  Reviewed case with [facility] SW    I have reviewed the patient's controlled substance dispensing history in the Prescription Drug Monitoring Program in compliance with the [place] regulations before prescribing any controlled substances.  Last refills: N/A    Decisional apparatus:  Patient does not have capacity on exam today.  If capacity is lost, patient's substitute decision maker would default to spouse (Liane) by [place] Act 169.    Advance Directive/Living Will, POLST and POA Forms: None on file.    ER contacts:  Name Relation Home Work Mobile   Liane Duval Spouse   [phone]      We appreciate the invitation to be involved in this patient's care.  We will continue to follow throughout this hospitalization.  Please do not hesitate to reach our on call provider through our clinic answering service at [phone] should you have acute symptom control concerns.      Subjective   Patient minimally responsive, opens eyes to name, intermittently follows commands, no verbal output.     Spoke w/ patient's spouse, Liane, via phone. Stated that she will be down Wednesday and Thursday. Liane stated that she feels immense guilt for having to divide her time between her job, two children and her . Needs significant emotional support. Stated that she has spoken with [facility] SW.     Objective :  Temp:  [97.9 °F (36.6 °C)-101.1 °F (38.4 °C)] 99.9 °F (37.7 °C)  HR:  [] 102  BP: ()/(63-94) 103/69  Resp:  [6-24] 16  SpO2:  [96 %-100 %] 100 %  O2 Device: Nasal cannula  Nasal Cannula O2 Flow Rate (L/min):  [1 L/min] 1 L/min    Physical Exam  Constitutional:       General: He is not in acute distress.     Appearance: He is ill-appearing.   HENT:      Mouth/Throat:      Mouth: Mucous membranes are dry.   Cardiovascular:      Rate and Rhythm: Tachycardia present.   Pulmonary:      Effort: Pulmonary effort is normal. No respiratory distress.   Skin:     General: Skin is warm and dry.   Neurological:      Mental Status: He is lethargic.      Comments: Minimally responsive, no verbal output, intermittently following commands          Lab Results: I have reviewed the following results:  Lab Results   Component Value Date/Time    SODIUM 139 10/21/2024 05:20 AM    SODIUM 130 (L) 10/01/2024 02:47 PM    K 3.6 10/21/2024 05:20 AM    K 4.5 10/01/2024 02:47 PM    BUN 31 (H) 10/21/2024 05:20 AM    BUN 18 10/01/2024 02:47 PM    BUN 18 10/11/2019 03:21 PM    CREATININE 0.56 (L) 10/21/2024 05:20 AM    CREATININE 0.9 10/01/2024 02:47 PM    GLUC 88 10/21/2024 05:20 AM    GLUC 79 10/01/2024 02:47 PM    CALCIUM  7.4 (L) 10/21/2024 05:20 AM    CALCIUM 7.5 (L) 10/01/2024 02:47 PM     (H) 10/21/2024 05:20 AM     (H) 10/01/2024 02:47 PM     (H) 10/21/2024 05:20 AM     (H) 10/01/2024 02:47 PM    ALB 2.1 (L) 10/21/2024 05:20 AM    ALB 2.7 (L) 10/01/2024 02:47 PM    TP 3.4 (L) 10/21/2024 05:20 AM    TP 4.4 (L) 10/01/2024 02:47 PM    EGFR 124 10/21/2024 05:20 AM    EGFR >90 10/01/2024 02:47 PM    EGFR 81 10/11/2019 03:21 PM     Lab Results   Component Value Date/Time    HGB 8.9 (L) 10/21/2024 05:20 AM    WBC 8.36 10/21/2024 05:20 AM     10/21/2024 05:20 AM    INR 1.56 (H) 10/18/2024 05:06 AM    PTT 63 (H) 10/12/2024 04:49 AM     Lab Results   Component Value Date/Time    [id number] 23.267 (H) 10/12/2024 04:49 AM     Administrative Statements   I have spent a total time of 30 minutes in caring for this patient on the day of the visit/encounter including Risks and benefits of tx options, Instructions for management, Patient and family education, Counseling / Coordination of care, Documenting in the medical record, Reviewing / ordering tests, medicine, procedures  , Obtaining or reviewing history  , and Communicating with other healthcare professionals . Topics discussed with the patient / family include symptom assessment and management, medication review, psychosocial support, supportive listening, and anticipatory guidance.    Nohemy Wyatt

## 2024-10-21 NOTE — ANESTHESIA PREPROCEDURE EVALUATION
Procedure:  EXAM UNDER ANESTHESIA (EUA), sacral wound debridement (Anus)    Relevant Problems   CARDIO   (+) HTN (hypertension)      ENDO   (+) Hypothyroidism (acquired)      NEURO/PSYCH   (+) Non-traumatic acute L subdural hematoma (HCC)   (+) Stroke (HCC)      PULMONARY   (+) Acute hypoxic respiratory failure (HCC)      Rheumatology   (+) Perirectal abscess      Surgery/Wound/Pain   (+) Sacral wound      Other   (+) Elevation of levels of liver transaminase levels   (+) Hypomagnesemia   (+) Hyponatremia   (+) Lactic acidosis   (+) Sepsis (HCC)   (+) Splenic lesion     TTE 10/16/24:    •  Left Ventricle: Left ventricular cavity size is normal. Wall thickness is normal. The left ventricular ejection fraction is 60%. Systolic function is normal. Wall motion is normal.  •  Atrial Septum: There is a patent foramen ovale confirmed with provocation (Valsalva) with predominant left to right shunting using saline contrast.    EKG:     Sinus tachycardia  Nonspecific T wave abnormality  Abnormal ECG  When compared with ECG of 14-OCT-2024 20:48, (unconfirmed)  No significant change was found  Confirmed by Brooklyn Carter ([id number]) on 10/15/2024 5:24:13 PM                       Component  Ref Range & Units 10/21/24 0520 10/20/24 0537 10/19/24 0540 10/18/24 0506 10/17/24 0444 10/16/24 0444 10/15/24 0426   WBC  4.31 - 10.16 Thousand/uL 8.36 7.92 8.08 8.48 10.51 High  9.03 11.39 High    RBC  3.88 - 5.62 Million/uL 3.02 Low  3.23 Low  3.14 Low  3.20 Low  3.33 Low  3.00 Low  3.25 Low    Hemoglobin  12.0 - 17.0 g/dL 8.9 Low  9.6 Low  9.4 Low  9.8 Low  10.0 Low  9.2 Low  9.7 Low    Hematocrit  36.5 - 49.3 % 28.9 Low  31.0 Low  29.9 Low  30.0 Low  31.5 Low  28.1 Low  29.2 Low                   Component  Ref Range & Units 10/21/24 0520 10/20/24 0537 10/19/24 0540 10/18/24 0506 10/17/24 0444 10/16/24 0444 10/15/24 0426   Sodium  135 - 147 mmol/L 139 140 139 137 136 134 Low  131 Low    Potassium  3.5 - 5.3 mmol/L 3.6 3.4 Low  4.0 4.0 4.1  3.9 4.3   Chloride  96 - 108 mmol/L 108 108 108 105 104 102 101   CO2  21 - 32 mmol/L 24 23 24 24 22 23 23   ANION GAP  4 - 13 mmol/L 7 9 7 8 10 9 7   BUN  5 - 25 mg/dL 31 High  30 High  31 High  30 High  25 25 29 High    Creatinine  0.60 - 1.30 mg/dL 0.56 Low  0.58 Low  CM 0.61 CM 0.75 CM 0.62 CM 0.69 CM 0.82 CM      Glucose  65 - 140 mg/dL 88 102 CM 92 CM 82 CM 91 CM 88 CM 91 CM      Calcium  8.4 - 10.2 mg/dL 7.4 Low  7.4 Low  6.9 Low  7.1 Low  7.1 Low  7.2 Low  7.6 Low    Corrected Calcium  8.3 - 10.1 mg/dL 8.9  8.4  8.4  8.8   AST  13 - 39 U/L 293 High   286 High   356 High   449 High    ALT  7 - 52 U/L 139 High   134 High  CM  170 High  CM  211 High  CM      Alkaline Phosphatase  34 - 104 U/L 1,247 High   599 High   708 High   699 High    Total Protein  6.4 - 8.4 g/dL 3.4 Low   3.6 Low   3.7 Low   3.7 Low    Albumin  3.5 - 5.0 g/dL 2.1 Low   2.1 Low   2.4 Low   2.5 Low    Total Bilirubin  0.20 - 1.00 mg/dL 3.42 High   2.23 High  CM  2.64 High  CM  2.31 High  CM      eGFR  ml/min/1.73sq m 124 123 120 110 119 114 106          Anesthesia Plan  ASA Score- 3     Anesthesia Type- general with ASA Monitors.         Additional Monitors:     Airway Plan: ETT.           Plan Factors-    Chart reviewed. EKG reviewed. Imaging results reviewed. Existing labs reviewed. Patient summary reviewed.    Patient is not a current smoker.              Induction- intravenous.    Postoperative Plan- Plan for postoperative opioid use. Planned trial extubation    Perioperative Resuscitation Plan - Level 1 - Full Code.       Informed Consent-

## 2024-10-21 NOTE — ASSESSMENT & PLAN NOTE
- Patient with large sacral wound, suspected to be pressure induced, and present on presentation to [facility] with potential perirectal fistula.     - NPO for procedure  - Preoperative labs  - OR today for EUA  - IVF while NPO  -

## 2024-10-21 NOTE — OP NOTE
OPERATIVE REPORT  PATIENT NAME: Clayton Duval    :  1980  MRN: [id number]  Pt Location: [facility] OR ROOM 03    SURGERY DATE: 10/21/2024    Surgeons and Role:     * Danny Paulino,  - Primary     * Igor Hudson MD - Assisting     * Kendra Amaral PA-C - Assisting    Preop Diagnosis:  Sacral ulcer (HCC) [L98.429]    Post-Op Diagnosis Codes:     * Sacral ulcer (HCC) [L98.429]    Procedure(s):  EXAM UNDER ANESTHESIA (EUA). sacral wound debridement    Specimen(s):  * No specimens in log *    Estimated Blood Loss:   Minimal    Drains:  NG/OG/Enteral Tube Nasogastric 12 Fr Right nare (Active)   Placement Reverification Auscultation 10/21/24 0800   Site Assessment Clean;Dry;Intact 10/21/24 08   External Tube Length (cm) 60 cm 10/21/24 0400   Enteral feeding tube interventions Flushed 10/19/24 2000   Status Clamped 10/21/24 0800   Drainage Appearance None 10/21/24 0400   Intake (mL) 60 mL 10/20/24 0800   Output (mL) 0 mL 10/21/24 0600   Number of days: 6       Urethral Catheter Temperature probe (Active)   Reasons to continue Urinary Catheter  Acute urinary retention/obstruction failing urinary retention protocol 10/21/24 0800   Goal for Removal Voiding trial when ambulation improves 10/20/24 1600   Site Assessment Clean;Skin intact 10/21/24 0800   [facility]ay Care Done 10/21/24 0900   Collection Container Standard drainage bag 10/21/24 0800   Securement Method Securing device (Describe) 10/21/24 0800   Securing Device Change Date 10/19/24 10/19/24 1200   Output (mL) 60 mL 10/21/24 1000   Number of days: 3       [REMOVED] Closed/Suction Drain Left;Lateral Head Bulb 7 Fr. (Removed)   Site Description Healing 10/16/24 040   Dressing Status Open to air 10/16/24 0400   Drainage Appearance Bloody 10/16/24 0744   Status To bulb suction 10/16/24 0400   Intake (mL) 0 mL 10/14/24 1800   Output (mL) 15 mL 10/16/24 0744   Number of days: 3       [REMOVED] Closed/Suction Drain Left;Lateral Head Bulb 7 Fr.  (Removed)   Site Description Healing 10/14/24 2000   Dressing Status Clean;Intact 10/14/24 2000   Drainage Appearance Bloody 10/14/24 2000   Status To bulb suction 10/14/24 2000   Intake (mL) 0 mL 10/14/24 1800   Output (mL) 0 mL 10/15/24 0555   Number of days: 2       [REMOVED] NG/OG/Enteral Tube Orogastric 16 Fr Right mouth (Removed)   Placement Reverification Aspiration 10/15/24 0800   Site Assessment Clean;Dry;Intact 10/15/24 0800   External Tube Length (cm) 65 cm 10/15/24 0800   Status Suction-low intermittent 10/15/24 0800   Drainage Appearance Brown;Dark red 10/15/24 0800   Intake (mL) 100 mL 10/15/24 0800   Output (mL) 0 mL 10/15/24 1600   Number of days: 2       [REMOVED] Urethral Catheter Latex;Temperature probe 16 Fr. (Removed)   Reasons to continue Urinary Catheter  Accurate I&O assessment in critically ill patients (48 hr. max) 10/16/24 0951   Goal for Removal Voiding trial when ambulation improves 10/16/24 0951   Site Assessment Clean;Skin intact 10/16/24 0951   Barclay Care Done 10/16/24 0900   Collection Container Standard drainage bag 10/15/24 2000   Securement Method Securing device (Describe) 10/15/24 2000   Securing Device Change Date 10/14/24 10/15/24 0800   Output (mL) 75 mL 10/16/24 1701   Number of days: 3       [REMOVED] Urethral Catheter 16 Fr. (Removed)   Reasons to continue Urinary Catheter  Acute urinary retention/obstruction failing urinary retention protocol 10/18/24 0800   Site Assessment Clean;Skin intact 10/18/24 0800   Barclay Care Done 10/18/24 0820   Collection Container Standard drainage bag 10/18/24 0800   Securement Method Securing device (Describe) 10/18/24 0800   Output (mL) 50 mL 10/18/24 1000   Number of days: 0       [REMOVED] External Urinary Catheter (Removed)   Collection Container Canister and suction tubing (For Female) 10/16/24 2000   Suction Pressure (mmHg) 80 mmHg 10/16/24 2000   Interventions Removed and skin assessed;Pericare performed 10/16/24 2000   Output (mL) 0  mL 10/16/24 2200   Number of days: 0       Anesthesia Type:   Choice    Operative Indications:  Sacral ulcer (HCC) [L98.429]      Operative Findings:  No anal or rectal-would fistula appreciated    Final wound dimensions   8 x 10 cm with 3.5 cm depth at superior aspect and 0.5 cm depth at inferior      Complications:   None    Procedure and Technique:  The patient was brought to the operating arena and placed in right lateral decubitus position. All regular monitoring devices were connected. The patient underwent general anesthesia with endotracheal intubation without complication. The patient received perioperative antibiotics. The patient received subcutaneous heparin in addition to bilateral lower extremity sequential compression devices for DVT prophylaxis. A timeout was performed prior to incision to ensure correct patient position, procedure, and site.    We began by inspecting for any connection or communication between the anus/rectum and wound.  There were no masses on MELE.  Anal mucosa and rectal wall felt intact on MELE.  We placed a Hill-Orosco retractor into the anus and noted some deep anal crypts but no obvious fistula opening.  We gently probed through the wound towards the anus but could not identify any tract.    We then turned our attention to the wound itself.  Of note the wound is close to the anal opening at the cephalad and patient left locations.  It appears that there are 2 wounds however there is in actuality 1 wound that is crossed by a short skin bridge.  We performed a sharp excisional debridement down to bone using scalpel and curette.  We left the skin bridge intact because it was viable skin.  We encountered bleeding tissue throughout the wound base except over the overlying the bone itself where there was some fibrinous slough.  Of note there is some final wound dimensions as above    The patient tolerated procedure well was taken to the post anesthesia care unit in stable  condition. All lap, needle, and instrument counts were correct.    Dr. Paulino was present for entire procedure    Patient Disposition:  Critical Care Unit             SIGNATURE: Igor Hudson MD  DATE: October 21, 2024  TIME: 2:36 PM

## 2024-10-21 NOTE — PROGRESS NOTES
Progress Note - Critical Care/ICU   Name: Clayton Duval 44 y.o. male I MRN: [id number]  Unit/Bed#: ICU 05 I Date of Admission: 10/13/2024   Date of Service: 10/21/2024 I Hospital Day: 8       Assessment & Plan     Neuro:   Diagnosis: Subdural hematoma s/p left hemicraniectomy for evacuation of SDH POD#4, AMS, Acute ischemic stroke  CT head - 10/13 0616 - preop: Mixed density left convexity subdural hemorrhage (1.2 cm thickness) with by 1.5 cm rightward midline shift. Trace subdural hemorrhage along the left tentorium. Mass effect with low-lying cerebellar tonsils, effacement of the suprasellar cistern, and partial effacement of the quadrigeminal plate cistern.  CT head - 10/13 - post op: New area of hypodensity within the left thalamus and basal ganglia compared to earlier day exam, suggestive of acute infarct. Expected postsurgical changes from left hemispheric craniectomy with decreased size of mixed density subdural hematoma, improved 4 mm left to right midline shift and improved effacement of the basilar cisterns. Improved mass effect on the ventricular system and dilatation of the right temporal horn of the lateral ventricle  MRI Brain w/wo contrast - 10/17 - No significant change. Stable evolving recent infarcts with petechial hemorrhage. Status post left hemispheric  craniectomy for subdural drainage. Stable subgaleal epidural hematoma with small residual subdurals. Stable small volume of intraventricular hemorrhage. Stable mass effect with 4 mm of left-to-right shift.  Carotid US 10/19 - There is no evidence of disease throughout the extracranial carotid arteries   Plan:   Continue monitoring neurological exam closely with frequent neuro checks, obtain stat CTH if any acute changes  Continuous vEEG for AMS.  Monitor for signs of ICP increase (bradycardia, HTN, changes in neuro exam, increased tone, pupillary changes)  Blood Pressure: SBP goal 110 - 140, cardene gtt or pressers/fluids as needed to  keep within range.  AC/AP: Heparin 5000 units q8h  Imaging/Diagnostic Studies: MRI Brain w/o contrast  Seizure Prophylaxis: Keppra 1g load followed by 750mg BID for 7 days   Tight glycemic control, goal <180. Monitor temperature, tylenol PRN.  PT/OT/Speech/PMR consults when able  DVT PPx: SCDs and heparin 5000 q8h   CV:   Diagnosis: sinus bradycardia -resolved  Plan:   Continue to monitor on Telemetry     Pulm:  Diagnosis: no active issues      GI:   Diagnosis: Hepatosplenomegaly, hepatic lesions, splenic lesions, transaminitis  Hepatitis panel - negative  B-HcG, CA 19-9, LDH elevated  Plan:   Oncology following along  IR consulted - plan for biopsy of a hepatic lesion vs iliac bone lesion originally 10/18, postponed due to febrile process - will re-contact IR when patient stable.     :   Diagnosis: plasencia catheter  Plan:   Monitor I&Os  Retention protocol     F/E/N:   F: none  E: replenish as indicated for Magnesium >2, K >4, Phos > 3  N: tube feeds 60cc/hr, FWF 100ml/4hrs     Heme/Onc:   Diagnosis: suspected metastatic cancer, hypocoagulability in the setting of hepatic malignancy, lytic bone lesions, hepatic coagulopathy  MRI Abdomen w/wo contrast - 10/11/24: Findings concerning for metastatic disease. Enhancing osseous lesions throughout the majority of the visualized spine and bony pelvis. Markedly enlarged spleen much of which is occupied by large confluent hypoenhancing lesions suspected to represent metastases. Hepatomegaly. Multifocal hepatic lesions which correlate to areas of hypoenhancement on CT are also suspected to represent metastasis in the given setting. Anasarca and moderate volume ascites  Fibrinogen level - 161  PT-INR - 21.6/1.87 - 10/13 - 0800 -  In the OR due to anemia and coagulopathy patient was given: 4 units of pRBCs, 4 units of FFP and 1 unit of platelets  Discussed with oncology - believe thrombosis panel derangements likely due to hepatic lesions.  Plan:   IR consulted for  biopsy  Oncology consult  Tumor markers ordered - CEA (normal), AFP (normal), B-HCG (elevated- 7.5) , LDH (elevated- 556), CA 19-9 (pending)  Spleen and liver lesions 2/2 metastasis vs abscesses     Endo:   Diagnosis: Hypothyroidism, hypothermia  Plan:   Continue home Levothyroxine 200mcg  Gisselle oconnor     ID:   Diagnosis: Sepsis, sacral decubitus ulcer, Babesiosis  Sepsis alert - hyperthermia, tachycardia, leukocytosis, bandemia  Blood cultures 10/11 - no growth  Blood cultures 10/14 - no growth  Sputum cultures 10/14 - MSSA  Hematology lab - peripheral blood smear with evidence of multiple organisms extracellularly and in RBC. History of possible tick exposure.  Blood parasite negative for extracellular or intracellular organisms 10/18  Plan:   ID following  Sepsis pathway  Continue Zosyn      MSK/Skin:   Diagnosis: Anasarca, sacral decubitus ulcer  CT C/A/P 10/18 - Decubitus ulcer overlying the coccyx with suggestion of cortical thinning in the inferior most coccygeal segment, concerning for osteomyelitis. No associated rim-enhancing fluid collection to suggest an abscess.  Plan:   Monitor fluid status  Wound care consulted  Red surgery debridement 10/17  Patient to go to OR with acute surgery today     Diagnosis: perirectal abscess  Plan:   Some drainage was performed at bedside, but will obtain CT to check for deeper infection and f/u with I&D if needed  Surgery following - plan for debridement today    Disposition: Critical care    ICU Core Measures     A: Assess, Prevent, and Manage Pain Has pain been assessed? Yes  Need for changes to pain regimen? No   B: Both SAT/SAT  N/A   C: Choice of Sedation RASS Goal: N/A patient not on sedation  Need for changes to sedation or analgesia regimen? No   D: Delirium CAM-ICU: Positive   E: Early Mobility  Plan for early mobility? Yes   F: Family Engagement Plan for family engagement today? Yes       Antibiotic Review: Continue broad spectrum secondary to severity of  illness.     Review of Invasive Devices:    Barclay Plan: Voiding trial after improvement in ambulation     Linda Plan: Discontinue arterial line    Prophylaxis:  VTE VTE covered by:  heparin (porcine), Subcutaneous, 5,000 Units at 10/21/24 0522       Stress Ulcer  covered byfamotidine (PEPCID) tablet 20 mg [[id number]]         24 Hour Events : NO acute events overnight. Patient seen and examined at bedside today morning. Patient remains non-verbal but continues to follow simple commands.   Subjective   Review of Systems: See HPI for Review of Systems    Objective :                   Vitals I/O      Most Recent Min/Max in 24hrs   Temp 98.8 °F (37.1 °C) Temp  Min: 97.9 °F (36.6 °C)  Max: 101.1 °F (38.4 °C)   Pulse 104 Pulse  Min: 96  Max: 116   Resp 19 Resp  Min: 6  Max: 24   /81 BP  Min: 97/72  Max: 138/89   O2 Sat 96 % SpO2  Min: 96 %  Max: 100 %      Intake/Output Summary (Last 24 hours) at 10/21/2024 1439  Last data filed at 10/21/2024 1354  Gross per 24 hour   Intake 2773.33 ml   Output 1080 ml   Net 1693.33 ml       Diet NPO    Invasive Monitoring           Physical Exam   Physical Exam  Vitals reviewed.   Eyes:      Conjunctiva/sclera: Conjunctivae normal.      Pupils: Pupils are equal, round, and reactive to light.   Skin:     General: Skin is warm.      Coloration: Skin is not jaundiced.      Findings: No rash.   Cardiovascular:      Rate and Rhythm: Normal rate and regular rhythm.   Abdominal:      Palpations: Abdomen is soft.      Tenderness: There is no abdominal tenderness.   Constitutional:       Interventions: He is not sedated and not intubated.  Pulmonary:      Effort: Pulmonary effort is normal. He is not intubated.      Breath sounds: Normal breath sounds.   Neurological:      Mental Status: He is somnolent.   Genitourinary/Anorectal:  Barclay present.        Diagnostic Studies        Lab Results: I have reviewed the following results:     Medications:  Scheduled PRN   [Transfer Hold]  acetaminophen, 1,000 mg, Q6H KAYLIE  chlorhexidine, 15 mL, Q12H KAYLIE  Cholecalciferol, 5,000 Units, Daily  famotidine, 20 mg, BID  folic acid, 1 mg, Daily  heparin (porcine), 5,000 Units, Q8H KAYLIE  levothyroxine, 200 mcg, Early Morning  piperacillin-tazobactam, 4.5 g, Q8H  thiamine, 100 mg, Daily      benzonatate, 100 mg, TID PRN  fentaNYL, 50 mcg, Q1H PRN  hydrALAZINE, 10 mg, Q6H PRN  HYDROmorphone, 1 mg, Q3H PRN  labetalol, 10 mg, Q6H PRN  ondansetron, 4 mg, Q8H PRN  polyethylene glycol, 17 g, Daily PRN       Continuous    lactated ringers, 100 mL/hr, Last Rate: 100 mL/hr (10/21/24 0022)         Labs:   CBC    Recent Labs     10/20/24  0537 10/21/24  0520   WBC 7.92 8.36   HGB 9.6* 8.9*   HCT 31.0* 28.9*   * 149   BANDSPCT  --  11*     BMP    Recent Labs     10/20/24  0537 10/21/24  0520   SODIUM 140 139   K 3.4* 3.6    108   CO2 23 24   AGAP 9 7   BUN 30* 31*   CREATININE 0.58* 0.56*   CALCIUM 7.4* 7.4*       Coags    No recent results     Additional Electrolytes  Recent Labs     10/20/24  0537 10/21/24  0520   MG 1.9 1.9   PHOS 2.7 2.7   CAIONIZED 1.12 1.11*          Blood Gas    Recent Labs     10/19/24  1453   PHART 7.452*   VLI3QVT 34.7*   PO2ART 103.7   IEJ6WLW 23.7   BEART 0.0   SOURCE Line, Arterial     Recent Labs     10/19/24  1453   SOURCE Line, Arterial    LFTs  Recent Labs     10/21/24  0520   *   *   ALKPHOS 1,247*   ALB 2.1*   TBILI 3.42*       Infectious  No recent results  Glucose  Recent Labs     10/20/24  0537 10/21/24  0520   GLUC 102 88        Bradley Quinteros MD  PGY-3, Internal Medicine  [facility]

## 2024-10-21 NOTE — PLAN OF CARE
Problem: PAIN - ADULT  Goal: Verbalizes/displays adequate comfort level or baseline comfort level  Description: Interventions:  - Encourage patient to monitor pain and request assistance  - Assess pain using appropriate pain scale  - Administer analgesics based on type and severity of pain and evaluate response  - Implement non-pharmacological measures as appropriate and evaluate response  - Consider cultural and social influences on pain and pain management  - Notify physician/advanced practitioner if interventions unsuccessful or patient reports new pain  Outcome: Progressing     Problem: INFECTION - ADULT  Goal: Absence or prevention of progression during hospitalization  Description: INTERVENTIONS:  - Assess and monitor for signs and symptoms of infection  - Monitor lab/diagnostic results  - Monitor all insertion sites, i.e. indwelling lines, tubes, and drains  - Monitor endotracheal if appropriate and nasal secretions for changes in amount and color  - Weldon appropriate cooling/warming therapies per order  - Administer medications as ordered  - Instruct and encourage patient and family to use good hand hygiene technique  - Identify and instruct in appropriate isolation precautions for identified infection/condition  Outcome: Progressing     Problem: SAFETY ADULT  Goal: Patient will remain free of falls  Description: INTERVENTIONS:  - Educate patient/family on patient safety including physical limitations  - Instruct patient to call for assistance with activity   - Consult OT/PT to assist with strengthening/mobility   - Keep Call bell within reach  - Keep bed low and locked with side rails adjusted as appropriate  - Keep care items and personal belongings within reach  - Initiate and maintain comfort rounds  - Make Fall Risk Sign visible to staff  - Offer Toileting every 2 Hours, in advance of need  - Apply yellow socks and bracelet for high fall risk patients  - Consider moving patient to room near nurses  station  Outcome: Progressing  Goal: Maintain or return to baseline ADL function  Description: INTERVENTIONS:  -  Assess patient's ability to carry out ADLs; assess patient's baseline for ADL function and identify physical deficits which impact ability to perform ADLs (bathing, care of mouth/teeth, toileting, grooming, dressing, etc.)  - Assess/evaluate cause of self-care deficits   - Assess range of motion  - Assess patient's mobility; develop plan if impaired  - Assess patient's need for assistive devices and provide as appropriate  - Encourage maximum independence but intervene and supervise when necessary  - Involve family in performance of ADLs  - Assess for home care needs following discharge   - Consider OT consult to assist with ADL evaluation and planning for discharge  - Provide patient education as appropriate  Outcome: Progressing  Goal: Maintains/Returns to pre admission functional level  Description: INTERVENTIONS:  - Perform AM-PAC 6 Click Basic Mobility/ Daily Activity assessment daily.  - Set and communicate daily mobility goal to care team and patient/family/caregiver.   - Collaborate with rehabilitation services on mobility goals if consulted  - Reposition patient every 2 hours.  - Out of bed for toileting  - Record patient progress and toleration of activity level   Outcome: Progressing     Problem: DISCHARGE PLANNING  Goal: Discharge to home or other facility with appropriate resources  Description: INTERVENTIONS:  - Identify barriers to discharge w/patient and caregiver  - Arrange for needed discharge resources and transportation as appropriate  - Identify discharge learning needs (meds, wound care, etc.)  - Arrange for interpretive services to assist at discharge as needed  - Refer to Case Management Department for coordinating discharge planning if the patient needs post-hospital services based on physician/advanced practitioner order or complex needs related to functional status, cognitive  ability, or social support system  Outcome: Progressing     Problem: Knowledge Deficit  Goal: Patient/family/caregiver demonstrates understanding of disease process, treatment plan, medications, and discharge instructions  Description: Complete learning assessment and assess knowledge base.  Interventions:  - Provide teaching at level of understanding  - Provide teaching via preferred learning methods  Outcome: Progressing     Problem: Prexisting or High Potential for Compromised Skin Integrity  Goal: Skin integrity is maintained or improved  Description: INTERVENTIONS:  - Identify patients at risk for skin breakdown  - Assess and monitor skin integrity  - Assess and monitor nutrition and hydration status  - Monitor labs   - Assess for incontinence   - Turn and reposition patient  - Assist with mobility/ambulation  - Relieve pressure over bony prominences  - Avoid friction and shearing  - Provide appropriate hygiene as needed including keeping skin clean and dry  - Evaluate need for skin moisturizer/barrier cream  - Collaborate with interdisciplinary team   - Patient/family teaching  - Consider wound care consult   Outcome: Progressing     Problem: Nutrition/Hydration-ADULT  Goal: Nutrient/Hydration intake appropriate for improving, restoring or maintaining nutritional needs  Description: Monitor and assess patient's nutrition/hydration status for malnutrition. Collaborate with interdisciplinary team and initiate plan and interventions as ordered.  Monitor patient's weight and dietary intake as ordered or per policy. Utilize nutrition screening tool and intervene as necessary. Determine patient's food preferences and provide high-protein, high-caloric foods as appropriate.     INTERVENTIONS:  - Monitor oral intake, urinary output, labs, and treatment plans  - Assess nutrition and hydration status and recommend course of action  - Evaluate amount of meals eaten  - Assist patient with eating if necessary   - Allow  adequate time for meals  - Recommend/ encourage appropriate diets, oral nutritional supplements, and vitamin/mineral supplements  - Order, calculate, and assess calorie counts as needed  - Recommend, monitor, and adjust tube feedings and TPN/PPN based on assessed needs  - Assess need for intravenous fluids  - Provide specific nutrition/hydration education as appropriate  - Include patient/family/caregiver in decisions related to nutrition  Outcome: Progressing

## 2024-10-21 NOTE — PROGRESS NOTES
Progress Note - Surgery-General   Name: Clayton Duval 44 y.o. male I MRN: [id number]  Unit/Bed#: ICU 05 I Date of Admission: 10/13/2024   Date of Service: 10/21/2024 I Hospital Day: 8    Assessment & Plan  Sacral wound  - Patient with large sacral wound, suspected to be pressure induced, and present on presentation to [facility] with potential perirectal fistula.     - NPO for procedure  - Preoperative labs  - OR today for EUA  - IVF while NPO  -   Non-traumatic acute L subdural hematoma (HCC)  Care per neuro ICU      Tmax 101.1, tachycardic to 116, normotensive, satting appropriately on 1L NC     cc      Subjective   Patient seen and examined.  NAEON.  Ready for OR per nursing.  Patient is at his baseline, minimally interactive.    Objective :  Temp:  [97.9 °F (36.6 °C)-101.1 °F (38.4 °C)] 100.3 °F (37.9 °C)  HR:  [] 108  BP: ()/(63-99) 119/84  Resp:  [6-24] 17  SpO2:  [96 %-100 %] 99 %  O2 Device: Nasal cannula  Nasal Cannula O2 Flow Rate (L/min):  [1 L/min] 1 L/min    I/O         10/19 0701  10/20 0700 10/20 0701  10/21 0700    P.O. 0     I.V. (mL/kg) 90 (0.9) 393.3 (3.8)    NG/ 160    IV Piggyback 474.2 642.9    Feedings 1244 840    Total Intake(mL/kg) 2193.2 (21.1) 2036.3 (19.6)    Urine (mL/kg/hr) 1025 (0.4) 780 (0.3)    Emesis/NG output 0 0    Stool 0 0    Blood 9     Total Output 1034 780    Net +1159.2 +1256.3          Unmeasured Urine Occurrence 0 x     Unmeasured Stool Occurrence 3 x 3 x    Unmeasured Emesis Occurrence 0 x           Lines/Drains/Airways       Active Status       Name Placement date Placement time Site Days    Arterial Line 10/14/24 Radial 10/14/24  0425  Radial  7    NG/OG/Enteral Tube Nasogastric 12 Fr Right nare 10/15/24  1800  Right nare  5    Urethral Catheter Temperature probe 10/18/24  1030  -- 2                  Physical Exam  General - no acute distress  CV - warm, regular rate  Pulm - normal work of breathing, no respiratory  distress  Abd - soft, nondistended, nontender;  Neuro - m/s grossly intact, cn grossly intact  Skin -large sacral ulcer, dressed with minimal strikethrough    Lab Results: I have reviewed the following results:  Recent Labs     10/19/24  0540 10/20/24  0537   WBC 8.08 7.92   HGB 9.4* 9.6*   HCT 29.9* 31.0*   * 137*   SODIUM 139 140   K 4.0 3.4*    108   CO2 24 23   BUN 31* 30*   CREATININE 0.61 0.58*   GLUC 92 102   CAIONIZED 1.09* 1.12   MG 1.9 1.9   PHOS 2.9 2.7   *  --    *  --    ALB 2.1*  --    TBILI 2.23*  --    ALKPHOS 599*  --      Jamey Lau, DO  Surgery PGY-3

## 2024-10-21 NOTE — ASSESSMENT & PLAN NOTE
Goals of care  Level 1 code status  Disease focused care with no limits in place.   Concerns introduced include:  Spoke w/ patient's wife, feels guilty about spreading herself thin between work, her children and her    Needs significant emotional support  Will continue discussions regarding GOC as patient's clinical presentation evolves.  Encouraged follow up with Palliative Medicine on an outpatient basis after discharge for continued symptom management.  Our office will contact patient to schedule a hospital follow up.    Social support:  Supportive listening provided  Normalized experience of patient/family  Provided anxiety containment  Provided anticipatory guidance  Encouraged self care    Follow up  Palliative Care will continue to follow and goals of care discussions will be ongoing.    Please reach out via [employer] Secure Chat if questions or concerns arise.    Care Coordination  Reviewed case with [facility] SW    I have reviewed the patient's controlled substance dispensing history in the Prescription Drug Monitoring Program in compliance with the [facility] regulations before prescribing any controlled substances.  Last refills: N/A    Decisional apparatus:  Patient does not have capacity on exam today.  If capacity is lost, patient's substitute decision maker would default to spouse (Liane) by [place] Act 169.    Advance Directive/Living Will, POLST and POA Forms: None on file.    ER contacts:  Name Relation Home Work Mobile   Liane Duval Spouse   [phone]     We appreciate the invitation to be involved in this patient's care.  We will continue to follow throughout this hospitalization.  Please do not hesitate to reach our on call provider through our clinic answering service at [phone] should you have acute symptom control concerns.

## 2024-10-21 NOTE — PHYSICAL THERAPY NOTE
Physical Therapy Cancellation Note    PT orders received chart review completed. Pt is currently off the floor in the OR and not appropriate to participate in skilled PT at this time. PT will follow and treat as medically appropriate.     10/21/24 1300   Note Type   Note type Cancelled Session   Cancel Reasons Patient to operating room   Wound 10/13/24 Pressure Injury Perineum   Date First Assessed/Time First Assessed: 10/13/24 1230   Primary Wound Type: Pressure Injury  Location: Perineum  Dressing Status: Leaking (Comment)   Wound Description Beefy red;Bleeding;Swelling   Ximena-wound Assessment Fragile   Dressing Changed Changed       Shahrzad Longo, PT

## 2024-10-22 NOTE — PHYSICAL THERAPY NOTE
Physical Therapy Cancellation Note    PT orders received chart review completed. Pt is currently off the floor in IR and not appropriate to participate in skilled PT at this time. PT will follow and treat as medically appropriate.     10/22/24 1300   Note Type   Note type Cancelled Session   Cancel Reasons Patient off floor/test       Shahrzad Longo, PT

## 2024-10-22 NOTE — PROGRESS NOTES
Progress Note - Surgery-General   Name: Clayton Duval 44 y.o. male I MRN: [id number]  Unit/Bed#: ICU 05 I Date of Admission: 10/13/2024   Date of Service: 10/22/2024 I Hospital Day: 9    Assessment & Plan  Sacral wound  43 yo male with large sacral wound, suspected to be pressure induced s/p sacral debridement 10/21.     Plan  -Monitor sacral wound, dressing changes PRN  -Trend hemoglobin  -Trend fever/wbc curve  -Pain/nausea control  -Rest of care per primary team      Surgery-General service will follow.    Subjective   Patient seen at bedside, not in acute distress.  Significantly less drainage on sacral dressing, however tachycardic to 140s.  He received 2 500 cc bolus normal saline for low urine output.  Tube feeds at 60.    Objective :  Temp:  [95.4 °F (35.2 °C)-100.4 °F (38 °C)] 98.1 °F (36.7 °C)  HR:  [] 138  BP: (103-130)/(69-94) 123/84  Resp:  [12-32] 22  SpO2:  [96 %-100 %] 97 %  O2 Device: None (Room air)  Nasal Cannula O2 Flow Rate (L/min):  [1 L/min] 1 L/min    I/O         10/20 0701  10/21 0700 10/21 0701  10/22 0700    I.V. (mL/kg) 593.3 (5.7) 2500 (24)    NG/ 330    IV Piggyback 642.9 800    Feedings 840 600    Total Intake(mL/kg) 2236.3 (21.5) 4230 (40.7)    Urine (mL/kg/hr) 855 (0.3) 855 (0.3)    Emesis/NG output 0 0    Stool 0 0    Total Output 855 855    Net +1381.3 +3375          Unmeasured Stool Occurrence 3 x 0 x          Lines/Drains/Airways       Active Status       Name Placement date Placement time Site Days    Arterial Line 10/14/24 Radial 10/14/24  0425  Radial  7    NG/OG/Enteral Tube Nasogastric 12 Fr Right nare 10/15/24  1800  Right nare  6    Urethral Catheter Latex 16 Fr. 10/22/24  0000  Latex  less than 1                  Physical Exam  Vitals and nursing note reviewed.   Constitutional:       General: He is not in acute distress.     Appearance: Normal appearance.   HENT:      Head: Normocephalic and atraumatic.   Cardiovascular:      Rate and Rhythm:  Tachycardia present.   Pulmonary:      Effort: Pulmonary effort is normal. No respiratory distress.   Abdominal:      Comments: Soft, mild grimacing on palpation, mild distention   Skin:     Comments: Sacral wound           Lab Results: I have reviewed the following results:  Recent Labs     10/21/24  0520 10/21/24  2159   WBC 8.36  --    HGB 8.9* 9.3*   HCT 28.9* 29.8*     --    BANDSPCT 11*  --    SODIUM 139  --    K 3.6  --      --    CO2 24  --    BUN 31*  --    CREATININE 0.56*  --    GLUC 88  --    CAIONIZED 1.11*  --    MG 1.9  --    PHOS 2.7  --    *  --    *  --    ALB 2.1*  --    TBILI 3.42*  --    ALKPHOS 1,247*  --        Imaging Results Review: No pertinent imaging studies reviewed.  Other Study Results Review: No additional pertinent studies reviewed.    VTE Pharmacologic Prophylaxis: VTE covered by:  heparin (porcine), Subcutaneous, 5,000 Units at 10/21/24 2130     VTE Mechanical Prophylaxis: sequential compression device

## 2024-10-22 NOTE — ASSESSMENT & PLAN NOTE
Source of sepsis is most likely perirectal abscess.  Despite sepsis, patient remains systemically well, without toxicity and hemodynamically stable, without hypotension.  Patient had prolonged fever, most likely secondary to infected sacral ulcer but this has resolved with antibiotic and I&D of sacral ulcer.  Admission blood cultures have no growth.   Antibiotic plan as in below.  Monitor temperature/WBC.    Monitor hemodynamics.

## 2024-10-22 NOTE — ASSESSMENT & PLAN NOTE
Patient has spontaneous drainage.  He is being followed by general surgery service, with no plan for surgical I&D previously.  However, at present, there is concern of persistent abscess.  Repeat CT imaging without obvious abscess.  Wound culture with growth of mixed alli, not helpful.  Patient has no history of MRSA, has negative MRSA screen and has MSSA growing in respiratory culture.  At I&D yesterday, there was no further abscess or purulence.  At this point, with no further wound infection with patient completing course of antibiotic, will discontinue antibiotic to avoid antibiotic toxicities, especially diarrhea given presence of sacral ulcer.  Complete IV Zosyn course today.  Wound care per surgery.

## 2024-10-22 NOTE — PROGRESS NOTES
Follow-up summary and recommendations:    Nutrition needs re-assessed; Current rx TF does not meet needs as re-assessed;  Tube Feeding recommendations noted below to reflect re-assessed needs; goal will [id number] TF volume; 2559 total kcal (TF formula, prosource and banatrol); 148g protein; 2109mL total free water; patient receives additional water flushes for meds and prosource and banatrol (if initiated)     Recommend:   Change TF formula to Jevity 1.2 x22 hr infusion (hold for levothyroxine 1hr before and 1 hr after dose); advance to 85mL/hr until 1870ml infused; increase Prosource Protein liquid to TID; continue water flushes as currently rx or adjust if indicated; suggest add 1 package Banatrol TID to help regulate BM's

## 2024-10-22 NOTE — PLAN OF CARE
Problem: PAIN - ADULT  Goal: Verbalizes/displays adequate comfort level or baseline comfort level  Description: Interventions:  - Encourage patient to monitor pain and request assistance  - Assess pain using appropriate pain scale  - Administer analgesics based on type and severity of pain and evaluate response  - Implement non-pharmacological measures as appropriate and evaluate response  - Consider cultural and social influences on pain and pain management  - Notify physician/advanced practitioner if interventions unsuccessful or patient reports new pain  Outcome: Progressing     Problem: INFECTION - ADULT  Goal: Absence or prevention of progression during hospitalization  Description: INTERVENTIONS:  - Assess and monitor for signs and symptoms of infection  - Monitor lab/diagnostic results  - Monitor all insertion sites, i.e. indwelling lines, tubes, and drains  - Monitor endotracheal if appropriate and nasal secretions for changes in amount and color  - Boling appropriate cooling/warming therapies per order  - Administer medications as ordered  - Instruct and encourage patient and family to use good hand hygiene technique  - Identify and instruct in appropriate isolation precautions for identified infection/condition  Outcome: Progressing     Problem: SAFETY ADULT  Goal: Patient will remain free of falls  Description: INTERVENTIONS:  - Educate patient/family on patient safety including physical limitations  - Instruct patient to call for assistance with activity   - Consult OT/PT to assist with strengthening/mobility   - Keep Call bell within reach  - Keep bed low and locked with side rails adjusted as appropriate  - Keep care items and personal belongings within reach  - Initiate and maintain comfort rounds  - Make Fall Risk Sign visible to staff  - Apply yellow socks and bracelet for high fall risk patients  - Consider moving patient to room near nurses station  Outcome: Progressing  Goal: Maintain or  return to baseline ADL function  Description: INTERVENTIONS:  -  Assess patient's ability to carry out ADLs; assess patient's baseline for ADL function and identify physical deficits which impact ability to perform ADLs (bathing, care of mouth/teeth, toileting, grooming, dressing, etc.)  - Assess/evaluate cause of self-care deficits   - Assess range of motion  - Assess patient's mobility; develop plan if impaired  - Assess patient's need for assistive devices and provide as appropriate  - Encourage maximum independence but intervene and supervise when necessary  - Involve family in performance of ADLs  - Assess for home care needs following discharge   - Consider OT consult to assist with ADL evaluation and planning for discharge  - Provide patient education as appropriate  Outcome: Progressing  Goal: Maintains/Returns to pre admission functional level  Description: INTERVENTIONS:  - Perform AM-PAC 6 Click Basic Mobility/ Daily Activity assessment daily.  - Set and communicate daily mobility goal to care team and patient/family/caregiver.   - Collaborate with rehabilitation services on mobility goals if consulted  - Reposition patient every 2 hours.  - Out of bed for toileting  - Record patient progress and toleration of activity level   Outcome: Progressing     Problem: DISCHARGE PLANNING  Goal: Discharge to home or other facility with appropriate resources  Description: INTERVENTIONS:  - Identify barriers to discharge w/patient and caregiver  - Arrange for needed discharge resources and transportation as appropriate  - Identify discharge learning needs (meds, wound care, etc.)  - Arrange for interpretive services to assist at discharge as needed  - Refer to Case Management Department for coordinating discharge planning if the patient needs post-hospital services based on physician/advanced practitioner order or complex needs related to functional status, cognitive ability, or social support system  Outcome:  Progressing     Problem: Knowledge Deficit  Goal: Patient/family/caregiver demonstrates understanding of disease process, treatment plan, medications, and discharge instructions  Description: Complete learning assessment and assess knowledge base.  Interventions:  - Provide teaching at level of understanding  - Provide teaching via preferred learning methods  Outcome: Progressing     Problem: Prexisting or High Potential for Compromised Skin Integrity  Goal: Skin integrity is maintained or improved  Description: INTERVENTIONS:  - Identify patients at risk for skin breakdown  - Assess and monitor skin integrity  - Assess and monitor nutrition and hydration status  - Monitor labs   - Assess for incontinence   - Turn and reposition patient  - Assist with mobility/ambulation  - Relieve pressure over bony prominences  - Avoid friction and shearing  - Provide appropriate hygiene as needed including keeping skin clean and dry  - Evaluate need for skin moisturizer/barrier cream  - Collaborate with interdisciplinary team   - Patient/family teaching  - Consider wound care consult   Outcome: Progressing     Problem: Nutrition/Hydration-ADULT  Goal: Nutrient/Hydration intake appropriate for improving, restoring or maintaining nutritional needs  Description: Monitor and assess patient's nutrition/hydration status for malnutrition. Collaborate with interdisciplinary team and initiate plan and interventions as ordered.  Monitor patient's weight and dietary intake as ordered or per policy. Utilize nutrition screening tool and intervene as necessary. Determine patient's food preferences and provide high-protein, high-caloric foods as appropriate.     INTERVENTIONS:  - Monitor oral intake, urinary output, labs, and treatment plans  - Assess nutrition and hydration status and recommend course of action  - Evaluate amount of meals eaten  - Assist patient with eating if necessary   - Allow adequate time for meals  - Recommend/ encourage  appropriate diets, oral nutritional supplements, and vitamin/mineral supplements  - Order, calculate, and assess calorie counts as needed  - Recommend, monitor, and adjust tube feedings and TPN/PPN based on assessed needs  - Assess need for intravenous fluids  - Provide specific nutrition/hydration education as appropriate  - Include patient/family/caregiver in decisions related to nutrition  Outcome: Progressing

## 2024-10-22 NOTE — PROGRESS NOTES
Progress Note - Palliative Care   Name: Clayton Duval 44 y.o. male I MRN: [id number]  Unit/Bed#: ICU 05 I Date of Admission: 10/13/2024   Date of Service: 10/22/2024 I Hospital Day: 9    Assessment & Plan  Non-traumatic acute L subdural hematoma (HCC)  Managed by Neuro ICU  Sacral wound  Managed by Surg-Gen  Large sacral wound which is suspected to be pressure induced  Potential perirectal fistula  OR on 10/21 for EUA  Palliative care by specialist  Goals of care  Level 1 code status  Disease focused care with no limits in place.   Concerns introduced include:  Spoke w/ patient's wife, feels guilty about spreading herself thin between work, her children and her    Needs significant emotional support  Will continue discussions regarding GOC as patient's clinical presentation evolves.    Social support:  Supportive listening provided  Normalized experience of patient/family  Provided anxiety containment  Provided anticipatory guidance  Encouraged self care    Follow up  Palliative Care will continue to follow and goals of care discussions will be ongoing.    Please reach out via [employer] Secure Chat if questions or concerns arise.    I have reviewed the patient's controlled substance dispensing history in the Prescription Drug Monitoring Program in compliance with the [facility] regulations before prescribing any controlled substances.  Last refills: N/A    Decisional apparatus:  Patient does not have capacity on exam today.  If capacity is lost, patient's substitute decision maker would default to spouse (Liane) by [place] Act 169.    Advance Directive/Living Will, POLST and POA Forms: None on file.    ER contacts:  Name Relation Home Work Mobile   Liane Duval Spouse   [phone]     We appreciate the invitation to be involved in this patient's care.  We will continue to follow throughout this hospitalization.  Please do not hesitate to reach our on call provider through our clinic answering service at  [phone] should you have acute symptom control concerns.      Subjective   Patient minimally interactive at bedside. Not following commands, doesn't nod head yes/no to questions, no verbal output. Tracks provider around room.     Spoke w/ spouse, she is trying her best to handle her current situation. Stated that she is doing ok, continues to need emotional support. Asked about short-term disability and how to fill out, she plans on bringing paperwork tomorrow.     Objective :  Temp:  [95.4 °F (35.2 °C)-99.3 °F (37.4 °C)] 99.1 °F (37.3 °C)  HR:  [] 142  BP: (105-135)/(70-94) 125/93  Resp:  [12-34] 34  SpO2:  [94 %-100 %] 94 %  O2 Device: None (Room air)    Physical Exam  Constitutional:       Appearance: He is ill-appearing.   HENT:      Mouth/Throat:      Mouth: Mucous membranes are dry.   Eyes:      Conjunctiva/sclera: Conjunctivae normal.   Cardiovascular:      Rate and Rhythm: Tachycardia present.   Pulmonary:      Effort: Tachypnea present.   Skin:     General: Skin is warm and dry.      Coloration: Skin is pale.   Neurological:      Mental Status: He is lethargic.      Comments: Minimally responsive, tracks but does not follow commands, no verbal output.           Lab Results: I have reviewed the following results:  Lab Results   Component Value Date/Time    SODIUM 139 10/22/2024 05:04 AM    SODIUM 130 (L) 10/01/2024 02:47 PM    K 4.2 10/22/2024 05:04 AM    K 4.5 10/01/2024 02:47 PM    BUN 35 (H) 10/22/2024 05:04 AM    BUN 18 10/01/2024 02:47 PM    BUN 18 10/11/2019 03:21 PM    CREATININE 0.72 10/22/2024 05:04 AM    CREATININE 0.9 10/01/2024 02:47 PM    GLUC 92 10/22/2024 05:04 AM    GLUC 79 10/01/2024 02:47 PM    CALCIUM 7.5 (L) 10/22/2024 05:04 AM    CALCIUM 7.5 (L) 10/01/2024 02:47 PM     (H) 10/22/2024 05:04 AM     (H) 10/01/2024 02:47 PM     (H) 10/22/2024 05:04 AM     (H) 10/01/2024 02:47 PM    ALB 2.0 (L) 10/22/2024 05:04 AM    ALB 2.7 (L) 10/01/2024 02:47 PM    TP  3.3 (L) 10/22/2024 05:04 AM    TP 4.4 (L) 10/01/2024 02:47 PM    EGFR 112 10/22/2024 05:04 AM    EGFR >90 10/01/2024 02:47 PM    EGFR 81 10/11/2019 03:21 PM     Lab Results   Component Value Date/Time    HGB 8.4 (L) 10/22/2024 05:04 AM    WBC 9.94 10/22/2024 05:04 AM     10/22/2024 05:04 AM    INR 1.73 (H) 10/22/2024 05:04 AM    PTT 47 (H) 10/22/2024 05:04 AM     Lab Results   Component Value Date/Time    [id number] 23.267 (H) 10/12/2024 04:49 AM     Administrative Statements   I have spent a total time of 30 minutes in caring for this patient on the day of the visit/encounter including Patient and family education, Counseling / Coordination of care, Documenting in the medical record, Reviewing / ordering tests, medicine, procedures  , Obtaining or reviewing history  , and Communicating with other healthcare professionals . Topics discussed with the patient / family include symptom assessment and management, medication review, supportive listening, and anticipatory guidance.    Nohemy Wyatt

## 2024-10-22 NOTE — ASSESSMENT & PLAN NOTE
Patient is status post bedside I&D and being followed by general surgery.  Concern for adjacent perirectal abscess noted.  CT scan 10/18 also noting auricle thinning of coccyx below the decubitus ulcer may suggest osteomyelitis.  However they are all bone remodeling beneath the sacral ulcer can also appear similarly.  Definitive diagnosis of osteomized would require a bone biopsy/culture.  Even if osteomyelitis was confirmed however, long-term antibiotic therapy would be futile without a plan to aggressively debride the tissue and bone, send bone cultures and cover the wound with flap for closure.  Discontinue antibiotic as in above.  Wound care per surgery.

## 2024-10-22 NOTE — ASSESSMENT & PLAN NOTE
Goals of care  Level 1 code status  Disease focused care with no limits in place.   Concerns introduced include:  Spoke w/ patient's wife, feels guilty about spreading herself thin between work, her children and her    Needs significant emotional support  Will continue discussions regarding GOC as patient's clinical presentation evolves.    Social support:  Supportive listening provided  Normalized experience of patient/family  Provided anxiety containment  Provided anticipatory guidance  Encouraged self care    Follow up  Palliative Care will continue to follow and goals of care discussions will be ongoing.    Please reach out via [employer] Secure Chat if questions or concerns arise.    I have reviewed the patient's controlled substance dispensing history in the Prescription Drug Monitoring Program in compliance with the [facility] regulations before prescribing any controlled substances.  Last refills: N/A    Decisional apparatus:  Patient does not have capacity on exam today.  If capacity is lost, patient's substitute decision maker would default to spouse (Liane) by [place] Act 169.    Advance Directive/Living Will, POLST and POA Forms: None on file.    ER contacts:  Name Relation Home Work Mobile   Liane Duval Spouse   [phone]     We appreciate the invitation to be involved in this patient's care.  We will continue to follow throughout this hospitalization.  Please do not hesitate to reach our on call provider through our clinic answering service at [phone] should you have acute symptom control concerns.

## 2024-10-22 NOTE — SEDATION DOCUMENTATION
Left side Paracentesis performed by Yvette pichardo, 2050 ml effie fluids obtained,Images saved,Patient tolerated well.

## 2024-10-22 NOTE — ASSESSMENT & PLAN NOTE
45 yo male with large sacral wound, suspected to be pressure induced s/p sacral debridement 10/21.     Plan  -Monitor sacral wound, dressing changes PRN  -Trend hemoglobin  -Trend fever/wbc curve  -Pain/nausea control  -Rest of care per primary team

## 2024-10-22 NOTE — QUICK NOTE
Patient seen and evaluated at bedside for sacral wound check. Previous dressing saturated. There was no active oozing. There was a clot noticed at the 5 o'clock position in the picture below. No foul odor or drainage. Wound was repacked with 1 saline soaked Kerlix and covered with a Mepilex Sacral Foam Dressing.         Kendra Amaral PA-C

## 2024-10-22 NOTE — PROGRESS NOTES
Progress Note - Infectious Disease   Name: Clayton Duval 44 y.o. male I MRN: [id number]  Unit/Bed#: ICU 05 I Date of Admission: 10/13/2024   Date of Service: 10/22/2024 I Hospital Day: 9    Assessment & Plan  Sepsis (HCC)  Source of sepsis is most likely perirectal abscess.  Despite sepsis, patient remains systemically well, without toxicity and hemodynamically stable, without hypotension.  Patient had prolonged fever, most likely secondary to infected sacral ulcer but this has resolved with antibiotic and I&D of sacral ulcer.  Admission blood cultures have no growth.   Antibiotic plan as in below.  Monitor temperature/WBC.    Monitor hemodynamics.    Perirectal abscess  Patient has spontaneous drainage.  He is being followed by general surgery service, with no plan for surgical I&D previously.  However, at present, there is concern of persistent abscess.  Repeat CT imaging without obvious abscess.  Wound culture with growth of mixed alli, not helpful.  Patient has no history of MRSA, has negative MRSA screen and has MSSA growing in respiratory culture.  At I&D yesterday, there was no further abscess or purulence.  At this point, with no further wound infection with patient completing course of antibiotic, will discontinue antibiotic to avoid antibiotic toxicities, especially diarrhea given presence of sacral ulcer.  Complete IV Zosyn course today.  Wound care per surgery.  Sacral wound  Patient is status post bedside I&D and being followed by general surgery.  Concern for adjacent perirectal abscess noted.  CT scan 10/18 also noting auricle thinning of coccyx below the decubitus ulcer may suggest osteomyelitis.  However they are all bone remodeling beneath the sacral ulcer can also appear similarly.  Definitive diagnosis of osteomized would require a bone biopsy/culture.  Even if osteomyelitis was confirmed however, long-term antibiotic therapy would be futile without a plan to aggressively debride the  tissue and bone, send bone cultures and cover the wound with flap for closure.  Discontinue antibiotic as in above.  Wound care per surgery.  Non-traumatic acute L subdural hematoma (HCC)  Patient is status post craniectomy and evacuation of subdural hematoma.  He also has ischemia in the brainstem.  Neurosurgery follow-up.  Stroke (HCC)  Head MRI showed ischemia involving brainstem.  Patient had decreased responsiveness on presentation.  Mental status with some improvement.  Neurology follow-up.  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  Plan for biopsy of hepatic lesion noted.  This is being postponed due to persistent fever.  Follow-up urine histoplasma antigen from 10/18    I have discussed with critical care service regarding the above plan to discontinue antibiotic after today.  Team agrees with the plan.    Antibiotics:  Zosyn # 7    Subjective   Patient remains in ICU.  He is sleepy but arousable.  When aroused, he is able to answer simple questions.  Mild pain in sacrum.  Temperature stays now.  No chills.    Objective :  Temp:  [95.4 °F (35.2 °C)-99.3 °F (37.4 °C)] 98.5 °F (36.9 °C)  HR:  [102-144] 144  BP: (105-135)/(70-94) 126/82  Resp:  [12-34] 32  SpO2:  [93 %-100 %] 94 %  O2 Device: None (Room air)    General:  No acute distress  Psychiatric: Sleepy/lethargic but arousable.  Answers simple questions  Pulmonary:  Normal respiratory excursion without accessory muscle use  Abdomen:  Soft, nontender  Extremities: Stable leg edema  Skin:  No rashes      Lab Results: I have reviewed the following results:  Results from last 7 days   Lab Units 10/22/24  0504 10/21/24  2159 10/21/24  0520 10/20/24  0537   WBC Thousand/uL 9.94  --  8.36 7.92   HEMOGLOBIN g/dL 8.4* 9.3* 8.9* 9.6*   PLATELETS Thousands/uL 215  --  149 137*     Results from last 7 days   Lab Units 10/22/24  0504 10/21/24  0520 10/20/24  0537 10/19/24  0540   SODIUM mmol/L 139 139 140  139   POTASSIUM mmol/L 4.2 3.6 3.4* 4.0   CHLORIDE mmol/L 109* 108 108 108   CO2 mmol/L 19* 24 23 24   BUN mg/dL 35* 31* 30* 31*   CREATININE mg/dL 0.72 0.56* 0.58* 0.61   EGFR ml/min/1.73sq m 112 124 123 120   CALCIUM mg/dL 7.5* 7.4* 7.4* 6.9*   AST U/L 340* 293*  --  286*   ALT U/L 146* 139*  --  134*   ALK PHOS U/L 1,176* 1,247*  --  599*   ALBUMIN g/dL 2.0* 2.1*  --  2.1*     Results from last 7 days   Lab Units 10/17/24  1216 10/16/24  1321 10/16/24  0134   BLOOD CULTURE  No Growth After 4 Days.  No Growth After 4 Days.  --  No Growth After 5 Days.  No Growth After 5 Days.   GRAM STAIN RESULT   --  2+ Polys*  2+ Gram positive rods*  1+ Gram positive cocci in pairs*  1+ Gram negative rods*  --    WOUND CULTURE   --  1+ Growth of  --      Results from last 7 days   Lab Units 10/17/24  0444 10/16/24  0133   PROCALCITONIN ng/ml 1.21* 1.13*     Results from last 7 days   Lab Units 10/16/24  0444   CRP mg/L 82.3*

## 2024-10-22 NOTE — QUICK NOTE
I had a discussion with patients spouse via phone regarding plan for wound management and possible colostomy. Patient requires biopsy of liver lesion, I have requested paracentesis at same time to rule out malignant ascites.    Ascites alone increases this patients risk of short and long term colostomy complications but malignant ascites would render colostomy inappropriate for the purpose of sacral wound diversion.    Additionally, the patient has a history of bariatric surgery - which could be evaluated at the time of colostomy (should that be the shared decision moving forward) however any ante-colic anatomy would render transverse loop colostomy inappropriate, thereby mandating sigmoid colostomy - also a consideration given pelvic ascites. Additionally there is also the consideration of mesenteric length and/or redundancy of sigmoid colon.    All in all, plan is to proceed with biopsy per primary team as planned. Pending results, we will obtain medical records from [facility] to ascertain surgical anatomy. We will then discuss risk benefit analysis for colostomy versus continued wound care or rectal tube placement.    All questions were answered.

## 2024-10-22 NOTE — BRIEF OP NOTE (RAD/CATH)
IR PARACENTESIS Procedure Note    PATIENT NAME: Clayton Duval  : 1980  MRN: [id number]    Pre-op Diagnosis:   1. Metastatic disease (HCC)    2. Non-traumatic acute L subdural hematoma (HCC)    3. Hypotension    4. Sepsis (HCC)    5. Stroke (cerebrum) (HCC)    6. Sacral ulcer (HCC)      Post-op Diagnosis:   1. Metastatic disease (HCC)    2. Non-traumatic acute L subdural hematoma (HCC)    3. Hypotension    4. Sepsis (HCC)    5. Stroke (cerebrum) (HCC)    6. Sacral ulcer (HCC)        Surgeon:   TOM Lr  Assistants:     No qualified resident was available, Resident is only observing    Estimated Blood Loss: minimal  Findings: 2050 ml cloudy effie ascites fluid, LUQ.    Specimens: multiple sent as requested including cytology.    Complications:  none immediate    Anesthesia: local    TOM Lr     Date: 10/22/2024  Time: 2:35 PM

## 2024-10-22 NOTE — SOCIAL WORK
SW attempted to see patient today. However, pt was getting a procedure completed at bedside. SW attempted phone contact to spouse for social/emotional support. SW was unable to connect. SW left  for return call. SW will continue to follow

## 2024-10-22 NOTE — PROGRESS NOTES
Progress Note - Critical Care/ICU   Name: Clayton Duval 44 y.o. male I MRN: [id number]  Unit/Bed#: ICU 05 I Date of Admission: 10/13/2024   Date of Service: 10/22/2024 I Hospital Day: 9       Assessment & Plan     Neuro:   Diagnosis: Subdural hematoma s/p left hemicraniectomy for evacuation of SDH POD#4, AMS, Acute ischemic stroke  CT head - 10/13 0616 - preop: Mixed density left convexity subdural hemorrhage (1.2 cm thickness) with by 1.5 cm rightward midline shift. Trace subdural hemorrhage along the left tentorium. Mass effect with low-lying cerebellar tonsils, effacement of the suprasellar cistern, and partial effacement of the quadrigeminal plate cistern.  CT head - 10/13 - post op: New area of hypodensity within the left thalamus and basal ganglia compared to earlier day exam, suggestive of acute infarct. Expected postsurgical changes from left hemispheric craniectomy with decreased size of mixed density subdural hematoma, improved 4 mm left to right midline shift and improved effacement of the basilar cisterns. Improved mass effect on the ventricular system and dilatation of the right temporal horn of the lateral ventricle  MRI Brain w/wo contrast - 10/17 - No significant change. Stable evolving recent infarcts with petechial hemorrhage. Status post left hemispheric  craniectomy for subdural drainage. Stable subgaleal epidural hematoma with small residual subdurals. Stable small volume of intraventricular hemorrhage. Stable mass effect with 4 mm of left-to-right shift.  Carotid US 10/19 - There is no evidence of disease throughout the extracranial carotid arteries   Plan:   Continue monitoring neurological exam closely with frequent neuro checks, obtain stat CTH if any acute changes  Monitor for signs of ICP increase (bradycardia, HTN, changes in neuro exam, increased tone, pupillary changes)  Blood Pressure: SBP goal 110 - 140, cardene gtt or pressers/fluids as needed to keep within range.  AC/AP:  Heparin 5000 units q8h  Seizure Prophylaxis completed  Tight glycemic control, goal <180. Monitor temperature, tylenol PRN.  PT/OT/Speech/PMR consults when able  DVT PPx: SCDs and heparin 5000 q8h   CV:   Diagnosis: sinus tachycardia - likely secondary to acute blood loss   Plan:   Continue to monitor on Telemetry  Blood transfusion if persistent or Hgb < 7.0     Pulm:  Diagnosis: no active issues      GI:   Diagnosis: Hepatosplenomegaly, hepatic lesions, splenic lesions, transaminitis  Hepatitis panel - negative  B-HcG, CA 19-9, LDH elevated  Plan:   Oncology following along  IR consulted - plan for biopsy today     :   Diagnosis: plasencia catheter  Plan:   Monitor I&Os    F/E/N:   F: none  E: replenish as indicated for Magnesium >2, K >4, Phos > 3  N: tube feeds 60cc/hr, FWF 100ml/4hrs     Heme/Onc:   Diagnosis: suspected metastatic cancer, hypocoagulability in the setting of hepatic malignancy, lytic bone lesions, hepatic coagulopathy  MRI Abdomen w/wo contrast - 10/11/24: Findings concerning for metastatic disease. Enhancing osseous lesions throughout the majority of the visualized spine and bony pelvis. Markedly enlarged spleen much of which is occupied by large confluent hypoenhancing lesions suspected to represent metastases. Hepatomegaly. Multifocal hepatic lesions which correlate to areas of hypoenhancement on CT are also suspected to represent metastasis in the given setting. Anasarca and moderate volume ascites  Fibrinogen level - 161  PT-INR - 21.6/1.87 - 10/13 - 0800 -  In the OR due to anemia and coagulopathy patient was given: 4 units of pRBCs, 4 units of FFP and 1 unit of platelets  Discussed with oncology - believe thrombosis panel derangements likely due to hepatic lesions.  Plan:   IR consulted for biopsy  Oncology consult  Tumor markers ordered - CEA (normal), AFP (normal), B-HCG (elevated- 7.5) , LDH (elevated- 556), CA 19-9 (pending)  Spleen and liver lesions 2/2 metastasis vs abscesses      Endo:   Diagnosis: Hypothyroidism, hypothermia  Plan:   Continue home Levothyroxine 200mcg  Gissellebernard oconnor     ID:   Diagnosis: Sepsis, sacral decubitus ulcer  Sepsis alert - hyperthermia, tachycardia, leukocytosis, bandemia  Blood cultures 10/11 - no growth  Blood cultures 10/14 - no growth  Sputum cultures 10/14 - MSSA  Hematology lab - peripheral blood smear with evidence of multiple organisms extracellularly and in RBC. History of possible tick exposure.  Blood parasite negative for extracellular or intracellular organisms 10/18  Plan:   ID following  Sepsis pathway  Continue Zosyn day 7      MSK/Skin:   Diagnosis: Anasarca, sacral decubitus ulcer  CT C/A/P 10/18 - Decubitus ulcer overlying the coccyx with suggestion of cortical thinning in the inferior most coccygeal segment, concerning for osteomyelitis. No associated rim-enhancing fluid collection to suggest an abscess.  Plan:   Monitor fluid status  Wound care consulted  Red surgery debridement 10/17  Patient to go to OR with acute surgery today     Diagnosis: perirectal abscess  Plan:   Surgery following - debridement done yesterday - plan for diverting colostomy depending on biopsy result and prognosis    Disposition: Critical care    ICU Core Measures     A: Assess, Prevent, and Manage Pain Has pain been assessed? NA  Need for changes to pain regimen? NA   B: Both SAT/SAT  N/A   C: Choice of Sedation RASS Goal: N/A patient not on sedation  Need for changes to sedation or analgesia regimen? NA   D: Delirium CAM-ICU: Positive   E: Early Mobility  Plan for early mobility? NA   F: Family Engagement Plan for family engagement today? Yes       Antibiotic Review: Continue broad spectrum secondary to severity of illness.  and Infectious disease consulted    Review of Invasive Devices:    Barclay Plan: Voiding trial after improvement in ambulation     Linda Plan: Discontinue arterial line    Prophylaxis:  VTE VTE covered by:  heparin (porcine), Subcutaneous, 5,000  Units at 10/22/24 0506       Stress Ulcer  covered byfamotidine (PEPCID) tablet 20 mg [[id number]]         24 Hour Events : Overnight patient had bleeding and clots from the sacral wound debridement area. General surgery was consulted and bedside debridement with cauterization was performed and bleeding was controlled. Otherwise no acute events overnight. Patient remains non-verbal and nods to questions. Sleepy this morning and does not follow commands.     Subjective   Review of Systems: See HPI for Review of Systems    Objective :                   Vitals I/O      Most Recent Min/Max in 24hrs   Temp 99.1 °F (37.3 °C) Temp  Min: 95.4 °F (35.2 °C)  Max: 99.3 °F (37.4 °C)   Pulse (!) 142 (critical care team aware) Pulse  Min: 96  Max: 142   Resp (!) 34 Resp  Min: 12  Max: 34   /93 BP  Min: 105/70  Max: 135/85   O2 Sat 94 % SpO2  Min: 94 %  Max: 100 %      Intake/Output Summary (Last 24 hours) at 10/22/2024 0825  Last data filed at 10/22/2024 0600  Gross per 24 hour   Intake 5400 ml   Output 835 ml   Net 4565 ml       Diet Enteral/Parenteral; Tube Feeding No Oral Diet; Vital AF 1.2; Continuous; 60; Prosource Protein Liquid - Two Packets; 100; Water; Every 4 hours    Invasive Monitoring           Physical Exam   Physical Exam  Vitals reviewed.   Eyes:      Conjunctiva/sclera: Conjunctivae normal.      Pupils: Pupils are equal, round, and reactive to light.   Skin:     General: Skin is warm.      Coloration: Skin is not jaundiced.      Findings: No rash.   Cardiovascular:      Rate and Rhythm: Regular rhythm. Tachycardia present.   Abdominal:      Palpations: Abdomen is soft.      Tenderness: There is no abdominal tenderness.   Constitutional:       Interventions: He is not sedated and not intubated.  Pulmonary:      Effort: Pulmonary effort is normal. He is not intubated.      Breath sounds: Normal breath sounds.   Neurological:      Mental Status: He is somnolent.      Comments: Not following commands today; nods  to some questions; tracking   Genitourinary/Anorectal:  Barclay present.        Diagnostic Studies        Lab Results: I have reviewed the following results:     Medications:  Scheduled PRN   chlorhexidine, 15 mL, Q12H KAYLIE  Cholecalciferol, 5,000 Units, Daily  famotidine, 20 mg, BID  folic acid, 1 mg, Daily  heparin (porcine), 5,000 Units, Q8H KAYLIE  levothyroxine, 200 mcg, Early Morning  piperacillin-tazobactam, 4.5 g, Q8H  thiamine, 100 mg, Daily      benzonatate, 100 mg, TID PRN  fentaNYL, 50 mcg, Q1H PRN  hydrALAZINE, 10 mg, Q6H PRN  HYDROmorphone, 1 mg, Q3H PRN  labetalol, 10 mg, Q6H PRN  ondansetron, 4 mg, Q8H PRN  polyethylene glycol, 17 g, Daily PRN       Continuous    lactated ringers, 100 mL/hr, Last Rate: 100 mL/hr (10/21/24 2128)         Labs:   CBC    Recent Labs     10/21/24  0520 10/21/24  2159 10/22/24  0504   WBC 8.36  --  9.94   HGB 8.9* 9.3* 8.4*   HCT 28.9* 29.8* 27.1*     --  215   BANDSPCT 11*  --   --      BMP    Recent Labs     10/21/24  0520 10/22/24  0504   SODIUM 139 139   K 3.6 4.2    109*   CO2 24 19*   AGAP 7 11   BUN 31* 35*   CREATININE 0.56* 0.72   CALCIUM 7.4* 7.5*       Coags    Recent Labs     10/22/24  0504   INR 1.73*   PTT 47*        Additional Electrolytes  Recent Labs     10/21/24  0520 10/22/24  0504   MG 1.9 1.9   PHOS 2.7 2.9   CAIONIZED 1.11*  --           Blood Gas    No recent results  No recent results LFTs  Recent Labs     10/21/24  0520 10/22/24  0504   * 146*   * 340*   ALKPHOS 1,247* 1,176*   ALB 2.1* 2.0*   TBILI 3.42* 2.98*       Infectious  No recent results  Glucose  Recent Labs     10/21/24  0520 10/22/24  0504   GLUC 88 92        Bradley Quinteros MD  PGY-3, Internal Medicine  [facility]

## 2024-10-22 NOTE — OCCUPATIONAL THERAPY NOTE
OT CANCEL NOTE    Pt chart reviewed. Pt is off the floor in IR. Will continue to follow pt on caseload and see pt when medically stable and as clinically appropriate.       10/22/24 1440   OT Last Visit   OT Visit Date 10/22/24   Note Type   Note Type Cancelled Session   Cancel Reasons Patient off floor/test       Mariela Matos MS, OTR/L

## 2024-10-22 NOTE — QUICK NOTE
"Post Op Check:    Patient seen and examined at bedside, in no acute distress.  Informed by nursing patient's sacral wound dressing was saturated.  At the bedside, packing removed with visible blood clot.  Skin edges with minor bleeding, cauterized with silver nitrate sticks.    Vitals:Blood pressure 127/86, pulse (!) 130, temperature (!) 95.4 °F (35.2 °C), temperature source Bladder, resp. rate (!) 24, height 6' 1\" (1.854 m), weight 104 kg (229 lb), SpO2 98%.  Temp (24hrs), Av.7 °F (37.1 °C), Min:95.4 °F (35.2 °C), Max:101.1 °F (38.4 °C)      General: NAD  HENT: NCAT MMM  Neck: supple, no JVD  CV: nl rate  Lungs: nl wob. No resp distress  ABD: Soft, nontender, nondistended  Extrem: No CCE  Sacrum: Visible minor bleeding from skin at, saturated dressings, packing changed and bleeding cauterized with silver nitrate sticks  Neuro: AAOx3       I/O         10/20 0701  10/21 0700 10/21 0701  10/22 0700    P.O.      I.V. (mL/kg) 593.3 (5.7) 1900 (18.3)    NG/ 230    IV Piggyback 642.9 300    Feedings 840 240    Total Intake(mL/kg) 2236.3 (21.5) 2670 (25.7)    Urine (mL/kg/hr) 855 (0.3) 580 (0.3)    Emesis/NG output 0 0    Stool 0 0    Blood      Total Output 855 580    Net +1381.3 +2090          Unmeasured Stool Occurrence 3 x 0 x            Invasive Devices       Peripheral Intravenous Line  Duration             Long-Dwell Peripheral IV (Midline) 10/13/24 Basilic 8 days              Arterial Line  Duration             Arterial Line 10/14/24 Radial 7 days              Drain  Duration             NG/OG/Enteral Tube Nasogastric 12 Fr Right nare 6 days    Urethral Catheter Temperature probe 3 days                      Plan:  Diet Enteral/Parenteral; Tube Feeding No Oral Diet; Vital AF 1.2; Continuous; 60; Prosource Protein Liquid - Two Packets; 100; Water; Every 4 hours  Continue to monitor sacral wound  Pain and nausea control PRN    Bright Fregoso MD  10/21/2024 11:09 PM   "

## 2024-10-22 NOTE — TELEMEDICINE
e-Consult (IPC)  - Interventional Radiology  Clayton Duval 44 y.o. male MRN: [id number]  Unit/Bed#: ICU 05 Encounter: [id number]          Interventional Radiology has been consulted to evaluate Clayton Duval    We were consulted concerning this patient with fever, chills, fatigue, liver, spleen and bone lesions.    Inpatient Consult to IR  Consult performed by: TOM Lr  Consult ordered by: Bradley Quinteros MD        10/22/24    Assessment/Recommendation:   Clayton Duval, 44y male with history of gastric bypass, VILLALOBOS, anemia, now with fevers, chills ,cough, and fatigue, with most recent cross-sectional imaging showing lesions in the liver, spleen, lumbar spine and left iliac bone, concerning for metastatic disease. Patient had acute change in mental status and found to have large subdural hematoma now s/p left craniotomy and evacuation of the hematoma.     Patient is s/p paracentesis today for 2050 ml cloudy effie ascites fluid, multiple specimens sent for evaluation including cytology.    Patient currently tachycardic, tachypneic and hypotensive.  R32 /74.    Patient will be difficult to position 2/2 respiratory status.     Patient INR 1.73  Patient is at high risk for bleeding.    For liver biopsy will need INR <1.5 prior to procedure.  Will need to HOLD SQ Heparin prior to liver biopsy.    For iliac biopsy - It is unclear if we will be able to target the iliac bone for biopsy 2/2 to sacral wound and patient positioning.          Will tentatively plan for IR biopsy with Anesthesia Thursday.  Iliac bone vs liver, pending INR, patient positioning, respiratory and hemodynamic status.    Feel free to reach out to IR with any questions or concerns.    31 + minutes, >50% of the total time devoted to medical consultative verbal/EMR discussion between providers. Written report will be generated in the EMR.     Thank you for allowing Interventional Radiology to participate in  the care of Clayton Duval. Please don't hesitate to call or TigerText us with any questions.     TOM Lr

## 2024-10-23 ENCOUNTER — ANESTHESIA (OUTPATIENT)
Dept: ANESTHESIOLOGY | Facility: HOSPITAL | Age: 44
End: 2024-10-23

## 2024-10-23 ENCOUNTER — ANESTHESIA EVENT (OUTPATIENT)
Dept: ANESTHESIOLOGY | Facility: HOSPITAL | Age: 44
End: 2024-10-23

## 2024-10-23 NOTE — ASSESSMENT & PLAN NOTE
Patient has spontaneous drainage.  He is being followed by general surgery service, with no plan for surgical I&D previously.  However, at present, there is concern of persistent abscess.  Repeat CT imaging without obvious abscess.  Wound culture with growth of mixed alli, not helpful.  Patient has no history of MRSA, has negative MRSA screen and has MSSA growing in respiratory culture.  At I&D, there was no further abscess or purulence.  Patient completed 7-day course of IV Zosyn last night.  Observe off further antibiotic for now, as in above  Wound care per surgery.

## 2024-10-23 NOTE — ASSESSMENT & PLAN NOTE
43 yo male with large sacral wound, suspected to be pressure induced s/p sacral debridement 10/21.    Please see Dr. Paulino's Quick Note on 10/22 for detailed description of surgical plan for this patient may entail.    -Monitor sacral wound -- needs more WTD debridements however improving   -Dressing change 10/22   -next surgery dressing change to 10/25 (nurses may change daily prn)  -Trend hemoglobin  -Trend fever/wbc curve  -Pain/nausea control  -Rest of care per primary team

## 2024-10-23 NOTE — QUICK NOTE
Patient's wife joão called and updated about patient's current condition and further plan of evaluation and management. All questions and concerns addressed.     Bradley Quinteros MD  PGY-3, Internal Medicine  [facility]

## 2024-10-23 NOTE — PROGRESS NOTES
Progress Note - Infectious Disease   Name: Clayton Duval 44 y.o. male I MRN: [id number]  Unit/Bed#: ICU 05 I Date of Admission: 10/13/2024   Date of Service: 10/23/2024 I Hospital Day: 10    Assessment & Plan  Sepsis (HCC)  Source of sepsis is most likely perirectal abscess.  Despite sepsis, patient remains systemically well, without toxicity and hemodynamically stable, without hypotension.  Patient had prolonged fever, most likely secondary to infected sacral ulcer but this has resolved with antibiotic and I&D of sacral ulcer.  Admission blood cultures have no growth.  Patient just completed Zosyn course last night.  Temperature is up again today.  Given that fever recurs so quickly after Zosyn discontinuation, doubt that fever is secondary to Zosyn discontinuation.  Observe off further antibiotic for now.  Monitor temperature/WBC closely.    Monitor hemodynamics.    Perirectal abscess  Patient has spontaneous drainage.  He is being followed by general surgery service, with no plan for surgical I&D previously.  However, at present, there is concern of persistent abscess.  Repeat CT imaging without obvious abscess.  Wound culture with growth of mixed alli, not helpful.  Patient has no history of MRSA, has negative MRSA screen and has MSSA growing in respiratory culture.  At I&D, there was no further abscess or purulence.  Patient completed 7-day course of IV Zosyn last night.  Observe off further antibiotic for now, as in above  Wound care per surgery.  Sacral wound  Patient is status post bedside I&D and being followed by general surgery.  Concern for adjacent perirectal abscess noted.  CT scan 10/18 also noting auricle thinning of coccyx below the decubitus ulcer may suggest osteomyelitis.  However they are all bone remodeling beneath the sacral ulcer can also appear similarly.  Definitive diagnosis of osteomized would require a bone biopsy/culture.  Even if osteomyelitis was confirmed however, long-term  antibiotic therapy would be futile without a plan to aggressively debride the tissue and bone, send bone cultures and cover the wound with flap for closure.  Patient complete antibiotic course.  Wound care per surgery.  Non-traumatic acute L subdural hematoma (HCC)  Patient is status post craniectomy and evacuation of subdural hematoma.  He also has ischemia in the brainstem.  Neurosurgery follow-up.  Stroke (HCC)  Head MRI showed ischemia involving brainstem.  Patient had decreased responsiveness on presentation.  Mental status with some improvement.  Neurology follow-up.  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  Plan for biopsy of hepatic lesion noted.  This is being postponed due to persistent fever.  Histoplasma antigen negative.      I have discussed with Dr. Kincaid from critical care service regarding the above plan to monitor temperature off additional antibiotic.  He agrees with the plan.    Antibiotics:  Off antibiotic    Subjective   Patient remains in ICU.  Mental status stable, arousable but not greatly communicative.  Temperature up again.    Objective :  Temp:  [97.7 °F (36.5 °C)-102.8 °F (39.3 °C)] 102.8 °F (39.3 °C)  HR:  [] 132  BP: ()/(51-80) 96/58  Resp:  [0-46] 28  SpO2:  [94 %-96 %] 94 %  O2 Device: Nasal cannula  Nasal Cannula O2 Flow Rate (L/min):  [2 L/min] 2 L/min    General:  No acute distress.  Arousable but not greatly communicative.  Comfortable.  Nontoxic.  Psychiatric: Lethargic but arousable.  Pulmonary:  Normal respiratory excursion without accessory muscle use  Abdomen:  Soft, nontender  Extremities: Stable leg edema  Skin:  No rashes      Lab Results: I have reviewed the following results:  Results from last 7 days   Lab Units 10/23/24  0513 10/22/24  1314 10/22/24  0504 10/21/24  2159 10/21/24  0520   WBC Thousand/uL 13.36*  --  9.94  --  8.36   HEMOGLOBIN g/dL 7.8* 8.4* 8.4*   < > 8.9*   PLATELETS  Thousands/uL 224  --  215  --  149    < > = values in this interval not displayed.     Results from last 7 days   Lab Units 10/23/24  0513 10/22/24  0504 10/21/24  0520   SODIUM mmol/L 143 139 139   POTASSIUM mmol/L 4.1 4.2 3.6   CHLORIDE mmol/L 111* 109* 108   CO2 mmol/L 21 19* 24   BUN mg/dL 45* 35* 31*   CREATININE mg/dL 1.16 0.72 0.56*   EGFR ml/min/1.73sq m 75 112 124   CALCIUM mg/dL 7.7* 7.5* 7.4*   AST U/L 457* 340* 293*   ALT U/L 176* 146* 139*   ALK PHOS U/L 883* 1,176* 1,247*   ALBUMIN g/dL 2.0* 2.0* 2.1*     Results from last 7 days   Lab Units 10/22/24  1438 10/17/24  1216 10/16/24  1321   BLOOD CULTURE   --  No Growth After 5 Days.  No Growth After 5 Days.  --    GRAM STAIN RESULT  No Polys or Bacteria seen  --  2+ Polys*  2+ Gram positive rods*  1+ Gram positive cocci in pairs*  1+ Gram negative rods*   WOUND CULTURE   --   --  1+ Growth of     Results from last 7 days   Lab Units 10/17/24  0444   PROCALCITONIN ng/ml 1.21*

## 2024-10-23 NOTE — PROGRESS NOTES
Progress Note - Critical Care/ICU   Name: Clayton Duval 44 y.o. male I MRN: [id number]  Unit/Bed#: ICU 05 I Date of Admission: 10/13/2024   Date of Service: 10/23/2024 I Hospital Day: 10       Assessment & Plan     Neuro:   Diagnosis: Subdural hematoma s/p left hemicraniectomy for evacuation of SDH POD#10, AMS, Acute ischemic stroke  Plan:   Continue monitoring neurological exam closely with frequent neuro checks, obtain stat CTH if any acute changes  Monitor for signs of ICP increase (bradycardia, HTN, changes in neuro exam, increased tone, pupillary changes)  Blood Pressure: SBP goal 110 - 140, cardene gtt or pressers/fluids as needed to keep within range.  AC/AP: Heparin 5000 units q8h  Tight glycemic control, goal <180. Monitor temperature, tylenol PRN.  PT/OT/Speech/PMR consults when able  DVT PPx: SCDs and heparin 5000 q8h   CV:   Diagnosis: sinus tachycardia - likely secondary to hypovolemia further evidenced by LIZZY  Plan:   Continue to monitor on Telemetry  Blood transfusion if persistent or Hgb < 7.0  Albumin 25 % 25 g TID fro 2 days     Pulm:  Diagnosis: no active issues      GI:   Diagnosis: Hepatosplenomegaly, hepatic lesions, splenic lesions, transaminitis  Hepatitis panel - negative  B-HcG, CA 19-9, LDH elevated  Plan:   Oncology following along  IR consulted - plan for IR biopsy 10/25     :   Diagnosis: plasencia catheter  Plan:   Monitor I&Os     F/E/N:   F: none  E: replenish as indicated for Magnesium >2, K >4, Phos > 3  N: tube feeds 60cc/hr, FWF 100ml/4hrs     Heme/Onc:   Diagnosis: suspected metastatic cancer, hypocoagulability in the setting of hepatic malignancy, lytic bone lesions, hepatic coagulopathy  Tumor markers ordered - CEA (normal), AFP (normal), B-HCG (elevated- 7.5) , LDH (elevated- 556), CA 19-9 (pending)     Plan:   IR consulted for biopsy  Oncology consulted    Endo:   Diagnosis: Hypothyroidism, hypothermia  Plan:   Continue home Levothyroxine 200mcg  Gisselle hugger PRN       ID:   Diagnosis: Sacral decubitus ulcer; esau-rectal abscess    Plan:   ID following; monitoring off antibiotics for now    MSK/Skin:   Diagnosis: Anasarca, sacral decubitus ulcer  Plan:   Monitor fluid status  Wound care consulted  Red surgery debridement 10/17     Diagnosis: perirectal abscess  Plan:   Surgery following - debridement done yesterday - plan for diverting colostomy depending on biopsy result and prognosis    Disposition: Critical care    ICU Core Measures     A: Assess, Prevent, and Manage Pain Has pain been assessed? Yes  Need for changes to pain regimen? No   B: Both SAT/SAT  N/A   C: Choice of Sedation RASS Goal: N/A patient not on sedation  Need for changes to sedation or analgesia regimen? NA   D: Delirium CAM-ICU: Unable to perform secondary to Acute cognitive dysfunction   E: Early Mobility  Plan for early mobility? NA   F: Family Engagement Plan for family engagement today? Yes       Review of Invasive Devices:    Barclay Plan: Voiding trial after improvement in ambulation     Linda Plan: Keep arterial line for hemodynamic monitoring    Prophylaxis:  VTE VTE covered by:  heparin (porcine), Subcutaneous, 5,000 Units at 10/23/24 0526       Stress Ulcer  covered byfamotidine (PEPCID) tablet 20 mg [[id number]]         24 Hour Events : Overnight no acute events. Patient seen and examined at bedside today morning. Patient sleepy and not very responsive to commands.   Subjective   Review of Systems: Review of Systems not obtainable due to Altered mental status    Objective :                   Vitals I/O      Most Recent Min/Max in 24hrs   Temp (!) 100.8 °F (38.2 °C) Temp  Min: 97.7 °F (36.5 °C)  Max: 102.8 °F (39.3 °C)   Pulse (!) 126 Pulse  Min: 69  Max: 138   Resp (!) 30 Resp  Min: 0  Max: 46   /65 BP  Min: 95/51  Max: 124/74   O2 Sat 96 % SpO2  Min: 94 %  Max: 96 %      Intake/Output Summary (Last 24 hours) at 10/23/2024 1247  Last data filed at 10/23/2024 1200  Gross per 24 hour   Intake  2330 ml   Output 5155 ml   Net -2825 ml       Diet Enteral/Parenteral; Tube Feeding No Oral Diet; Vital AF 1.2; Continuous; 60; Prosource Protein Liquid - Two Packets; 100; Water; Every 4 hours  Diet NPO    Invasive Monitoring           Physical Exam   Physical Exam  Vitals reviewed.   Eyes:      Conjunctiva/sclera: Conjunctivae normal.      Pupils: Pupils are equal, round, and reactive to light.   Skin:     General: Skin is warm.      Coloration: Skin is not jaundiced.      Findings: No rash.   Cardiovascular:      Rate and Rhythm: Regular rhythm. Tachycardia present.   Abdominal:      Palpations: Abdomen is soft.      Tenderness: There is no abdominal tenderness.   Constitutional:       Interventions: He is not sedated and not intubated.  Pulmonary:      Effort: Pulmonary effort is normal. He is not intubated.      Breath sounds: Normal breath sounds.   Neurological:      Mental Status: He is somnolent.      Comments: Moves right upper and lower extremity; no movement on left   Genitourinary/Anorectal:  Barclay present.        Diagnostic Studies        Lab Results: I have reviewed the following results:     Medications:  Scheduled PRN   Albumin 25%, 25 g, TID  chlorhexidine, 15 mL, Q12H KAYLIE  Cholecalciferol, 5,000 Units, Daily  famotidine, 20 mg, BID  folic acid, 1 mg, Daily  heparin (porcine), 5,000 Units, Q8H KAYLIE  levothyroxine, 200 mcg, Early Morning  phytonadione, 10 mg, Daily  thiamine, 100 mg, Daily      fentaNYL, 50 mcg, Q1H PRN  hydrALAZINE, 10 mg, Q6H PRN  HYDROmorphone, 1 mg, Q3H PRN  labetalol, 10 mg, Q6H PRN  ondansetron, 4 mg, Q8H PRN  polyethylene glycol, 17 g, Daily PRN       Continuous          Labs:   CBC    Recent Labs     10/22/24  0504 10/22/24  1314 10/23/24  0513   WBC 9.94  --  13.36*   HGB 8.4* 8.4* 7.8*   HCT 27.1* 27.2* 24.4*     --  224     BMP    Recent Labs     10/22/24  0504 10/23/24  0513   SODIUM 139 143   K 4.2 4.1   * 111*   CO2 19* 21   AGAP 11 11   BUN 35* 45*    CREATININE 0.72 1.16   CALCIUM 7.5* 7.7*       Coags    Recent Labs     10/22/24  0504 10/23/24  0513   INR 1.73* 1.77*   PTT 47*  --         Additional Electrolytes  Recent Labs     10/22/24  0504 10/23/24  0513   MG 1.9 2.0   PHOS 2.9 3.2          Blood Gas    No recent results  No recent results LFTs  Recent Labs     10/22/24  0504 10/23/24  0513   * 176*   * 457*   ALKPHOS 1,176* 883*   ALB 2.0* 2.0*   TBILI 2.98* 2.67*       Infectious  No recent results  Glucose  Recent Labs     10/22/24  0504 10/23/24  0513   GLUC 92 115          Bradley Quinteros MD  PGY-3, Internal Medicine  [facility]

## 2024-10-23 NOTE — PROGRESS NOTES
Progress Note - Palliative Care   Name: Clayton Duval 44 y.o. male I MRN: [id number]  Unit/Bed#: ICU 05 I Date of Admission: 10/13/2024   Date of Service: 10/23/2024 I Hospital Day: 10    Assessment & Plan  Non-traumatic acute L subdural hematoma (HCC)  Managed by Neuro ICU  Sacral wound  Managed by Surg-Gen  Large sacral wound which is suspected to be pressure induced  Potential perirectal fistula  OR on 10/21 for EUA  Palliative care by specialist  Goals of care  Level 1 code status  Disease focused care with no limits in place.   Concerns introduced include:  Spoke w/ patient's wife, feels guilty about spreading herself thin between work, her children and her    Needs significant emotional support  [facility] SW following  Spouse will be present tomorrow around 12PM - further GoC discussion will be held  Will continue discussions regarding GOC as patient's clinical presentation evolves.    Social support:  Supportive listening provided  Normalized experience of patient/family  Provided anxiety containment  Provided anticipatory guidance  Encouraged self care    Follow up  Palliative Care will continue to follow and goals of care discussions will be ongoing.    Please reach out via [employer] Secure Chat if questions or concerns arise.    I have reviewed the patient's controlled substance dispensing history in the Prescription Drug Monitoring Program in compliance with the [facility] regulations before prescribing any controlled substances.  Last refills: N/A    Decisional apparatus:  Patient does not have capacity on exam today.  If capacity is lost, patient's substitute decision maker would default to spouse (Liane) by [place] Act 169.    Advance Directive/Living Will, POLST and POA Forms: None on file.    ER contacts:  Name Relation Home Work Mobile   Liane Duval Spouse   [phone]     We appreciate the invitation to be involved in this patient's care.  We will continue to follow throughout this hospitalization.   Please do not hesitate to reach our on call provider through our clinic answering service at [phone] should you have acute symptom control concerns.      Subjective   Patient lying in bed, minimally responsive, lethargic. Doesn't respond to questions, not tracking. Discussed case w/ RN. No family present at bedside. Per RN, biopsy planned for tomorrow.     Went back to patient's room in the evening, spouse present. Introduced [facility] team, spouse very tearful during conversation. Stated that she has been getting updates from the team. Will be at the hospital tomorrow around 12PM to discuss further GoC.     Objective :  Temp:  [97.4 °F (36.3 °C)-101.7 °F (38.7 °C)] 101.7 °F (38.7 °C)  HR:  [] 134  BP: ()/(51-82) 115/75  Resp:  [0-57] 0  SpO2:  [94 %-96 %] 94 %  O2 Device: Nasal cannula  Nasal Cannula O2 Flow Rate (L/min):  [2 L/min] 2 L/min    Physical Exam  Constitutional:       General: He is not in acute distress.     Appearance: He is ill-appearing.   HENT:      Mouth/Throat:      Mouth: Mucous membranes are dry.   Cardiovascular:      Rate and Rhythm: Tachycardia present.   Pulmonary:      Effort: Pulmonary effort is normal. Tachypnea present. No respiratory distress.   Skin:     General: Skin is warm and dry.      Coloration: Skin is pale.   Neurological:      Mental Status: He is lethargic.      Comments: Not following commands, minimally responsive          Lab Results: I have reviewed the following results:  Lab Results   Component Value Date/Time    SODIUM 143 10/23/2024 05:13 AM    SODIUM 130 (L) 10/01/2024 02:47 PM    K 4.1 10/23/2024 05:13 AM    K 4.5 10/01/2024 02:47 PM    BUN 45 (H) 10/23/2024 05:13 AM    BUN 18 10/01/2024 02:47 PM    BUN 18 10/11/2019 03:21 PM    CREATININE 1.16 10/23/2024 05:13 AM    CREATININE 0.9 10/01/2024 02:47 PM    GLUC 115 10/23/2024 05:13 AM    GLUC 79 10/01/2024 02:47 PM    CALCIUM 7.7 (L) 10/23/2024 05:13 AM    CALCIUM 7.5 (L) 10/01/2024 02:47 PM     (H)  10/23/2024 05:13 AM     (H) 10/01/2024 02:47 PM     (H) 10/23/2024 05:13 AM     (H) 10/01/2024 02:47 PM    ALB 2.0 (L) 10/23/2024 05:13 AM    ALB 2.7 (L) 10/01/2024 02:47 PM    TP 3.4 (L) 10/23/2024 05:13 AM    TP 4.4 (L) 10/01/2024 02:47 PM    EGFR 75 10/23/2024 05:13 AM    EGFR >90 10/01/2024 02:47 PM    EGFR 81 10/11/2019 03:21 PM     Lab Results   Component Value Date/Time    HGB 7.8 (L) 10/23/2024 05:13 AM    WBC 13.36 (H) 10/23/2024 05:13 AM     10/23/2024 05:13 AM    INR 1.77 (H) 10/23/2024 05:13 AM    PTT 47 (H) 10/22/2024 05:04 AM     Lab Results   Component Value Date/Time    [id number] 23.267 (H) 10/12/2024 04:49 AM     Administrative Statements   I have spent a total time of 40 minutes in caring for this patient on the day of the visit/encounter including Counseling / Coordination of care, Documenting in the medical record, Reviewing / ordering tests, medicine, procedures  , Obtaining or reviewing history  , and Communicating with other healthcare professionals . Topics discussed with the patient / family include symptom assessment and management, medication review, psychosocial support, supportive listening, and anticipatory guidance.    Nohemy Wyatt

## 2024-10-23 NOTE — ASSESSMENT & PLAN NOTE
Patient with multiple splenic, hepatic and osseous lesions, concerning for metastasis.  Plan for biopsy of hepatic lesion noted.  This is being postponed due to persistent fever.  Histoplasma antigen negative.

## 2024-10-23 NOTE — TELEPHONE ENCOUNTER
10/23/24 - PT STILL IN HOSPITAL  11/1/24 2 WK POV W/CT HEAD *[facility]*  12/3/24 6 WK POV W/JA *[facility]*    10/21/2024- PT STILL IN HOSPITAL    10/18/2024- PT STILL IN HOSPITAL

## 2024-10-23 NOTE — PLAN OF CARE
Problem: PAIN - ADULT  Goal: Verbalizes/displays adequate comfort level or baseline comfort level  Description: Interventions:  - Encourage patient to monitor pain and request assistance  - Assess pain using appropriate pain scale  - Administer analgesics based on type and severity of pain and evaluate response  - Implement non-pharmacological measures as appropriate and evaluate response  - Consider cultural and social influences on pain and pain management  - Notify physician/advanced practitioner if interventions unsuccessful or patient reports new pain  Outcome: Progressing     Problem: INFECTION - ADULT  Goal: Absence or prevention of progression during hospitalization  Description: INTERVENTIONS:  - Assess and monitor for signs and symptoms of infection  - Monitor lab/diagnostic results  - Monitor all insertion sites, i.e. indwelling lines, tubes, and drains  - Monitor endotracheal if appropriate and nasal secretions for changes in amount and color  - [place] appropriate cooling/warming therapies per order  - Administer medications as ordered  - Instruct and encourage patient and family to use good hand hygiene technique  - Identify and instruct in appropriate isolation precautions for identified infection/condition  Outcome: Progressing     Problem: SAFETY ADULT  Goal: Patient will remain free of falls  Description: INTERVENTIONS:  - Educate patient/family on patient safety including physical limitations  - Instruct patient to call for assistance with activity   - Consult OT/PT to assist with strengthening/mobility   - Keep Call bell within reach  - Keep bed low and locked with side rails adjusted as appropriate  - Keep care items and personal belongings within reach  - Initiate and maintain comfort rounds  - Make Fall Risk Sign visible to staff  - Offer Toileting every 2 Hours, in advance of need  - Apply yellow socks and bracelet for high fall risk patients  - Consider moving patient to room near nurses  station  Outcome: Progressing  Goal: Maintain or return to baseline ADL function  Description: INTERVENTIONS:  -  Assess patient's ability to carry out ADLs; assess patient's baseline for ADL function and identify physical deficits which impact ability to perform ADLs (bathing, care of mouth/teeth, toileting, grooming, dressing, etc.)  - Assess/evaluate cause of self-care deficits   - Assess range of motion  - Assess patient's mobility; develop plan if impaired  - Assess patient's need for assistive devices and provide as appropriate  - Encourage maximum independence but intervene and supervise when necessary  - Involve family in performance of ADLs  - Assess for home care needs following discharge   - Consider OT consult to assist with ADL evaluation and planning for discharge  - Provide patient education as appropriate  Outcome: Progressing  Goal: Maintains/Returns to pre admission functional level  Description: INTERVENTIONS:  - Perform AM-PAC 6 Click Basic Mobility/ Daily Activity assessment daily.  - Set and communicate daily mobility goal to care team and patient/family/caregiver.   - Collaborate with rehabilitation services on mobility goals if consulted  - Reposition patient every 2 hours.  - Out of bed for toileting  - Record patient progress and toleration of activity level   Outcome: Progressing     Problem: DISCHARGE PLANNING  Goal: Discharge to home or other facility with appropriate resources  Description: INTERVENTIONS:  - Identify barriers to discharge w/patient and caregiver  - Arrange for needed discharge resources and transportation as appropriate  - Identify discharge learning needs (meds, wound care, etc.)  - Arrange for interpretive services to assist at discharge as needed  - Refer to Case Management Department for coordinating discharge planning if the patient needs post-hospital services based on physician/advanced practitioner order or complex needs related to functional status, cognitive  ability, or social support system  Outcome: Progressing     Problem: Knowledge Deficit  Goal: Patient/family/caregiver demonstrates understanding of disease process, treatment plan, medications, and discharge instructions  Description: Complete learning assessment and assess knowledge base.  Interventions:  - Provide teaching at level of understanding  - Provide teaching via preferred learning methods  Outcome: Progressing     Problem: Prexisting or High Potential for Compromised Skin Integrity  Goal: Skin integrity is maintained or improved  Description: INTERVENTIONS:  - Identify patients at risk for skin breakdown  - Assess and monitor skin integrity  - Assess and monitor nutrition and hydration status  - Monitor labs   - Assess for incontinence   - Turn and reposition patient  - Assist with mobility/ambulation  - Relieve pressure over bony prominences  - Avoid friction and shearing  - Provide appropriate hygiene as needed including keeping skin clean and dry  - Evaluate need for skin moisturizer/barrier cream  - Collaborate with interdisciplinary team   - Patient/family teaching  - Consider wound care consult   Outcome: Progressing     Problem: Nutrition/Hydration-ADULT  Goal: Nutrient/Hydration intake appropriate for improving, restoring or maintaining nutritional needs  Description: Monitor and assess patient's nutrition/hydration status for malnutrition. Collaborate with interdisciplinary team and initiate plan and interventions as ordered.  Monitor patient's weight and dietary intake as ordered or per policy. Utilize nutrition screening tool and intervene as necessary. Determine patient's food preferences and provide high-protein, high-caloric foods as appropriate.     INTERVENTIONS:  - Monitor oral intake, urinary output, labs, and treatment plans  - Assess nutrition and hydration status and recommend course of action  - Evaluate amount of meals eaten  - Assist patient with eating if necessary   - Allow  adequate time for meals  - Recommend/ encourage appropriate diets, oral nutritional supplements, and vitamin/mineral supplements  - Order, calculate, and assess calorie counts as needed  - Recommend, monitor, and adjust tube feedings and TPN/PPN based on assessed needs  - Assess need for intravenous fluids  - Provide specific nutrition/hydration education as appropriate  - Include patient/family/caregiver in decisions related to nutrition  Outcome: Progressing

## 2024-10-23 NOTE — ASSESSMENT & PLAN NOTE
Goals of care  Level 1 code status  Disease focused care with no limits in place.   Concerns introduced include:  Spoke w/ patient's wife, feels guilty about spreading herself thin between work, her children and her    Needs significant emotional support  PSC SW following  Spouse will be present tomorrow around 12PM - further GoC discussion will be held  Will continue discussions regarding GOC as patient's clinical presentation evolves.    Social support:  Supportive listening provided  Normalized experience of patient/family  Provided anxiety containment  Provided anticipatory guidance  Encouraged self care    Follow up  Palliative Care will continue to follow and goals of care discussions will be ongoing.    Please reach out via [employer] Secure Chat if questions or concerns arise.    I have reviewed the patient's controlled substance dispensing history in the Prescription Drug Monitoring Program in compliance with the [facility] regulations before prescribing any controlled substances.  Last refills: N/A    Decisional apparatus:  Patient does not have capacity on exam today.  If capacity is lost, patient's substitute decision maker would default to spouse (Liane) by [place] Act 169.    Advance Directive/Living Will, POLST and POA Forms: None on file.    ER contacts:  Name Relation Home Work Mobile   Liane Duval Spouse   [phone]     We appreciate the invitation to be involved in this patient's care.  We will continue to follow throughout this hospitalization.  Please do not hesitate to reach our on call provider through our clinic answering service at [phone] should you have acute symptom control concerns.

## 2024-10-23 NOTE — UTILIZATION REVIEW
Continued Stay Review    Date: 10/23                          Current Patient Class: Inpatient  Current Level of Care: Critical Care    HPI:44 y.o. male initially admitted on 10/13     Assessment/Plan:   Per General Surgery; Large sacral wound, suspected to be pressure induced s/p sacral debridement 10/21  Trend Hgb. Pain and nausea control prn.    Per Critical Care; Iv Albumin 25% 25g Tid for 2 days. Plan for IR Biopsy on 10/25.  Monitor off antibiotics.     Per ID; Pt with multiple splenic, hepatic and osseous lesions, concerning for metastasis. Plan for biopsy of hepatic lesion noted. This is being postponed due to persistent fever.   Monitor off antibiotics.     Medications:   Scheduled Medications:  Albumin 25%, 25 g, Intravenous, TID  chlorhexidine, 15 mL, Mouth/Throat, Q12H KAYLIE  Cholecalciferol, 5,000 Units, Oral, Daily  famotidine, 20 mg, Oral, BID  folic acid, 1 mg, Per NG Tube, Daily  heparin (porcine), 5,000 Units, Subcutaneous, Q8H KAYLIE  levothyroxine, 200 mcg, Oral, Early Morning  phytonadione, 10 mg, Oral, Daily  thiamine, 100 mg, Per NG Tube, Daily      Continuous IV Infusions: None     PRN Meds:  fentaNYL, 50 mcg, Intravenous, Q1H PRN  hydrALAZINE, 10 mg, Intravenous, Q6H PRN  HYDROmorphone, 1 mg, Intravenous, Q3H PRN  labetalol, 10 mg, Intravenous, Q6H PRN  ondansetron, 4 mg, Intravenous, Q8H PRN  polyethylene glycol, 17 g, Oral, Daily PRN      Discharge Plan: TBD    Vital Signs (last 3 days)       Date/Time Temp Pulse Resp BP MAP (mmHg) Arterial Line BP MAP SpO2 Calculated FIO2 (%) - Nasal Cannula Nasal Cannula O2 Flow Rate (L/min) O2 Device Patient Position - Orthostatic VS [place] Coma Scale Score Pain    10/23/24 1342 97 °F (36.1 °C) -- -- -- -- -- -- -- -- -- -- -- -- --    10/23/24 1200 100.8 °F (38.2 °C) 126 30 100/65 80 102/64 78 mmHg 96 % -- -- Nasal cannula Lying 14 --    10/23/24 1100 -- 130 25 107/66 75 98/60 72 mmHg 95 % -- -- -- -- -- --    10/23/24 1000 102.8 °F (39.3 °C) 132 28 96/58  77 94/56 68 mmHg 94 % -- -- -- -- -- --    10/23/24 0900 -- 134 26 119/70 88 102/60 72 mmHg 95 % -- -- -- -- -- --    10/23/24 0800 -- 134 0 112/75 91 94/58 68 mmHg 94 % -- -- -- -- -- --    10/23/24 0746 101.7 °F (38.7 °C) 134 0 -- -- 94/58 68 mmHg 94 % -- -- Nasal cannula Lying -- --    10/23/24 0745 -- -- -- -- -- -- -- -- -- -- -- -- 14 --    10/23/24 0700 -- 134 33 115/75 88 96/58 72 mmHg 95 % -- -- -- -- -- --    10/23/24 0600 -- 134 31 95/51 63 96/58 70 mmHg 95 % -- -- -- -- 14 --    10/23/24 0500 -- 134 31 109/73 84 92/58 70 mmHg 95 % -- -- -- -- -- --    10/23/24 0400 98.2 °F (36.8 °C) 134 30 119/71 80 92/58 70 mmHg 95 % 28 2 L/min Nasal cannula -- 14 No Pain    10/23/24 0300 -- 136 32 124/74 89 92/58 70 mmHg 95 % -- -- -- -- -- --    10/23/24 0200 -- 136 31 114/78 87 94/60 72 mmHg 95 % -- -- -- -- 14 --    10/23/24 0100 -- 136 31 119/76 93 96/62 72 mmHg 95 % -- -- -- -- -- --    10/23/24 0000 98.1 °F (36.7 °C) 138 29 -- -- 98/68 76 mmHg 94 % 28 2 L/min Nasal cannula -- 14 No Pain    10/22/24 2300 -- 138 30 -- -- 104/64 78 mmHg 94 % -- -- -- -- -- --    10/22/24 2200 -- 138 30 -- -- 102/64 76 mmHg 94 % -- -- -- -- 14 --    10/22/24 2100 -- 136 27 -- -- 106/64 78 mmHg 94 % -- -- -- -- -- --    10/22/24 2000 98 °F (36.7 °C) 134 27 -- -- 114/74 88 mmHg 94 % 28 2 L/min Nasal cannula -- 14 No Pain    10/22/24 1900 -- 132 30 -- -- 118/76 88 mmHg 94 % -- -- -- -- -- --    10/22/24 1807 -- -- -- -- -- -- -- -- -- -- -- -- -- 8    10/22/24 1800 -- 132 25 -- -- 126/78 94 mmHg 95 % -- -- -- -- 14 --    10/22/24 1700 -- 132 38 -- -- 114/76 88 mmHg 95 % -- -- -- -- -- --    10/22/24 1600 97.7 °F (36.5 °C) 128 26 -- -- 100/64 76 mmHg 96 % 28 2 L/min Nasal cannula -- 14 2    10/22/24 1500 -- 130 28 -- -- 98/66 78 mmHg 96 % -- -- -- -- -- --    10/22/24 14:28:41 -- 132 -- 106/74 -- -- -- 96 % -- -- -- -- -- --    10/22/24 1428 -- 132 32 -- -- 104/72 84 mmHg 96 % -- -- -- -- -- --    10/22/24 1427 -- 130 35 -- -- 106/72 82  mmHg 96 % -- -- -- -- -- --    10/22/24 1426 -- 130 39 -- -- 104/70 82 mmHg 96 % -- -- -- -- -- --    10/22/24 1425 -- 130 27 -- -- 108/72 82 mmHg 96 % -- -- -- -- -- --    10/22/24 1424 -- 130 25 -- -- 110/72 84 mmHg 96 % -- -- -- -- -- --    10/22/24 1423 -- 130 26 -- -- 108/72 82 mmHg 96 % -- -- -- -- -- --    10/22/24 1422 -- 132 27 -- -- 106/70 82 mmHg 96 % -- -- -- -- -- --    10/22/24 1421 -- 132 28 -- -- 104/70 82 mmHg 95 % -- -- -- -- -- --    10/22/24 1420 -- 134 29 -- -- 106/70 82 mmHg 95 % -- -- -- -- -- --    10/22/24 1419 -- 134 33 -- -- 110/74 86 mmHg 95 % -- -- -- -- -- --    10/22/24 1418 -- 132 14 -- -- 108/74 86 mmHg 95 % -- -- -- -- -- --    10/22/24 1417 -- 134 25 -- -- 108/74 84 mmHg 95 % -- -- -- -- -- --    10/22/24 1416 -- 132 20 -- -- 108/72 84 mmHg 95 % -- -- -- -- -- --    10/22/24 1415 -- 134 33 -- -- 110/72 84 mmHg 95 % -- -- -- -- -- --    10/22/24 1414 -- 134 34 -- -- 114/76 88 mmHg 95 % -- -- -- -- -- --    10/22/24 1413 -- 134 40 -- -- 112/74 86 mmHg 95 % -- -- -- -- -- --    10/22/24 1412 -- 134 33 -- -- 106/72 84 mmHg 95 % -- -- -- -- -- --    10/22/24 1411 -- 134 31 -- -- 102/72 84 mmHg 95 % -- -- -- -- -- --    10/22/24 1410 -- 134 28 -- -- 108/74 86 mmHg 95 % -- -- -- -- -- --    10/22/24 1409 -- 136 46 -- -- 112/76 90 mmHg 96 % -- -- -- -- -- --    10/22/24 1408 -- 136 37 -- -- 114/78 90 mmHg 96 % -- -- -- -- -- --    10/22/24 1407 -- 136 32 -- -- 114/78 90 mmHg 96 % -- -- -- -- -- --    10/22/24 14:06:51 -- 69 -- 114/80 -- -- -- 96 % -- -- -- -- -- --    10/22/24 1406 -- 136 33 -- -- 108/72 86 mmHg 96 % -- -- -- -- -- --    10/22/24 1400 -- 134 29 -- -- 110/74 86 mmHg 95 % -- -- -- -- 14 --    10/22/24 1300 -- 134 30 -- -- 102/72 82 mmHg 95 % -- -- -- -- -- --    10/22/24 1200 97.4 °F (36.3 °C) 148 57 -- -- 108/72 84 mmHg 94 % -- -- None (Room air) -- 14 --    10/22/24 1158 -- -- -- -- -- -- -- -- -- -- -- -- -- 8    10/22/24 1100 -- 144 34 -- -- 100/66 78 mmHg 94 % -- -- --  -- -- --    10/22/24 1000 -- 144 32 126/82 101 100/68 78 mmHg 94 % -- -- -- -- 14 --    10/22/24 0930 -- -- -- -- -- -- -- -- -- -- -- -- -- 9    10/22/24 0900 -- 144 30 -- -- 106/72 82 mmHg 94 % -- -- -- -- -- --    10/22/24 0800 98.5 °F (36.9 °C) 142 28 -- -- 106/70 82 mmHg 93 % -- -- None (Room air) -- 14 No Pain    10/22/24 0700 -- 138 32 -- -- 116/72 86 mmHg 93 % -- -- -- -- -- --    10/22/24 0600 -- 142 34 125/93 107 122/78 94 mmHg 94 % -- -- -- -- 14 --    10/22/24 0500 -- 138 26 105/70 80 118/78 90 mmHg 96 % -- -- -- -- -- --    10/22/24 0400 99.1 °F (37.3 °C) 138 27 135/85 98 126/72 88 mmHg 97 % -- -- None (Room air) -- 14 No Pain    10/22/24 0300 -- 134 24 116/89 104 124/68 88 mmHg 97 % -- -- -- -- -- --    10/22/24 0200 -- 138 22 123/84 110 132/74 92 mmHg 97 % -- -- -- -- 14 --    10/22/24 0100 -- 138 23 125/94 107 122/76 92 mmHg 98 % -- -- -- -- -- --    10/22/24 0000 98.1 °F (36.7 °C) 132 12 119/90 100 122/72 90 mmHg 97 % -- -- None (Room air) -- 14 No Pain    10/21/24 2300 99 °F (37.2 °C) 138 26 120/91 109 130/76 94 mmHg 97 % -- -- -- -- -- --    10/21/24 2200 95.4 °F (35.2 °C) 130 24 127/86 95 128/72 90 mmHg 98 % -- -- None (Room air) -- 14 --    10/21/24 2100 99.3 °F (37.4 °C) 122 20 -- -- 122/70 88 mmHg 97 % -- -- -- -- -- --    10/21/24 2000 98.2 °F (36.8 °C) 120 22 -- -- 124/70 88 mmHg 97 % -- -- None (Room air) -- 14 No Pain    10/21/24 1900 97.2 °F (36.2 °C) 116 18 -- -- 134/72 92 mmHg 96 % -- -- -- -- -- --    10/21/24 1800 96.8 °F (36 °C) 116 17 -- -- 90/74 82 mmHg 97 % -- -- -- -- 14 --    10/21/24 1730 96.4 °F (35.8 °C) 114 19 -- -- 120/70 86 mmHg 96 % -- -- -- -- -- --    10/21/24 1700 98.6 °F (37 °C) 116 19 -- -- 122/84 98 mmHg 97 % -- -- -- -- -- --    10/21/24 1630 98.6 °F (37 °C) 114 18 130/86 100 124/76 92 mmHg 100 % -- -- -- -- -- --    10/21/24 1600 98.6 °F (37 °C) 114 23 129/80 112 124/76 92 mmHg 98 % -- -- None (Room air) Lying -- No Pain    10/21/24 1545 -- -- -- -- -- -- -- -- --  -- -- -- 13 --    10/21/24 1530 98.6 °F (37 °C) 110 21 114/82 93 106/80 90 mmHg 98 % -- -- -- -- -- --    10/21/24 1515 98.6 °F (37 °C) 106 30 -- -- 92/76 84 mmHg 99 % -- -- -- -- -- --    10/21/24 1500 98.6 °F (37 °C) 104 23 -- -- 90/76 84 mmHg 100 % -- -- -- -- -- --    10/21/24 1445 98.6 °F (37 °C) 102 32 120/77 86 124/76 90 mmHg 100 % -- -- -- -- -- --    10/21/24 1202 -- -- -- -- -- -- -- -- -- -- None (Room air) -- -- --    10/21/24 1155 98.8 °F (37.1 °C) 104 19 -- -- 134/66 88 mmHg 96 % -- -- None (Room air) -- 14 No Pain    10/21/24 1100 -- 96 14 -- -- 124/68 84 mmHg 97 % -- -- -- -- -- --    10/21/24 1000 -- 96 14 -- -- 116/70 86 mmHg 97 % -- -- -- -- 14 --    10/21/24 0900 -- 100 14 -- -- 116/70 84 mmHg 96 % -- -- -- -- -- --    10/21/24 0805 100.4 °F (38 °C) 102 14 117/81 102 88/84 86 mmHg 99 % -- -- None (Room air) Lying -- No Pain    10/21/24 0800 -- -- -- -- -- -- -- -- -- -- -- -- 14 --    10/21/24 0700 -- 100 16 -- -- 86/80 82 mmHg 100 % -- -- -- -- -- --    10/21/24 0605 -- 102 16 103/69 79 98/80 88 mmHg 100 % -- -- -- -- -- --    10/21/24 0600 -- -- -- -- -- -- -- -- -- -- -- -- 14 --    10/21/24 0505 99.9 °F (37.7 °C) 110 24 104/88 98 122/70 88 mmHg 99 % -- -- -- -- -- --    10/21/24 0405 100.3 °F (37.9 °C) 108 17 119/84 94 116/68 82 mmHg 99 % 24 1 L/min Nasal cannula -- -- --    10/21/24 0400 -- -- -- -- -- -- -- -- -- -- -- -- 14 No Pain    10/21/24 0305 -- 116 18 122/86 102 116/66 82 mmHg 98 % -- -- -- -- -- --    10/21/24 0205 101.1 °F (38.4 °C) 112 17 138/89 100 122/82 96 mmHg 100 % -- -- -- -- -- --    10/21/24 0200 -- -- -- -- -- -- -- -- -- -- -- -- 14 --    10/21/24 0105 100.4 °F (38 °C) 114 24 122/87 98 134/106 120 mmHg 96 % -- -- -- -- -- --    10/21/24 0005 99.7 °F (37.6 °C) 116 21 116/90 102 132/76 94 mmHg 100 % 24 1 L/min Nasal cannula -- -- --    10/21/24 0000 -- -- -- -- -- -- -- -- -- -- -- -- 14 No Pain    10/20/24 2305 99 °F (37.2 °C) 110 19 123/89 112 130/78 96 mmHg 100 % -- --  -- -- -- --    10/20/24 2205 98.2 °F (36.8 °C) 106 17 122/94 102 120/76 90 mmHg 98 % -- -- -- -- -- --    10/20/24 2200 -- -- -- -- -- -- -- -- -- -- -- -- 14 --    10/20/24 2105 -- -- -- 120/85 99 -- -- -- -- -- -- -- -- --    10/20/24 2100 97.9 °F (36.6 °C) 104 19 -- -- 118/74 90 mmHg 97 % -- -- -- -- -- --    10/20/24 2005 -- -- -- 119/81 89 -- -- -- -- -- -- -- -- --    10/20/24 2000 97.9 °F (36.6 °C) 104 12 -- -- 108/76 90 mmHg 98 % 24 1 L/min Nasal cannula -- 14 No Pain    10/20/24 1900 99 °F (37.2 °C) 108 17 -- -- 116/80 92 mmHg 98 % -- -- -- -- -- --    10/20/24 1803 99.3 °F (37.4 °C) 102 17 107/81 88 118/72 86 mmHg 99 % -- -- -- -- -- --    10/20/24 1800 99.3 °F (37.4 °C) 102 16 -- -- 122/74 90 mmHg 100 % -- -- -- -- -- --    10/20/24 1703 99 °F (37.2 °C) 102 16 100/75 84 116/68 84 mmHg 99 % -- -- -- -- -- --    10/20/24 1700 99 °F (37.2 °C) 102 17 -- -- 116/68 84 mmHg 99 % -- -- -- -- -- --    10/20/24 1646 99 °F (37.2 °C) 98 16 -- -- 118/72 86 mmHg 100 % -- -- -- -- -- --    10/20/24 1603 98.6 °F (37 °C) 102 12 97/72 78 112/68 82 mmHg 100 % -- -- -- -- -- --    10/20/24 1600 98.6 °F (37 °C) 102 6 -- -- 114/68 82 mmHg 99 % -- -- -- -- 12 --    10/20/24 1500 -- 106 18 -- -- 112/72 86 mmHg 99 % -- -- -- -- -- --    10/20/24 1403 -- 110 21 107/86 92 114/72 86 mmHg 97 % -- -- -- -- -- --    10/20/24 1303 100.4 °F (38 °C) 110 14 102/74 85 106/70 84 mmHg 98 % -- -- -- Sitting -- --    10/20/24 1300 100.4 °F (38 °C) 110 13 -- -- 106/70 82 mmHg 98 % -- -- -- -- -- --    10/20/24 1230 100 °F (37.8 °C) 116 17 117/81 97 118/80 94 mmHg 98 % -- -- -- -- -- --    10/20/24 1200 99.7 °F (37.6 °C) 116 12 -- -- 120/78 92 mmHg 99 % -- -- -- -- 12 --    10/20/24 1100 99.3 °F (37.4 °C) 106 15 -- -- 90/84 88 mmHg 100 % -- -- -- -- -- --    10/20/24 1003 -- 106 14 106/63 88 124/86 100 mmHg 99 % -- -- -- Sitting -- --    10/20/24 0903 -- 114 21 116/99 104 126/86 102 mmHg 100 % -- -- -- Lying -- --    10/20/24 0900 -- 112 22 -- --  118/86 100 mmHg 100 % -- -- -- -- -- --    10/20/24 0800 99 °F (37.2 °C) 108 13 -- -- 128/88 100 mmHg 100 % 24 1 L/min Nasal cannula -- 12 --    10/20/24 0603 -- 106 13 117/83 96 120/86 98 mmHg 99 % -- -- None (Room air) Lying -- --    10/20/24 0503 -- 106 14 120/88 99 126/78 94 mmHg 99 % -- -- None (Room air) Lying -- --    10/20/24 0403 99.6 °F (37.6 °C) 102 17 110/85 94 108/70 84 mmHg 98 % -- -- None (Room air) Lying -- --    10/20/24 0400 -- -- -- -- -- -- -- -- -- -- -- -- 12 --    10/20/24 0303 -- 112 18 121/81 93 -- -- 99 % -- -- None (Room air) Lying -- --    10/20/24 0200 101.1 °F (38.4 °C) 100 23 -- -- 128/76 90 mmHg 100 % -- -- None (Room air) -- -- --    10/20/24 0103 101.1 °F (38.4 °C) 100 24 121/69 97 124/72 86 mmHg 100 % -- -- None (Room air) Lying -- --    10/20/24 0003 100.8 °F (38.2 °C) 98 17 119/74 83 90/76 82 mmHg 100 % -- -- None (Room air) Lying -- --    10/20/24 0000 -- -- -- -- -- -- -- -- -- -- -- -- 12 --          Weight (last 2 days)       None            Pertinent Labs/Diagnostic Results:   Radiology:  XR chest portable ICU   Final Interpretation by Clayton Enriquez MD (10/23 1102)      Mild interstitial prominence which is probably exaggerated by very low lung volumes. No focal consolidation seen.            Workstation performed: [id number]         VAS carotid complete study   Final Interpretation by Wai Garcia MD (10/20 6876)      CT abdomen pelvis w contrast   Final Interpretation by Katerine Mak MD (10/18 6521)      1) Decubitus ulcer overlying the coccyx with suggestion of cortical thinning in the inferior most coccygeal segment, concerning for osteomyelitis. No associated rim-enhancing fluid collection to suggest an abscess.      2) Incompletely imaged perineal region. Air seen to the right of the anus, unclear whether along the gluteal cleft or within the subcutaneous tissues. No subcutaneous emphysema elsewhere.      3) Findings concerning for metastatic  disease in the bones, spleen, and liver, as on the recent studies, as discussed above. These were better assessed on the prior MRI.      4) Moderate abdominal and pelvic ascites, increased from 10/10/2024, however no organized fluid collections. Significant anasarca.      5) Small bilateral pleural effusions and bibasilar atelectasis, right greater than left, increased since 10/10/2024.      6) Additional findings as above.      The study was marked in EPIC for immediate notification.            Workstation performed: [id number]         MRI brain w wo contrast   Final Interpretation by E. Alec Schoenberger, MD (10/18 0725)      No significant change. Stable evolving recent infarcts with petechial hemorrhage.   Status post left hemispheric craniectomy for subdural drainage. Stable subgaleal epidural hematoma with small residual subdurals. Stable small volume of intraventricular hemorrhage.   Stable mass effect with 4 mm of left-to-right shift.      Workstation performed: [id number]         CT head wo contrast   Final Interpretation by Trenton Stanley MD (10/17 0805)      1.  No significant change in the left subdural hemorrhage or evolving right thalamic and left temporo-occipital infarctions.                  Workstation performed: [id number]         XR abdomen 1 view kub   Final Interpretation by Gen Mims MD (10/16 0723)      Nasogastric tube terminates in the stomach.               Workstation performed: [id number]         CT head wo contrast   Final Interpretation by Fabricio Almeida MD (10/15 8354)      Status post decompressive left-sided hemicraniectomy with mild interval increase in the degree of mixed attenuation subdural as well as subgaleal hemorrhage along the craniectomy site. Grossly stable mild rightward midline shift.      Thin subdural hemorrhage tracking along the right tentorium likely represents redistributed blood products. Small amount of hemorrhage also noted layering in the  "occipital horns.      Evolving areas of ischemia as described above with punctate areas seen to better advantage on recent MRI. No definite CT evidence for new large acute vascular distribution infarct.      The study was marked in EPIC for immediate notification.                  Workstation performed: [id number]         MRI brain wo contrast   Final Interpretation by Wil Brennan DO (10/14 1368)      Multifocal early ischemia including right anterior thalamus, anterior occipital lobes left greater than right. On the left this tracks into the posterior medial temporal lobe. There is also a punctate focus of cortical ischemia within the posterior    parafalcine frontal lobe. Cortical petechial hemorrhage noted within the central aspect of the right thalamus and left anterior occipital lobe. No sofía hemorrhagic transformation. Evaluation of the cervical and intracranial vasculature is recommended.      Patient is status post left hemicraniectomy for surgical decompression of acute subdural hemorrhage. Mixed signal subdural hemorrhage, fluid and air remains within the periphery of the left hemisphere with mild mass effect and 4 mm of left to right    shift.      There is a very small subdural hemorrhage noted within the right middle cranial fossa extending posteriorly lateral to the occipital lobe, less than 2 mm in thickness.      Focal intraventricular hemorrhage within the occipital horn of the right greater than left. There is increased T2 and FLAIR signal change surrounding the occipital horn of the lateral ventricle suggesting focal transependymal flow of CSF.      This examination was marked \"immediate notification\" in Epic in order to begin the standard process by which the radiology reading room liaison alerts the referring practitioner.      Workstation performed: [id number]         CT head wo contrast   Final Interpretation by Pradip Lovett MD (10/13 5539)      -New area of hypodensity within the " left thalamus and basal ganglia compared to earlier day exam, suggestive of acute infarct.      -Expected postsurgical changes from left hemispheric craniectomy with decreased size of mixed density subdural hematoma, improved 4 mm left to right midline shift and improved effacement of the basilar cisterns.      -Improved mass effect on the ventricular system and dilatation of the right temporal horn of the lateral ventricle as detailed above.      I personally communicated the findings via telephone with Son Dover at 12:56 pm. on 10/13/2024.                  Workstation performed: [id number]         CT head wo contrast    (Results Pending)   IR INPATIENT Paracentesis    (Results Pending)   IR biopsy bone    (Results Pending)     Cardiology:  ECG 12 lead   Final Result by Fabricio Navarrete MD (10/22 0454)   Sinus tachycardia   Low voltage QRS   Septal infarct (cited on or before 21-OCT-2024)   Abnormal ECG   When compared with ECG of 21-OCT-2024 22:17, (unconfirmed)   No significant change was found   Confirmed by Fabricio Navarrete ([id number]) on 10/22/2024 4:54:23 AM      ECG 12 lead   Final Result by Fabricio Navarrete MD (10/22 0454)   Sinus tachycardia   Low voltage QRS   Septal infarct , age undetermined   Abnormal ECG   When compared with ECG of 14-OCT-2024 21:19,   Septal infarct is now Present   Nonspecific T wave abnormality, worse in Lateral leads   Confirmed by Fabricio Navarrete ([id number]) on 10/22/2024 4:54:12 AM      Echo follow up/limited w/ contrast if indicated   Final Result by Tj Costa MD (10/16 1726)        Left Ventricle: Left ventricular cavity size is normal. Wall thickness    is normal. The left ventricular ejection fraction is 60%. Systolic    function is normal. Wall motion is normal.     Atrial Septum: There is a patent foramen ovale confirmed with    provocation (Valsalva) with predominant left to right shunting using    saline contrast.         ECG 12 lead   Final Result by Brooklyn Carter MD (10/15 1724)    Sinus tachycardia   Nonspecific T wave abnormality   Abnormal ECG   When compared with ECG of 14-OCT-2024 20:48, (unconfirmed)   No significant change was found   Confirmed by Brooklyn Carter ([id number]) on 10/15/2024 5:24:13 PM      ECG 12 lead   Final Result by Brooklyn Carter MD (10/15 1722)   Sinus tachycardia   Low voltage QRS   Nonspecific T wave abnormality   Abnormal ECG   When compared with ECG of 14-OCT-2024 04:41,   Vent. rate has increased BY  61 BPM   Confirmed by Brooklyn Carter ([id number]) on 10/15/2024 5:22:36 PM      ECG 12 lead   Final Result by Blair Bhandari MD (10/14 1329)   Marked sinus bradycardia   Low voltage QRS   Abnormal ECG   Confirmed by Blair Bhandari ([id number]) on 10/14/2024 1:29:31 PM      ECG 12 lead   Final Result by Valentin Washington MD (10/13 1851)   Sinus bradycardia   Septal infarct (cited on or before 15-MAR-2024)   Lateral infarct (cited on or before 15-MAR-2024)   Inferior infarct , age undetermined   Abnormal ECG      Confirmed by Valentin Washington ([id number]) on 10/13/2024 6:51:18 PM        GI:  No orders to display           Results from last 7 days   Lab Units 10/23/24  0513 10/22/24  1314 10/22/24  0504 10/21/24  2159 10/21/24  0520 10/19/24  0540 10/18/24  0506 10/17/24  0444   WBC Thousand/uL 13.36*  --  9.94  --  8.36   < > 8.48 10.51*   HEMOGLOBIN g/dL 7.8* 8.4* 8.4* 9.3* 8.9*   < > 9.8* 10.0*   HEMATOCRIT % 24.4* 27.2* 27.1* 29.8* 28.9*   < > 30.0* 31.5*   PLATELETS Thousands/uL 224  --  215  --  149   < > 140* 123*   TOTAL NEUT ABS Thousand/uL  --   --   --   --   --   --   --  9.25*   BANDS PCT %  --   --   --   --  11*  --  14* 17*    < > = values in this interval not displayed.         Results from last 7 days   Lab Units 10/23/24  0513 10/22/24  0504 10/21/24  0520 10/20/24  0537 10/19/24  0540 10/18/24  0506 10/17/24  0444   SODIUM mmol/L 143 139 139 140 139 137 136   POTASSIUM mmol/L 4.1 4.2 3.6 3.4* 4.0 4.0 4.1   CHLORIDE mmol/L 111* 109* 108 108 108 105 104   CO2 mmol/L 21  "19* 24 23 24 24 22   ANION GAP mmol/L 11 11 7 9 7 8 10   BUN mg/dL 45* 35* 31* 30* 31* 30* 25   CREATININE mg/dL 1.16 0.72 0.56* 0.58* 0.61 0.75 0.62   EGFR ml/min/1.73sq m 75 112 124 123 120 110 119   CALCIUM mg/dL 7.7* 7.5* 7.4* 7.4* 6.9* 7.1* 7.1*   CALCIUM, IONIZED mmol/L  --   --  1.11* 1.12 1.09* 1.07* 1.09*   MAGNESIUM mg/dL 2.0 1.9 1.9 1.9 1.9 1.9 2.0   PHOSPHORUS mg/dL 3.2 2.9 2.7 2.7 2.9 2.9 2.5*     Results from last 7 days   Lab Units 10/23/24  0513 10/22/24  0504 10/21/24  0520 10/19/24  0540 10/17/24  0444   AST U/L 457* 340* 293* 286* 356*   ALT U/L 176* 146* 139* 134* 170*   ALK PHOS U/L 883* 1,176* 1,247* 599* 708*   TOTAL PROTEIN g/dL 3.4* 3.3* 3.4* 3.6* 3.7*   ALBUMIN g/dL 2.0* 2.0* 2.1* 2.1* 2.4*   TOTAL BILIRUBIN mg/dL 2.67* 2.98* 3.42* 2.23* 2.64*   BILIRUBIN DIRECT mg/dL  --  1.69*  --   --   --      Results from last 7 days   Lab Units 10/23/24  1144 10/23/24  0551 10/22/24  2346 10/22/24  1707 10/22/24  1157 10/22/24  0503 10/21/24  2349 10/21/24  2347 10/21/24  1915 10/21/24  1852 10/21/24  1748 10/21/24  1150   POC GLUCOSE mg/dl 116 116 109 106 98 104 92 63* 103 54* 55* 77     Results from last 7 days   Lab Units 10/23/24  0513 10/22/24  0504 10/21/24  0520 10/20/24  0537 10/19/24  0540 10/18/24  0506 10/17/24  0444   GLUCOSE RANDOM mg/dL 115 92 88 102 92 82 91             No results found for: \"BETA-HYDROXYBUTYRATE\"   Results from last 7 days   Lab Units 10/19/24  1453   PH ART  7.452*   PCO2 ART mm Hg 34.7*   PO2 ART mm Hg 103.7   HCO3 ART mmol/L 23.7   BASE EXC ART mmol/L 0.0   O2 CONTENT ART mL/dL 13.8*   O2 HGB, ARTERIAL % 95.9   ABG SOURCE  Line, Arterial       Results from last 7 days   Lab Units 10/23/24  0513 10/22/24  0504 10/18/24  0506   PROTIME seconds 20.7* 20.4* 18.8*   INR  1.77* 1.73* 1.56*   PTT seconds  --  47*  --          Results from last 7 days   Lab Units 10/17/24  0444   PROCALCITONIN ng/ml 1.21*       Results from last 7 days   Lab Units 10/17/24  0444   SED RATE " mm/hour <1     Results from last 7 days   Lab Units 10/22/24  1438 10/17/24  1216   BLOOD CULTURE   --  No Growth After 5 Days.  No Growth After 5 Days.   GRAM STAIN RESULT  No Polys or Bacteria seen  --    BODY FLUID CULTURE, STERILE  No growth  --      Results from last 7 days   Lab Units 10/22/24  1438 10/17/24  0444   TOTAL COUNTED  100 100   WBC FLUID /ul 112  --                Network Utilization Review Department  ATTENTION: Please call with any questions or concerns to [phone] and carefully listen to the prompts so that you are directed to the right person. All voicemails are confidential.   For Discharge needs, contact Care Management DC Support Team at [phone] opt. 2  Send all requests for admission clinical reviews, approved or denied determinations and any other requests to dedicated fax number below belonging to the [facility] where the patient is receiving treatment. List of dedicated fax numbers for the Facilities:  FACILITY NAME UR FAX NUMBER   ADMISSION DENIALS (Administrative/Medical Necessity) [phone]   DISCHARGE SUPPORT TEAM (NETWORK) [phone]   PARENT CHILD HEALTH (Maternity/NICU/Pediatrics) [phone]   [facility] [phone]   [facility] [phone]   [facility] [phone]   [facility] [phone]   [facility] [phone]   [facility] [phone]   [facility] [phone]   [facility] [phone]   [facility] [phone]   [facility] [phone]   [facility] [phone]   [facility] [phone]

## 2024-10-23 NOTE — PROGRESS NOTES
Progress Note - Surgery-General   Name: Clayton Duval 44 y.o. male I MRN: [id number]  Unit/Bed#: ICU 05 I Date of Admission: 10/13/2024   Date of Service: 10/23/2024 I Hospital Day: 10    Assessment & Plan  Sacral wound  43 yo male with large sacral wound, suspected to be pressure induced s/p sacral debridement 10/21.    Please see Dr. Paulino's Quick Note on 10/22 for detailed description of surgical plan for this patient may entail.    -Monitor sacral wound -- needs more WTD debridements however improving   -Dressing change 10/22   -next surgery dressing change to 10/25 (nurses may change daily prn)  -Trend hemoglobin  -Trend fever/wbc curve  -Pain/nausea control  -Rest of care per primary team  Non-traumatic acute L subdural hematoma (HCC)  Care per neuro ICU    Palliative care by specialist      Surgery-General service will peripherally follow. Please re-engage group if any surgery question present themselves.    Mike Hess MD  General Surgery  10/23/24  10:01 AM      Subjective   NAEON. AVSS. On 1L O2 via NC. Tolerated dressing change yesterday. Tolerating TF @60cc/hr. Voiding appropirately. Had a paracentesis yesterday with 2L removed from L chest (effie colored fluid). Awaiting pathology.     Objective :  Temp:  [97.4 °F (36.3 °C)-101.7 °F (38.7 °C)] 101.7 °F (38.7 °C)  HR:  [] 134  BP: ()/(51-80) 115/75  Resp:  [0-57] 0  SpO2:  [94 %-96 %] 94 %  O2 Device: Nasal cannula  Nasal Cannula O2 Flow Rate (L/min):  [2 L/min] 2 L/min    I/O         10/21 0701  10/22 0700 10/22 0701  10/23 0700    I.V. (mL/kg) 2930 (28.2) 410 (3.9)    NG/ 290    IV Piggyback 1400 300    Feedings 840 960    Total Intake(mL/kg) 5600 (53.8) 1960 (18.8)    Urine (mL/kg/hr) 955 (0.4) 700 (0.3)    Emesis/NG output 0 0    Other  4050    Stool 0 0    Total Output 955 4750    Net +4645 -3090          Unmeasured Stool Occurrence 0 x 0 x          Lines/Drains/Airways       Active Status       Name Placement  date Placement time Site Days    Arterial Line 10/14/24 Radial 10/14/24  0425  Radial  9    NG/OG/Enteral Tube Nasogastric 12 Fr Right nare 10/15/24  1800  Right nare  7    Urethral Catheter Latex 16 Fr. 10/22/24  0000  Latex  1                  Physical Exam:  GENERAL APPEARANCE: well developed, morbidly obese, obtunded male in NAD. Not engaging with exam.  CARDIOVASCULAR: Regular rate. Grossly well perfused.  LUNGS/CHEST: Symmetric chest rise/fall with respirations.  ABD: Soft; non-distended; non-tender.  /Rectal: Sacral wound examined yesterday with packed saline-soaked kerlix (photo below)  EXT: Normal ROM. No observable deformities in 4/4 extremities. No edema.  NEURO: AAOx4. No focal neurologic deficits. Distally neurovascularly intact.  SKIN: Warm, dry and well perfused; no rash; no jaundice.          Lab Results: I have reviewed the following results:  Recent Labs     10/21/24  0520 10/21/24  2159 10/22/24  0504 10/22/24  1314 10/23/24  0513   WBC 8.36  --  9.94  --  13.36*   HGB 8.9*   < > 8.4*   < > 7.8*   HCT 28.9*   < > 27.1*   < > 24.4*     --  215  --  224   BANDSPCT 11*  --   --   --   --    SODIUM 139  --  139  --  143   K 3.6  --  4.2  --  4.1     --  109*  --  111*   CO2 24  --  19*  --  21   BUN 31*  --  35*  --  45*   CREATININE 0.56*  --  0.72  --  1.16   GLUC 88  --  92  --  115   CAIONIZED 1.11*  --   --   --   --    MG 1.9  --  1.9  --  2.0   PHOS 2.7  --  2.9  --  3.2   *  --  340*  --  457*   *  --  146*  --  176*   ALB 2.1*  --  2.0*  --  2.0*   TBILI 3.42*  --  2.98*  --  2.67*   ALKPHOS 1,247*  --  1,176*  --  883*   PTT  --   --  47*  --   --    INR  --    < > 1.73*  --  1.77*    < > = values in this interval not displayed.       Imaging Results Review: I reviewed radiology reports from this admission including: chest xray, CT abdomen/pelvis, CT head, MRI brain, MRI abdomen/MRCP, xray(s), procedure reports, and Ultrasound(s).  VAS carotid complete study    Final Result      CT abdomen pelvis w contrast   Final Result      1) Decubitus ulcer overlying the coccyx with suggestion of cortical thinning in the inferior most coccygeal segment, concerning for osteomyelitis. No associated rim-enhancing fluid collection to suggest an abscess.      2) Incompletely imaged perineal region. Air seen to the right of the anus, unclear whether along the gluteal cleft or within the subcutaneous tissues. No subcutaneous emphysema elsewhere.      3) Findings concerning for metastatic disease in the bones, spleen, and liver, as on the recent studies, as discussed above. These were better assessed on the prior MRI.      4) Moderate abdominal and pelvic ascites, increased from 10/10/2024, however no organized fluid collections. Significant anasarca.      5) Small bilateral pleural effusions and bibasilar atelectasis, right greater than left, increased since 10/10/2024.      6) Additional findings as above.      The study was marked in EPIC for immediate notification.            Workstation performed: [id number]         MRI brain w wo contrast   Final Result      No significant change. Stable evolving recent infarcts with petechial hemorrhage.   Status post left hemispheric craniectomy for subdural drainage. Stable subgaleal epidural hematoma with small residual subdurals. Stable small volume of intraventricular hemorrhage.   Stable mass effect with 4 mm of left-to-right shift.      Workstation performed: [id number]         CT head wo contrast   Final Result      1.  No significant change in the left subdural hemorrhage or evolving right thalamic and left temporo-occipital infarctions.                  Workstation performed: [id number]         XR abdomen 1 view kub   Final Result      Nasogastric tube terminates in the stomach.               Workstation performed: [id number]         CT head wo contrast   Final Result      Status post decompressive left-sided hemicraniectomy with mild  "interval increase in the degree of mixed attenuation subdural as well as subgaleal hemorrhage along the craniectomy site. Grossly stable mild rightward midline shift.      Thin subdural hemorrhage tracking along the right tentorium likely represents redistributed blood products. Small amount of hemorrhage also noted layering in the occipital horns.      Evolving areas of ischemia as described above with punctate areas seen to better advantage on recent MRI. No definite CT evidence for new large acute vascular distribution infarct.      The study was marked in EPIC for immediate notification.                  Workstation performed: [id number]         MRI brain wo contrast   Final Result      Multifocal early ischemia including right anterior thalamus, anterior occipital lobes left greater than right. On the left this tracks into the posterior medial temporal lobe. There is also a punctate focus of cortical ischemia within the posterior    parafalcine frontal lobe. Cortical petechial hemorrhage noted within the central aspect of the right thalamus and left anterior occipital lobe. No sofía hemorrhagic transformation. Evaluation of the cervical and intracranial vasculature is recommended.      Patient is status post left hemicraniectomy for surgical decompression of acute subdural hemorrhage. Mixed signal subdural hemorrhage, fluid and air remains within the periphery of the left hemisphere with mild mass effect and 4 mm of left to right    shift.      There is a very small subdural hemorrhage noted within the right middle cranial fossa extending posteriorly lateral to the occipital lobe, less than 2 mm in thickness.      Focal intraventricular hemorrhage within the occipital horn of the right greater than left. There is increased T2 and FLAIR signal change surrounding the occipital horn of the lateral ventricle suggesting focal transependymal flow of CSF.      This examination was marked \"immediate notification\" in " Epic in order to begin the standard process by which the radiology reading room liaison alerts the referring practitioner.      Workstation performed: [id number]         CT head wo contrast   Final Result      -New area of hypodensity within the left thalamus and basal ganglia compared to earlier day exam, suggestive of acute infarct.      -Expected postsurgical changes from left hemispheric craniectomy with decreased size of mixed density subdural hematoma, improved 4 mm left to right midline shift and improved effacement of the basilar cisterns.      -Improved mass effect on the ventricular system and dilatation of the right temporal horn of the lateral ventricle as detailed above.      I personally communicated the findings via telephone with Son Dover at 12:56 pm. on 10/13/2024.                  Workstation performed: [id number]         CT head wo contrast    (Results Pending)   IR INPATIENT Paracentesis    (Results Pending)   IR biopsy bone    (Results Pending)   XR chest portable ICU    (Results Pending)     Other Study Results Review: EKG was reviewed.     VTE Pharmacologic Prophylaxis: VTE covered by:  heparin (porcine), Subcutaneous, 5,000 Units at 10/23/24 4916      VTE Mechanical Prophylaxis: sequential compression device

## 2024-10-23 NOTE — PROGRESS NOTES
Progress Note - Critical Care/ICU   Name: Clayton Duval 44 y.o. male I MRN: [id number]  Unit/Bed#: ICU 05 I Date of Admission: 10/13/2024   Date of Service: 10/23/2024 I Hospital Day: 10       Assessment & Plan     Neuro:   Diagnosis: Subdural hematoma s/p left hemicraniectomy for evacuation of SDH POD#10, AMS, Acute ischemic stroke  Plan:   Continue monitoring neurological exam closely with frequent neuro checks, obtain stat CTH if any acute changes  Monitor for signs of ICP increase (bradycardia, HTN, changes in neuro exam, increased tone, pupillary changes)  Blood Pressure: SBP goal 110 - 140, cardene gtt or pressers/fluids as needed to keep within range.  AC/AP: Heparin 5000 units q8h  Tight glycemic control, goal <180. Monitor temperature, tylenol PRN.  PT/OT/Speech/PMR consults when able  DVT PPx: SCDs and heparin 5000 q8h   CV:   Diagnosis: sinus tachycardia - likely secondary to hypovolemia further evidenced by LIZZY; slightly improved today to 110s-120s from 130s previously  Plan:   Continue to monitor on Telemetry  Blood transfusion if persistent or Hgb < 7.0  Albumin 25 % 25 g TID (day 2/2)     Pulm:  Diagnosis: no active issues      GI:   Diagnosis: Hepatosplenomegaly, hepatic lesions, splenic lesions, transaminitis  Hepatitis panel - negative  B-HcG, CA 19-9, LDH elevated  Plan:   Oncology consulted  IR consulted - plan for IR biopsy today     :   Diagnosis: plasencia catheter  Plan:   Monitor I&Os     F/E/N:   F: none  E: replenish as indicated for Magnesium >2, K >4, Phos > 3  N: tube feeds 60cc/hr, FWF 100ml/4hrs     Heme/Onc:   Diagnosis: suspected metastatic cancer, hypocoagulability in the setting of hepatic malignancy, lytic bone lesions, hepatic coagulopathy  Tumor markers ordered - CEA (normal), AFP (normal), B-HCG (elevated- 7.5) , LDH (elevated- 556), CA 19-9 (pending)     Plan:   IR consulted for biopsy  Oncology consulted    Diagnosis: Anemia, Coagulopathy INR 1.7  Plan:   Transfuse  for Hgb < 7  Vit K 10 mg PO daily for 3 days        Endo:   Diagnosis: Hypothyroidism, hypothermia  Plan:   Continue home Levothyroxine 200mcg  Gisselle hugger PRN      ID:   Diagnosis: Sacral decubitus ulcer; esau-rectal abscess     Plan:   ID following; s/p 7 day zosyn course; monitoring off antibiotics for now     MSK/Skin:   Diagnosis: Sacral decubitus ulcer  Plan:   Monitor fluid status  Wound care consulted  Red surgery debridement 10/17     Diagnosis: perirectal abscess  Plan:   Surgery following - debridement done yesterday - plan for diverting colostomy depending on biopsy result and prognosis    Disposition: Critical care    ICU Core Measures     A: Assess, Prevent, and Manage Pain Has pain been assessed? NA  Need for changes to pain regimen? NA   B: Both SAT/SAT  N/A   C: Choice of Sedation RASS Goal: N/A patient not on sedation  Need for changes to sedation or analgesia regimen? NA   D: Delirium CAM-ICU: Positive   E: Early Mobility  Plan for early mobility? NA   F: Family Engagement Plan for family engagement today? No       Review of Invasive Devices:    Barclay Plan: Voiding trial after improvement in ambulation     Linda Plan: Keep arterial line for hemodynamic monitoring and frequent labs    Prophylaxis:  VTE VTE covered by:  heparin (porcine), Subcutaneous, 5,000 Units at 10/23/24 1339       Stress Ulcer  covered byfamotidine (PEPCID) tablet 20 mg [[id number]]         24 Hour Events : Overnight patient spiked a fever and blood cultures were sent. This morning patient's hemoglobin came back at 6.3 and 1 unit pRBCs transfused.     Subjective   Review of Systems: Review of Systems not obtainable due to nonverbal    Objective :                   Vitals I/O      Most Recent Min/Max in 24hrs   Temp (!) 97 °F (36.1 °C) Temp  Min: 97 °F (36.1 °C)  Max: 102.8 °F (39.3 °C)   Pulse (!) 114 Pulse  Min: 112  Max: 138   Resp (!) 39 Resp  Min: 0  Max: 39   /71 BP  Min: 73/51  Max: 124/74   O2 Sat 96 % SpO2  Min:  94 %  Max: 97 %      Intake/Output Summary (Last 24 hours) at 10/23/2024 1703  Last data filed at 10/23/2024 1401  Gross per 24 hour   Intake 2110 ml   Output 975 ml   Net 1135 ml       Diet NPO  Diet Enteral/Parenteral; Tube Feeding No Oral Diet; Jevity 1.2 Dionicio; Cyclic; 85; 22 hours; Prosource Protein Liquid - One Packet; TID; Banatrol Plus Banana Flakes - One Packet; TID; 100; Water; Every 4 hours    Invasive Monitoring           Physical Exam   Physical Exam  Vitals reviewed.   Eyes:      Conjunctiva/sclera: Conjunctivae normal.      Pupils: Pupils are equal, round, and reactive to light.   Skin:     General: Skin is warm.      Coloration: Skin is not jaundiced.      Findings: No rash.   Cardiovascular:      Rate and Rhythm: Regular rhythm. Tachycardia present.   Abdominal:      Palpations: Abdomen is soft.      Tenderness: There is no abdominal tenderness.   Constitutional:       Interventions: He is not sedated and not intubated.  Pulmonary:      Effort: Pulmonary effort is normal. He is not intubated.      Breath sounds: Normal breath sounds.   Neurological:      Mental Status: He is somnolent.      Comments: Moves right upper and lower extremity; no movement on left   Genitourinary/Anorectal:  Barclay present.        Diagnostic Studies        Lab Results: I have reviewed the following results:     Medications:  Scheduled PRN   Albumin 25%, 25 g, TID  chlorhexidine, 15 mL, Q12H KAYLIE  Cholecalciferol, 5,000 Units, Daily  famotidine, 20 mg, BID  folic acid, 1 mg, Daily  heparin (porcine), 5,000 Units, Q8H KAYLIE  levothyroxine, 200 mcg, Early Morning  phytonadione, 10 mg, Daily  thiamine, 100 mg, Daily      fentaNYL, 50 mcg, Q1H PRN  hydrALAZINE, 10 mg, Q6H PRN  HYDROmorphone, 1 mg, Q3H PRN  labetalol, 10 mg, Q6H PRN  ondansetron, 4 mg, Q8H PRN  polyethylene glycol, 17 g, Daily PRN       Continuous          Labs:   CBC    Recent Labs     10/22/24  0504 10/22/24  1314 10/23/24  0513   WBC 9.94  --  13.36*   HGB 8.4*  8.4* 7.8*   HCT 27.1* 27.2* 24.4*     --  224     BMP    Recent Labs     10/22/24  0504 10/23/24  0513   SODIUM 139 143   K 4.2 4.1   * 111*   CO2 19* 21   AGAP 11 11   BUN 35* 45*   CREATININE 0.72 1.16   CALCIUM 7.5* 7.7*       Coags    Recent Labs     10/22/24  0504 10/23/24  0513   INR 1.73* 1.77*   PTT 47*  --         Additional Electrolytes  Recent Labs     10/22/24  0504 10/23/24  0513   MG 1.9 2.0   PHOS 2.9 3.2          Blood Gas    No recent results  No recent results LFTs  Recent Labs     10/22/24  0504 10/23/24  0513   * 176*   * 457*   ALKPHOS 1,176* 883*   ALB 2.0* 2.0*   TBILI 2.98* 2.67*       Infectious  No recent results  Glucose  Recent Labs     10/22/24  0504 10/23/24  0513   GLUC 92 115        Bradley Quinteros MD  PGY-3, Internal Medicine  [facility]

## 2024-10-23 NOTE — WOUND OSTOMY CARE
Progress Note - Wound   Clayton Duval 44 y.o. male MRN: [id number]  Unit/Bed#: ICU 05 Encounter: [id number]        Assessment:   Patient is seen for wound care follow-up. Patient's sacral wound is now being followed or managed via surgery - treatment to area per their recommendations. B/L heels remain intact, pink and blanchable with preventative silicone bordered foam dressings. Patient seen lying on critical care low air loss mattress.     Orders listed below and wound care will sign off, call or Secure Chat Message with questions.         Skin Care Plan:  1-Sacral wound: per surgery recommendations   2-Turn/reposition q2h for pressure re-distribution on skin.  3-EHOB  cushion when out of bed  4-Elevate heels to offload pressure  5-Moisturize skin daily with skin nourishing cream.  6. Kreg low air loss mattress   7. ATR turning system   8. Silicone foam to bilateral heels summer with a P and date peel and check skin integrity every shift change every 3 days   9. Prevalon boots                  Terrie Funk RN, BSN, CWOCN

## 2024-10-23 NOTE — PLAN OF CARE
Problem: PAIN - ADULT  Goal: Verbalizes/displays adequate comfort level or baseline comfort level  Description: Interventions:  - Encourage patient to monitor pain and request assistance  - Assess pain using appropriate pain scale  - Administer analgesics based on type and severity of pain and evaluate response  - Implement non-pharmacological measures as appropriate and evaluate response  - Consider cultural and social influences on pain and pain management  - Notify physician/advanced practitioner if interventions unsuccessful or patient reports new pain  Outcome: Progressing     Problem: INFECTION - ADULT  Goal: Absence or prevention of progression during hospitalization  Description: INTERVENTIONS:  - Assess and monitor for signs and symptoms of infection  - Monitor lab/diagnostic results  - Monitor all insertion sites, i.e. indwelling lines, tubes, and drains  - Monitor endotracheal if appropriate and nasal secretions for changes in amount and color  - [place] appropriate cooling/warming therapies per order  - Administer medications as ordered  - Instruct and encourage patient and family to use good hand hygiene technique  - Identify and instruct in appropriate isolation precautions for identified infection/condition  Outcome: Progressing  Goal: Absence of fever/infection during neutropenic period  Description: INTERVENTIONS:  - Monitor WBC    Outcome: Progressing     Problem: SAFETY ADULT  Goal: Patient will remain free of falls  Description: INTERVENTIONS:  - Educate patient/family on patient safety including physical limitations  - Instruct patient to call for assistance with activity   - Consult OT/PT to assist with strengthening/mobility   - Keep Call bell within reach  - Keep bed low and locked with side rails adjusted as appropriate  - Keep care items and personal belongings within reach  - Initiate and maintain comfort rounds  - Make Fall Risk Sign visible to staff    - Apply yellow socks and  bracelet for high fall risk patients  - Consider moving patient to room near nurses station  Outcome: Progressing  Goal: Maintain or return to baseline ADL function  Description: INTERVENTIONS:  -  Assess patient's ability to carry out ADLs; assess patient's baseline for ADL function and identify physical deficits which impact ability to perform ADLs (bathing, care of mouth/teeth, toileting, grooming, dressing, etc.)  - Assess/evaluate cause of self-care deficits   - Assess range of motion  - Assess patient's mobility; develop plan if impaired  - Assess patient's need for assistive devices and provide as appropriate  - Encourage maximum independence but intervene and supervise when necessary  - Involve family in performance of ADLs  - Assess for home care needs following discharge   - Consider OT consult to assist with ADL evaluation and planning for discharge  - Provide patient education as appropriate  Outcome: Progressing  Goal: Maintains/Returns to pre admission functional level  Description: INTERVENTIONS:  - Perform AM-PAC 6 Click Basic Mobility/ Daily Activity assessment daily.  - Set and communicate daily mobility goal to care team and patient/family/caregiver.   - Collaborate with rehabilitation services on mobility goals if consulted       Problem: DISCHARGE PLANNING  Goal: Discharge to home or other facility with appropriate resources  Description: INTERVENTIONS:  - Identify barriers to discharge w/patient and caregiver  - Arrange for needed discharge resources and transportation as appropriate  - Identify discharge learning needs (meds, wound care, etc.)  - Arrange for interpretive services to assist at discharge as needed  - Refer to Case Management Department for coordinating discharge planning if the patient needs post-hospital services based on physician/advanced practitioner order or complex needs related to functional status, cognitive ability, or social support system  Outcome: Progressing      Problem: Knowledge Deficit  Goal: Patient/family/caregiver demonstrates understanding of disease process, treatment plan, medications, and discharge instructions  Description: Complete learning assessment and assess knowledge base.  Interventions:  - Provide teaching at level of understanding  - Provide teaching via preferred learning methods  Outcome: Progressing     Problem: Prexisting or High Potential for Compromised Skin Integrity  Goal: Skin integrity is maintained or improved  Description: INTERVENTIONS:  - Identify patients at risk for skin breakdown  - Assess and monitor skin integrity  - Assess and monitor nutrition and hydration status  - Monitor labs   - Assess for incontinence   - Turn and reposition patient  - Assist with mobility/ambulation  - Relieve pressure over bony prominences  - Avoid friction and shearing  - Provide appropriate hygiene as needed including keeping skin clean and dry  - Evaluate need for skin moisturizer/barrier cream  - Collaborate with interdisciplinary team   - Patient/family teaching  - Consider wound care consult   Outcome: Progressing     Problem: Nutrition/Hydration-ADULT  Goal: Nutrient/Hydration intake appropriate for improving, restoring or maintaining nutritional needs  Description: Monitor and assess patient's nutrition/hydration status for malnutrition. Collaborate with interdisciplinary team and initiate plan and interventions as ordered.  Monitor patient's weight and dietary intake as ordered or per policy. Utilize nutrition screening tool and intervene as necessary. Determine patient's food preferences and provide high-protein, high-caloric foods as appropriate.     INTERVENTIONS:  - Monitor oral intake, urinary output, labs, and treatment plans  - Assess nutrition and hydration status and recommend course of action  - Evaluate amount of meals eaten  - Assist patient with eating if necessary   - Allow adequate time for meals  - Recommend/ encourage appropriate  diets, oral nutritional supplements, and vitamin/mineral supplements  - Order, calculate, and assess calorie counts as needed  - Recommend, monitor, and adjust tube feedings and TPN/PPN based on assessed needs  - Assess need for intravenous fluids  - Provide specific nutrition/hydration education as appropriate  - Include patient/family/caregiver in decisions related to nutrition  Outcome: Progressing

## 2024-10-23 NOTE — ASSESSMENT & PLAN NOTE
Source of sepsis is most likely perirectal abscess.  Despite sepsis, patient remains systemically well, without toxicity and hemodynamically stable, without hypotension.  Patient had prolonged fever, most likely secondary to infected sacral ulcer but this has resolved with antibiotic and I&D of sacral ulcer.  Admission blood cultures have no growth.  Patient just completed Zosyn course last night.  Temperature is up again today.  Given that fever recurs so quickly after Zosyn discontinuation, doubt that fever is secondary to Zosyn discontinuation.  Observe off further antibiotic for now.  Monitor temperature/WBC closely.    Monitor hemodynamics.

## 2024-10-23 NOTE — ASSESSMENT & PLAN NOTE
Patient is status post bedside I&D and being followed by general surgery.  Concern for adjacent perirectal abscess noted.  CT scan 10/18 also noting auricle thinning of coccyx below the decubitus ulcer may suggest osteomyelitis.  However they are all bone remodeling beneath the sacral ulcer can also appear similarly.  Definitive diagnosis of osteomized would require a bone biopsy/culture.  Even if osteomyelitis was confirmed however, long-term antibiotic therapy would be futile without a plan to aggressively debride the tissue and bone, send bone cultures and cover the wound with flap for closure.  Patient complete antibiotic course.  Wound care per surgery.

## 2024-10-23 NOTE — ANESTHESIA POSTPROCEDURE EVALUATION
Post-Op Assessment Note    CV Status:  Stable    Pain management: adequate       Mental Status:  Unresponsive   Hydration Status:  Stable   PONV Controlled:  Controlled   Airway Patency:  Patent     Post Op Vitals Reviewed: Yes    No anethesia notable event occurred.    Staff: Anesthesiologist, CRNA       Last Filed PACU Vitals:  Vitals Value Taken Time   Temp 97 °F (36.1 °C) 10/23/24 1342   Pulse 112 10/23/24 1410   /76 10/23/24 1400   Resp 20 10/23/24 1410   SpO2 96 % 10/23/24 1410   Vitals shown include unfiled device data.    Modified [place]:  No data recorded

## 2024-10-24 NOTE — ANESTHESIA PROCEDURE NOTES
Central Line Insertion    Performed by: Kimberly Gutiérrez MD  Authorized by: Kimberly Gutiérrez MD    Date/Time: 10/24/2024 10:55 AM  Catheter Type:  triple lumen  Consent: Written consent obtained.  Risks and benefits: risks, benefits and alternatives were discussed  Consent given by: spouse  Required items: required blood products, implants, devices, and special equipment available  Patient identity confirmed: arm band  Indications: vascular access and central pressure monitoring  Catheter size: 7 Fr  Patient position: Trendelenburg  Anesthesia method: under GA.    Sedation:  Patient sedated: under GA.    Assessment: blood return through all ports and free fluid flow  Preparation: skin prepped with ChloraPrep  Skin prep agent dried: skin prep agent completely dried prior to procedure  Sterile barriers: all five maximum sterile barriers used - cap, mask, sterile gown, sterile gloves, and large sterile sheet  Hand hygiene: hand hygiene performed prior to central venous catheter insertion  sterile gel and probe cover used in ultrasound-guided central venous catheter insertionultrasound permanent image saved  Number of attempts: 1  Successful placement: yes  Post-procedure: dressing applied and line sutured  Patient tolerance: patient tolerated the procedure well with no immediate complications  Comments: Ultrasound guidance  Ultrasound pic in MEDIA section of Epic  Performed postinduction to anesthesia [id number]

## 2024-10-24 NOTE — ASSESSMENT & PLAN NOTE
Source of sepsis is most likely perirectal abscess.  Despite sepsis, patient remains systemically well, without toxicity and hemodynamically stable, without hypotension.  Patient had prolonged fever, most likely secondary to infected sacral ulcer but this has resolved with antibiotic and I&D of sacral ulcer.  Admission blood cultures have no growth.  Patient just completed Zosyn course earlier this week.  Temperature up 2 nights ago but has been down since.  At present, there is no obvious active infection.  Continue to observe off further antibiotic.  Monitor temperature/WBC closely.    Monitor hemodynamics.

## 2024-10-24 NOTE — OCCUPATIONAL THERAPY NOTE
Occupational Therapy Cancel Note        Patient Name: Clayton Duval  Today's Date: 10/24/2024       10/24/24 1311   OT Last Visit   OT Visit Date 10/24/24   Note Type   Note Type Cancelled Session   Cancel Reasons Patient off floor/test       Pt off floor @ IR, not appropriate to participate in skilled OT services @ this time. Will continue to follow on caseload and see when medically appropriate.     Annie Perez MS, OTR/L

## 2024-10-24 NOTE — ASSESSMENT & PLAN NOTE
Patient has spontaneous drainage.  He is being followed by general surgery service, with no plan for surgical I&D previously.  However, at present, there is concern of persistent abscess.  Repeat CT imaging without obvious abscess.  Wound culture with growth of mixed alli, not helpful.  Patient has no history of MRSA, has negative MRSA screen and has MSSA growing in respiratory culture.  At I&D, there was no further abscess or purulence.  Patient completed 7-day course of IV Zosyn earlier this week.  Observe off further antibiotic for now, as in above  Wound care per surgery.

## 2024-10-24 NOTE — PHYSICAL THERAPY NOTE
Physical Therapy Cancellation Note    PT orders received chart review completed. Pt is currently off the floor for bone bx and not appropriate to participate in skilled PT at this time. PT will follow and treat as medically appropriate.     10/24/24 1100   Note Type   Note type Cancelled Session   Cancel Reasons Patient off floor/test   Additional Comments Pt off the floor for a bone bx,       Shahrzad Longo, PT

## 2024-10-24 NOTE — QUICK NOTE
Asked to see patient this afternoon due to concerns for fluid on pillow case.  On arrival, incision looks CDI, no active drainage, nothing to palpation, no erythema or edema along the incision, no drainage from prior drain sites.  Pillow case has been changed and remains dry.    CT head completed today - Postsurgical changes from decompressive left hemicraniectomy again demonstrated, with decreased epidural/subgaleal blood products noted along the left scalp but still measuring approximately 1.6 cm in maximal thickness. Increased epidural fluid   density noted more inferiorly at the level of the left temporal lobe when compared to the prior study. Trace left convexity subdural hematoma still likely present measuring 4-5 mm in maximal thickness. Areas of low-attenuation involving the right thalamus, left parafalcine occipital and mesial left temporal lobe, compatible with the evolving recent infarcts in these regions. No intraparenchymal hemorrhage is identified.    Maybe some development of external hydrocephalus but overall blood collections are stable to improving, no new hemorrhage.      Continue medical management per primary team.  Reach out with questions or concerns.

## 2024-10-24 NOTE — RESPIRATORY THERAPY NOTE
Resp care   10/24/24 1300   Respiratory Assessment   Assessment Type Assess only   General Appearance Drowsy;Sedated   Respiratory Pattern Assisted   Chest Assessment Chest expansion symmetrical   Bilateral Breath Sounds Clear;Diminished   Resp Comments Received pt from I.R. Pt remains intubated from procedure. Plan is to extubate. Pt transferred to CT scan and back to ICU at this time.   Vent Information   Vent type Mares C3   Mares Vent Mode SPONT   $ Vent Charge-INITIAL Yes   Is the patient reintubated? Yes   $ Pulse Oximetry Spot Check Charge Completed   SpO2 99 %   SPONT Settings   FIO2 (%) 40 %   PEEP (cmH2O) 6 cmH2O   Pressure Support (cmH2O) 6 cmH20   Flow Trigger (LPM) 3 LPM   P-ramp (ms) 50 ms   ETS  (%) 25 %   Humidification Heater   Heater Temp 95 °F (35 °C)   SPONT Actuals   Resp Rate (BPM) 12 BPM   VT (mL) 869 mL   MV (Obs) 7.8   MAP (cmH2O) 7.4 cmH2O   Peak Pressure (cmH2O) 12 cmH2O   I:E Ratio (Obs) 1:2.4   Heater Temperature (Obs) 95 °F (35 °C)   SPONT Alarms   High Peak Pressure (cmH20) 40 cmH2O   High Resp Rate (BPM) 40 BPM   High MV (L/min) 20 L/min   Low MV (L/min) 4 L/min   High Spont VTE (mL) 1000 mL   Low Spont VTE (mL) 200 mL   Apnea Time (S) 20 S   Daily Screen   Patient safety screen outcome: Passed   Spont breathing trial % for 30 min Yes   IHI Ventilator Associated Pneumonia Bundle   Daily Awakening Trials Performed Not applicable (Comment)   ETT  Cuffed;Oral;Hi-Lo 8 mm   Placement Date/Time: 10/24/24 1030   Mask Ventilation: Ventilated by mask (1)  Preoxygenated: Yes  Technique: Direct laryngoscopy  Type: Cuffed;Oral;Hi-Lo  Tube Size: 8 mm  Laryngoscope: Mac  Blade Size: 3  Location: Oral  Grade View: Full view of the...   Secured at (cm) 23   Measured from Lips   Secured Location Right   Secured by Pink tape   Cuff Pressure (cm H2O)   (MLT)

## 2024-10-24 NOTE — PROGRESS NOTES
Progress Note - Infectious Disease   Name: Clayton Duval 44 y.o. male I MRN: [id number]  Unit/Bed#: ICU 05 I Date of Admission: 10/13/2024   Date of Service: 10/24/2024 I Hospital Day: 11    Assessment & Plan  Sepsis (HCC)  Source of sepsis is most likely perirectal abscess.  Despite sepsis, patient remains systemically well, without toxicity and hemodynamically stable, without hypotension.  Patient had prolonged fever, most likely secondary to infected sacral ulcer but this has resolved with antibiotic and I&D of sacral ulcer.  Admission blood cultures have no growth.  Patient just completed Zosyn course earlier this week.  Temperature up 2 nights ago but has been down since.  At present, there is no obvious active infection.  Continue to observe off further antibiotic.  Monitor temperature/WBC closely.    Monitor hemodynamics.    Perirectal abscess  Patient has spontaneous drainage.  He is being followed by general surgery service, with no plan for surgical I&D previously.  However, at present, there is concern of persistent abscess.  Repeat CT imaging without obvious abscess.  Wound culture with growth of mixed alli, not helpful.  Patient has no history of MRSA, has negative MRSA screen and has MSSA growing in respiratory culture.  At I&D, there was no further abscess or purulence.  Patient completed 7-day course of IV Zosyn earlier this week.  Observe off further antibiotic for now, as in above  Wound care per surgery.  Sacral wound  Patient is status post bedside I&D and being followed by general surgery.  Concern for adjacent perirectal abscess noted.  CT scan 10/18 also noting auricle thinning of coccyx below the decubitus ulcer may suggest osteomyelitis.  However they are all bone remodeling beneath the sacral ulcer can also appear similarly.  Definitive diagnosis of osteomized would require a bone biopsy/culture.  Even if osteomyelitis was confirmed however, long-term antibiotic therapy would be  futile without a plan to aggressively debride the tissue and bone, send bone cultures and cover the wound with flap for closure.  Patient complete antibiotic course.  Wound care per surgery.  Non-traumatic acute L subdural hematoma (HCC)  Patient is status post craniectomy and evacuation of subdural hematoma.  He also has ischemia in the brainstem.  Neurosurgery follow-up.  Stroke (HCC)  Head MRI showed ischemia involving brainstem.  Patient had decreased responsiveness on presentation.  Mental status with some improvement.  Neurology follow-up.  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  Plan for bone marrow biopsy noted.  Histoplasma antigen negative.    I have discussed with Dr. Villalobos from critical care service regarding the above plan to continue observation of antibiotic.  She agrees with the plan.    Antibiotics:  None    Subjective   Patient with stable mental status.  He is lethargic but arousable, answer simple question, stable mild confusion.  Temperature is now down.    Objective :  Temp:  [97 °F (36.1 °C)-102 °F (38.9 °C)] 99.1 °F (37.3 °C)  HR:  [112-126] 118  BP: ()/(51-82) 114/76  Resp:  [18-39] 24  SpO2:  [96 %-99 %] 96 %  O2 Device: None (Room air)  Nasal Cannula O2 Flow Rate (L/min):  [1 L/min] 1 L/min    General:  No acute distress  Psychiatric: Lethargic but arousable, answer simple questions, stable mild confusion  Pulmonary:  Normal respiratory excursion without accessory muscle use  Abdomen:  Soft, nontender  Extremities: Stable leg edema  Skin:  No rashes      Lab Results: I have reviewed the following results:  Results from last 7 days   Lab Units 10/24/24  0440 10/23/24  0513 10/22/24  1314 10/22/24  0504   WBC Thousand/uL 11.09* 13.36*  --  9.94   HEMOGLOBIN g/dL 6.3* 7.8* 8.4* 8.4*   PLATELETS Thousands/uL 164 224  --  215     Results from last 7 days   Lab Units 10/24/24  0440 10/23/24  0513 10/22/24  0504   SODIUM  mmol/L 144 143 139   POTASSIUM mmol/L 3.9 4.1 4.2   CHLORIDE mmol/L 112* 111* 109*   CO2 mmol/L 23 21 19*   BUN mg/dL 44* 45* 35*   CREATININE mg/dL 0.78 1.16 0.72   EGFR ml/min/1.73sq m 109 75 112   CALCIUM mg/dL 8.0* 7.7* 7.5*   AST U/L 391* 457* 340*   ALT U/L 155* 176* 146*   ALK PHOS U/L 917* 883* 1,176*   ALBUMIN g/dL 2.4* 2.0* 2.0*     Results from last 7 days   Lab Units 10/23/24  2309 10/23/24  2251 10/22/24  1438 10/17/24  1216   BLOOD CULTURE  Received in Microbiology Lab. Culture in Progress. Received in Microbiology Lab. Culture in Progress.  --  No Growth After 5 Days.  No Growth After 5 Days.   GRAM STAIN RESULT   --   --  No Polys or Bacteria seen  --    BODY FLUID CULTURE, STERILE   --   --  No growth  --

## 2024-10-24 NOTE — PROGRESS NOTES
I have personally seen and examined patient and reviewed all data with resident. Agree with note, assessment and plan. Critical care time 77 min . Please refer to attending comments below. Critical care time does not include procedures, family meeting or teaching.         Left subdural hematoma with brain compression and mass effect status post hemicraniectomy for evacuation  Acute ischemic strokes-left thalamic and basal ganglia  Encephalopathy  Sinus tachycardia  PFO  Hepatosplenomegaly with hepatic lesions, splenic lesions and transaminitis  Lytic bone lesions  Hypothyroid-continue Synthroid  Hypothermia  Sacral decubitus ulcer with perirectal abscess  Anasarca  Obesity BMI 30.2 status post gastric sleeve and biliary duodenal switch procedure 2012  Coagulopathy  Severe protein calorie malnutrition    Exam:  Patient was examined after interventional radiology.  He is noted to have a halo of fluid around his head upon arrival from IR.  Flap was soft with palpable fluid wave.  Incision clean dry and intact.  Endotracheal tube in place.  Patient was not participating in exam did not open eyes.  Pupils were equal.  Occasional spontaneous movement of the right upper extremity.  Edematous lower extremities.  Pale.    Goal systolic blood pressure:  110-150 with mean arterial pressure greater than 65    Respiratory support:  Nasal cannula    I/O +17.4 L  UO 1.4 L  BM 1    Micro:  10/23/2024 blood culture-pending  10/22/2024 body fluid culture paracentesis-negative Gram stain no growth to date  10/18/2024 blood Dae site smear-negative  10/17/2024 fungal blood culture-negative  10/17/2024 blood culture-negative  10/16/2024 wound culture-+2 gram-positive rods, +1 gram-positive cocci in pairs, +1 gram-negative rods  10/16/2024-blood culture-negative  10/15/2024 Legionella urine antigen-negative  10/14/2024 sputum culture-Staph aureus  10/14/2024 Motrin)-negative  10/14/2024 blood culture-negative  10/11/2024 blood cx-  negative  10/10/2024 flu/covid negative     Devices:  10/13/2024 midline basilic  10/15/2024 NG tube right nare  10/22/2024 urinary catheter  10/14/2024 radial arterial line      Patient was reported to have a low hemoglobin this morning at 6.3 requiring transfusion.  He also had fever overnight with a Tmax of 102.  Patient is currently being monitored off antibiotics.    Vital signs reviewed overnight with tachycardia heart rate in the 120s which appears to be consistent with prior days.  Tmax as above.  Patient's current temperature is 99.  Laboratory data reviewed phosphorus 2.3.  Chloride elevated 112.  Persistently elevated transaminitis and alk phos.  Albumin 2.4.  Total bilirubin 3.51.  INR is 1.47.  Hemoglobin 6.3.  MCV is 101.  Lactic acid of 3.0.  All blood glucoses less than 180.  No new imaging to review.  Patient had a chest x-ray yesterday with mild interstitial prominence which is probably exaggerated by very low lung volumes.    Patient continues to have somnolence but does move his right upper and lower extremity.  He did have evacuation of the subdural hematoma on the left.  Patient will need a helmet for mobilization.  Continue to monitor for new changes in exam.  If patient has new focality or decrease in GCS by greater than 2 points plan for emergent reimaging.  Neurosurgery has signed off and following peripherally and available if needed.  Pending patient's awake and alertness today consider reimaging as patient's last CT scan was 10/17/2024 patient is not on any prophylactic antiepileptic regimen as he completed his course.    Patient had multiple strokes on imaging.  With plan for IR intervention as well as with recent subdural patient is not on anticoagulation or antiplatelet medication at this time.    Patient appears to be having fevers which mostly occur in the evening hours.  Repeat blood cultures were sent yesterday.  He had completed a course of antibiotics.  Infectious diseases  "following.  He is noted to have a sacral decub as well as a perirectal abscess.  Patient completed a course of Zosyn.  General surgery is following.  Pathology results and ongoing discussions will dictate course of care.  Likely fevers are related to possible metastatic process with multiple lesions involving the bones, spleen and liver.  Patient is scheduled for biopsy today.  Continue local wound care/wound care as per surgery to sacral decub.    Patient's hemoglobin noted to be 6.3 this morning.  He does not have any acute bleeding events.  1 unit packed red blood cells to be transfused.  Patient's INR is in acceptable range.  Patient has persistent tachycardia which may be secondary to his low hemoglobin today.  He has been evaluated with an echocardiogram which shows an EF of 60%.  He also was identified to have a patent PFO.    Update: 1234pm    Discussed with ID, agree we will continue to monitor off abx    Patient has biopsy of lytic lesion pelvis with IR today. Arrived post procedure intubated.     Received 500ml 5% albumin and 500ml IVF during procedure    Recheck labs    Patient had \"halo\" around head on pillow. Check CTH.    With fevers and persistent tachycardia check CT PULMONARY EMBOLISM CAP.     Will discuss with NS drainage from surgical site. Also noted to have ecchymosis left post auricular. Unclear if this is new or evolving.       Update: 221pm    Patient did not respond to 1 RBC appropriately, check fibrinogen and FDP with thrombocytopenia. Check reticulocyte count and iron studies. Patient also has a bandemia 11. Will discuss with ID abx plan.     RLL consolidation on CT chest, plan for bronch and cultures. Debris in airway.     PULMONARY EMBOLISM identified on CT. Cannot anticoagulate with recent SDH. Check duplex LE. May  need IVC filter.       Update 8 PM    Patient underwent bronchoscopy with culture sent of secretions of right lower lobe.  Concern that patient may be aspirating.  Patient " has febrile event tonight consider administration of antibiotics.    Additional 1 unit RBC to be administered.    Continue with vent support.  Endotracheal tube positioning appropriate on CT scan.  CVL may be slightly low in positioning but patient had poor inspiratory effort.  Will continue to monitor and consider repositioning.

## 2024-10-24 NOTE — ANESTHESIA POSTPROCEDURE EVALUATION
Post-Op Assessment Note    CV Status:  Stable  Pain Score: 0    Pain management: adequate       Mental Status:  Somnolent   Hydration Status:  Stable   PONV Controlled:  None   Airway Patency:  Patent  Two or more mitigation strategies used for obstructive sleep apnea  No anethesia notable event occurred.    Staff: Anesthesiologist, CRNA   Comments: Patient transported to ICU on vent, VSS, report given to ICU team        Last Filed PACU Vitals:  Vitals Value Taken Time   Temp     Pulse 106 10/24/24 1252   /58    Resp 31 10/24/24 1252   SpO2 100 % 10/24/24 1252   Vitals shown include unfiled device data.    Modified [place]:  No data recorded

## 2024-10-24 NOTE — ANESTHESIA PREPROCEDURE EVALUATION
Procedure:  PRE-OP ONLY    Relevant Problems   CARDIO   (+) HTN (hypertension)      ENDO   (+) Hypothyroidism (acquired)      NEURO/PSYCH   (+) Non-traumatic acute L subdural hematoma (HCC)   (+) Stroke (HCC)      PULMONARY   (+) Acute hypoxic respiratory failure (HCC)      Rheumatology   (+) Perirectal abscess      Other   (+) Babesiosis   (+) Palliative care by specialist   (+) Splenic lesion      Subdural hematoma s/p left hemicraniectomy for evacuation of SDH POD#11  Liver and splenic lesions, suspect metastatic CA  history of gastric sleeve and biliary duodenal switch procedure (reported 2012)   Sarcral decub ulcer, perirectal abscess, may cause sepsis. s/p sacral debridement 10/21.   On tube feeds  Anemia: 7/24  Anasarca and pelvic ascites  Carotids B/L clean  Recent MRI brain, postop shows Stable mass effect with 4 mm of left-to-right shift.     ECHO 10/16:   Left Ventricle: Left ventricular cavity size is normal. Wall thickness is normal. The left ventricular ejection fraction is 60%. Systolic function is normal. Wall motion is normal.    Atrial Septum: There is a patent foramen ovale confirmed with provocation (Valsalva) with predominant left to right shunting using saline contrast.  - trace MR, TR    EKG: Sinus tachycardia  Low voltage QRS  Septal infarct (cited on or before 21-OCT-2024)  Abnormal ECG         Anesthesia Plan  ASA Score- 4     Anesthesia Type- general with ASA Monitors.         Additional Monitors:     Airway Plan: ETT.           Plan Factors-    Chart reviewed. EKG reviewed.  Existing labs reviewed. Patient summary reviewed.    Patient is not a current smoker.              Induction- intravenous.    Postoperative Plan- . Planned trial extubation    Perioperative Resuscitation Plan - Level 1 - Full Code.       Informed Consent-

## 2024-10-24 NOTE — PLAN OF CARE
Problem: PAIN - ADULT  Goal: Verbalizes/displays adequate comfort level or baseline comfort level  Description: Interventions:  - Encourage patient to monitor pain and request assistance  - Assess pain using appropriate pain scale  - Administer analgesics based on type and severity of pain and evaluate response  - Implement non-pharmacological measures as appropriate and evaluate response  - Consider cultural and social influences on pain and pain management  - Notify physician/advanced practitioner if interventions unsuccessful or patient reports new pain  Outcome: Progressing     Problem: INFECTION - ADULT  Goal: Absence or prevention of progression during hospitalization  Description: INTERVENTIONS:  - Assess and monitor for signs and symptoms of infection  - Monitor lab/diagnostic results  - Monitor all insertion sites, i.e. indwelling lines, tubes, and drains  - Monitor endotracheal if appropriate and nasal secretions for changes in amount and color  - [place] appropriate cooling/warming therapies per order  - Administer medications as ordered  - Instruct and encourage patient and family to use good hand hygiene technique  - Identify and instruct in appropriate isolation precautions for identified infection/condition  Outcome: Progressing  Goal: Absence of fever/infection during neutropenic period  Description: INTERVENTIONS:  - Monitor WBC    Outcome: Progressing     Problem: SAFETY ADULT  Goal: Patient will remain free of falls  Description: INTERVENTIONS:  - Educate patient/family on patient safety including physical limitations  - Instruct patient to call for assistance with activity   - Consult OT/PT to assist with strengthening/mobility   - Keep Call bell within reach  - Keep bed low and locked with side rails adjusted as appropriate  - Keep care items and personal belongings within reach  - Initiate and maintain comfort rounds  - Make Fall Risk Sign visible to staff  - Offer Toileting every 2 Hours,  in advance of need  - Initiate/Maintain bed alarm  - Obtain necessary fall risk management equipment:   - Apply yellow socks and bracelet for high fall risk patients  - Consider moving patient to room near nurses station  Outcome: Progressing  Goal: Maintain or return to baseline ADL function  Description: INTERVENTIONS:  -  Assess patient's ability to carry out ADLs; assess patient's baseline for ADL function and identify physical deficits which impact ability to perform ADLs (bathing, care of mouth/teeth, toileting, grooming, dressing, etc.)  - Assess/evaluate cause of self-care deficits   - Assess range of motion  - Assess patient's mobility; develop plan if impaired  - Assess patient's need for assistive devices and provide as appropriate  - Encourage maximum independence but intervene and supervise when necessary  - Involve family in performance of ADLs  - Assess for home care needs following discharge   - Consider OT consult to assist with ADL evaluation and planning for discharge  - Provide patient education as appropriate  Outcome: Progressing  Goal: Maintains/Returns to pre admission functional level  Description: INTERVENTIONS:  - Perform AM-PAC 6 Click Basic Mobility/ Daily Activity assessment daily.  - Set and communicate daily mobility goal to care team and patient/family/caregiver.   - Collaborate with rehabilitation services on mobility goals if consulted  - Perform Range of Motion 4 times a day.  - Reposition patient every 2 hours.  - Record patient progress and toleration of activity level   Outcome: Progressing     Problem: DISCHARGE PLANNING  Goal: Discharge to home or other facility with appropriate resources  Description: INTERVENTIONS:  - Identify barriers to discharge w/patient and caregiver  - Arrange for needed discharge resources and transportation as appropriate  - Identify discharge learning needs (meds, wound care, etc.)  - Arrange for interpretive services to assist at discharge as  needed  - Refer to Case Management Department for coordinating discharge planning if the patient needs post-hospital services based on physician/advanced practitioner order or complex needs related to functional status, cognitive ability, or social support system  Outcome: Progressing     Problem: Knowledge Deficit  Goal: Patient/family/caregiver demonstrates understanding of disease process, treatment plan, medications, and discharge instructions  Description: Complete learning assessment and assess knowledge base.  Interventions:  - Provide teaching at level of understanding  - Provide teaching via preferred learning methods  Outcome: Progressing     Problem: Prexisting or High Potential for Compromised Skin Integrity  Goal: Skin integrity is maintained or improved  Description: INTERVENTIONS:  - Identify patients at risk for skin breakdown  - Assess and monitor skin integrity  - Assess and monitor nutrition and hydration status  - Monitor labs   - Assess for incontinence   - Turn and reposition patient  - Assist with mobility/ambulation  - Relieve pressure over bony prominences  - Avoid friction and shearing  - Provide appropriate hygiene as needed including keeping skin clean and dry  - Evaluate need for skin moisturizer/barrier cream  - Collaborate with interdisciplinary team   - Patient/family teaching  - Consider wound care consult   Outcome: Progressing     Problem: Nutrition/Hydration-ADULT  Goal: Nutrient/Hydration intake appropriate for improving, restoring or maintaining nutritional needs  Description: Monitor and assess patient's nutrition/hydration status for malnutrition. Collaborate with interdisciplinary team and initiate plan and interventions as ordered.  Monitor patient's weight and dietary intake as ordered or per policy. Utilize nutrition screening tool and intervene as necessary. Determine patient's food preferences and provide high-protein, high-caloric foods as appropriate.      INTERVENTIONS:  - Monitor oral intake, urinary output, labs, and treatment plans  - Assess nutrition and hydration status and recommend course of action  - Evaluate amount of meals eaten  - Assist patient with eating if necessary   - Allow adequate time for meals  - Recommend/ encourage appropriate diets, oral nutritional supplements, and vitamin/mineral supplements  - Order, calculate, and assess calorie counts as needed  - Recommend, monitor, and adjust tube feedings and TPN/PPN based on assessed needs  - Assess need for intravenous fluids  - Provide specific nutrition/hydration education as appropriate  - Include patient/family/caregiver in decisions related to nutrition  Outcome: Progressing

## 2024-10-24 NOTE — ASSESSMENT & PLAN NOTE
Patient with multiple splenic, hepatic and osseous lesions, concerning for metastasis.  Plan for bone marrow biopsy noted.  Histoplasma antigen negative.

## 2024-10-24 NOTE — PLAN OF CARE
Problem: PAIN - ADULT  Goal: Verbalizes/displays adequate comfort level or baseline comfort level  Description: Interventions:  - Encourage patient to monitor pain and request assistance  - Assess pain using appropriate pain scale  - Administer analgesics based on type and severity of pain and evaluate response  - Implement non-pharmacological measures as appropriate and evaluate response  - Consider cultural and social influences on pain and pain management  - Notify physician/advanced practitioner if interventions unsuccessful or patient reports new pain  Outcome: Progressing     Problem: INFECTION - ADULT  Goal: Absence or prevention of progression during hospitalization  Description: INTERVENTIONS:  - Assess and monitor for signs and symptoms of infection  - Monitor lab/diagnostic results  - Monitor all insertion sites, i.e. indwelling lines, tubes, and drains  - Monitor endotracheal if appropriate and nasal secretions for changes in amount and color  - [place] appropriate cooling/warming therapies per order  - Administer medications as ordered  - Instruct and encourage patient and family to use good hand hygiene technique  - Identify and instruct in appropriate isolation precautions for identified infection/condition  Outcome: Progressing  Goal: Absence of fever/infection during neutropenic period  Description: INTERVENTIONS:  - Monitor WBC    Outcome: Progressing     Problem: SAFETY ADULT  Goal: Patient will remain free of falls  Description: INTERVENTIONS:  - Educate patient/family on patient safety including physical limitations  - Instruct patient to call for assistance with activity   - Consult OT/PT to assist with strengthening/mobility   - Keep Call bell within reach  - Keep bed low and locked with side rails adjusted as appropriate  - Keep care items and personal belongings within reach  - Initiate and maintain comfort rounds  - Make Fall Risk Sign visible to staff  - Apply yellow socks and bracelet  for high fall risk patients  - Consider moving patient to room near nurses station  Outcome: Progressing  Goal: Maintain or return to baseline ADL function  Description: INTERVENTIONS:  -  Assess patient's ability to carry out ADLs; assess patient's baseline for ADL function and identify physical deficits which impact ability to perform ADLs (bathing, care of mouth/teeth, toileting, grooming, dressing, etc.)  - Assess/evaluate cause of self-care deficits   - Assess range of motion  - Assess patient's mobility; develop plan if impaired  - Assess patient's need for assistive devices and provide as appropriate  - Encourage maximum independence but intervene and supervise when necessary  - Involve family in performance of ADLs  - Assess for home care needs following discharge   - Consider OT consult to assist with ADL evaluation and planning for discharge  - Provide patient education as appropriate  Outcome: Progressing  Goal: Maintains/Returns to pre admission functional level  Description: INTERVENTIONS:  - Perform AM-PAC 6 Click Basic Mobility/ Daily Activity assessment daily.  - Set and communicate daily mobility goal to care team and patient/family/caregiver.   - Collaborate with rehabilitation services on mobility goals if consulted  - Out of bed for toileting  - Record patient progress and toleration of activity level   Outcome: Progressing     Problem: DISCHARGE PLANNING  Goal: Discharge to home or other facility with appropriate resources  Description: INTERVENTIONS:  - Identify barriers to discharge w/patient and caregiver  - Arrange for needed discharge resources and transportation as appropriate  - Identify discharge learning needs (meds, wound care, etc.)  - Arrange for interpretive services to assist at discharge as needed  - Refer to Case Management Department for coordinating discharge planning if the patient needs post-hospital services based on physician/advanced practitioner order or complex needs  related to functional status, cognitive ability, or social support system  Outcome: Progressing     Problem: Knowledge Deficit  Goal: Patient/family/caregiver demonstrates understanding of disease process, treatment plan, medications, and discharge instructions  Description: Complete learning assessment and assess knowledge base.  Interventions:  - Provide teaching at level of understanding  - Provide teaching via preferred learning methods  Outcome: Progressing     Problem: Prexisting or High Potential for Compromised Skin Integrity  Goal: Skin integrity is maintained or improved  Description: INTERVENTIONS:  - Identify patients at risk for skin breakdown  - Assess and monitor skin integrity  - Assess and monitor nutrition and hydration status  - Monitor labs   - Assess for incontinence   - Turn and reposition patient  - Assist with mobility/ambulation  - Relieve pressure over bony prominences  - Avoid friction and shearing  - Provide appropriate hygiene as needed including keeping skin clean and dry  - Evaluate need for skin moisturizer/barrier cream  - Collaborate with interdisciplinary team   - Patient/family teaching  - Consider wound care consult   Outcome: Progressing     Problem: Nutrition/Hydration-ADULT  Goal: Nutrient/Hydration intake appropriate for improving, restoring or maintaining nutritional needs  Description: Monitor and assess patient's nutrition/hydration status for malnutrition. Collaborate with interdisciplinary team and initiate plan and interventions as ordered.  Monitor patient's weight and dietary intake as ordered or per policy. Utilize nutrition screening tool and intervene as necessary. Determine patient's food preferences and provide high-protein, high-caloric foods as appropriate.     INTERVENTIONS:  - Monitor oral intake, urinary output, labs, and treatment plans  - Assess nutrition and hydration status and recommend course of action  - Evaluate amount of meals eaten  - Assist  patient with eating if necessary   - Allow adequate time for meals  - Recommend/ encourage appropriate diets, oral nutritional supplements, and vitamin/mineral supplements  - Order, calculate, and assess calorie counts as needed  - Recommend, monitor, and adjust tube feedings and TPN/PPN based on assessed needs  - Assess need for intravenous fluids  - Provide specific nutrition/hydration education as appropriate  - Include patient/family/caregiver in decisions related to nutrition  Outcome: Progressing

## 2024-10-24 NOTE — ASSESSMENT & PLAN NOTE
Goals of care  Level 1 code status  Disease focused care with no limits in place.   Concerns introduced include:  Spoke w/ patient's wife:  Patient will remain a Level 1 - Full Code  She would like to give him a fighting chance, she is afraid that certain decisions she will make will cut his time too short  Stated that she doesn't want to see him suffer but she isn't ready to lose him just yet  Has not informed her two children of their father's current medical status  She feels incredible guilt and is dealing w/ significant emotional/mental distress of current situation  Needs immense emotional support from team  [facility] SW involved   Liane will be at work OTW, but plans to come to the hospital next Monday and Tuesday.     Social support:  Supportive listening provided  Normalized experience of patient/family  Provided anxiety containment  Provided anticipatory guidance  Encouraged self care    Follow up  Palliative Care will continue to follow and goals of care discussions will be ongoing.    Please reach out via [employer] Secure Chat if questions or concerns arise.    I have reviewed the patient's controlled substance dispensing history in the Prescription Drug Monitoring Program in compliance with the [facility] regulations before prescribing any controlled substances.  Last refills: N/A    Decisional apparatus:  Patient does not have capacity on exam today.  If capacity is lost, patient's substitute decision maker would default to spouse (Liane) by [place] Act 169.    Advance Directive/Living Will, POLST and POA Forms: None on file.    ER contacts:  Name Relation Home Work Mobile   Liane Duval Spouse   [phone]     We appreciate the invitation to be involved in this patient's care.  We will continue to follow throughout this hospitalization.  Please do not hesitate to reach our on call provider through our clinic answering service at [phone] should you have acute symptom control concerns.

## 2024-10-24 NOTE — BRIEF OP NOTE (RAD/CATH)
INTERVENTIONAL RADIOLOGY PROCEDURE NOTE    Date: 10/24/2024    Procedure:   Procedure Summary       Date: 10/24/24 Room / Location: [facility] CAT Scan    Anesthesia Start: 1019 Anesthesia Stop:     Procedure: IR BIOPSY BONE Diagnosis: (44/M presented with fever, chills, fatigue and found to have liver, spleen, and bone lesions. Of note had a SDH while in hospital requiring left hemicraniectomy.)    Scheduled Providers: Kimberly Gutiérrez MD Responsible Provider: Kimberly Gutiérrez MD    Anesthesia Type: general ASA Status: 4            Preoperative diagnosis:   1. Metastatic disease (HCC)    2. Non-traumatic acute L subdural hematoma (HCC)    3. Hypotension    4. Sepsis (HCC)    5. Stroke (cerebrum) (HCC)    6. Sacral ulcer (HCC)         Postoperative diagnosis: Same.    Surgeon: Queta Velazco MD     Assistant: None. No qualified resident was available.    Blood loss: 0    Specimens:     Large needle Cores  clot  Biopince cores    Formalin  Flow  Cultures including AFB fungal aerobic      Findings: left iliac lytic lesion biopsy  Posterior approach  Gelfoam tract hemostasis    Complications: None immediate.    Anesthesia: general

## 2024-10-24 NOTE — SOCIAL WORK
Palliative SW saw patient and spouse “Liane” at the bedside today. Pt was sleeping when SW arrived. SW appreciates the opportunity to provider patient and spouse with inpatient emotional support and guidance while patient continues to receive medical attention from the medical team.    SW was able to connect with critical care team, PSC CRNP on medical updates on pt. spouse was open to discussion. CRNP was able to have a brief goals of care discussion with spouse. Further GOC discussion will be held tomorrow. Spouse will be present tomorrow around 12pm. Spouse would like to continue with disease focused care with no limits in place.     Spouse also gave STD paperwork and FMLA paperwork to the critical care team to complete. SW was able to provide spouse with social/emotional support during this visit.     Palliative will follow for ongoing social/emotional support as situation evolves.    I have spent 20 minutes with patient today in which greater than 50% of this time was spent in counseling/coordination of care.    PCS SW will continue to follow as requested by the medical team, patient, and/or family.

## 2024-10-24 NOTE — ANESTHESIA PREPROCEDURE EVALUATION
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 s/p sacral debridement 10/21.   On tube feeds  Anemia: 6/24 today,  1 unit PRBCs given in ICU  Anasarca and pelvic ascites, peripheral edema of legs  Carotids B/L clean  Recent MRI brain, postop shows Stable mass effect with 4 mm of left-to-right shift.     ECHO 10/16:   Left Ventricle: Left ventricular cavity size is normal. Wall thickness is normal. The left ventricular ejection fraction is 60%. Systolic function is normal. Wall motion is normal.    Atrial Septum: There is a patent foramen ovale confirmed with provocation (Valsalva) with predominant left to right shunting using saline contrast.  - trace MR, TR    EKG: Sinus tachycardia  Low voltage QRS  Septal infarct (cited on or before 21-OCT-2024)  Abnormal ECG  Physical Exam    Airway    Mallampati score: III  TM Distance: >3 FB  Neck ROM: full     Dental   Comment: Pt is non communicative  NGT is in place       Cardiovascular      Pulmonary      Other Findings          Anesthesia Plan  ASA Score- 4     Anesthesia Type- general with ASA Monitors.         Additional Monitors: central venous line.    Airway Plan: ETT.    Comment: ABG obtained today. GA with ETT, right lateral decub positioning, A line in situ, will place R IJ TLC to help with hemodynamic support, postop mechanical ventilation, blood products discussed  .       Plan Factors-    Chart reviewed. EKG reviewed.  Existing labs reviewed.  Patient summary reviewed.    Patient is not a current smoker.              Induction- intravenous.    Postoperative Plan-     Perioperative Resuscitation Plan - Level 1 - Full Code.       Informed Consent- Anesthetic plan and risks discussed with spouse (spouse Jane, consent obtained over the phone. full code.).  I personally reviewed this patient with the CRNA. Discussed and agreed on the Anesthesia Plan with the CRNA..

## 2024-10-24 NOTE — PROGRESS NOTES
Progress Note - Palliative Care   Name: Clayton Duval 44 y.o. male I MRN: [id number]  Unit/Bed#: ICU 05 I Date of Admission: 10/13/2024   Date of Service: 10/24/2024 I Hospital Day: 11    Assessment & Plan  Non-traumatic acute L subdural hematoma (HCC)  Managed by Neuro ICU  Sacral wound  Managed by Surg-Gen  Large sacral wound which is suspected to be pressure induced  Potential perirectal fistula  OR on 10/21 for EUA  Palliative care by specialist  Goals of care  Level 1 code status  Disease focused care with no limits in place.   Concerns introduced include:  Spoke w/ patient's wife:  Patient will remain a Level 1 - Full Code  She would like to give him a fighting chance, she is afraid that certain decisions she will make will cut his time too short  Stated that she doesn't want to see him suffer but she isn't ready to lose him just yet  Has not informed her two children of their father's current medical status  She feels incredible guilt and is dealing w/ significant emotional/mental distress of current situation  Needs immense emotional support from team  PSC SW involved   Liane will be at work OTW, but plans to come to the hospital next Monday and Tuesday.     Social support:  Supportive listening provided  Normalized experience of patient/family  Provided anxiety containment  Provided anticipatory guidance  Encouraged self care    Follow up  Palliative Care will continue to follow and goals of care discussions will be ongoing.    Please reach out via iTOK Secure Chat if questions or concerns arise.    I have reviewed the patient's controlled substance dispensing history in the Prescription Drug Monitoring Program in compliance with the [facility] regulations before prescribing any controlled substances.  Last refills: N/A    Decisional apparatus:  Patient does not have capacity on exam today.  If capacity is lost, patient's substitute decision maker would default to spouse (Liane) by [place] Act  "169.    Advance Directive/Living Will, POLST and POA Forms: None on file.    ER contacts:  Name Relation Home Work Mobile   Liane Duval Spouse   [phone]     We appreciate the invitation to be involved in this patient's care.  We will continue to follow throughout this hospitalization.  Please do not hesitate to reach our on call provider through our clinic answering service at [phone] should you have acute symptom control concerns.      Subjective   Patient had IR procedure today, now intubated/sedated. Met w/ patient's wife, Liane, to further discuss GoC. Discussed code status, which she would like the patient to remain a level 1 - full code. She is fearful of making the wrong decision and cutting the patient's life short. Stated that she would like to give him more time. Liane seems realistic in that she knows that her  is very sick and that he may \"not pull through this.\" She stated that she doesn't want to seem him suffer but she also doesn't want to lose him just yet.     Objective :  Temp:  [97 °F (36.1 °C)-102.8 °F (39.3 °C)] 99 °F (37.2 °C)  HR:  [112-134] 123  BP: ()/(51-77) 135/59  Resp:  [18-39] 18  SpO2:  [94 %-99 %] 97 %  O2 Device: Nasal cannula    Physical Exam  Constitutional:       General: He is not in acute distress.     Appearance: He is ill-appearing.      Interventions: He is sedated and intubated.   Cardiovascular:      Rate and Rhythm: Tachycardia present.   Pulmonary:      Effort: Pulmonary effort is normal. No respiratory distress. He is intubated.      Comments: Intubated   Skin:     General: Skin is warm and dry.      Coloration: Skin is pale.          Lab Results: I have reviewed the following results:  Lab Results   Component Value Date/Time    SODIUM 144 10/24/2024 04:40 AM    SODIUM 130 (L) 10/01/2024 02:47 PM    K 3.9 10/24/2024 04:40 AM    K 4.5 10/01/2024 02:47 PM    BUN 44 (H) 10/24/2024 04:40 AM    BUN 18 10/01/2024 02:47 PM    BUN 18 10/11/2019 03:21 " PM    CREATININE 0.78 10/24/2024 04:40 AM    CREATININE 0.9 10/01/2024 02:47 PM    GLUC 98 10/24/2024 04:40 AM    GLUC 79 10/01/2024 02:47 PM    CALCIUM 8.0 (L) 10/24/2024 04:40 AM    CALCIUM 7.5 (L) 10/01/2024 02:47 PM     (H) 10/24/2024 04:40 AM     (H) 10/01/2024 02:47 PM     (H) 10/24/2024 04:40 AM     (H) 10/01/2024 02:47 PM    ALB 2.4 (L) 10/24/2024 04:40 AM    ALB 2.7 (L) 10/01/2024 02:47 PM    TP 3.4 (L) 10/24/2024 04:40 AM    TP 4.4 (L) 10/01/2024 02:47 PM    EGFR 109 10/24/2024 04:40 AM    EGFR >90 10/01/2024 02:47 PM    EGFR 81 10/11/2019 03:21 PM     Lab Results   Component Value Date/Time    HGB 6.3 (L) 10/24/2024 04:40 AM    WBC 11.09 (H) 10/24/2024 04:40 AM     10/24/2024 04:40 AM    INR 1.47 (H) 10/24/2024 04:40 AM    PTT 47 (H) 10/22/2024 05:04 AM     Lab Results   Component Value Date/Time    [id number] 23.267 (H) 10/12/2024 04:49 AM     Administrative Statements   I have spent a total time of 60 minutes in caring for this patient on the day of the visit/encounter including Risks and benefits of tx options, Instructions for management, Patient and family education, Risk factor reductions, Counseling / Coordination of care, Documenting in the medical record, Reviewing / ordering tests, medicine, procedures  , Obtaining or reviewing history  , and Communicating with other healthcare professionals . Topics discussed with the patient / family include symptom assessment and management, medication review, psychosocial support, advanced directives, goals of care, supportive listening, and anticipatory guidance.    Nohemy Wyatt

## 2024-10-24 NOTE — SPEECH THERAPY NOTE
Speech-Language Pathology Progress Note      Patient Name: Clayton Duval    Today's Date: 10/24/2024      Pt has been lethargic for the past several days, not appropriate for PO trials. Today he is NPO for IR. Will f/u as able/appropriate.

## 2024-10-24 NOTE — SEDATION DOCUMENTATION
Right iliac bone biopsy performed by Dr. Velazco. General anesthesia. Several tissues samples sent to pathology. Will return to ICU intubated.

## 2024-10-24 NOTE — PROGRESS NOTES
Progress Note -Interventional Radiology  Claytno Duval 44 y.o. male MRN: [id number]  Unit/Bed#: ICU 05 Encounter: [id number]    Assessment:    43 yo w/ ischemic stroke  Decub ulcer  Masses in the liver as reported on MRI and CT as well as the spleen and left iliac bone    He remains critically ill with respiratory failure, tachypnea, tachycardia, severe anemia    The underlying link to all of his disease processes and imaging findings have not been determined    There is concern that he has undiagnosed underlying malignancy    Therefore biopsy has been planned.    Unfortunately he has very limited options and minimal window for decompensation    Simply turning him prone is probably impossible physiologically    We do not like performing liver biopsies in the setting of ascites due to risk of bleeding    Spleen lesions easily accessible but these are often high risk of bleeding    Limited bedside US performed by myself    No large targetable liver lesions  Small 8mm rim enhancing lesion seen anterior left liver    Large easy to acces spleen lesion    I discussed with ICU tem dr eckert  Spleen lesions have higher risk of bleeding - up to 30-40 percent in our historical studies  He will tolerate a bleeding event poorly     risks benefits alternatives discussed in detail with ICU team, anesthesia and patient's wife    Informed consent obtained for bone lesion biopsy          Patient Active Problem List   Diagnosis    Hyponatremia    Hypomagnesemia    Hypothyroidism (acquired)    Elevation of levels of liver transaminase levels    Malnutrition (HCC)    Lactic acidosis    Abnormal CT scan, chest    Moderate protein-calorie malnutrition (HCC)    Non-traumatic acute L subdural hematoma (HCC)    Acute hypoxic respiratory failure (HCC)    HTN (hypertension)    Sepsis (HCC)    Stroke (HCC)    Sacral wound    Perirectal abscess    Babesiosis    Splenic lesion    Abnormal blood smear    Palliative care by specialist  "         Subjective: Clayton Duval is a 44 y.o. male who presented with stroke.    Objective:    Vitals:  /76   Pulse (!) 118   Temp 99.1 °F (37.3 °C) (Axillary)   Resp (!) 24   Ht 6' 1\" (1.854 m)   Wt 104 kg (229 lb)   SpO2 96%   BMI 30.21 kg/m²   Body mass index is 30.21 kg/m².  Weight (last 2 days)       None            I/Os:    Intake/Output Summary (Last 24 hours) at 10/24/2024 0928  Last data filed at 10/24/2024 0900  Gross per 24 hour   Intake 1945 ml   Output 1510 ml   Net 435 ml       Invasive Devices       Peripheral Intravenous Line  Duration             Long-Dwell Peripheral IV (Midline) 10/13/24 Basilic 11 days              Arterial Line  Duration             Arterial Line 10/14/24 Radial 10 days              Drain  Duration             NG/OG/Enteral Tube Nasogastric 12 Fr Right nare 8 days    Urethral Catheter Latex 16 Fr. 2 days                    Physical Exam:    Obese  Flaccid left arm  A line  Nonresponsive to commands                  Lab Results and Cultures:   CBC with diff:   Lab Results   Component Value Date    WBC 11.09 (H) 10/24/2024    HGB 6.3 (L) 10/24/2024    HCT 21.1 (L) 10/24/2024     (H) 10/24/2024     10/24/2024    RBC 2.08 (L) 10/24/2024    MCH 30.3 10/24/2024    MCHC 29.9 (L) 10/24/2024    RDW 21.0 (H) 10/24/2024    MPV 12.3 10/24/2024    NRBC 1 10/21/2024      BMP/CMP:  Lab Results   Component Value Date    K 3.9 10/24/2024    K 4.5 10/01/2024     (H) 10/24/2024    CL 95 (L) 10/01/2024    CO2 23 10/24/2024    CO2 22 10/13/2024    CO2 24 10/01/2024    BUN 44 (H) 10/24/2024    BUN 18 10/01/2024    BUN 18 10/11/2019    CREATININE 0.78 10/24/2024    CREATININE 0.9 10/01/2024    GLUCOSE 107 10/13/2024    CALCIUM 8.0 (L) 10/24/2024    CALCIUM 7.5 (L) 10/01/2024     (H) 10/24/2024     (H) 10/01/2024     (H) 10/24/2024     (H) 10/01/2024    ALKPHOS 917 (H) 10/24/2024    ALKPHOS 352 (H) 10/01/2024    EGFR 109 10/24/2024 " "   EGFR >90 10/01/2024    EGFR 81 10/11/2019   ,     Coags:   Lab Results   Component Value Date    PTT 47 (H) 10/22/2024    INR 1.47 (H) 10/24/2024   ,   Results from last 7 days   Lab Units 10/24/24  0440 10/23/24  0513 10/22/24  0504 10/18/24  0506   PTT seconds  --   --  47*  --    INR  1.47* 1.77* 1.73* 1.56*        Lipid Panel: No results found for: \"CHOL\"  No results found for: \"HDL\"  No results found for: \"HDL\"  No results found for: \"LDLCALC\"  No results found for: \"TRIG\"    HgbA1c:   Lab Results   Component Value Date    HGBA1C 3.9 (L) 03/18/2024       Blood Culture:   Lab Results   Component Value Date    BLOODCX Received in Microbiology Lab. Culture in Progress. 10/23/2024   ,   Urinalysis:   Lab Results   Component Value Date    COLORU Yellow 10/14/2024    CLARITYU Turbid 10/14/2024    SPECGRAV 1.035 (H) 10/14/2024    PHUR 6.0 10/14/2024    LEUKOCYTESUR Negative 10/14/2024    NITRITE Negative 10/14/2024    GLUCOSEU Negative 10/14/2024    KETONESU Negative 10/14/2024    BILIRUBINUR Negative 10/14/2024    BLOODU Small (A) 10/14/2024   ,   Urine Culture:   Lab Results   Component Value Date    URINECX No Growth <1000 cfu/mL 10/11/2024   ,   Wound Culure:    Lab Results   Component Value Date    WOUNDCULT 1+ Growth of 10/16/2024         Thank you for allowing me to participate in the care of Clayton Duval. Please don't hesitate to contact us with any questions.     "

## 2024-10-25 PROBLEM — J18.9 PNEUMONIA OF RIGHT LOWER LOBE DUE TO INFECTIOUS ORGANISM: Status: ACTIVE | Noted: 2024-10-25

## 2024-10-25 NOTE — PROGRESS NOTES
Progress Note - Critical Care/ICU   Name: Clayton Duval 44 y.o. male I MRN: [id number]  Unit/Bed#: ICU 05 I Date of Admission: 10/13/2024   Date of Service: 10/25/2024 I Hospital Day: 12       Assessment & Plan     Neuro:   Diagnosis: Subdural hematoma s/p left hemicraniectomy for evacuation of SDH POD#10, AMS, Acute ischemic stroke  Plan:   Continue monitoring neurological exam closely with frequent neuro checks, obtain stat CTH if any acute changes  Monitor for signs of ICP increase (bradycardia, HTN, changes in neuro exam, increased tone, pupillary changes)  Blood Pressure: SBP goal 110 - 140, cardene gtt or pressers/fluids as needed to keep within range.  AC/AP: Heparin 5000 units q8h  Tight glycemic control, goal <180. Monitor temperature, tylenol PRN.  PT/OT/Speech/PMR consults when able  DVT PPx: SCDs and heparin 5000 q8h   CV:   Diagnosis: sinus tachycardia - likely secondary to hypovolemia further evidenced by LIZZY; slightly improved today to 100s-110s from 130s previously  Plan:   Continue to monitor on Telemetry  Blood transfusion if persistent or Hgb < 7.0  Albumin 25 % 25 g 2 days completed  Pulm:  Diagnosis: no active issues      GI:   Diagnosis: Hepatosplenomegaly, hepatic lesions, splenic lesions, transaminitis  Hepatitis panel - negative  B-HcG, CA 19-9, LDH elevated  Plan:   Oncology consulted  IR iliac bone biopsy done 10/24 - follow up tissue exam     :   Diagnosis: plasencia catheter  Plan:   Monitor I&Os     F/E/N:   F: none  E: replenish as indicated for Magnesium >2, K >4, Phos > 3  N: tube feeds 60cc/hr, FWF 100ml/4hrs     Heme/Onc:   Diagnosis: suspected metastatic cancer, hypocoagulability in the setting of hepatic malignancy, lytic bone lesions, hepatic coagulopathy  Tumor markers ordered - CEA (normal), AFP (normal), B-HCG (elevated- 7.5) , LDH (elevated- 556), CA 19-9 (pending)     Plan:   Biopsy done 10/24 - follow up tissue exam  Oncology consulted     Diagnosis: Anemia,  Coagulopathy INR 1.7  Plan:   Transfuse for Hgb < 7  Vit K 10 mg PO 3 days completed        Endo:   Diagnosis: Hypothyroidism, hypothermia  Plan:   Continue home Levothyroxine 200mcg  Gisselle hugger PRN      ID:   Diagnosis: Sacral decubitus ulcer; esau-rectal abscess     Plan:   ID following; s/p 7 day zosyn course; monitoring off antibiotics for now     MSK/Skin:   Diagnosis: Sacral decubitus ulcer  Plan:   Monitor fluid status  Wound care consulted  Red surgery debridement 10/17     Diagnosis: perirectal abscess  Plan:   Surgery following - notified about bleeding ulcer        Disposition: Critical care    ICU Core Measures     Vented Patient  VAP Bundle  VAP bundle ordered     A: Assess, Prevent, and Manage Pain Has pain been assessed? NA  Need for changes to pain regimen? NA   B: Both Spontaneous Awakening Trials (SATs) and Spontaneous Breathing Trials (SBTs) Plan to perform spontaneous awakening trial today? N/A   Plan to perform spontaneous breathing trial today? Yes   Obvious barriers to extubation? Yes   C: Choice of Sedation RASS Goal: N/A patient not on sedation  Need for changes to sedation or analgesia regimen? NA   D: Delirium CAM-ICU: Unable to perform secondary to Acute cognitive dysfunction   E: Early Mobility  Plan for early mobility? NA   F: Family Engagement Plan for family engagement today? Yes       Review of Invasive Devices:    [place] Plan: Voiding trial after improvement in ambulation   Central access plan: Medications requiring central line  [place] Plan: Keep arterial line for hemodynamic monitoring    Prophylaxis:  VTE VTE covered by:  heparin (porcine), Subcutaneous, 5,000 Units at 10/25/24 0608       Stress Ulcer  covered byfamotidine (PEPCID) tablet 20 mg [[id number]]         24 Hour Events : No acute events overnight. Hemoglobin repeat came back at 7.7. Remained intubated for airway protection.     Subjective   Review of Systems: Review of Systems not obtainable due to Altered mental  status    Objective :                   Vitals I/O      Most Recent Min/Max in 24hrs   Temp 98.3 °F (36.8 °C) Temp  Min: 98.3 °F (36.8 °C)  Max: 99.7 °F (37.6 °C)   Pulse (!) 124 Pulse  Min: 100  Max: 124   Resp 18 Resp  Min: 8  Max: 24   /52 BP  Min: 96/50  Max: 114/76   O2 Sat 92 % SpO2  Min: 92 %  Max: 100 %      Intake/Output Summary (Last 24 hours) at 10/25/2024 0839  Last data filed at 10/25/2024 0600  Gross per 24 hour   Intake 1755 ml   Output 1570 ml   Net 185 ml       Diet Enteral/Parenteral; Tube Feeding No Oral Diet; Jevity 1.2 Dionicio; Cyclic; 85; 22 hours; Prosource Protein Liquid - One Packet; TID; Banatrol Plus Banana Flakes - One Packet; TID; 100; Water; Every 4 hours    Invasive Monitoring           Physical Exam   Physical Exam  Vitals reviewed.   Eyes:      Conjunctiva/sclera: Conjunctivae normal.      Pupils: Pupils are equal, round, and reactive to light.   Skin:     General: Skin is warm.      Coloration: Skin is not jaundiced.      Findings: No rash.   Cardiovascular:      Rate and Rhythm: Regular rhythm. Tachycardia present.   Abdominal:      Palpations: Abdomen is soft.      Tenderness: There is no abdominal tenderness.   Constitutional:       Interventions: He is not sedated and not intubated.  Pulmonary:      Effort: Pulmonary effort is normal. He is not intubated.      Breath sounds: Normal breath sounds.   Neurological:      Mental Status: He is somnolent.      Comments: Moves right upper and lower extremity; no movement on left   Genitourinary/Anorectal:  Barclay present.        Diagnostic Studies        Lab Results: I have reviewed the following results:     Medications:  Scheduled PRN   chlorhexidine, 15 mL, Q12H KAYLIE  Cholecalciferol, 5,000 Units, Daily  famotidine, 20 mg, BID  folic acid, 1 mg, Daily  heparin (porcine), 5,000 Units, Q8H KAYLIE  levothyroxine, 200 mcg, Early Morning  silver nitrate-potassium nitrate, ,   thiamine, 100 mg, Daily      hydrALAZINE, 10 mg, Q6H  PRN  HYDROmorphone, 1 mg, Q3H PRN  labetalol, 10 mg, Q6H PRN  ondansetron, 4 mg, Q8H PRN  polyethylene glycol, 17 g, Daily PRN  silver nitrate-potassium nitrate, ,        Continuous          Labs:   CBC    Recent Labs     10/24/24  1259 10/24/24  2149 10/25/24  0607   WBC 9.23  --  7.61   HGB 6.9* 7.7* 7.8*   HCT 21.8* 24.2* 25.4*   *  --  126*   BANDSPCT 11*  --   --      BMP    Recent Labs     10/24/24  1259 10/25/24  0607   SODIUM 144 146   K 3.9 3.8   * 113*   CO2 26 25   AGAP 6 8   BUN 40* 37*   CREATININE 0.79 0.69   CALCIUM 7.9* 8.0*       Coags    Recent Labs     10/24/24  0440   INR 1.47*        Additional Electrolytes  Recent Labs     10/24/24  0440 10/25/24  0607   MG 2.0 1.9   PHOS 2.3* 2.3*          Blood Gas    Recent Labs     10/24/24  0851   PHART 7.519*   [id number] 30.2*   PO2ART 63.1*   [id number] 24.1   BEART 1.3   SOURCE Line, Arterial     Recent Labs     10/24/24  0851   SOURCE Line, Arterial    LFTs  Recent Labs     10/24/24  1259 10/25/24  0607   * 118*   * 289*   ALKPHOS 802* 742*   ALB 2.7* 2.6*   TBILI 4.09* 4.85*       Infectious  No recent results  Glucose  Recent Labs     10/24/24  0440 10/24/24  1259 10/25/24  0607   GLUC 98 92 84        Bradley Qiunteros MD  PGY-3, Internal Medicine  [facility]

## 2024-10-25 NOTE — TREATMENT PLAN
Patient seen and evaluated for wound assessment.  At this time, nursing staff noted the patient has had bright red blood/bleeding from the wound edge this morning and there is also concern for separate GI/rectal bleeding during the evaluation of the medical critical care team earlier today.    On wound assessment, there was an area at the periphery of the wound base near the 3 o'clock position with active bleeding.  1 small area appeared to be arterial in nature and this was controlled with 2 sutures using 2-0 Vicryl suture as well as additional application of silver nitrate to the surrounding raw bleeding wound surface.  Following those interventions, I held direct pressure resulting in hemostasis at this time.    Overall, there are areas of both nonviable/necrotic tissue in the wound base and areas of brownish-black cauterized tissue mixed with some clean, pink and healthy wound bed.  There was no foul odor or purulent drainage noted from the large sacral wound or the adjacent and communicating right perirectal wound.    Once the wound bleeding was assessed and addressed, I performed a digital rectal exam identifying only loose and liquid light brown stool.  At this time, I do not believe that there is any rectal bleeding.  It is likely that the bleeding from the sacral wound caused a mixed picture during the earlier evaluation.  Will need to continue to monitor for GI bleeding.    Wound image:        No additional excisional debridement was performed at the bedside at this time due to the patient exhibiting evidence of discomfort during his wound evaluation and control of bleeding as well as some desaturations while positioned on his side.    The wound was repacked with 1 piece of moist Kerlix gauze dressing and it was then covered with a silicone bordered foam dressing to try to isolate the wound from the patient's rectal area to avoid stool contamination.    In discussion with the nursing staff, it is believed  that his dressing has been changed 3 times over the last 24 hours due to stool contamination.  The nursing staff also notes that the patient's bowel movements remain primarily liquid in nature and are difficult to isolate from the wounds.    Surgical service will continue to follow the wound closely and assess for ongoing or worsening contamination.  Operative intervention for stool diversion in the form of colostomy would come with significant comorbidity risks considering his comorbidities including metastatic cancer and known intra-abdominal ascites.    Discussed with Dr. Palladino of the medical critical care service.    Nick Eid PA-C  10/25/2024 10:19 AM

## 2024-10-25 NOTE — PLAN OF CARE
Problem: PAIN - ADULT  Goal: Verbalizes/displays adequate comfort level or baseline comfort level  Description: Interventions:  - Encourage patient to monitor pain and request assistance  - Assess pain using appropriate pain scale  - Administer analgesics based on type and severity of pain and evaluate response  - Implement non-pharmacological measures as appropriate and evaluate response  - Consider cultural and social influences on pain and pain management  - Notify physician/advanced practitioner if interventions unsuccessful or patient reports new pain  10/25/2024 0009 by Erasmo Mims RN  Outcome: Progressing  10/25/2024 0009 by Erasmo Mims RN  Outcome: Progressing     Problem: INFECTION - ADULT  Goal: Absence or prevention of progression during hospitalization  Description: INTERVENTIONS:  - Assess and monitor for signs and symptoms of infection  - Monitor lab/diagnostic results  - Monitor all insertion sites, i.e. indwelling lines, tubes, and drains  - Monitor endotracheal if appropriate and nasal secretions for changes in amount and color  - [place] appropriate cooling/warming therapies per order  - Administer medications as ordered  - Instruct and encourage patient and family to use good hand hygiene technique  - Identify and instruct in appropriate isolation precautions for identified infection/condition  10/25/2024 0009 by Erasmo Mims RN  Outcome: Progressing  10/25/2024 0009 by Erasmo Mims RN  Outcome: Progressing  Goal: Absence of fever/infection during neutropenic period  Description: INTERVENTIONS:  - Monitor WBC    10/25/2024 0009 by Erasmo Mims RN  Outcome: Progressing  10/25/2024 0009 by Erasmo Mims RN  Outcome: Progressing     Problem: SAFETY ADULT  Goal: Patient will remain free of falls  Description: INTERVENTIONS:  - Educate patient/family on patient safety including physical limitations  - Instruct patient to call for assistance with activity   - Consult OT/PT to assist  with strengthening/mobility   - Keep Call bell within reach  - Keep bed low and locked with side rails adjusted as appropriate  - Keep care items and personal belongings within reach  - Initiate and maintain comfort rounds  - Make Fall Risk Sign visible to staff  - Offer Toileting every 2 Hours, in advance of need  - Initiate/Maintain bed alarm  - Obtain necessary fall risk management equipment  - Apply yellow socks and bracelet for high fall risk patients  - Consider moving patient to room near nurses station  10/25/2024 0009 by Erasmo Mims RN  Outcome: Progressing  10/25/2024 0009 by Erasmo Mims RN  Outcome: Progressing  Goal: Maintain or return to baseline ADL function  Description: INTERVENTIONS:  -  Assess patient's ability to carry out ADLs; assess patient's baseline for ADL function and identify physical deficits which impact ability to perform ADLs (bathing, care of mouth/teeth, toileting, grooming, dressing, etc.)  - Assess/evaluate cause of self-care deficits   - Assess range of motion  - Assess patient's mobility; develop plan if impaired  - Assess patient's need for assistive devices and provide as appropriate  - Encourage maximum independence but intervene and supervise when necessary  - Involve family in performance of ADLs  - Assess for home care needs following discharge   - Consider OT consult to assist with ADL evaluation and planning for discharge  - Provide patient education as appropriate  10/25/2024 0009 by Erasmo Mims RN  Outcome: Progressing  10/25/2024 0009 by Erasmo Mims RN  Outcome: Progressing  Goal: Maintains/Returns to pre admission functional level  Description: INTERVENTIONS:  - Perform AM-PAC 6 Click Basic Mobility/ Daily Activity assessment daily.  - Set and communicate daily mobility goal to care team and patient/family/caregiver.   - Collaborate with rehabilitation services on mobility goals if consulted  - Out of bed for toileting  - Record patient progress and  toleration of activity level   10/25/2024 0009 by Erasmo Mims RN  Outcome: Progressing  10/25/2024 0009 by Erasmo Mims RN  Outcome: Progressing     Problem: DISCHARGE PLANNING  Goal: Discharge to home or other facility with appropriate resources  Description: INTERVENTIONS:  - Identify barriers to discharge w/patient and caregiver  - Arrange for needed discharge resources and transportation as appropriate  - Identify discharge learning needs (meds, wound care, etc.)  - Arrange for interpretive services to assist at discharge as needed  - Refer to Case Management Department for coordinating discharge planning if the patient needs post-hospital services based on physician/advanced practitioner order or complex needs related to functional status, cognitive ability, or social support system  10/25/2024 0009 by Erasmo Mims RN  Outcome: Progressing  10/25/2024 0009 by Erasmo Mims RN  Outcome: Progressing     Problem: Knowledge Deficit  Goal: Patient/family/caregiver demonstrates understanding of disease process, treatment plan, medications, and discharge instructions  Description: Complete learning assessment and assess knowledge base.  Interventions:  - Provide teaching at level of understanding  - Provide teaching via preferred learning methods  10/25/2024 0009 by Erasmo Mims RN  Outcome: Progressing  10/25/2024 0009 by Erasmo Mims RN  Outcome: Progressing     Problem: Prexisting or High Potential for Compromised Skin Integrity  Goal: Skin integrity is maintained or improved  Description: INTERVENTIONS:  - Identify patients at risk for skin breakdown  - Assess and monitor skin integrity  - Assess and monitor nutrition and hydration status  - Monitor labs   - Assess for incontinence   - Turn and reposition patient  - Assist with mobility/ambulation  - Relieve pressure over bony prominences  - Avoid friction and shearing  - Provide appropriate hygiene as needed including keeping skin clean and dry  -  Evaluate need for skin moisturizer/barrier cream  - Collaborate with interdisciplinary team   - Patient/family teaching  - Consider wound care consult   10/25/2024 0009 by Erasmo Mims RN  Outcome: Progressing  10/25/2024 0009 by Erasmo Mims RN  Outcome: Progressing     Problem: Nutrition/Hydration-ADULT  Goal: Nutrient/Hydration intake appropriate for improving, restoring or maintaining nutritional needs  Description: Monitor and assess patient's nutrition/hydration status for malnutrition. Collaborate with interdisciplinary team and initiate plan and interventions as ordered.  Monitor patient's weight and dietary intake as ordered or per policy. Utilize nutrition screening tool and intervene as necessary. Determine patient's food preferences and provide high-protein, high-caloric foods as appropriate.     INTERVENTIONS:  - Monitor oral intake, urinary output, labs, and treatment plans  - Assess nutrition and hydration status and recommend course of action  - Evaluate amount of meals eaten  - Assist patient with eating if necessary   - Allow adequate time for meals  - Recommend/ encourage appropriate diets, oral nutritional supplements, and vitamin/mineral supplements  - Order, calculate, and assess calorie counts as needed  - Recommend, monitor, and adjust tube feedings and TPN/PPN based on assessed needs  - Assess need for intravenous fluids  - Provide specific nutrition/hydration education as appropriate  - Include patient/family/caregiver in decisions related to nutrition  10/25/2024 0009 by Erasmo Mims RN  Outcome: Progressing  10/25/2024 0009 by Erasmo Mims RN  Outcome: Progressing     Problem: SAFETY,RESTRAINT: NV/NON-SELF DESTRUCTIVE BEHAVIOR  Goal: Remains free of harm/injury (restraint for non violent/non self-detsructive behavior)  Description: INTERVENTIONS:  - Instruct patient/family regarding restraint use   - Assess and monitor physiologic and psychological status   - Provide interventions  and comfort measures to meet assessed patient needs   - Identify and implement measures to help patient regain control  - Assess readiness for release of restraint   Outcome: Progressing  Goal: Returns to optimal restraint-free functioning  Description: INTERVENTIONS:  - Assess the patient's behavior and symptoms that indicate continued need for restraint  - Identify and implement measures to help patient regain control  - Assess readiness for release of restraint   Outcome: Progressing

## 2024-10-25 NOTE — PLAN OF CARE
Problem: PAIN - ADULT  Goal: Verbalizes/displays adequate comfort level or baseline comfort level  Description: Interventions:  - Encourage patient to monitor pain and request assistance  - Assess pain using appropriate pain scale  - Administer analgesics based on type and severity of pain and evaluate response  - Implement non-pharmacological measures as appropriate and evaluate response  - Consider cultural and social influences on pain and pain management  - Notify physician/advanced practitioner if interventions unsuccessful or patient reports new pain  Outcome: Progressing     Problem: INFECTION - ADULT  Goal: Absence or prevention of progression during hospitalization  Description: INTERVENTIONS:  - Assess and monitor for signs and symptoms of infection  - Monitor lab/diagnostic results  - Monitor all insertion sites, i.e. indwelling lines, tubes, and drains  - Monitor endotracheal if appropriate and nasal secretions for changes in amount and color  - [place] appropriate cooling/warming therapies per order  - Administer medications as ordered  - Instruct and encourage patient and family to use good hand hygiene technique  - Identify and instruct in appropriate isolation precautions for identified infection/condition  Outcome: Progressing  Goal: Absence of fever/infection during neutropenic period  Description: INTERVENTIONS:  - Monitor WBC    Outcome: Progressing     Problem: SAFETY ADULT  Goal: Patient will remain free of falls  Description: INTERVENTIONS:  - Educate patient/family on patient safety including physical limitations  - Instruct patient to call for assistance with activity   - Consult OT/PT to assist with strengthening/mobility   - Keep Call bell within reach  - Keep bed low and locked with side rails adjusted as appropriate  - Keep care items and personal belongings within reach  - Initiate and maintain comfort rounds  - Make Fall Risk Sign visible to staff  - Offer Toileting every 2 Hours,  in advance of need  - Initiate/Maintain bed alarm  - Obtain necessary fall risk management equipment:   - Apply yellow socks and bracelet for high fall risk patients  - Consider moving patient to room near nurses station  Outcome: Progressing  Goal: Maintain or return to baseline ADL function  Description: INTERVENTIONS:  -  Assess patient's ability to carry out ADLs; assess patient's baseline for ADL function and identify physical deficits which impact ability to perform ADLs (bathing, care of mouth/teeth, toileting, grooming, dressing, etc.)  - Assess/evaluate cause of self-care deficits   - Assess range of motion  - Assess patient's mobility; develop plan if impaired  - Assess patient's need for assistive devices and provide as appropriate  - Encourage maximum independence but intervene and supervise when necessary  - Involve family in performance of ADLs  - Assess for home care needs following discharge   - Consider OT consult to assist with ADL evaluation and planning for discharge  - Provide patient education as appropriate  Outcome: Progressing  Goal: Maintains/Returns to pre admission functional level  Description: INTERVENTIONS:  - Perform AM-PAC 6 Click Basic Mobility/ Daily Activity assessment daily.  - Set and communicate daily mobility goal to care team and patient/family/caregiver.   - Collaborate with rehabilitation services on mobility goals if consulted  - Perform Range of Motion 4 times a day.  - Reposition patient every 2 hours.  - Record patient progress and toleration of activity level   Outcome: Progressing     Problem: DISCHARGE PLANNING  Goal: Discharge to home or other facility with appropriate resources  Description: INTERVENTIONS:  - Identify barriers to discharge w/patient and caregiver  - Arrange for needed discharge resources and transportation as appropriate  - Identify discharge learning needs (meds, wound care, etc.)  - Arrange for interpretive services to assist at discharge as  needed  - Refer to Case Management Department for coordinating discharge planning if the patient needs post-hospital services based on physician/advanced practitioner order or complex needs related to functional status, cognitive ability, or social support system  Outcome: Progressing     Problem: Knowledge Deficit  Goal: Patient/family/caregiver demonstrates understanding of disease process, treatment plan, medications, and discharge instructions  Description: Complete learning assessment and assess knowledge base.  Interventions:  - Provide teaching at level of understanding  - Provide teaching via preferred learning methods  Outcome: Progressing     Problem: Prexisting or High Potential for Compromised Skin Integrity  Goal: Skin integrity is maintained or improved  Description: INTERVENTIONS:  - Identify patients at risk for skin breakdown  - Assess and monitor skin integrity  - Assess and monitor nutrition and hydration status  - Monitor labs   - Assess for incontinence   - Turn and reposition patient  - Assist with mobility/ambulation  - Relieve pressure over bony prominences  - Avoid friction and shearing  - Provide appropriate hygiene as needed including keeping skin clean and dry  - Evaluate need for skin moisturizer/barrier cream  - Collaborate with interdisciplinary team   - Patient/family teaching  - Consider wound care consult   Outcome: Progressing     Problem: Nutrition/Hydration-ADULT  Goal: Nutrient/Hydration intake appropriate for improving, restoring or maintaining nutritional needs  Description: Monitor and assess patient's nutrition/hydration status for malnutrition. Collaborate with interdisciplinary team and initiate plan and interventions as ordered.  Monitor patient's weight and dietary intake as ordered or per policy. Utilize nutrition screening tool and intervene as necessary. Determine patient's food preferences and provide high-protein, high-caloric foods as appropriate.      INTERVENTIONS:  - Monitor oral intake, urinary output, labs, and treatment plans  - Assess nutrition and hydration status and recommend course of action  - Evaluate amount of meals eaten  - Assist patient with eating if necessary   - Allow adequate time for meals  - Recommend/ encourage appropriate diets, oral nutritional supplements, and vitamin/mineral supplements  - Order, calculate, and assess calorie counts as needed  - Recommend, monitor, and adjust tube feedings and TPN/PPN based on assessed needs  - Assess need for intravenous fluids  - Provide specific nutrition/hydration education as appropriate  - Include patient/family/caregiver in decisions related to nutrition  Outcome: Progressing     Problem: SAFETY,RESTRAINT: NV/NON-SELF DESTRUCTIVE BEHAVIOR  Goal: Remains free of harm/injury (restraint for non violent/non self-detsructive behavior)  Description: INTERVENTIONS:  - Instruct patient/family regarding restraint use   - Assess and monitor physiologic and psychological status   - Provide interventions and comfort measures to meet assessed patient needs   - Identify and implement measures to help patient regain control  - Assess readiness for release of restraint   Outcome: Progressing  Goal: Returns to optimal restraint-free functioning  Description: INTERVENTIONS:  - Assess the patient's behavior and symptoms that indicate continued need for restraint  - Identify and implement measures to help patient regain control  - Assess readiness for release of restraint   Outcome: Progressing

## 2024-10-25 NOTE — PROGRESS NOTES
I have personally seen and examined patient and reviewed all data with resident. Agree with note, assessment and plan. Critical care time 49 minutes. Please refer to attending comments below. Critical care time does not include procedures, family meeting or teaching.     Left subdural hematoma with brain compression and mass effect status post hemicraniectomy for evacuation  Acute ischemic strokes-left thalamic and basal ganglia  Encephalopathy  Acute hypoxic respiratory failure  RLL consolidation/ pna likely aspiration  Sinus tachycardia  PFO  Hepatosplenomegaly with hepatic lesions, splenic lesions and transaminitis  Lytic bone lesions  Hypothyroid-continue Synthroid  Hypothermia  Sacral decubitus ulcer with perirectal abscess  Anasarca  Obesity BMI 30.2 status post gastric sleeve and biliary duodenal switch procedure 2012  Coagulopathy  Severe protein calorie malnutrition  Pulmonary embolism  Pleural effusion  DVT left UE and superficial thrombophlebitis lle  Anemia- multifactorial ABLA with sacral decub and critical illness     Exam:  Endotracheal tube in place, anasarca, scalp incision CDI w/o drainage, craniectomy site soft with palpable fluid wave, scleral icterus, extraocular movements intact, patient opens his eyes to voice, he attempts to move all extremities to verbal request but has global deconditioning/weakness, he seems to have remote strength with his right upper extremity, large sacral decubitus ulcer    Goal systolic blood pressure:  110-150 with mean arterial pressure greater than 65     Respiratory support:  Assist-control rate 18, , FiO2 50%, PEEP 6     I/O +455 mL   UO 1.7 L  BM 1     Micro:  10/24/2024 fungal gktvwbc-dajwqd-molsqgm  10/24/2024 viral kuqnlsq-rbvvsa-qvtzrhh  10/24/2024 bacterial culture-bronch plus for GPC in pairs   10/23/2024 blood culture-pending  10/22/2024 body fluid culture paracentesis-negative Gram stain no growth to date  10/18/2024 blood Dae site  smear-negative  10/17/2024 fungal blood culture-negative  10/17/2024 blood culture-negative  10/16/2024 wound culture-+2 gram-positive rods, +1 gram-positive cocci in pairs, +1 gram-negative rods  10/16/2024-blood culture-negative  10/15/2024 Legionella urine antigen-negative  10/14/2024 sputum culture-Staph aureus  10/14/2024 Motrin)-negative  10/14/2024 blood culture-negative  10/11/2024 blood cx- negative  10/10/2024 flu/covid negative      Devices:  10/24/2024 endotracheal tube  10/13/2024 midline basilic  10/15/2024 NG tube right nare  10/22/2024 urinary catheter  10/14/2024 radial arterial line     Patient underwent biopsy of lytic lesions in pelvis with interventional radiology yesterday.  He received 1 unit of packed red blood cells prior to intervention as hemoglobin was 6.3.  Patient did not respond appropriately to RBCs with a repeat hemoglobin of 6.9.  He received an additional unit of RBCs overnight.  For procedure patient was intubated.  When he returned to the ICU he had a halo of what appeared to be CSF leak on his pillow.  Repeat imaging was obtained and identified postsurgical changes from left hemicraniectomy.  Low-attenuation changes in the right thalamus, left parafalcine occipital and mesial temporal lobe compatible with evolving infarcts.  Increasing epidural fluid density more inferiorly at the level of the left frontal temporal lobe.  CT PE study with abdomen pelvis was also obtained as patient was having lower hemoglobin, plan was to assess for possible RPH or other bleeding etiology.  CT PE study was obtained with persistent tachycardia.  A right lower lobe consolidation was identified with debris in the bronchus.  Small left upper lobe pulmonary embolisms were also identified.  Hepatic and splenic lesions concerning for metastatic disease reidentified.  Bronchoscopy was performed with right lower lobe consolidation and debris in bronchus.  Thick white secretions were obtained and sent for  culture.  Patient was maintained on ventilator overnight.    Vital signs overnight with persistent tachycardia in the low 100s.  Mean arterial pressures appropriate as well as saturations.  No febrile events.  Imaging as stated above.  Laboratory data reviewed.  Will plan to replete potassium, magnesium and phosphorus.  Hemoglobin improved to 7.8.  Patient's platelet count remains somewhat stable at 126.  Fibrinogen level is low at 78.  FDP is less than 10 and not consistent with DIC.  Thrombocytopenia may be secondary to critical illness, nutritional status and likely underlying malignancy.  Consider discontinuing Pepcid as this may contribute to thrombocytopenia.  Blood glucoses are low with the lowest of 66 yesterday.    Continue to monitor neurologic exam.  Neurosurgery was contacted regarding fluid on pillow and a halo appearance yesterday after interventional radiology.  Incision was inspected and no interventions recommended at this time.  Neurosurgery aware of CT findings.    Continue with ventilatory support.  Assess for spontaneous breathing trial today.  Continue with aggressive pulmonary hygiene.  There is concern with patient's poor mental status that he will have ongoing events with mucous plugging as well as aspiration.  Plan to have detailed discussion with patient's family regarding goals of care.  Palliative care is following as well.    No anticoagulation at this time with patient's hemoglobin requiring blood transfusions as well as with recent neurosurgical intervention.  Continue with supportive care for PE.  Recheck point-of-care ultrasound.  Duplex lower extremities pending.    Considering bronchoscopy findings patient may benefit from antibiotics.  Will discuss with infectious disease.    Patient's thrombocytopenia may be secondary to medication effect.  He is on Pepcid.  Will plan to transition off of Pepcid and maintain on PPI.  Continue to trend platelets.  Patient's fibrinogen was low,  will plan to replete with cryoprecipitate.  Trend platelets.  FDP was less than 10% not consistent with DIC.    Update: 1047am    Fever 101.1, vancomycin started with bronch culture    Duplex completed left brachial and axillary DVT, left le thrombophlebitis    D/C midline    Paracentesis cytology negative    Alk phos elevated likely combination liver disease and bone lytic lesions s/p biopsy. Check ggt     Bleeding for ulcer this am, silver nitrate applied and surgery placed stitch    D/C pepcid    Transfuse 1 unit cryo    Tylenol 650 mg q 8 hr max dose

## 2024-10-25 NOTE — SPEECH THERAPY NOTE
Pt is now intubated and sedated. Please send a new order for ST when pt is extubated and appropriate for swallow assessment. ST will sign off case for now.

## 2024-10-25 NOTE — PROGRESS NOTES
Nutrition Note    Nutrition Summary:  Chart reviewed in follow up. Intubated 10/24. Hypophosphatemia noted. On cyclic feeds due to synthroid hold times. EN recs adjusted below to decrease modular product needs. Product recommended has higher protein content and contains L-arginine to assist with wound healing.      Nutrition Recommendations:   1 .  Adjust EN regimen to Pivot 1.5 at 75 mL/hr x 22 hrs. Continue to hold EN 1 hr before and 1 hr after synthroid administration.   - Provides:1650 mL total volume, 2475 kcal, 155g protein, 1238 mL free water.   2.  Defer water flush adjustment to provider due to SDH, however, FWF at 125 mL q 4 hrs will meet hydration needs.   3.  Decrease banatrol to BID ( provides additional 80 kcal/day)   4. Replace low P PRN                          Brooklyn Fajardo RD

## 2024-10-25 NOTE — ASSESSMENT & PLAN NOTE
Goals of care  Level 1 code status  Disease focused care with no limits in place.   Concerns introduced include:  Spoke w/ patient's wife:  Patient will remain a Level 1 - Full Code  She would like to give him a fighting chance, she is afraid that certain decisions she will make will cut his time too short  Stated that she doesn't want to see him suffer but she isn't ready to lose him just yet  Has not informed her two children of their father's current medical status  She feels incredible guilt and is dealing w/ significant emotional/mental distress of current situation  Needs immense emotional support from team  PSC SW involved   Spouse agreeable to family meeting either Monday or Tuesday of next week  Liane will be at work OTW, but plans to come to the hospital next Monday and Tuesday.     Social support:  Supportive listening provided  Normalized experience of patient/family  Provided anxiety containment  Provided anticipatory guidance  Encouraged self care    Follow up  Palliative Care will continue to follow and goals of care discussions will be ongoing.    Please reach out via [employer] Secure Chat if questions or concerns arise.    I have reviewed the patient's controlled substance dispensing history in the Prescription Drug Monitoring Program in compliance with the [facility] regulations before prescribing any controlled substances.  Last refills: N/A    Decisional apparatus:  Patient does not have capacity on exam today.  If capacity is lost, patient's substitute decision maker would default to spouse (Liane) by [place] 169.    Advance Directive/Living Will, POLST and POA Forms: None on file.    ER contacts:  Name Relation Home Work Mobile   Liane Duval Spouse   [phone]     We appreciate the invitation to be involved in this patient's care.  We will continue to follow throughout this hospitalization.  Please do not hesitate to reach our on call provider through our clinic answering service at [phone]  should you have acute symptom control concerns.

## 2024-10-25 NOTE — RESPIRATORY THERAPY NOTE
10/25/24 0733   Respiratory Assessment   Resp Comments Pt continued to be in distress on CMV settings, placed pt in SPONT 14/6 where pt appears much more comfprtbale. Commuhnicated changes with team   Vent Information   Mares Vent Mode SPONT   SPONT Settings   FIO2 (%) 40 %   PEEP (cmH2O) 6 cmH2O   Pressure Support (cmH2O) 14 cmH20   Flow Trigger (LPM) 3 LPM   P-ramp (ms) 50 ms   ETS  (%) 25 %   Humidification Heater   Heater Temp 95 °F (35 °C)   SPONT Actuals   Resp Rate (BPM) 16 BPM   VT (mL) 710 mL   MV (Obs) 11.3   MAP (cmH2O) 9 cmH2O   Peak Pressure (cmH2O) 24 cmH2O   I:E Ratio (Obs) 1/3.3   Heater Temperature (Obs) 95 °F (35 °C)   SPONT Alarms   High Peak Pressure (cmH20) 40 cmH2O   High Resp Rate (BPM) 40 BPM   High MV (L/min) 20 L/min   Low MV (L/min) 4 L/min   High Spont VTE (mL) 1000 mL   Low Spont VTE (mL) 200 mL   Apnea Time (S) 20 S   SPONT Apnea Settings   Resp Rate (BPM) 18 BPM   FIO2 (%) 100 %   %TI (%) 0.67 %   Apnea Time (S) 20 S

## 2024-10-25 NOTE — PROGRESS NOTES
Vancomycin IV Pharmacy-to-Dose Consultation    Clayton Duval is a 44 y.o. male who is currently receiving Vancomycin IV with management by the Pharmacy Consult service.    Relevant clinical data and objective / subjective history reviewed.      Vancomycin Assessment:  Indication: Pneumonia (goal -600, trough >10) broad spectrum for pending cultures  Status: critically ill  Micro:   - 10/24 Bronchial: GPC in pairs  Procalcitonin: no recent  Renal Function:     Lab Results   Component Value Date    CREATININE 0.69 10/25/2024     Estimated Creatinine Clearance: 171.1 mL/min (by C-G formula based on SCr of 0.69 mg/dL).  Dialysis: No  Days of Therapy: 1  Current Dose: 1750 mg IV q12h   Goal AUC / Trough: -600, trough >10   Last Level: None  Model Fit:  N/A  Assessment: WBC wnl, febrile.       Vancomycin Plan:  New Dosing: continue current regimen   Predicted Trough / AUC: 12.2 / 492  Next Level: on 10/26 at 0600  Renal Function Monitoring: Daily BMP and UOP      Pharmacy will continue to follow closely for s/sx of nephrotoxicity, infusion reactions and appropriateness of therapy.  BMP and CBC will be ordered per protocol. We will continue to follow the patient’s culture results and clinical progress daily.       Jonah Mendoza, PharmD, BCCCP  Critical Care Clinical Pharmacist  Available via [employer]

## 2024-10-25 NOTE — PROGRESS NOTES
Progress Note - Palliative Care   Name: Clayton Duval 44 y.o. male I MRN: [id number]  Unit/Bed#: ICU 05 I Date of Admission: 10/13/2024   Date of Service: 10/25/2024 I Hospital Day: 12    Assessment & Plan  Non-traumatic acute L subdural hematoma (HCC)  Managed by Neuro ICU  Sacral wound  Managed by Surg-Gen  Large sacral wound which is suspected to be pressure induced  Potential perirectal fistula  OR on 10/21 for EUA  Palliative care by specialist  Goals of care  Level 1 code status  Disease focused care with no limits in place.   Concerns introduced include:  Spoke w/ patient's wife:  Patient will remain a Level 1 - Full Code  She would like to give him a fighting chance, she is afraid that certain decisions she will make will cut his time too short  Stated that she doesn't want to see him suffer but she isn't ready to lose him just yet  Has not informed her two children of their father's current medical status  She feels incredible guilt and is dealing w/ significant emotional/mental distress of current situation  Needs immense emotional support from team  PSC SW involved   Spouse agreeable to family meeting either Monday or Tuesday of next week  Liane will be at work OTW, but plans to come to the hospital next Monday and Tuesday.     Social support:  Supportive listening provided  Normalized experience of patient/family  Provided anxiety containment  Provided anticipatory guidance  Encouraged self care    Follow up  Palliative Care will continue to follow and goals of care discussions will be ongoing.    Please reach out via [employer] Secure Chat if questions or concerns arise.    I have reviewed the patient's controlled substance dispensing history in the Prescription Drug Monitoring Program in compliance with the [place] regulations before prescribing any controlled substances.  Last refills: N/A    Decisional apparatus:  Patient does not have capacity on exam today.  If capacity is lost, patient's  substitute decision maker would default to spouse (Liane) by [place].    Advance Directive/Living Will, POLST and POA Forms: None on file.    ER contacts:  Name Relation Home Work Mobile   Liane Duval Spouse   [phone]     We appreciate the invitation to be involved in this patient's care.  We will continue to follow throughout this hospitalization.  Please do not hesitate to reach our on call provider through our clinic answering service at [phone] should you have acute symptom control concerns.      Subjective   Patient continues to be intubated and sedated in the ICU. He does not respond to verbal/tactile stimuli.    Spoke w/ spouse via phone call. Stated that she ok, trying her best to get through this situation. Is agreeable to a family meeting next week. She will be present at the hospital on Monday and Tuesday, but is unsure of what time. Answered all questions and concerns.     Objective :  Temp:  [98.3 °F (36.8 °C)-99.7 °F (37.6 °C)] 98.3 °F (36.8 °C)  HR:  [100-124] 124  BP: ()/(50-76) 103/52  Resp:  [8-24] 18  SpO2:  [92 %-100 %] 92 %  O2 Device: Ventilator  FiO2 (%):  [30-40] 30    Physical Exam  Constitutional:       General: He is not in acute distress.     Appearance: He is ill-appearing.      Interventions: He is intubated.      Comments: Intubated/sedated    HENT:      Mouth/Throat:      Mouth: Mucous membranes are moist.   Cardiovascular:      Rate and Rhythm: Tachycardia present.   Pulmonary:      Effort: Pulmonary effort is normal. No respiratory distress. He is intubated.      Comments: On ventilator   Skin:     General: Skin is warm and dry.      Coloration: Skin is pale.   Neurological:      Comments: Not responding to verbal/tactile stimuli           Lab Results: I have reviewed the following results:  Lab Results   Component Value Date/Time    SODIUM 146 10/25/2024 06:07 AM    SODIUM 130 (L) 10/01/2024 02:47 PM    K 3.8 10/25/2024 06:07 AM    K 4.5 10/01/2024 02:47 PM     BUN 37 (H) 10/25/2024 06:07 AM    BUN 18 10/01/2024 02:47 PM    BUN 18 10/11/2019 03:21 PM    CREATININE 0.69 10/25/2024 06:07 AM    CREATININE 0.9 10/01/2024 02:47 PM    GLUC 84 10/25/2024 06:07 AM    GLUC 79 10/01/2024 02:47 PM    CALCIUM 8.0 (L) 10/25/2024 06:07 AM    CALCIUM 7.5 (L) 10/01/2024 02:47 PM     (H) 10/25/2024 06:07 AM     (H) 10/01/2024 02:47 PM     (H) 10/25/2024 06:07 AM     (H) 10/01/2024 02:47 PM    ALB 2.6 (L) 10/25/2024 06:07 AM    ALB 2.7 (L) 10/01/2024 02:47 PM    TP 3.5 (L) 10/25/2024 06:07 AM    TP 4.4 (L) 10/01/2024 02:47 PM    EGFR 114 10/25/2024 06:07 AM    EGFR >90 10/01/2024 02:47 PM    EGFR 81 10/11/2019 03:21 PM     Lab Results   Component Value Date/Time    HGB 7.8 (L) 10/25/2024 06:07 AM    WBC 7.61 10/25/2024 06:07 AM     (L) 10/25/2024 06:07 AM    INR 1.47 (H) 10/24/2024 04:40 AM    PTT 47 (H) 10/22/2024 05:04 AM     Lab Results   Component Value Date/Time    [id number] 23.267 (H) 10/12/2024 04:49 AM     Administrative Statements   I have spent a total time of 20 minutes in caring for this patient on the day of the visit/encounter including Patient and family education, Impressions, Counseling / Coordination of care, Documenting in the medical record, Reviewing / ordering tests, medicine, procedures  , Obtaining or reviewing history  , and Communicating with other healthcare professionals . Topics discussed with the patient / family include symptom assessment and management, medication review, psychosocial support, supportive listening, and anticipatory guidance.    Nohemy Wyatt

## 2024-10-25 NOTE — RESPIRATORY THERAPY NOTE
10/24/24 1929   Respiratory Assessment   Assessment Type Assess only   General Appearance Sedated   Respiratory Pattern Spontaneous;Assisted   Chest Assessment Chest expansion symmetrical   Bilateral Breath Sounds Diminished;Clear   Cough None   Resp Comments Patient received on PS mode, RSBI <20. He was transitioned to CPAP+6 with 80% ATC. Possible extubation tonight as there is an extubation order pending.   O2 Device C3 Vent   Vent Information   Vent ID [id number]   Vent type Mares C3   Mares Vent Mode SPONT/TRC   $ Pulse Oximetry Spot Check Charge Completed   SPONT/TRC Settings   FIO2 (%) 30 %   PEEP (cmH2O) 6 cmH2O   Tube Comp (%) 80 %   Flow Trigger (LPM) 3 LPM   Pressure Support (cmH2O) 0 cmH2O   ETS (%) 25 %   Tube Type  ET   Tube Size (I.D.) (mm) 8   Humidification Heater   Heater Temp 95 °F (35 °C)   SPONT/TRC Actuals   Resp Rate (BPM) 17 BPM   VT (mL) 969 mL   MV (Obs) 16.1   MAP (cmH2O) 6.2 cmH2O   Peak Pressure (cmH2O) 10 cmH2O   I:E Ratio (Obs) 1:2.9   RSBI (f/Vt) 18 f/Vt   Heater Temperature (Obs) 95 °F (35 °C)   SPONT/TRC Alarms   High Peak Pressure (cmH2O) 40 cm H2O   Low Pressure (cmH2O) 5 cm H2O   High Resp Rate (BPM) 40 BPM   Low Resp Rate (BPM) 8 BPM   High MV (L/min) 20 L/min   Low MV (L/min) 4 L/min   High Spont VTE (mL) 1300 mL   Low Spont VTE (mL) 200 mL   Apnea Time (s) 20 S   SPONT/TRC Apnea Settings   Resp Rate (BPM) 12 BPM   Apnea Time (S) 20 S   %TI (%)   (1 sec)   Maintenance   Alarm (pink) cable attached No   Resuscitation bag with peep valve at bedside Yes   Water bag changed No   Circuit changed No   Daily Screen   Spont breathing trial % for 30 min Yes   IHI Ventilator Associated Pneumonia Bundle   Daily Awakening Trials Performed Yes   Daily Assessment of Readiness to Extubate Yes   Head of Bed Elevated HOB 30   ETT  Cuffed;Oral;Hi-Lo 8 mm   Placement Date/Time: 10/24/24 1030   Mask Ventilation: Ventilated by mask (1)  Preoxygenated: Yes  Technique: Direct laryngoscopy   Type: Cuffed;Oral;Hi-Lo  Tube Size: 8 mm  Laryngoscope: Mac  Blade Size: 3  Location: Oral  Grade View: Full view of the...   Secured at (cm) 24   Measured from Lips   Secured Location Right   Repositioned Left to Right   Secured by Commercial tube welsh   Cuff Pressure (cm H2O)   (MLT)   HI-LO Suction  Capped

## 2024-10-25 NOTE — OCCUPATIONAL THERAPY NOTE
Occupational Therapy         Patient Name: Clayton Duval  Today's Date: 10/25/2024         10/25/24 1100   OT Last Visit   OT Visit Date 10/25/24   Note Type   Note Type Cancelled Session   Cancel Reasons Intubated/sedated  (will continue to hold 2* intubation and decreased overall medical status)         Daysi Nogueira

## 2024-10-25 NOTE — ASSESSMENT & PLAN NOTE
Source of sepsis is most likely perirectal abscess.  Despite sepsis, patient remains systemically well, without toxicity and hemodynamically stable, without hypotension.  Patient had prolonged fever, most likely secondary to infected sacral ulcer but this has resolved with antibiotic and I&D of sacral ulcer.  Admission blood cultures have no growth.  Patient just completed Zosyn course earlier this week.  Temperature is up again, with concern for pneumonia now, as in below.  Antibiotic plan as in below.  Monitor temperature/WBC.    Monitor hemodynamics.

## 2024-10-25 NOTE — RESPIRATORY THERAPY NOTE
RT Ventilator Management Note  Clayton Duval 44 y.o. male MRN: [id number]  Unit/Bed#: ICU 05 Encounter: [id number]      Daily Screen         10/24/2024  1929 10/25/2024  0721          Patient safety screen outcome:: -- Failed      Not Ready for Weaning due to:: -- Underline problem not resolved      Spont breathing trial % for 30 min: Yes --                Physical Exam:   Assessment Type: Assess only  General Appearance: Awake  Respiratory Pattern: Assisted  Chest Assessment: Chest expansion symmetrical  Bilateral Breath Sounds: Clear, Diminished  Cough: Productive  Suction: Oral, ET Tube  O2 Device: vent      Resp Comments: receieved pt on CPAP +6. called to bedside as pt is tacypniec and desatting. Pt in distress. Attempted to add PS, pt remained Tachypniec. Placed pt into CMV settings.Bilateral BS clear/diminshed. Pt is more relaxed on documented CMV settings

## 2024-10-25 NOTE — PROGRESS NOTES
Progress Note - Infectious Disease   Name: Clayton Duval 44 y.o. male I MRN: [id number]  Unit/Bed#: ICU 05 I Date of Admission: 10/13/2024   Date of Service: 10/25/2024 I Hospital Day: 12    Assessment & Plan  Sepsis (HCC)  Source of sepsis is most likely perirectal abscess.  Despite sepsis, patient remains systemically well, without toxicity and hemodynamically stable, without hypotension.  Patient had prolonged fever, most likely secondary to infected sacral ulcer but this has resolved with antibiotic and I&D of sacral ulcer.  Admission blood cultures have no growth.  Patient just completed Zosyn course earlier this week.  Temperature is up again, with concern for pneumonia now, as in below.  Antibiotic plan as in below.  Monitor temperature/WBC.    Monitor hemodynamics.    Perirectal abscess  Patient has spontaneous drainage.  He is being followed by general surgery service, with no plan for surgical I&D previously.  However, at present, there is concern of persistent abscess.  Repeat CT imaging without obvious abscess.  Wound culture with growth of mixed alli, not helpful.  Patient has no history of MRSA, has negative MRSA screen and has MSSA growing in respiratory culture.  At I&D, there was no further abscess or purulence.  Patient completed 7-day course of IV Zosyn earlier this week.  No further antibiotic for this indication  Wound care per surgery.  Sacral wound  Patient is status post bedside I&D and being followed by general surgery.  Concern for adjacent perirectal abscess noted.  CT scan 10/18 also noting auricle thinning of coccyx below the decubitus ulcer may suggest osteomyelitis.  However they are all bone remodeling beneath the sacral ulcer can also appear similarly.  Definitive diagnosis of osteomized would require a bone biopsy/culture.  Even if osteomyelitis was confirmed however, long-term antibiotic therapy would be futile without a plan to aggressively debride the tissue and bone,  send bone cultures and cover the wound with flap for closure.  Patient complete antibiotic course.  Wound care per surgery.  Pneumonia of right lower lobe due to infectious organism  Possible pneumonia, with CXR showing right basilar infiltrate.  Patient is status post bronchoscopy yesterday, with finding of airway purulence.  BAL Gram stain had 4+ GPC.  With recurrent fever overnight, will treat patient for presumptive pneumonia.  Of note, patient is now on ventilator status post procedure yesterday.  Start IV vancomycin empirically.  Monitor temperature.  Follow-up on final BAL culture.  Non-traumatic acute L subdural hematoma (HCC)  Patient is status post craniectomy and evacuation of subdural hematoma.  He also has ischemia in the brainstem.  Neurosurgery follow-up.  Stroke (HCC)  Head MRI showed ischemia involving brainstem.  Patient had decreased responsiveness on presentation.  Mental status with some improvement.  Neurology follow-up.  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  Plan for bone marrow biopsy noted.  Histoplasma antigen negative.    I have discussed with Dr. Quinteros from critical care medicine service regarding the above plan to start IV vancomycin for presumptive pneumonia.  He agrees with the plan.    Antibiotics:  None    Subjective   Patient remains in ICU.  He is sedated on ventilator.  Temperature up overnight.    Objective :  Temp:  [98.3 °F (36.8 °C)-101 °F (38.3 °C)] 99 °F (37.2 °C)  HR:  [100-124] 100  BP: ()/(50-59) 101/59  Resp:  [8-55] 15  SpO2:  [74 %-100 %] 96 %  O2 Device: Ventilator  FiO2 (%):  [30-40] 40    Physical Exam:     General: Lethargic but arousable, not greatly communicative, comfortable, nontoxic.   Neck:  Supple. No mass.  No lymphadenopathy.   Lungs: Expansion symmetric, diffuse rhonchi, no rales, no wheezing.   Heart:  Regular rate and rhythm, S1 and S2 normal, no murmur.   Abdomen: Soft,  nondistended, non-tender, bowel sounds active all four quadrants, no masses, no organomegaly.   Extremities: Stable leg edema. No erythema/warmth. No ulcer. Nontender to palpation.   Skin:  No rash.   Neuro: Not assessable.        Lab Results: I have reviewed the following results:  Results from last 7 days   Lab Units 10/25/24  0607 10/24/24  2149 10/24/24  1259 10/24/24  0440   WBC Thousand/uL 7.61  --  9.23 11.09*   HEMOGLOBIN g/dL 7.8* 7.7* 6.9* 6.3*   PLATELETS Thousands/uL 126*  --  145* 164     Results from last 7 days   Lab Units 10/25/24  0607 10/24/24  1259 10/24/24  0440   SODIUM mmol/L 146 144 144   POTASSIUM mmol/L 3.8 3.9 3.9   CHLORIDE mmol/L 113* 112* 112*   CO2 mmol/L 25 26 23   BUN mg/dL 37* 40* 44*   CREATININE mg/dL 0.69 0.79 0.78   EGFR ml/min/1.73sq m 114 108 109   CALCIUM mg/dL 8.0* 7.9* 8.0*   AST U/L 289* 341* 391*   ALT U/L 118* 135* 155*   ALK PHOS U/L 742* 802* 917*   ALBUMIN g/dL 2.6* 2.7* 2.4*     Results from last 7 days   Lab Units 10/24/24  1529 10/24/24  0000 10/23/24  2309 10/23/24  2251 10/22/24  1438   BLOOD CULTURE   --   --  No Growth at 24 hrs. No Growth at 24 hrs.  --    GRAM STAIN RESULT  4+ Gram positive cocci in pairs*  Rare Polys* Rare Polys  No organisms seen  --   --  No Polys or Bacteria seen   BODY FLUID CULTURE, STERILE   --   --   --   --  No growth             Results from last 7 days   Lab Units 10/24/24  1434   FERRITIN ng/mL >7,500*         Imaging Results Review: I personally reviewed the following image studies in PACS and associated radiology reports: chest xray. My interpretation of the radiology images/reports is: CXR with right basilar infiltrate.

## 2024-10-25 NOTE — ASSESSMENT & PLAN NOTE
Possible pneumonia, with CXR showing right basilar infiltrate.  Patient is status post bronchoscopy yesterday, with finding of airway purulence.  BAL Gram stain had 4+ GPC.  With recurrent fever overnight, will treat patient for presumptive pneumonia.  Of note, patient is now on ventilator status post procedure yesterday.  Start IV vancomycin empirically.  Monitor temperature.  Follow-up on final BAL culture.

## 2024-10-25 NOTE — ASSESSMENT & PLAN NOTE
Patient has spontaneous drainage.  He is being followed by general surgery service, with no plan for surgical I&D previously.  However, at present, there is concern of persistent abscess.  Repeat CT imaging without obvious abscess.  Wound culture with growth of mixed alli, not helpful.  Patient has no history of MRSA, has negative MRSA screen and has MSSA growing in respiratory culture.  At I&D, there was no further abscess or purulence.  Patient completed 7-day course of IV Zosyn earlier this week.  No further antibiotic for this indication  Wound care per surgery.

## 2024-10-26 NOTE — RESPIRATORY THERAPY NOTE
10/26/24 1457   Respiratory Protocol   Protocol Initiated? Yes   Protocol Selection Respiratory;Airway Clearance   Language Barrier? Yes   Medical & Social History Reviewed? Yes   Diagnostic Studies Reviewed? Yes   Physical Assessment Performed? Yes   Airway Clearance Plan Percussive Vest   Respiratory Plan No distress/Pulmonary history   Respiratory Assessment   Assessment Type Pre-treatment   General Appearance Alert;Awake   Respiratory Pattern Normal   Chest Assessment Chest expansion symmetrical   Bilateral Breath Sounds Diminished;Clear   Suction ET Tube   Resp Comments Pt ordered UDN/Vest  for secretion clearance. Pt given rxs. Will continue to monitor pt per protocol.   Airway Suctioning/Secretions   Suction Type Endotracheal tube   Suction Device Inline;Catheter   Secretion Amount Small   Secretion Color Yellow;Bloody   Secretion Consistency Thick   Suction Tolerance Tolerated well   Suctioning Adverse Effects None

## 2024-10-26 NOTE — RESPIRATORY THERAPY NOTE
10/26/24 1645   Respiratory Assessment   Assessment Type Assess only   Resp Comments Pt has cuff leak, pt extubated and placed on nasal cannula at 6l/m. No stridor or resp distress noted at this time. Will continue to monitor pt.   Additional Assessments   SpO2 98 %

## 2024-10-26 NOTE — PROGRESS NOTES
Clayton Duval is a 44 y.o. male who is currently ordered Vancomycin IV with management by the Pharmacy Consult service.  Relevant clinical data and objective / subjective history reviewed.  Vancomycin Assessment:  Indication and Goal AUC/Trough: Pneumonia (goal -600, trough >10), broad spectrum for pending cultures, -600, trough >10  Clinical Status: critically ill  Micro:       Renal Function:  SCr: 0.74 mg/dL  CrCl: 162.4 mL/min  Renal replacement: Not on dialysis  Days of Therapy: 2  Current Dose: 1750 mg IV q12h  Vancomycin Plan:  New Dosing: continue current regimen  Estimated AUC: 476 mcg*hr/mL  Estimated Trough: 11.8 mcg/mL  Next Level: 10/30 0600. Level resulted at 12.9 on 10/26  Renal Function Monitoring: Daily BMP and UOP  Pharmacy will continue to follow closely for s/sx of nephrotoxicity, infusion reactions and appropriateness of therapy.  BMP and CBC will be ordered per protocol. We will continue to follow the patient’s culture results and clinical progress daily.    Umu Way, Pharmacist

## 2024-10-26 NOTE — PLAN OF CARE
Problem: PAIN - ADULT  Goal: Verbalizes/displays adequate comfort level or baseline comfort level  Description: Interventions:  - Encourage patient to monitor pain and request assistance  - Assess pain using appropriate pain scale  - Administer analgesics based on type and severity of pain and evaluate response  - Implement non-pharmacological measures as appropriate and evaluate response  - Consider cultural and social influences on pain and pain management  - Notify physician/advanced practitioner if interventions unsuccessful or patient reports new pain  Outcome: Progressing     Problem: Knowledge Deficit  Goal: Patient/family/caregiver demonstrates understanding of disease process, treatment plan, medications, and discharge instructions  Description: Complete learning assessment and assess knowledge base.  Interventions:  - Provide teaching at level of understanding  - Provide teaching via preferred learning methods  Outcome: Progressing     Problem: SAFETY,RESTRAINT: NV/NON-SELF DESTRUCTIVE BEHAVIOR  Goal: Remains free of harm/injury (restraint for non violent/non self-detsructive behavior)  Description: INTERVENTIONS:  - Instruct patient/family regarding restraint use   - Assess and monitor physiologic and psychological status   - Provide interventions and comfort measures to meet assessed patient needs   - Identify and implement measures to help patient regain control  - Assess readiness for release of restraint   Outcome: Progressing  Goal: Returns to optimal restraint-free functioning  Description: INTERVENTIONS:  - Assess the patient's behavior and symptoms that indicate continued need for restraint  - Identify and implement measures to help patient regain control  - Assess readiness for release of restraint   Outcome: Progressing     Problem: INFECTION - ADULT  Goal: Absence or prevention of progression during hospitalization  Description: INTERVENTIONS:  - Assess and monitor for signs and symptoms of  infection  - Monitor lab/diagnostic results  - Monitor all insertion sites, i.e. indwelling lines, tubes, and drains  - Monitor endotracheal if appropriate and nasal secretions for changes in amount and color  - [place] appropriate cooling/warming therapies per order  - Administer medications as ordered  - Instruct and encourage patient and family to use good hand hygiene technique  - Identify and instruct in appropriate isolation precautions for identified infection/condition  Outcome: Not Progressing  Goal: Absence of fever/infection during neutropenic period  Description: INTERVENTIONS:  - Monitor WBC    Outcome: Not Progressing

## 2024-10-26 NOTE — PROGRESS NOTES
Progress Note - Critical Care/ICU   Name: Clayton Duval 44 y.o. male I MRN: [id number]  Unit/Bed#: ICU 05 I Date of Admission: 10/13/2024   Date of Service: 10/26/2024 I Hospital Day: 13       Assessment & Plan   Neuro:   Diagnosis: Subdural hematoma s/p left hemicraniectomy for evacuation of SDH POD#14, AMS, Acute ischemic left thalamic and basal ganglia stroke  Plan:   Continue monitoring neurological exam closely with frequent neuro checks, obtain stat CTH if any acute changes  Monitor for signs of ICP increase (bradycardia, HTN, changes in neuro exam, increased tone, pupillary changes)  Blood Pressure: SBP goal 110 - 140, cardene gtt or pressers/fluids as needed to keep within range.  AC/AP: Heparin 5000 units q8h  Tight glycemic control, goal <180. Monitor temperature, tylenol PRN.  PT/OT/Speech/PMR consults when able  DVT PPx: SCDs and heparin 5000 q8h   CV:   Diagnosis: sinus tachycardia - likely secondary to hypovolemia further evidenced by LIZZY; slightly improved today to 100s-110s from 130s previously  Plan:   Continue to monitor on Telemetry  Blood transfusion if persistent or Hgb < 7.0  Albumin 25 % 25 g 2 days completed  Pulm:  Patient successfully extubated today after being more alert and able to follow commands  Inhalaed hypertonic and pulmonary vest ordered  Pneumonia of right lower lobe  On vancomycin  Cultures grew gram pos cocci        GI:   Diagnosis: Hepatosplenomegaly, hepatic lesions, splenic lesions, transaminitis  Hepatitis panel - negative  B-HcG, CA 19-9, LDH elevated  Plan:   Oncology consulted  IR iliac bone biopsy done 10/24 - follow up tissue exam  Suspect this is the reason for increased alk phos  GGT elevated 114     :   Diagnosis: plasencia catheter  Plan:   Monitor I&Os     F/E/N:   F: none  E: replenish as indicated for Magnesium >2, K >4, Phos > 3  N: tube feeds 60cc/hr, FWF 100ml/4hrs     Heme/Onc:   Diagnosis: suspected metastatic cancer, hypocoagulability in the setting  of hepatic malignancy, lytic bone lesions, hepatic coagulopathy  Tumor markers ordered - CEA (normal), AFP (normal), B-HCG (elevated- 7.5) , LDH (elevated- 556), CA 19-9 (697)     Plan:   Biopsy done 10/24 - follow up tissue exam  Oncology consulted     Diagnosis: Anemia, Coagulopathy INR 1.7  Plan:   Transfuse for Hgb < 7  Vit K 10 mg PO 3 days completed  Fibrinogen remained low on 10/26-given another unit of cryo  Repeat am        Endo:   Diagnosis: Hypothyroidism, hypothermia  Plan:   Continue home Levothyroxine 200mcg  Gisselle hugger PRN      ID:   Diagnosis: Sacral decubitus ulcer; esau-rectal abscess     Plan:   ID following; s/p 7 day zosyn course; monitoring off antibiotics for now     MSK/Skin:   Diagnosis: Sacral decubitus ulcer  Plan:   Monitor fluid status  Wound care consulted  Red surgery debridement 10/17     Diagnosis: perirectal abscess  Plan:   Disposition: Critical care    ICU Core Measures     Vented Patient  VAP Bundle  VAP bundle ordered     A: Assess, Prevent, and Manage Pain Has pain been assessed? Yes  Need for changes to pain regimen? No   B: Both Spontaneous Awakening Trials (SATs) and Spontaneous Breathing Trials (SBTs) Plan to perform spontaneous awakening trial today? Yes   Plan to perform spontaneous breathing trial today? Yes   Obvious barriers to extubation? Yes   C: Choice of Sedation RASS Goal: 0 Alert and Calm  Need for changes to sedation or analgesia regimen? No   D: Delirium CAM-ICU: Unable to perform secondary to Acute cognitive dysfunction   E: Early Mobility  Plan for early mobility? Yes   F: Family Engagement Plan for family engagement today? Yes       Antibiotic Review: Patient on appropriate coverage based on culture data.     Review of Invasive Devices:    [place] Plan:  continue  Central access plan: Hemodynamic monitoring  Linda Plan: Keep arterial line for hemodynamic monitoring    Prophylaxis:  VTE VTE covered by:  heparin (porcine), Subcutaneous, 5,000 Units at 10/26/24  1433       Stress Ulcer  not ordered         24 Hour Events : Overnight fever of 102 controlled with tylenol and cold blankets. Given one time dose of motrin    Subjective   Review of Systems: Review of Systems not obtainable due to Altered mental status    Objective :                   Vitals I/O      Most Recent Min/Max in 24hrs   Temp 97.8 °F (36.6 °C) Temp  Min: 97.5 °F (36.4 °C)  Max: 102.1 °F (38.9 °C)   Pulse (!) 112 Pulse  Min: 106  Max: 128   Resp (!) 29 Resp  Min: 15  Max: 38   /72 BP  Min: 112/75  Max: 120/72   O2 Sat 98 % SpO2  Min: 95 %  Max: 100 %      Intake/Output Summary (Last 24 hours) at 10/26/2024 1747  Last data filed at 10/26/2024 1700  Gross per 24 hour   Intake 2960 ml   Output 1145 ml   Net 1815 ml       Diet Enteral/Parenteral; Tube Feeding No Oral Diet; Pivot 1.5; Cyclic; 75; 22 hours; Banatrol Plus Banana Flakes - One Packet; BID; 200; Water; Every 4 hours    Invasive Monitoring   Arterial Line  [place] /72  Arterial Line BP  Min: 84/70  Max: 138/84   MAP 90 mmHg  Arterial Line MAP (mmHg)  Min: 66 mmHg  Max: 100 mmHg           Physical Exam   Physical Exam  Eyes:      General: Lids are normal. Scleral icterus present.      Pupils: Pupils are equal, round, and reactive to light.   HENT:      Head:      Comments: Craniectomy site incision clean and dry, area of swelling is stable     Mouth/Throat:      Lips: Pink.      Mouth: Mucous membranes are moist.   Cardiovascular:      Rate and Rhythm: Tachycardia present.      Heart sounds: Normal heart sounds.   Abdominal:      Palpations: Abdomen is soft.      Tenderness: There is no abdominal tenderness.   Constitutional:       General: He is not in acute distress.     Appearance: He is ill-appearing.      Comments: anasarca   Pulmonary:      Effort: Pulmonary effort is normal.      Breath sounds: Normal breath sounds.   Psychiatric:         Speech: He is communicative.   Neurological:      Mental Status: He is lethargic.      GCS: GCS  eye subscore is 4. GCS verbal subscore is 1. GCS motor subscore is 6.      Comments: R extremities more strength than left side  follows commands            Diagnostic Studies        Lab Results: I have reviewed the following results:     Medications:  Scheduled PRN   chlorhexidine, 15 mL, Q12H KAYLIE  Cholecalciferol, 5,000 Units, Daily  folic acid, 1 mg, Daily  heparin (porcine), 5,000 Units, Q8H KAYLIE  levothyroxine, 200 mcg, Early Morning  magnesium sulfate, 2 g, After Breakfast  modafinil, 200 mg, Daily  potassium phosphate, 12 mmol, Once  sodium chloride, 4 mL, Q6H  thiamine, 100 mg, Daily  vancomycin, 1,750 mg, Q12H      acetaminophen, 650 mg, Q8H PRN  hydrALAZINE, 10 mg, Q6H PRN  HYDROmorphone, 1 mg, Q3H PRN  labetalol, 10 mg, Q6H PRN  ondansetron, 4 mg, Q8H PRN  polyethylene glycol, 17 g, Daily PRN       Continuous          Labs:   CBC    Recent Labs     10/25/24  0607 10/26/24  0518   WBC 7.61 9.96   HGB 7.8* 7.4*   HCT 25.4* 24.4*   * 113*     BMP    Recent Labs     10/25/24  0607 10/26/24  0518   SODIUM 146 148*   K 3.8 3.7   * 116*   CO2 25 24   AGAP 8 8   BUN 37* 41*   CREATININE 0.69 0.74   CALCIUM 8.0* 7.7*       Coags    No recent results     Additional Electrolytes  Recent Labs     10/25/24  0607 10/26/24  0518   MG 1.9 2.0   PHOS 2.3* 2.2*          Blood Gas    No recent results    No recent results   LFTs  Recent Labs     10/25/24  0607 10/26/24  0518   * 103*   * 247*   ALKPHOS 742* 861*   ALB 2.6* 2.5*   TBILI 4.85* 4.90*       Infectious  No recent results  Glucose  Recent Labs     10/25/24  0607 10/26/24  0518   GLUC 84 127

## 2024-10-26 NOTE — PLAN OF CARE
Problem: PAIN - ADULT  Goal: Verbalizes/displays adequate comfort level or baseline comfort level  Description: Interventions:  - Encourage patient to monitor pain and request assistance  - Assess pain using appropriate pain scale  - Administer analgesics based on type and severity of pain and evaluate response  - Implement non-pharmacological measures as appropriate and evaluate response  - Consider cultural and social influences on pain and pain management  - Notify physician/advanced practitioner if interventions unsuccessful or patient reports new pain  Outcome: Progressing     Problem: INFECTION - ADULT  Goal: Absence or prevention of progression during hospitalization  Description: INTERVENTIONS:  - Assess and monitor for signs and symptoms of infection  - Monitor lab/diagnostic results  - Monitor all insertion sites, i.e. indwelling lines, tubes, and drains  - Monitor endotracheal if appropriate and nasal secretions for changes in amount and color  - [place] appropriate cooling/warming therapies per order  - Administer medications as ordered  - Instruct and encourage patient and family to use good hand hygiene technique  - Identify and instruct in appropriate isolation precautions for identified infection/condition  Outcome: Progressing  Goal: Absence of fever/infection during neutropenic period  Description: INTERVENTIONS:  - Monitor WBC    Outcome: Progressing     Problem: SAFETY ADULT  Goal: Patient will remain free of falls  Description: INTERVENTIONS:  - Educate patient/family on patient safety including physical limitations  - Instruct patient to call for assistance with activity   - Consult OT/PT to assist with strengthening/mobility   - Keep Call bell within reach  - Keep bed low and locked with side rails adjusted as appropriate  - Keep care items and personal belongings within reach  - Initiate and maintain comfort rounds  - Make Fall Risk Sign visible to staff  - Offer Toileting every 2 Hours,  in advance of need  - Initiate/Maintain alarm  - Obtain necessary fall risk management equipment  - Apply yellow socks and bracelet for high fall risk patients  - Consider moving patient to room near nurses station  Outcome: Progressing  Goal: Maintain or return to baseline ADL function  Description: INTERVENTIONS:  -  Assess patient's ability to carry out ADLs; assess patient's baseline for ADL function and identify physical deficits which impact ability to perform ADLs (bathing, care of mouth/teeth, toileting, grooming, dressing, etc.)  - Assess/evaluate cause of self-care deficits   - Assess range of motion  - Assess patient's mobility; develop plan if impaired  - Assess patient's need for assistive devices and provide as appropriate  - Encourage maximum independence but intervene and supervise when necessary  - Involve family in performance of ADLs  - Assess for home care needs following discharge   - Consider OT consult to assist with ADL evaluation and planning for discharge  - Provide patient education as appropriate  Outcome: Progressing  Goal: Maintains/Returns to pre admission functional level  Description: INTERVENTIONS:  - Perform AM-PAC 6 Click Basic Mobility/ Daily Activity assessment daily.  - Set and communicate daily mobility goal to care team and patient/family/caregiver.   - Collaborate with rehabilitation services on mobility goals if consulted  - Perform Range of Motion 3 times a day.  - Reposition patient every 2 hours.  - Dangle patient  times a day  - Stand patient  times a day  - Ambulate patient  times a day  - Out of bed to chair  times a day   - Out of bed for meals  times a day  - Out of bed for toileting  - Record patient progress and toleration of activity level   Outcome: Progressing     Problem: DISCHARGE PLANNING  Goal: Discharge to home or other facility with appropriate resources  Description: INTERVENTIONS:  - Identify barriers to discharge w/patient and caregiver  - Arrange for  needed discharge resources and transportation as appropriate  - Identify discharge learning needs (meds, wound care, etc.)  - Arrange for interpretive services to assist at discharge as needed  - Refer to Case Management Department for coordinating discharge planning if the patient needs post-hospital services based on physician/advanced practitioner order or complex needs related to functional status, cognitive ability, or social support system  Outcome: Progressing     Problem: Knowledge Deficit  Goal: Patient/family/caregiver demonstrates understanding of disease process, treatment plan, medications, and discharge instructions  Description: Complete learning assessment and assess knowledge base.  Interventions:  - Provide teaching at level of understanding  - Provide teaching via preferred learning methods  Outcome: Progressing     Problem: Prexisting or High Potential for Compromised Skin Integrity  Goal: Skin integrity is maintained or improved  Description: INTERVENTIONS:  - Identify patients at risk for skin breakdown  - Assess and monitor skin integrity  - Assess and monitor nutrition and hydration status  - Monitor labs   - Assess for incontinence   - Turn and reposition patient  - Assist with mobility/ambulation  - Relieve pressure over bony prominences  - Avoid friction and shearing  - Provide appropriate hygiene as needed including keeping skin clean and dry  - Evaluate need for skin moisturizer/barrier cream  - Collaborate with interdisciplinary team   - Patient/family teaching  - Consider wound care consult   Outcome: Progressing     Problem: Nutrition/Hydration-ADULT  Goal: Nutrient/Hydration intake appropriate for improving, restoring or maintaining nutritional needs  Description: Monitor and assess patient's nutrition/hydration status for malnutrition. Collaborate with interdisciplinary team and initiate plan and interventions as ordered.  Monitor patient's weight and dietary intake as ordered or  per policy. Utilize nutrition screening tool and intervene as necessary. Determine patient's food preferences and provide high-protein, high-caloric foods as appropriate.     INTERVENTIONS:  - Monitor oral intake, urinary output, labs, and treatment plans  - Assess nutrition and hydration status and recommend course of action  - Evaluate amount of meals eaten  - Assist patient with eating if necessary   - Allow adequate time for meals  - Recommend/ encourage appropriate diets, oral nutritional supplements, and vitamin/mineral supplements  - Order, calculate, and assess calorie counts as needed  - Recommend, monitor, and adjust tube feedings and TPN/PPN based on assessed needs  - Assess need for intravenous fluids  - Provide specific nutrition/hydration education as appropriate  - Include patient/family/caregiver in decisions related to nutrition  Outcome: Progressing     Problem: SAFETY,RESTRAINT: NV/NON-SELF DESTRUCTIVE BEHAVIOR  Goal: Remains free of harm/injury (restraint for non violent/non self-detsructive behavior)  Description: INTERVENTIONS:  - Instruct patient/family regarding restraint use   - Assess and monitor physiologic and psychological status   - Provide interventions and comfort measures to meet assessed patient needs   - Identify and implement measures to help patient regain control  - Assess readiness for release of restraint   Outcome: Progressing  Goal: Returns to optimal restraint-free functioning  Description: INTERVENTIONS:  - Assess the patient's behavior and symptoms that indicate continued need for restraint  - Identify and implement measures to help patient regain control  - Assess readiness for release of restraint   Outcome: Progressing

## 2024-10-26 NOTE — RESPIRATORY THERAPY NOTE
10/26/24 0831   Respiratory Assessment   Assessment Type Assess only   Bilateral Breath Sounds Diminished;Clear   Resp Comments Pt continues on Spont 6/6 40%. BS clear, no rx needed at this time. Will continue to monitor pt.   Vent Information   Vent ID [id number]   Vent type Mares C3   Mares Vent Mode SPONT   $ Pulse Oximetry Spot Check Charge Completed   SpO2 100 %   SPONT Settings   FIO2 (%) 40 %   PEEP (cmH2O) 6 cmH2O   Pressure Support (cmH2O) 6 cmH20   Flow Trigger (LPM) 3 LPM   P-ramp (ms) 50 ms   ETS  (%) 25 %   Humidification Heater   Heater Temp 95 °F (35 °C)   SPONT Actuals   Resp Rate (BPM) 19 BPM   VT (mL) 732 mL   MV (Obs) 12.8   MAP (cmH2O) 7.9 cmH2O   Peak Pressure (cmH2O) 16 cmH2O   I:E Ratio (Obs) 1:2.9   RSBI (f/vt) 25 f/Vt   Heater Temperature (Obs) 98.6 °F (37 °C)   SPONT Alarms   High Peak Pressure (cmH20) 40 cmH2O   High Resp Rate (BPM) 40 BPM   High MV (L/min) 20 L/min   Low MV (L/min) 4 L/min   High Spont VTE (mL) 1000 mL   Low Spont VTE (mL) 200 mL   Apnea Time (S) 20 S   SPONT Apnea Settings   Resp Rate (BPM) 12 BPM   FIO2 (%) 40 %   %TI (%) 1 %   Apnea Time (S) 20 S   Maintenance   Alarm (pink) cable attached No   Resuscitation bag with peep valve at bedside Yes   Water bag changed No   Circuit changed No   Daily Screen   Patient safety screen outcome: Passed   Spont breathing trial outcome: Passed   IHI Ventilator Associated Pneumonia Bundle   Daily Awakening Trials Performed Yes   Daily Assessment of Readiness to Extubate Yes   Head of Bed Elevated HOB 30   ETT  Cuffed;Oral;Hi-Lo 8 mm   Placement Date/Time: 10/24/24 1030   Mask Ventilation: Ventilated by mask (1)  Preoxygenated: Yes  Technique: Direct laryngoscopy  Type: Cuffed;Oral;Hi-Lo  Tube Size: 8 mm  Laryngoscope: Mac  Blade Size: 3  Location: Oral  Grade View: Full view of the...   Secured at (cm) 25   Measured from Lips   Secured Location Center   Repositioned Left to Center   Secured by Commercial tube welsh   Site  Condition Dry   Cuff Pressure (color) Green   HI-LO Suction  Continuous low suction   HI-LO Secretions Small;Yellow   HI-LO Intervention Patent

## 2024-10-26 NOTE — RESPIRATORY THERAPY NOTE
RT Protocol Note  Clayton Duval 44 y.o. male MRN: [id number]  Unit/Bed#: ICU 05 Encounter: [id number]    Assessment    Principal Problem:    Non-traumatic acute L subdural hematoma (HCC)  Active Problems:    Sepsis (HCC)    Stroke (HCC)    Sacral wound    Perirectal abscess    Babesiosis    Splenic lesion    Abnormal blood smear    Palliative care by specialist    Pneumonia of right lower lobe due to infectious organism      Home Pulmonary Medications:         History reviewed. No pertinent past medical history.  Social History     Socioeconomic History    Marital status: /Civil Union     Spouse name: None    Number of children: None    Years of education: None    Highest education level: None   Occupational History    None   Tobacco Use    Smoking status: Never    Smokeless tobacco: Current   Vaping Use    Vaping status: Never Used   Substance and Sexual Activity    Alcohol use: Never    Drug use: Never    Sexual activity: None   Other Topics Concern    None   Social History Narrative    None     Social Determinants of Health     Financial Resource Strain: Not on file   Food Insecurity: No Food Insecurity (10/14/2024)    Nursing - Inadequate Food Risk Classification     Worried About Running Out of Food in the Last Year: Never true     Ran Out of Food in the Last Year: Never true     Ran Out of Food in the Last Year: Not on file   Transportation Needs: No Transportation Needs (10/14/2024)    PRAPARE - Transportation     Lack of Transportation (Medical): No     Lack of Transportation (Non-Medical): No   Physical Activity: Not on file   Stress: Not on file   Social Connections: Unknown (6/18/2024)    Received from [facility]    Social Connections     How often do you feel lonely or isolated from those around you? (Adult - for ages 18 years and over): Not on file   Intimate Partner Violence: Not on file   Housing Stability: Low Risk  (10/14/2024)    Housing Stability Vital Sign     Unable to Pay for  "Housing in the Last Year: No     Number of Times Moved in the Last Year: 0     Homeless in the Last Year: No       Subjective         Objective    Physical Exam:   Assessment Type: Assess only  General Appearance: (P) Alert, Awake  Respiratory Pattern: (P) Normal  Chest Assessment: (P) Chest expansion symmetrical  Bilateral Breath Sounds: (P) Diminished, Clear  Suction: (P) ET Tube    Vitals:  Blood pressure 112/75, pulse (!) 122, temperature 98.2 °F (36.8 °C), resp. rate (!) 23, height 6' 1\" (1.854 m), weight 108 kg (238 lb 8.6 oz), SpO2 98%.    Results from last 7 days   Lab Units 10/24/24  0851   PH ART  7.519*   PCO2 ART mm Hg 30.2*   PO2 ART mm Hg 63.1*   HCO3 ART mmol/L 24.1   BASE EXC ART mmol/L 1.3   O2 CONTENT ART mL/dL 9.7*   O2 HGB, ARTERIAL % 91.0*   ABG SOURCE  Line, Arterial       Imaging and other studies: Results Review Statement: I personally reviewed the following image studies/reports in PACS and discussed pertinent findings with Radiology: chest xray. My interpretation of the radiology images/reports is: NA.    O2 Device: C3 Vent     Plan    Respiratory Plan: (P) No distress/Pulmonary history  Airway Clearance Plan: (P) Percussive Vest     Resp Comments: Pt has cuff leak, pt extubated and placed on nasal cannula at 6l/m. No stridor or resp distress noted at this time. Will continue to monitor pt.   "

## 2024-10-26 NOTE — PROGRESS NOTES
I have personally seen and examined patient and reviewed all data with resident. Agree with note, assessment and plan. Critical care time 48 minutes. Please refer to attending comments below. Critical care time does not include procedures, family meeting or teaching.     Left subdural hematoma with brain compression and mass effect status post hemicraniectomy for evacuation 10/13/2024  Acute ischemic strokes-left thalamic and basal ganglia  Encephalopathy  Acute hypoxic respiratory failure  RLL consolidation/ pna likely aspiration  Sinus tachycardia  PFO  Hepatosplenomegaly with hepatic lesions, splenic lesions and transaminitis  Lytic bone lesions  Hypothyroid-continue Synthroid  Hypothermia  Sacral decubitus ulcer with perirectal abscess  Anasarca  Obesity BMI 30.2 status post gastric sleeve and biliary duodenal switch procedure 2012  Coagulopathy  Severe protein calorie malnutrition  Pulmonary embolism  Pleural effusion  DVT left UE and superficial thrombophlebitis lle  Anemia- multifactorial ABLA with sacral decub and critical illness  Hypernatremia  Hypophosphatemia-replete and recheck  Hypokalemia-replete and recheck  Hypofibrinogenemia-multifactorial with blood loss as well as nutritional state      exam:  Patient has his eyes open and appears to be more awake and alert today.  He is able to move all extremities to verbal request.  Appears to have greater strength in the right upper and lower extremity compared to the left.  Endotracheal tube is in place.  NG tube is in place.  Incision is clean dry and intact.  Flap is soft with a palpable fluid wave.  Diffuse anasarca.  Brown clear secretions from endotracheal tube.  Breath sounds clear.  Tachypnea.  Strong cough.  Scleral yellowing.    Goal systolic blood pressure:  110-150 with mean arterial pressure greater than 65     Respiratory support:  Spontaneous pressure support 6, PEEP of 6, FiO2 40%     I/O +1.8 L  UO 1.1 L  BM 1     Micro:  10/24/2024 fungal  [id number]-[id number]   10/24/2024 viral wumxuyu-kwyfsf-bdcwatg  10/24/2024 bacterial culture-bronch plus for GPC in pairs   10/23/2024 blood culture-pending  10/22/2024 body fluid culture paracentesis-negative Gram stain no growth to date  10/18/2024 blood Dae site smear-negative  10/17/2024 fungal blood culture-negative  10/17/2024 blood culture-negative  10/16/2024 wound culture-+2 gram-positive rods, +1 gram-positive cocci in pairs, +1 gram-negative rods  10/16/2024-blood culture-negative  10/15/2024 Legionella urine antigen-negative  10/14/2024 sputum culture-Staph aureus  10/14/2024 Motrin)-negative  10/14/2024 blood culture-negative  10/11/2024 blood cx- negative  10/10/2024 flu/covid negative      Pathology:  10/22/2024 ascites-negative for malignancy  10/24/2024 bone biopsy-pending    Devices:  10/24/2024 endotracheal tube  10/13/2024 midline basilic  10/15/2024 NG tube right nare  10/22/2024 urinary catheter  10/14/2024 radial arterial line    Fever 102 overnight. Sodium elevated 148 this am. LFT stable. Hb 7.4.  Fibrinogen 78 to 90 after 1 unit cryoptt. GGT elevated but had cholecystectomy and has biliary masses, low suspicion for biliary etiology such as obstructive stone considering patient has no tenderness.    Chest x-ray yesterday with persistent dense right lung base opacity.  He underwent bronchoscopy with secretions sent for culture.  Patient subsequently been initiated on antibiotics. Add percussion vest and inhaled HTS to airway clearance. Tolerating SBT, attempt to extubate    Tissue biopsy pending.  Ascites fluid is negative for malignancy.  Unclear of etiology/primary source of multiple splenic and hepatic lesions.    Serosang drainage from sacral decub.  Continue with wound care as recommended.  Patient's bleeding/oozing from site has improved since silver nitrate and suture placed.  Surgery following patient and pending pathology results further discussion regarding interventions will be  discussed.    Increase free water 200ml q 4hrs    Patient's fibrinogen level still remains low and he does have some oozing from his sacral decubitus.  Will administer an additional dose of cryoprecipitate.    Continue monitor neurologic exam.  Patient has a palpable fluid wave at his craniectomy site.  He has not had any significant drainage as he did after interventional radiology.  Surgical incision site management as per neurosurgery.  Patient still has staples in place.  Surgery was performed on 10/13/2024.  Will discuss with neurosurgery timing of staple removal.    Update 3 PM:    Patient had a fever of 101.1.  He had already received Tylenol.  Plan to administer 400 mg of Motrin x 1.  Continue with current plan of care.  Attempt to achieve normothermia prior to trial of extubation.

## 2024-10-27 NOTE — RESPIRATORY THERAPY NOTE
10/27/24 0743   Inhalation Therapy Tx   $ Inhalation Therapy Performed Yes   $ Pulse Oximetry Spot Check Charge Completed   SpO2 100 %   Pre-Treatment Pulse 69   Pre-Treatment Respirations 24   Breath Sounds Pre-Treatment Bilateral Diminished;Clear   Breath Sounds Post-Treatment Right Clear;Diminished   Post-Treatment Pulse 75   Post-Treatment Respirations 22   Delivery Source UDN;Oxygen   Position Sitting   Treatment Tolerance Tolerated well   Resp Comments Pt awake. Instructed pt on therapy needs to promote secretion clearance. Pt understands.

## 2024-10-27 NOTE — PLAN OF CARE
Problem: NEUROSENSORY - ADULT  Goal: Achieves stable or improved neurological status  Description: INTERVENTIONS  - Monitor and report changes in neurological status  - Monitor vital signs such as temperature, blood pressure, glucose, and any other labs ordered   - Initiate measures to prevent increased intracranial pressure  - Monitor for seizure activity and implement precautions if appropriate      Outcome: Progressing     Problem: CARDIOVASCULAR - ADULT  Goal: Maintains optimal cardiac output and hemodynamic stability  Description: INTERVENTIONS:  - Monitor I/O, vital signs and rhythm  - Monitor for S/S and trends of decreased cardiac output  - Administer and titrate ordered vasoactive medications to optimize hemodynamic stability  - Assess quality of pulses, skin color and temperature  - Assess for signs of decreased coronary artery perfusion  - Instruct patient to report change in severity of symptoms  Outcome: Progressing     Problem: METABOLIC, FLUID AND ELECTROLYTES - ADULT  Goal: Electrolytes maintained within normal limits  Description: INTERVENTIONS:  - Monitor labs and assess patient for signs and symptoms of electrolyte imbalances  - Administer electrolyte replacement as ordered  - Monitor response to electrolyte replacements, including repeat lab results as appropriate  - Instruct patient on fluid and nutrition as appropriate  Outcome: Progressing

## 2024-10-27 NOTE — PROGRESS NOTES
Clayton Duval is a 44 y.o. male who is currently ordered Vancomycin IV with management by the Pharmacy Consult service.  Relevant clinical data and objective / subjective history reviewed.  Vancomycin Assessment:  Indication and Goal AUC/Trough: Pneumonia (goal -600, trough >10), broad spectrum for pending cultures, -600, trough >10  Clinical Status: critically ill  Micro: pending   Renal Function:  SCr: 0.7 mg/dL  CrCl: 150 mL/min  Renal replacement: Not on dialysis  Days of Therapy: 3  Current Dose: 1750 mg IV q12h  Vancomycin Plan:  New Dosing: continue current regimen  Estimated AUC: 477 mcg*hr/mL  Estimated Trough: 12 mcg/mL  Next Level: AM random on 10/30  Renal Function Monitoring: Daily BMP and UOP  Pharmacy will continue to follow closely for s/sx of nephrotoxicity, infusion reactions and appropriateness of therapy.  BMP and CBC will be ordered per protocol. We will continue to follow the patient’s culture results and clinical progress daily.    Halley Katz, Pharmacist

## 2024-10-27 NOTE — PROGRESS NOTES
Progress Note - Critical Care/ICU   Name: Clayton Duval 44 y.o. male I MRN: [id number]  Unit/Bed#: ICU 05 I Date of Admission: 10/13/2024   Date of Service: 10/27/2024 I Hospital Day: 14       Assessment & Plan     Neuro:   Diagnosis: Subdural hematoma s/p left hemicraniectomy for evacuation of SDH POD#14, AMS, Acute ischemic left thalamic and basal ganglia stroke  Plan:   Continue monitoring neurological exam closely with frequent neuro checks, obtain stat CTH if any acute changes  Monitor for signs of ICP increase (bradycardia, HTN, changes in neuro exam, increased tone, pupillary changes)  Blood Pressure: SBP goal 110 - 140, cardene gtt or pressers/fluids as needed to keep within range.  AC/AP: Heparin 5000 units q8h  Tight glycemic control, goal <180. Monitor temperature, tylenol PRN.  PT/OT/Speech/PMR consults when able  DVT PPx: SCDs and heparin 5000 q8h   CV:   Diagnosis: sinus tachycardia - likely secondary to hypovolemia further evidenced by LIZZY; slightly improved to 100s-110s from 130s previously  Plan:   Continue to monitor on Telemetry  Blood transfusion if persistent or Hgb < 7.0  Albumin 25 % 25 g 2 days completed  Pulm:  Patient successfully extubated 10/26 after being more alert and able to follow commands  Inhalaed hypertonic and pulmonary vest ordered  Pneumonia of right lower lobe  On vancomycin  Cultures grew gram pos cocci        GI:   Diagnosis: Hepatosplenomegaly, hepatic lesions, splenic lesions, transaminitis  Hepatitis panel - negative  B-HcG, CA 19-9, LDH elevated  Plan:   Oncology consulted  IR iliac bone biopsy done 10/24 - follow up tissue exam  Suspect this is the reason for increased alk phos  GGT elevated 114     :   Diagnosis: plasencia catheter  Plan:   Monitor I&Os     F/E/N:   F: none  E: replenish as indicated for Magnesium >2, K >4, Phos > 3  N: tube feeds 60cc/hr, FWF 100ml/4hrs     Heme/Onc:   Diagnosis: suspected metastatic cancer, hypocoagulability in the setting of  hepatic malignancy, lytic bone lesions, hepatic coagulopathy  Tumor markers ordered - CEA (normal), AFP (normal), B-HCG (elevated- 7.5) , LDH (elevated- 556), CA 19-9 (697)     Plan:   Biopsy done 10/24 - follow up tissue exam  Oncology consulted     Diagnosis: Anemia, Coagulopathy INR 1.7  Plan:   Transfuse for Hgb < 7  Vit K 10 mg PO 3 days completed  Fibrinogen remained low on 10/26-given another unit of cryo  Repeat am        Endo:   Diagnosis: Hypothyroidism, hypothermia  Plan:   Continue home Levothyroxine 200mcg  Gisselle hugger PRN      ID:   Diagnosis: Sacral decubitus ulcer; esau-rectal abscess     Plan:   ID following; s/p 7 day zosyn course; monitoring off antibiotics for now     MSK/Skin:   Diagnosis: Sacral decubitus ulcer  Plan:   Monitor fluid status  Wound care consulted  Red surgery debridement 10/17     Diagnosis: perirectal abscess  Plan:       Disposition: Critical care    ICU Core Measures     A: Assess, Prevent, and Manage Pain Has pain been assessed? Yes  Need for changes to pain regimen? No   B: Both SAT/SAT  N/A   C: Choice of Sedation RASS Goal: N/A patient not on sedation  Need for changes to sedation or analgesia regimen? No   D: Delirium CAM-ICU: Positive   E: Early Mobility  Plan for early mobility? NA   F: Family Engagement Plan for family engagement today? Yes       Antibiotic Review: Awaiting culture results.     Review of Invasive Devices:    [place] Plan: Voiding trial after improvement in ambulation   Central access plan: Medications requiring central line  [place] Plan: Keep arterial line for hemodynamic monitoring    Prophylaxis:  VTE VTE covered by:  heparin (porcine), Subcutaneous, 5,000 Units at 10/27/24 0542       Stress Ulcer  not ordered         24 Hour Events : Overnight patient had 1 minute run of ventricular arrhythmia with hypotension which self resolved.      Subjective   Patient seen and examined at bedside today morning. Patient woke up to voice and followed commands on  right side and went back to sleep.    Review of Systems: See HPI for Review of Systems    Objective :                   Vitals I/O      Most Recent Min/Max in 24hrs   Temp 99.7 °F (37.6 °C) Temp  Min: 97.4 °F (36.3 °C)  Max: 101.1 °F (38.4 °C)   Pulse (!) 118 Pulse  Min: 104  Max: 128   Resp (!) 26 Resp  Min: 20  Max: 29   /72 BP  Min: 112/75  Max: 120/72   O2 Sat 100 % SpO2  Min: 97 %  Max: 100 %      Intake/Output Summary (Last 24 hours) at 10/27/2024 1136  Last data filed at 10/27/2024 1001  Gross per 24 hour   Intake 4613.33 ml   Output 855 ml   Net 3758.33 ml       Diet Enteral/Parenteral; Tube Feeding No Oral Diet; Pivot 1.5; Cyclic; 75; 22 hours; Banatrol Plus Banana Flakes - One Packet; BID; 200; Water; Every 4 hours    Invasive Monitoring           Physical Exam   Physical Exam  Vitals reviewed.   Eyes:      Conjunctiva/sclera: Conjunctivae normal.      Pupils: Pupils are equal, round, and reactive to light.   Skin:     General: Skin is warm.      Coloration: Skin is not jaundiced.      Findings: No rash.   Cardiovascular:      Rate and Rhythm: Normal rate and regular rhythm.   Abdominal:      Palpations: Abdomen is soft.      Tenderness: There is no abdominal tenderness.   Constitutional:       General: He is not in acute distress.     Appearance: He is well-developed. He is ill-appearing.      Interventions: He is restrained. He is not sedated and not intubated.  Pulmonary:      Effort: Pulmonary effort is normal. He is not intubated.      Breath sounds: Normal breath sounds.   Neurological:      Mental Status: He is alert.      Comments: RUE 3/5; RLE 2/5; LUE LLE 1/5          Diagnostic Studies        Lab Results: I have reviewed the following results:     Medications:  Scheduled PRN   chlorhexidine, 15 mL, Q12H KAYLIE  Cholecalciferol, 5,000 Units, Daily  folic acid, 1 mg, Daily  heparin (porcine), 5,000 Units, Q8H KAYLIE  levothyroxine, 200 mcg, Early Morning  magnesium sulfate, 2 g, After  Breakfast  modafinil, 200 mg, Daily  potassium phosphate, 30 mmol, Once  sodium chloride, 4 mL, Q6H  thiamine, 100 mg, Daily  vancomycin, 1,750 mg, Q12H      acetaminophen, 650 mg, Q8H PRN  hydrALAZINE, 10 mg, Q6H PRN  HYDROmorphone, 1 mg, Q3H PRN  labetalol, 10 mg, Q6H PRN  ondansetron, 4 mg, Q8H PRN  polyethylene glycol, 17 g, Daily PRN       Continuous          Labs:   CBC    Recent Labs     10/26/24  0518 10/27/24  0420   WBC 9.96 7.71   HGB 7.4* 7.4*   HCT 24.4* 25.0*   * 102*   BANDSPCT  --  7     BMP    Recent Labs     10/26/24  2130 10/27/24  0420   SODIUM 146 148*   K 3.7 3.7   * 116*   CO2 24 22   AGAP 6 10   BUN 45* 44*   CREATININE 0.77 0.70   CALCIUM 7.7* 7.9*       Coags    No recent results     Additional Electrolytes  Recent Labs     10/26/24  2130 10/27/24  0420   MG 2.7 2.4   PHOS 2.7 2.4*   CAIONIZED 1.16 1.09*          Blood Gas    No recent results  No recent results LFTs  Recent Labs     10/26/24  0518   *   *   ALKPHOS 861*   ALB 2.5*   TBILI 4.90*       Infectious  No recent results  Glucose  Recent Labs     10/26/24  0518 10/26/24  2130 10/27/24  0420   GLUC 127 124 118        Bradley Quinteros MD  PGY-3, Internal Medicine  [facility]

## 2024-10-27 NOTE — ANESTHESIA POSTPROCEDURE EVALUATION
Post-Op Assessment Note    CV Status:  Stable  Pain scale: unable to assess.    Pain control: unable to assess.       Post-procedure mental status: baseline - noncommunicative.   Hydration Status:  Stable   PONV status: unable to assess.   Airway patency: extubated on 10/26 at night.  Airway: intubated     Post Op Vitals Reviewed: Yes    No anethesia notable event occurred.    Staff: Anesthesiologist   Comments: defer to critical care team. extubated overnight 10/26    Reason for prolonged intubation > 24 hours:  Defer to critical care team. extubated overnight 10/26Reason for prolonged intubation > 48 hours:  Defer to critical care team. extubated overnight 10/26      Last Filed PACU Vitals:  Vitals Value Taken Time   Temp     Pulse 124 10/27/24 1204   BP     Resp 23 10/27/24 1204   SpO2 98 % 10/27/24 1204   Vitals shown include unfiled device data.    Modified Venus:  No data recorded

## 2024-10-27 NOTE — PLAN OF CARE
Problem: PAIN - ADULT  Goal: Verbalizes/displays adequate comfort level or baseline comfort level  Description: Interventions:  - Encourage patient to monitor pain and request assistance  - Assess pain using appropriate pain scale  - Administer analgesics based on type and severity of pain and evaluate response  - Implement non-pharmacological measures as appropriate and evaluate response  - Consider cultural and social influences on pain and pain management  - Notify physician/advanced practitioner if interventions unsuccessful or patient reports new pain  Outcome: Progressing     Problem: INFECTION - ADULT  Goal: Absence or prevention of progression during hospitalization  Description: INTERVENTIONS:  - Assess and monitor for signs and symptoms of infection  - Monitor lab/diagnostic results  - Monitor all insertion sites, i.e. indwelling lines, tubes, and drains  - Monitor endotracheal if appropriate and nasal secretions for changes in amount and color  - [place] appropriate cooling/warming therapies per order  - Administer medications as ordered  - Instruct and encourage patient and family to use good hand hygiene technique  - Identify and instruct in appropriate isolation precautions for identified infection/condition  Outcome: Progressing  Goal: Absence of fever/infection during neutropenic period  Description: INTERVENTIONS:  - Monitor WBC    Outcome: Progressing     Problem: SAFETY ADULT  Goal: Patient will remain free of falls  Description: INTERVENTIONS:  - Educate patient/family on patient safety including physical limitations  - Instruct patient to call for assistance with activity   - Consult OT/PT to assist with strengthening/mobility   - Keep Call bell within reach  - Keep bed low and locked with side rails adjusted as appropriate  - Keep care items and personal belongings within reach  - Initiate and maintain comfort rounds  - Make Fall Risk Sign visible to staff  - Apply yellow socks and bracelet  for high fall risk patients  - Consider moving patient to room near nurses station  Outcome: Progressing  Goal: Maintain or return to baseline ADL function  Description: INTERVENTIONS:  -  Assess patient's ability to carry out ADLs; assess patient's baseline for ADL function and identify physical deficits which impact ability to perform ADLs (bathing, care of mouth/teeth, toileting, grooming, dressing, etc.)  - Assess/evaluate cause of self-care deficits   - Assess range of motion  - Assess patient's mobility; develop plan if impaired  - Assess patient's need for assistive devices and provide as appropriate  - Encourage maximum independence but intervene and supervise when necessary  - Involve family in performance of ADLs  - Assess for home care needs following discharge   - Consider OT consult to assist with ADL evaluation and planning for discharge  - Provide patient education as appropriate  Outcome: Progressing  Goal: Maintains/Returns to pre admission functional level  Description: INTERVENTIONS:  - Perform AM-PAC 6 Click Basic Mobility/ Daily Activity assessment daily.  - Set and communicate daily mobility goal to care team and patient/family/caregiver.   - Out of bed for toileting  - Record patient progress and toleration of activity level   Outcome: Progressing     Problem: DISCHARGE PLANNING  Goal: Discharge to home or other facility with appropriate resources  Description: INTERVENTIONS:  - Identify barriers to discharge w/patient and caregiver  - Arrange for needed discharge resources and transportation as appropriate  - Identify discharge learning needs (meds, wound care, etc.)  - Arrange for interpretive services to assist at discharge as needed  - Refer to Case Management Department for coordinating discharge planning if the patient needs post-hospital services based on physician/advanced practitioner order or complex needs related to functional status, cognitive ability, or social support  system  Outcome: Progressing     Problem: Knowledge Deficit  Goal: Patient/family/caregiver demonstrates understanding of disease process, treatment plan, medications, and discharge instructions  Description: Complete learning assessment and assess knowledge base.  Interventions:  - Provide teaching at level of understanding  - Provide teaching via preferred learning methods  Outcome: Progressing     Problem: Prexisting or High Potential for Compromised Skin Integrity  Goal: Skin integrity is maintained or improved  Description: INTERVENTIONS:  - Identify patients at risk for skin breakdown  - Assess and monitor skin integrity  - Assess and monitor nutrition and hydration status  - Monitor labs   - Assess for incontinence   - Turn and reposition patient  - Assist with mobility/ambulation  - Relieve pressure over bony prominences  - Avoid friction and shearing  - Provide appropriate hygiene as needed including keeping skin clean and dry  - Evaluate need for skin moisturizer/barrier cream  - Collaborate with interdisciplinary team   - Patient/family teaching  - Consider wound care consult   Outcome: Progressing     Problem: Nutrition/Hydration-ADULT  Goal: Nutrient/Hydration intake appropriate for improving, restoring or maintaining nutritional needs  Description: Monitor and assess patient's nutrition/hydration status for malnutrition. Collaborate with interdisciplinary team and initiate plan and interventions as ordered.  Monitor patient's weight and dietary intake as ordered or per policy. Utilize nutrition screening tool and intervene as necessary. Determine patient's food preferences and provide high-protein, high-caloric foods as appropriate.     INTERVENTIONS:  - Monitor oral intake, urinary output, labs, and treatment plans  - Assess nutrition and hydration status and recommend course of action  - Evaluate amount of meals eaten  - Assist patient with eating if necessary   - Allow adequate time for meals  -  Recommend/ encourage appropriate diets, oral nutritional supplements, and vitamin/mineral supplements  - Order, calculate, and assess calorie counts as needed  - Recommend, monitor, and adjust tube feedings and TPN/PPN based on assessed needs  - Assess need for intravenous fluids  - Provide specific nutrition/hydration education as appropriate  - Include patient/family/caregiver in decisions related to nutrition  Outcome: Progressing     Problem: SAFETY,RESTRAINT: NV/NON-SELF DESTRUCTIVE BEHAVIOR  Goal: Remains free of harm/injury (restraint for non violent/non self-detsructive behavior)  Description: INTERVENTIONS:  - Instruct patient/family regarding restraint use   - Assess and monitor physiologic and psychological status   - Provide interventions and comfort measures to meet assessed patient needs   - Identify and implement measures to help patient regain control  - Assess readiness for release of restraint   Outcome: Progressing  Goal: Returns to optimal restraint-free functioning  Description: INTERVENTIONS:  - Assess the patient's behavior and symptoms that indicate continued need for restraint  - Identify and implement measures to help patient regain control  - Assess readiness for release of restraint   Outcome: Progressing

## 2024-10-27 NOTE — PROGRESS NOTES
I have personally seen and examined patient and reviewed all data with resident. Agree with note, assessment and plan. Critical care time 0 minutes. Please refer to attending comments below. Critical care time does not include procedures, family meeting or teaching.       Left subdural hematoma with brain compression and mass effect status post hemicraniectomy for evacuation 10/13/2024  Acute ischemic strokes-left thalamic and basal ganglia  Encephalopathy  Acute hypoxic respiratory failure  RLL consolidation/ pna likely aspiration  Sinus tachycardia  PFO  Hepatosplenomegaly with hepatic lesions, splenic lesions and transaminitis  Lytic bone lesions  Hypothyroid-continue Synthroid  Hypothermia  Sacral decubitus ulcer with perirectal abscess  Anasarca  Obesity BMI 30.2 status post gastric sleeve and biliary duodenal switch procedure 2012  Coagulopathy  Severe protein calorie malnutrition  Pulmonary embolism  Pleural effusion  DVT left UE and superficial thrombophlebitis lle  Anemia- multifactorial ABLA with sacral decub and critical illness  Hypernatremia  Hypophosphatemia-replete and recheck  Hypokalemia-replete and recheck  Hypofibrinogenemia-multifactorial with blood loss as well as nutritional state        exam:  NG tube in place.  Earlier when patient was febrile he was tachypneic and tachycardic and appeared to be in acute distress.  His fever has since resolved and patient appears calm without tachypnea.  He is craniectomy incision is clean dry and intact without drainage, his flap is now concave and soft with a fluid wave.  He is able to gaze in all directions.  He does have jaundice and scleral icterus.  Pupils are equal.  Patient will move all extremities to verbal request.  He appears to have greater strength in his right upper extremity compared to his left.  He was able to lift his right lower extremity which was dangling off the bed up and almost place and independently on the bed.  Sacral decubitus  unchanged.     Goal systolic blood pressure:  110-150 with mean arterial pressure greater than 65     Respiratory support:  6 L nasal cannula  Inhaled 3% saline 4 mL every 6 hours respiratory     I/O + 3.3 L   mL  BM 1     Micro:  10/24/2024 fungal [id number]   10/24/2024 viral [id number]-itgkdzw  10/24/2024 bacterial culture-bronch plus for GPC in pairs   10/23/2024 blood culture-pending  10/22/2024 body fluid culture paracentesis-negative Gram stain no growth to date  10/18/2024 blood Dae site smear-negative  10/17/2024 fungal blood culture-negative  10/17/2024 blood culture-negative  10/16/2024 wound culture-+2 gram-positive rods, +1 gram-positive cocci in pairs, +1 gram-negative rods  10/16/2024-blood culture-negative  10/15/2024 Legionella urine antigen-negative  10/14/2024 sputum culture-Staph aureus  10/14/2024 Motrin)-negative  10/14/2024 blood culture-negative  10/11/2024 blood cx- negative  10/10/2024 flu/covid negative      Pathology:  10/22/2024 ascites-negative for malignancy  10/24/2024 bone biopsy-pending     Devices:  10/24/2024 right IJ CVL  10/24/2024 endotracheal tube-removed  10/13/2024 midline basilic-removed  10/15/2024 NG tube right nare  10/22/2024 urinary catheter  10/14/2024 radial arterial line    Antimicrobials:  Vancomycin 1750 IV every 12 hours end date 10/31/2024    Sleep-wake cycle regulation: Provigil 200 mg every morning    Afib x 2 events. Tachycardia with flushing the distal port. Plan to get PICC tomorrow.  A-fib events may be related to central venous access placement as patient has not had events prior to this.  Plan yesterday was attempt to extubate as patient was tolerating spontaneous breathing trial.  Unfortunately he developed a temperature of 101.4 and became tachycardic and tachypneic which resulted in extubation being deferred.  Patient successfully extubated overnight.    Vital signs reviewed and patient had tachycardia throughout the night.  He  is also tachypneic with respiratory rate in the mid 20s.  No new imaging to review.  Fibrinogen 113.  Hemoglobin is 7.4 which is stable.  WBC count improved and bandemia improved.  Patient's platelets are 102.  Sodium is 148 which is stable to slightly higher compared to yesterday.  Phosphorus is 2.4, will replete and recheck.  All glucoses less than 180.    Continue with supportive care.  Patient was successfully extubated yesterday.  Aggressive airway clearance protocol.  Continue with percussion vest as well as inhaled hypertonic saline to assist with mobilizing secretions.  Maintain saturations greater than 92%.  Continue with antibiotics vancomycin with right lower lobe consolidation and recent bronchoscopy with gram-positive cocci.  Infectious diseases following.    Patient appears to be more awake with initiation of Provigil.  Continue to monitor patient's neurologic exam.  He has overall severe deconditioning and muscle weakness.  He does attempt to move all extremities and appears to have degraded strength with his right upper extremity.  Will discuss with neurosurgery plan for staple removal.  Patient has not had drainage from his incision over the past 48 hours.  He will need a helmet for mobilization.    Pathology after IR biopsy of lytic bone lesions is still pending.  Biopsy will help with further discussions regarding plan with sacral decubitus and if patient is a candidate for surgical intervention.  Continue with wound care for sacral decub.  Patient has not had significant bleeding events.  Attempt to maintain fibrinogen greater than 100.  Patient's hemoglobin is stable at 7.4.  No indication for transfusion today.    Update: 252pm    Patient had fever and tachypnea and tachycardia    Platelets declining, no schistocytes on differential. No bleeding events after transfused cryo. No indication for transfusion at this time.  Thrombocytopenia may be secondary to critical illness as well as infection  currently being treated with antibiotics.  Cannot exclude the possibility of underlying malignancy contributing to thrombocytopenia.

## 2024-10-28 NOTE — ASSESSMENT & PLAN NOTE
Patient with multiple splenic, hepatic and osseous lesions, concerning for metastasis.  Patient is status post BM biopsy last week.  Histoplasma antigen negative.  Follow-up on pathology.

## 2024-10-28 NOTE — QUICK NOTE
Wound check    Patient seen and evaluated at bedside for sacral wound check. Previous dressing saturated. There was no active oozing. Previous silver nitrate visible. No foul odor or drainage. Wound was repacked with 1 saline soaked Kerlix and covered with a Mepilex Sacral Foam Dressing.         Bright Fregoso MD  10/28/2024 5:01 PM

## 2024-10-28 NOTE — PROGRESS NOTES
MD requested re-assessment with TF to aid with Na level, currently Na 151. Currently receiving TF Pivot 1.5@75mL/hr x22hrs, banatrol plus BID (mixed with 120mL for each packet) and water flushes 300mL every 4hrs provides total volume 1650mL, 2555cal, 155g pro, 3278mL, 2434mg of Na from TF and 40mg from banatrol.     Can adjust to Jevity 1.2@80mL/hr x 22hrs, add 1 pack of prosource TID (flush 30mL before and after each prosource), add 1 pack of micaela BID (mix 120mL water with each packet), d/c banatrol plus (will monitor bowels on higher fiber formula), provides total volume 1760mL, 2472cal, 148g pro, 1840mL (TF + prosurce flushes and water with micaela), Na 1923mg (TF +prosurces), water flushes per critical or consider 120mL every 4hrs would provide total of 2560mL, continue to monitor Na and adjust flushes as needed.

## 2024-10-28 NOTE — PROGRESS NOTES
Progress Note - Critical Care/ICU   Name: Clayton Duval 44 y.o. male I MRN: [id number]  Unit/Bed#: ICU 05 I Date of Admission: 10/13/2024   Date of Service: 10/28/2024 I Hospital Day: 15       Critical Care Interval Transfer Note:    Brief Hospital Summary: Clayton Duval is a 43-year-old male with past medical history of gastric bypass surgery with duodenal switch procedure who initially presented to his PCP office with fever, chills, fatigue and felt better with steroids.  But symptoms returned when he stopped steroids and thus he presented to the ED.  On further evaluation he was found to have masses in liver, spleen, bone lytic lesions in the pelvis and thus who is admitted for further evaluation of the same.  On day 3 of hospitalization patient had an episode of altered mental status and further evaluation showed left subdural hematoma requiring left craniectomy.  Post colectomy patient was admitted to the ICU.  Since then the course was complicated by severe sacral ulcer, rectal abscess.  He also was found to have subsegmental pulmonary embolism but anticoagulation was not pursued due to recent history of subdural hematoma.  Infectious disease is on board for management of possible pneumonia with vancomycin.  During the course patient also got IR biopsy of the pelvic bone lesions and the results are still pending.  General surgery is on board for sacral ulcer and plan is for diverting colostomy depending on the prognostication after the biopsy results.  Today patient was found to be stable enough for stepdown 2 level of care.    Barriers to discharge:   Sacral ulcer management pending biopsy results  Placement     Consults: IP CONSULT TO CASE MANAGEMENT  IP CONSULT TO ONCOLOGY  INPATIENT CONSULT TO IR  IP CONSULT TO PHARMACY  IP CONSULT TO NEUROLOGY  IP CONSULT TO NUTRITION SERVICES  IP CONSULT TO PALLIATIVE CARE  IP CONSULT TO ACUTE CARE SURGERY  IP CONSULT TO INFECTIOUS DISEASES  IP CONSULT  TO VENOUS ACCESS TEAM  INPATIENT CONSULT TO IR  IP CONSULT TO PHARMACY  IP CONSULT TO VENOUS ACCESS TEAM    Recommended to review admission imaging for incidental findings and document in discharge navigator: Chart reviewed, no known incidental findings noted at this time.      Discharge Plan: Anticipate discharge in >72 hrs to TBD  Barclay Plan: Voiding trial after improvement in ambulation   Central access plan: No peripheral access able to be obtained.  Plan Remove CVC and place PICC line    Patient seen and evaluated by Critical Care today and deemed to be appropriate for transfer to Stepdown Level 2. Spoke to Dr Sood from [facility] to accept transfer. Critical care can be contacted via SecureChat with any questions or concerns.    Bradley Quinteros MD  PGY-3, Internal Medicine  [facility]

## 2024-10-28 NOTE — ASSESSMENT & PLAN NOTE
Possible pneumonia, with CXR showing right basilar infiltrate.  Patient is status post bronchoscopy last week, with finding of airway purulence.  BAL Gram stain had 4+ GPC but culture only grew mixed respiratory alli.  With recurrent fever overnight, IV vancomycin was started for presumptive pneumonia.  Despite IV vancomycin, and recent Zosyn, patient continued to have intermittent fever.  Given persistent fever despite antibiotic, doubt pneumonia and doubt active infection.  Discontinue IV vancomycin and monitor off antibiotic.  Monitor temperature.

## 2024-10-28 NOTE — CONSULTS
Vancomycin IV Pharmacy-to-Dose Consultation    Clayton Duval is a 44 y.o. male who was receiving Vancomycin IV with management by the Pharmacy Consult service for treatment of Pneumonia (goal -600, trough >10) broad spectrum for pending cultures.       The patient’s Vancomycin therapy has been discontinued. Thank you for allowing us to take part in this patient's care. Pharmacy will sign-off now; please call or re-consult if there are any questions.        Jonah Mendoza, PharmD, BCCCP  Critical Care Clinical Pharmacist  Available via Tiger Text

## 2024-10-28 NOTE — PROGRESS NOTES
Progress Note - Infectious Disease   Name: Clayton Duval 44 y.o. male I MRN: [id number]  Unit/Bed#: ICU 05 I Date of Admission: 10/13/2024   Date of Service: 10/28/2024 I Hospital Day: 15    Assessment & Plan  Sepsis (HCC)  Source of sepsis is most likely perirectal abscess.  Despite sepsis, patient remains systemically well, without toxicity and hemodynamically stable, without hypotension.  Patient had prolonged fever, most likely secondary to infected sacral ulcer but this has resolved with antibiotic and I&D of sacral ulcer.  Admission blood cultures have no growth.  Patient completed Zosyn course last week.  Temperature remains intermittently up throughout hospitalization, despite antibiotic, without further obvious active infection.  WBC remains in the normal range.  Consider to remove fever as etiology of persistent intermittent fever.  At this point, it is best to monitor patient off antibiotic.  Discontinue antibiotic and observe.  Monitor temperature/WBC.    Monitor hemodynamics.    Perirectal abscess  Patient has spontaneous drainage.  He is being followed by general surgery service, with no plan for surgical I&D previously.  However, at present, there is concern of persistent abscess.  Repeat CT imaging without obvious abscess.  Wound culture with growth of mixed alli, not helpful.  Patient has no history of MRSA, has negative MRSA screen and has MSSA growing in respiratory culture.  At I&D, there was no further abscess or purulence.  Patient completed 7-day course of IV Zosyn last week.  No further antibiotic for this indication  Wound care per surgery.  Sacral wound  Patient is status post bedside I&D and being followed by general surgery.  Concern for adjacent perirectal abscess noted.  CT scan 10/18 also noting auricle thinning of coccyx below the decubitus ulcer may suggest osteomyelitis.  However they are all bone remodeling beneath the sacral ulcer can also appear similarly.  Definitive  diagnosis of osteomized would require a bone biopsy/culture.  Even if osteomyelitis was confirmed however, long-term antibiotic therapy would be futile without a plan to aggressively debride the tissue and bone, send bone cultures and cover the wound with flap for closure.  Patient complete antibiotic course.  Wound care per surgery.  Pneumonia of right lower lobe due to infectious organism  Possible pneumonia, with CXR showing right basilar infiltrate.  Patient is status post bronchoscopy last week, with finding of airway purulence.  BAL Gram stain had 4+ GPC but culture only grew mixed respiratory alli.  With recurrent fever overnight, IV vancomycin was started for presumptive pneumonia.  Despite IV vancomycin, and recent Zosyn, patient continued to have intermittent fever.  Given persistent fever despite antibiotic, doubt pneumonia and doubt active infection.  Discontinue IV vancomycin and monitor off antibiotic.  Monitor temperature.  Non-traumatic acute L subdural hematoma (HCC)  Patient is status post craniectomy and evacuation of subdural hematoma.  He also has ischemia in the brainstem.  Neurosurgery follow-up.  Stroke (HCC)  Head MRI showed ischemia involving brainstem.  Patient had decreased responsiveness on presentation.  Mental status with some improvement.  Neurology follow-up.  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  Patient is status post BM biopsy last week.  Histoplasma antigen negative.  Follow-up on pathology.    I have discussed with Dr. Sood from primary service regarding the above plan to discontinue antibiotic and observe.  She agrees with the plan.    Antibiotics:  Vancomycin # 4    Subjective   Patient remains in ICU.  Mental status is stable, awake and alert but not greatly communicative.  Temperature up again.    Objective :  Temp:  [98.8 °F (37.1 °C)-102.5 °F (39.2 °C)] 102.5 °F (39.2 °C)  HR:  [116-136] 124  BP:  (116-133)/(61-90) 129/61  Resp:  [24-30] 26  SpO2:  [93 %-100 %] 100 %  O2 Device: Nasal cannula  Nasal Cannula O2 Flow Rate (L/min):  [6 L/min] 6 L/min    Physical Exam:     General: Awake, alert, noncommunicative, comfortable, nontoxic.   Neck:  Supple. No mass.  No lymphadenopathy.   Lungs: Decreased breath sounds, no rales, no wheezing, respirations unlabored.   Heart:  Regular rate and rhythm, S1 and S2 normal, no murmur.   Abdomen: Soft, nondistended, non-tender, bowel sounds active all four quadrants, no masses, no organomegaly.   Extremities: Stable leg edema. No erythema/warmth. No draining ulcer. Nontender to palpation.   Skin:  No rash.   Neuro: Moves all extremities.        Lab Results: I have reviewed the following results:  Results from last 7 days   Lab Units 10/28/24  0549 10/27/24  0420 10/26/24  0518   WBC Thousand/uL 7.20 7.71 9.96   HEMOGLOBIN g/dL 7.3* 7.4* 7.4*   PLATELETS Thousands/uL 127* 102* 113*     Results from last 7 days   Lab Units 10/28/24  0549 10/27/24  0420 10/26/24  2130 10/26/24  0518 10/25/24  0607   SODIUM mmol/L 151* 148* 146 148* 146   POTASSIUM mmol/L 4.1 3.7 3.7 3.7 3.8   CHLORIDE mmol/L 118* 116* 116* 116* 113*   CO2 mmol/L 24 22 24 24 25   BUN mg/dL 47* 44* 45* 41* 37*   CREATININE mg/dL 0.78 0.70 0.77 0.74 0.69   EGFR ml/min/1.73sq m 109 113 109 111 114   CALCIUM mg/dL 8.0* 7.9* 7.7* 7.7* 8.0*   AST U/L 253*  --   --  247* 289*   ALT U/L 113*  --   --  103* 118*   ALK PHOS U/L 1,097*  --   --  861* 742*   ALBUMIN g/dL 2.4*  --   --  2.5* 2.6*     Results from last 7 days   Lab Units 10/24/24  1529 10/24/24  0000 10/23/24  2309 10/23/24  2251 10/22/24  1438   BLOOD CULTURE   --   --  No Growth After 4 Days. No Growth After 4 Days.  --    GRAM STAIN RESULT  4+ Gram positive cocci in pairs*  Rare Polys* Rare Polys  No organisms seen  --   --  No Polys or Bacteria seen   BODY FLUID CULTURE, STERILE   --   --   --   --  No growth             Results from last 7 days   Lab  Units 10/24/24  1434   FERRITIN ng/mL >7,500*

## 2024-10-28 NOTE — ASSESSMENT & PLAN NOTE
Goals of care  Level 1 code status  Disease focused care with no limits in place.   Concerns introduced include:  Spoke w/ patient's wife:  Patient will remain a Level 1 - Full Code  She would like to give him a fighting chance, she is afraid that certain decisions she will make will cut his time too short  Stated that she doesn't want to see him suffer but she isn't ready to lose him just yet  Has not informed her two children of their father's current medical status  She feels incredible guilt and is dealing w/ significant emotional/mental distress of current situation  Needs immense emotional support from team  PSC SW involved     Social support:  Supportive listening provided  Normalized experience of patient/family  Provided anxiety containment  Provided anticipatory guidance  Encouraged self care    Follow up  Palliative Care will continue to follow and goals of care discussions will be ongoing.    Please reach out via iTagged Secure Chat if questions or concerns arise.    I have reviewed the patient's controlled substance dispensing history in the Prescription Drug Monitoring Program in compliance with the [facility] regulations before prescribing any controlled substances.  Last refills: N/A    Decisional apparatus:  Patient does not have capacity on exam today.  If capacity is lost, patient's substitute decision maker would default to spouse (Liane) by [place] Act 169.    Advance Directive/Living Will, POLST and POA Forms: None on file.    ER contacts:  Name Relation Home Work Mobile   Liane Duval Spouse   [phone]     We appreciate the invitation to be involved in this patient's care.  We will continue to follow throughout this hospitalization.  Please do not hesitate to reach our on call provider through our clinic answering service at [phone] should you have acute symptom control concerns.     Self-Care for Headaches    Most headaches aren't serious and can be relieved with self-care. But some headaches may be a sign of another health problem like eye trouble or high blood pressure.  To find the best treatment, learn what kind of headaches you Signs of migraine  Any of the following can be signs:  · Throbbing pain on one or both sides of your head  · Nausea or vomiting  · Extreme sensitivity to light, sound, and smells  · Bright spots, flashes, or other visual changes  · Pain or nausea so severe

## 2024-10-28 NOTE — PROGRESS NOTES
Progress Note - Critical Care/ICU   Name: Clayton Duval 44 y.o. male I MRN: [id number]  Unit/Bed#: ICU 05 I Date of Admission: 10/13/2024   Date of Service: 10/28/2024 I Hospital Day: 15       Assessment & Plan     Neuro:   Diagnosis: Subdural hematoma s/p left hemicraniectomy for evacuation of SDH POD#14, AMS, Acute ischemic left thalamic and basal ganglia stroke  Plan:   Continue monitoring neurological exam closely with frequent neuro checks, obtain stat CTH if any acute changes  Monitor for signs of ICP increase (bradycardia, HTN, changes in neuro exam, increased tone, pupillary changes)  Blood Pressure: SBP goal 110 - 140, cardene gtt or pressers/fluids as needed to keep within range.  AC/AP: Heparin 5000 units q8h  Tight glycemic control, goal <180. Monitor temperature, tylenol PRN.  PT/OT/Speech/PMR consults when able  DVT PPx: SCDs and heparin 5000 q8h   CV:   Diagnosis: sinus tachycardia - likely secondary to hypovolemia; slightly improved to 100s-110s from 130s previously after 2-day albumin challenge  Segmental pulmonary embolism and DVT     Plan:   Continue to monitor on Telemetry  Blood transfusion if persistent or Hgb < 7.0  1 L LR bolus today  Hold off anticoagulation due to recent SDH and high risk of bleeding  Pulm:  Patient successfully extubated 10/26 after being more alert and able to follow commands  Inhalaed hypertonic and pulmonary vest ordered  Pneumonia of right lower lobe  On vancomycin per ID  Cultures grew gram pos cocci        GI:   Diagnosis: Hepatosplenomegaly, hepatic lesions, splenic lesions, transaminitis  Hepatitis panel - negative  B-HcG, CA 19-9, LDH elevated  Plan:   Oncology consulted  IR iliac bone biopsy done 10/24 - follow up tissue exam  Suspect this is the reason for increased alk phos     :   Diagnosis: plasencia catheter  Plan:   Monitor I&Os     F/E/N:   F: none  E: replenish as indicated for Magnesium >2, K >4, Phos > 3  N: tube feeds 60cc/hr, FWF 100ml/4hrs      Heme/Onc:   Diagnosis: suspected metastatic cancer, hypocoagulability in the setting of hepatic malignancy, lytic bone lesions, hepatic coagulopathy  Tumor markers ordered - CEA (normal), AFP (normal), B-HCG (elevated- 7.5) , LDH (elevated- 556), CA 19-9 (697)     Plan:   Biopsy done 10/24 - follow up tissue exam  Oncology consulted     Endo:   Diagnosis: Hypothyroidism, hypothermia  Plan:   Continue home Levothyroxine 200mcg  Gisselle hugger PRN      ID:   Diagnosis: Sacral decubitus ulcer; esau-rectal abscess     Plan:   ID following; s/p 7 day zosyn course; monitoring off antibiotics for now     MSK/Skin:   Diagnosis: Sacral decubitus ulcer  Plan:   Monitor fluid status  Wound care consulted  Red surgery following  Needs diverting colostomy but holding off until biopsy results back for prognostication       Disposition: Stepdown Level 2    ICU Core Measures     A: Assess, Prevent, and Manage Pain Has pain been assessed? Yes  Need for changes to pain regimen? No   B: Both SAT/SAT  N/A   C: Choice of Sedation RASS Goal: N/A patient not on sedation  Need for changes to sedation or analgesia regimen? NA   D: Delirium CAM-ICU: Unable to perform secondary to Acute cognitive dysfunction   E: Early Mobility  Plan for early mobility? NA   F: Family Engagement Plan for family engagement today? Yes       Review of Invasive Devices:    Barclay Plan: Voiding trial after improvement in ambulation   Central access plan: Patient requires PICC secondary to access issue  Linda Plan: Discontinue arterial line    Prophylaxis:  VTE VTE covered by:  heparin (porcine), Subcutaneous, 5,000 Units at 10/28/24 1316       Stress Ulcer  not ordered         24 Hour Events : No acute events overnight  Subjective   Patient seen examined bedside today morning.  Patient remains nonverbal but continues to follow commands.  Tolerating tube feeds well.    Review of Systems: See HPI for Review of Systems    Objective :                   Vitals I/O       Most Recent Min/Max in 24hrs   Temp (!) 102.5 °F (39.2 °C) Temp  Min: 98.8 °F (37.1 °C)  Max: 102.5 °F (39.2 °C)   Pulse (!) 124 Pulse  Min: 116  Max: 136   Resp (!) 26 Resp  Min: 24  Max: 30   /61 BP  Min: 116/80  Max: 133/80   O2 Sat 99 % SpO2  Min: 93 %  Max: 100 %      Intake/Output Summary (Last 24 hours) at 10/28/2024 1429  Last data filed at 10/28/2024 1213  Gross per 24 hour   Intake 3185 ml   Output 1410 ml   Net 1775 ml       Diet Enteral/Parenteral; Tube Feeding No Oral Diet; Pivot 1.5; Cyclic; 75; 22 hours; Banatrol Plus Banana Flakes - One Packet; BID; 300; Water; Every 4 hours    Invasive Monitoring           Physical Exam   Physical Exam  Vitals reviewed.   Eyes:      Conjunctiva/sclera: Conjunctivae normal.      Pupils: Pupils are equal, round, and reactive to light.   Skin:     General: Skin is warm.      Coloration: Skin is not jaundiced.      Findings: No rash.   Cardiovascular:      Rate and Rhythm: Normal rate and regular rhythm.   Abdominal:      Palpations: Abdomen is soft.      Tenderness: There is no abdominal tenderness.   Constitutional:       General: He is not in acute distress.     Appearance: He is well-developed. He is ill-appearing.      Interventions: He is restrained. He is not sedated and not intubated.  Pulmonary:      Effort: Pulmonary effort is normal. He is not intubated.      Breath sounds: Normal breath sounds.   Neurological:      Mental Status: He is alert.      Comments: RUE 3/5; RLE 2/5; LUE LLE 1/5          Diagnostic Studies        Lab Results: I have reviewed the following results:     Medications:  Scheduled PRN   chlorhexidine, 15 mL, Q12H KAYLIE  Cholecalciferol, 5,000 Units, Daily  folic acid, 1 mg, Daily  heparin (porcine), 5,000 Units, Q8H KAYLIE  levothyroxine, 200 mcg, Early Morning  magnesium sulfate, 2 g, After Breakfast  modafinil, 200 mg, Daily  sodium chloride, 4 mL, Q6H  thiamine, 100 mg, Daily      acetaminophen, 650 mg, Q8H PRN  hydrALAZINE, 10 mg, Q6H  PRN  HYDROmorphone, 1 mg, Q3H PRN  labetalol, 10 mg, Q6H PRN  ondansetron, 4 mg, Q8H PRN  polyethylene glycol, 17 g, Daily PRN       Continuous          Labs:   CBC    Recent Labs     10/27/24  0420 10/28/24  0549   WBC 7.71 7.20   HGB 7.4* 7.3*   HCT 25.0* 24.8*   * 127*   BANDSPCT 7  --      BMP    Recent Labs     10/27/24  0420 10/28/24  0549   SODIUM 148* 151*   K 3.7 4.1   * 118*   CO2 22 24   AGAP 10 9   BUN 44* 47*   CREATININE 0.70 0.78   CALCIUM 7.9* 8.0*       Coags    Recent Labs     10/28/24  0549   INR 1.35*   PTT 43*        Additional Electrolytes  Recent Labs     10/26/24  2130 10/27/24  0420 10/28/24  0549   MG 2.7 2.4 2.2   PHOS 2.7 2.4* 3.2   CAIONIZED 1.16 1.09*  --           Blood Gas    Recent Labs     10/27/24  1217   PHART 7.509*   LZE9EBI 26.4*   PO2ART 148.7*   LES3CAR 20.5*   BEART -2.0   SOURCE Line, Arterial     Recent Labs     10/27/24  1217   SOURCE Line, Arterial    LFTs  Recent Labs     10/28/24  0549   *   *   ALKPHOS 1,097*   ALB 2.4*   TBILI 6.21*       Infectious  No recent results  Glucose  Recent Labs     10/26/24  2130 10/27/24  0420 10/28/24  0549   GLUC 124 118 103        Bradley Quinteros MD  PGY-3, Internal Medicine  [facility]

## 2024-10-28 NOTE — PROCEDURES
Insert Complex Venous Access Line    Date/Time: 10/28/2024 5:32 PM    Performed by: Erin Sweet RN  Authorized by: Bradley Quinteros MD    Patient location:  Bedside  Other Assisting Provider: No    Consent:     Consent obtained:  Written    Consent given by:  Spouse    Risks discussed:  Arterial puncture, incorrect placement, nerve damage, bleeding, infection and pneumothorax    Alternatives discussed:  No treatment, alternative treatment and delayed treatment  Universal protocol:     Procedure explained and questions answered to patient or proxy's satisfaction: yes      Immediately prior to procedure, a time out was called: yes      Relevant documents present and verified: yes      Test results available and properly labeled: yes      Radiology Images displayed and confirmed.  If images not available, report reviewed: yes      Required blood products, implants, devices, and special equipment available: yes      Site/side marked: yes      Patient identity confirmed:  Hospital-assigned identification number and arm band  Pre-procedure details:     Hand hygiene: Hand hygiene performed prior to insertion      Sterile barrier technique: All elements of maximal sterile technique followed      Skin preparation:  ChloraPrep    Skin preparation agent: Skin preparation agent completely dried prior to procedure    Procedure details:     Complex Venous Access Line Type: PICC      Complex Venous Access Line Indications: no peripheral vascular access      Catheter tip vessel location: atriocaval junction      Orientation:  Right    Location:  Basilic    Procedural supplies:  Double lumen (REF: [id number], LOT: [id number], EXP: 2025-08-31)    Catheter size:  5 Fr    Total catheter length (cm):  43    Catheter out on skin (cm):  0    Max flow rate:  999    Arm circumference:  29    Patient evaluated for contraindications to access (i.e. fistula, thrombosis, etc): Yes      Site selection rationale:  Largest Most patent vein     Approach: percutaneous technique used      Patient position:  Flat    Ultrasound image availability:  Still images obtained    Sterile ultrasound techniques: Sterile gel and sterile probe covers were used      Number of attempts:  1    Successful placement: yes      Landmarks identified: yes      Vessel of catheter tip end:  Sherlock 3CG confirmed  Anesthesia (see MAR for exact dosages):     Anesthesia method:  Local infiltration    Local anesthetic:  Lidocaine 1% w/o epi  Post-procedure details:     Post-procedure:  Dressing applied and securement device placed    Assessment:  Blood return through all ports and free fluid flow    Post-procedure complications: none      Patient tolerance of procedure:  Tolerated well, no immediate complications    Dr Roman Crowe Infectious Disease okay with PICC line placement

## 2024-10-28 NOTE — ASSESSMENT & PLAN NOTE
Source of sepsis is most likely perirectal abscess.  Despite sepsis, patient remains systemically well, without toxicity and hemodynamically stable, without hypotension.  Patient had prolonged fever, most likely secondary to infected sacral ulcer but this has resolved with antibiotic and I&D of sacral ulcer.  Admission blood cultures have no growth.  Patient completed Zosyn course last week.  Temperature remains intermittently up throughout hospitalization, despite antibiotic, without further obvious active infection.  WBC remains in the normal range.  Consider to remove fever as etiology of persistent intermittent fever.  At this point, it is best to monitor patient off antibiotic.  Discontinue antibiotic and observe.  Monitor temperature/WBC.    Monitor hemodynamics.

## 2024-10-28 NOTE — QUICK NOTE
At 2100 this nurse noticed pt's NGT pulled out further than it should be. Reinserted to 60 cm and notified provider. CXRY completed but verified tube was in possibly in lung. Removed NGT and inserted new one. CXRY completed and provider at bedside to confirm placement of new NGT.

## 2024-10-28 NOTE — PLAN OF CARE
Problem: PAIN - ADULT  Goal: Verbalizes/displays adequate comfort level or baseline comfort level  Description: Interventions:  - Encourage patient to monitor pain and request assistance  - Assess pain using appropriate pain scale  - Administer analgesics based on type and severity of pain and evaluate response  - Implement non-pharmacological measures as appropriate and evaluate response  - Consider cultural and social influences on pain and pain management  - Notify physician/advanced practitioner if interventions unsuccessful or patient reports new pain  Outcome: Not Progressing     Problem: INFECTION - ADULT  Goal: Absence or prevention of progression during hospitalization  Description: INTERVENTIONS:  - Assess and monitor for signs and symptoms of infection  - Monitor lab/diagnostic results  - Monitor all insertion sites, i.e. indwelling lines, tubes, and drains  - Monitor endotracheal if appropriate and nasal secretions for changes in amount and color  - [place] appropriate cooling/warming therapies per order  - Administer medications as ordered  - Instruct and encourage patient and family to use good hand hygiene technique  - Identify and instruct in appropriate isolation precautions for identified infection/condition  Outcome: Not Progressing  Goal: Absence of fever/infection during neutropenic period  Description: INTERVENTIONS:  - Monitor WBC    Outcome: Not Progressing     Problem: SAFETY ADULT  Goal: Patient will remain free of falls  Description: INTERVENTIONS:  - Educate patient/family on patient safety including physical limitations  - Instruct patient to call for assistance with activity   - Consult OT/PT to assist with strengthening/mobility   - Keep Call bell within reach  - Keep bed low and locked with side rails adjusted as appropriate  - Keep care items and personal belongings within reach  - Initiate and maintain comfort rounds  - Make Fall Risk Sign visible to staff  - Initiate/Maintain  alarm  - Obtain necessary fall risk management equipment  - Apply yellow socks and bracelet for high fall risk patients  - Consider moving patient to room near nurses station  Outcome: Not Progressing  Goal: Maintain or return to baseline ADL function  Description: INTERVENTIONS:  -  Assess patient's ability to carry out ADLs; assess patient's baseline for ADL function and identify physical deficits which impact ability to perform ADLs (bathing, care of mouth/teeth, toileting, grooming, dressing, etc.)  - Assess/evaluate cause of self-care deficits   - Assess range of motion  - Assess patient's mobility; develop plan if impaired  - Assess patient's need for assistive devices and provide as appropriate  - Encourage maximum independence but intervene and supervise when necessary  - Involve family in performance of ADLs  - Assess for home care needs following discharge   - Consider OT consult to assist with ADL evaluation and planning for discharge  - Provide patient education as appropriate  Outcome: Not Progressing  Goal: Maintains/Returns to pre admission functional level  Description: INTERVENTIONS:  - Perform AM-PAC 6 Click Basic Mobility/ Daily Activity assessment daily.  - Set and communicate daily mobility goal to care team and patient/family/caregiver.   - Collaborate with rehabilitation services on mobility goals if consulted  - Out of bed for toileting  - Record patient progress and toleration of activity level   Outcome: Not Progressing     Problem: DISCHARGE PLANNING  Goal: Discharge to home or other facility with appropriate resources  Description: INTERVENTIONS:  - Identify barriers to discharge w/patient and caregiver  - Arrange for needed discharge resources and transportation as appropriate  - Identify discharge learning needs (meds, wound care, etc.)  - Arrange for interpretive services to assist at discharge as needed  - Refer to Case Management Department for coordinating discharge planning if the  patient needs post-hospital services based on physician/advanced practitioner order or complex needs related to functional status, cognitive ability, or social support system  Outcome: Not Progressing     Problem: Knowledge Deficit  Goal: Patient/family/caregiver demonstrates understanding of disease process, treatment plan, medications, and discharge instructions  Description: Complete learning assessment and assess knowledge base.  Interventions:  - Provide teaching at level of understanding  - Provide teaching via preferred learning methods  Outcome: Not Progressing     Problem: Prexisting or High Potential for Compromised Skin Integrity  Goal: Skin integrity is maintained or improved  Description: INTERVENTIONS:  - Identify patients at risk for skin breakdown  - Assess and monitor skin integrity  - Assess and monitor nutrition and hydration status  - Monitor labs   - Assess for incontinence   - Turn and reposition patient  - Assist with mobility/ambulation  - Relieve pressure over bony prominences  - Avoid friction and shearing  - Provide appropriate hygiene as needed including keeping skin clean and dry  - Evaluate need for skin moisturizer/barrier cream  - Collaborate with interdisciplinary team   - Patient/family teaching  - Consider wound care consult   Outcome: Not Progressing     Problem: Nutrition/Hydration-ADULT  Goal: Nutrient/Hydration intake appropriate for improving, restoring or maintaining nutritional needs  Description: Monitor and assess patient's nutrition/hydration status for malnutrition. Collaborate with interdisciplinary team and initiate plan and interventions as ordered.  Monitor patient's weight and dietary intake as ordered or per policy. Utilize nutrition screening tool and intervene as necessary. Determine patient's food preferences and provide high-protein, high-caloric foods as appropriate.     INTERVENTIONS:  - Monitor oral intake, urinary output, labs, and treatment plans  - Assess  nutrition and hydration status and recommend course of action  - Evaluate amount of meals eaten  - Assist patient with eating if necessary   - Allow adequate time for meals  - Recommend/ encourage appropriate diets, oral nutritional supplements, and vitamin/mineral supplements  - Order, calculate, and assess calorie counts as needed  - Recommend, monitor, and adjust tube feedings and TPN/PPN based on assessed needs  - Assess need for intravenous fluids  - Provide specific nutrition/hydration education as appropriate  - Include patient/family/caregiver in decisions related to nutrition  Outcome: Not Progressing     Problem: SAFETY,RESTRAINT: NV/NON-SELF DESTRUCTIVE BEHAVIOR  Goal: Remains free of harm/injury (restraint for non violent/non self-detsructive behavior)  Description: INTERVENTIONS:  - Instruct patient/family regarding restraint use   - Assess and monitor physiologic and psychological status   - Provide interventions and comfort measures to meet assessed patient needs   - Identify and implement measures to help patient regain control  - Assess readiness for release of restraint   Outcome: Not Progressing  Goal: Returns to optimal restraint-free functioning  Description: INTERVENTIONS:  - Assess the patient's behavior and symptoms that indicate continued need for restraint  - Identify and implement measures to help patient regain control  - Assess readiness for release of restraint   Outcome: Not Progressing     Problem: NEUROSENSORY - ADULT  Goal: Achieves stable or improved neurological status  Description: INTERVENTIONS  - Monitor and report changes in neurological status  - Monitor vital signs such as temperature, blood pressure, glucose, and any other labs ordered   - Initiate measures to prevent increased intracranial pressure  - Monitor for seizure activity and implement precautions if appropriate      Outcome: Not Progressing  Goal: Remains free of injury related to seizures activity  Description:  INTERVENTIONS  - Maintain airway, patient safety  and administer oxygen as ordered  - Monitor patient for seizure activity, document and report duration and description of seizure to physician/advanced practitioner  - If seizure occurs,  ensure patient safety during seizure  - Reorient patient post seizure  - Seizure pads on all 4 side rails  - Instruct patient/family to notify RN of any seizure activity including if an aura is experienced  - Instruct patient/family to call for assistance with activity based on nursing assessment  - Administer anti-seizure medications if ordered    Outcome: Not Progressing  Goal: Achieves maximal functionality and self care  Description: INTERVENTIONS  - Monitor swallowing and airway patency with patient fatigue and changes in neurological status  - Encourage and assist patient to increase activity and self care.   - Encourage visually impaired, hearing impaired and aphasic patients to use assistive/communication devices  Outcome: Not Progressing     Problem: CARDIOVASCULAR - ADULT  Goal: Maintains optimal cardiac output and hemodynamic stability  Description: INTERVENTIONS:  - Monitor I/O, vital signs and rhythm  - Monitor for S/S and trends of decreased cardiac output  - Administer and titrate ordered vasoactive medications to optimize hemodynamic stability  - Assess quality of pulses, skin color and temperature  - Assess for signs of decreased coronary artery perfusion  - Instruct patient to report change in severity of symptoms  Outcome: Not Progressing  Goal: Absence of cardiac dysrhythmias or at baseline rhythm  Description: INTERVENTIONS:  - Continuous cardiac monitoring, vital signs, obtain 12 lead EKG if ordered  - Administer antiarrhythmic and heart rate control medications as ordered  - Monitor electrolytes and administer replacement therapy as ordered  Outcome: Not Progressing     Problem: RESPIRATORY - ADULT  Goal: Achieves optimal ventilation and  oxygenation  Description: INTERVENTIONS:  - Assess for changes in respiratory status  - Assess for changes in mentation and behavior  - Position to facilitate oxygenation and minimize respiratory effort  - Oxygen administered by appropriate delivery if ordered  - Initiate smoking cessation education as indicated  - Encourage broncho-pulmonary hygiene including cough, deep breathe, Incentive Spirometry  - Assess the need for suctioning and aspirate as needed  - Assess and instruct to report SOB or any respiratory difficulty  - Respiratory Therapy support as indicated  Outcome: Not Progressing     Problem: GASTROINTESTINAL - ADULT  Goal: Minimal or absence of nausea and/or vomiting  Description: INTERVENTIONS:  - Administer IV fluids if ordered to ensure adequate hydration  - Maintain NPO status until nausea and vomiting are resolved  - Nasogastric tube if ordered  - Administer ordered antiemetic medications as needed  - Provide nonpharmacologic comfort measures as appropriate  - Advance diet as tolerated, if ordered  - Consider nutrition services referral to assist patient with adequate nutrition and appropriate food choices  Outcome: Not Progressing  Goal: Maintains or returns to baseline bowel function  Description: INTERVENTIONS:  - Assess bowel function  - Encourage oral fluids to ensure adequate hydration  - Administer IV fluids if ordered to ensure adequate hydration  - Administer ordered medications as needed  - Encourage mobilization and activity  - Consider nutritional services referral to assist patient with adequate nutrition and appropriate food choices  Outcome: Not Progressing  Goal: Maintains adequate nutritional intake  Description: INTERVENTIONS:  - Monitor percentage of each meal consumed  - Identify factors contributing to decreased intake, treat as appropriate  - Assist with meals as needed  - Monitor I&O, weight, and lab values if indicated  - Obtain nutrition services referral as  needed  Outcome: Not Progressing  Goal: Establish and maintain optimal ostomy function  Description: INTERVENTIONS:  - Assess bowel function  - Encourage oral fluids to ensure adequate hydration  - Administer IV fluids if ordered to ensure adequate hydration   - Administer ordered medications as needed  - Encourage mobilization and activity  - Nutrition services referral to assist patient with appropriate food choices  - Assess stoma site  - Consider wound care consult   Outcome: Not Progressing  Goal: Oral mucous membranes remain intact  Description: INTERVENTIONS  - Assess oral mucosa and hygiene practices  - Implement preventative oral hygiene regimen  - Implement oral medicated treatments as ordered  - Initiate Nutrition services referral as needed  Outcome: Not Progressing     Problem: GENITOURINARY - ADULT  Goal: Maintains or returns to baseline urinary function  Description: INTERVENTIONS:  - Assess urinary function  - Encourage oral fluids to ensure adequate hydration if ordered  - Administer IV fluids as ordered to ensure adequate hydration  - Administer ordered medications as needed  - Offer frequent toileting  - Follow urinary retention protocol if ordered  Outcome: Not Progressing  Goal: Absence of urinary retention  Description: INTERVENTIONS:  - Assess patient’s ability to void and empty bladder  - Monitor I/O  - Bladder scan as needed  - Discuss with physician/AP medications to alleviate retention as needed  - Discuss catheterization for long term situations as appropriate  Outcome: Not Progressing  Goal: Urinary catheter remains patent  Description: INTERVENTIONS:  - Assess patency of urinary catheter  - If patient has a chronic plasencia, consider changing catheter if non-functioning  - Follow guidelines for intermittent irrigation of non-functioning urinary catheter  Outcome: Not Progressing     Problem: METABOLIC, FLUID AND ELECTROLYTES - ADULT  Goal: Electrolytes maintained within normal  limits  Description: INTERVENTIONS:  - Monitor labs and assess patient for signs and symptoms of electrolyte imbalances  - Administer electrolyte replacement as ordered  - Monitor response to electrolyte replacements, including repeat lab results as appropriate  - Instruct patient on fluid and nutrition as appropriate  Outcome: Not Progressing  Goal: Fluid balance maintained  Description: INTERVENTIONS:  - Monitor labs   - Monitor I/O and WT  - Instruct patient on fluid and nutrition as appropriate  - Assess for signs & symptoms of volume excess or deficit  Outcome: Not Progressing  Goal: Glucose maintained within target range  Description: INTERVENTIONS:  - Monitor Blood Glucose as ordered  - Assess for signs and symptoms of hyperglycemia and hypoglycemia  - Administer ordered medications to maintain glucose within target range  - Assess nutritional intake and initiate nutrition service referral as needed  Outcome: Not Progressing     Problem: SKIN/TISSUE INTEGRITY - ADULT  Goal: Skin Integrity remains intact(Skin Breakdown Prevention)  Description: Assess:  -Perform Deon assessment   -Clean and moisturize skin  -Inspect skin when repositioning, toileting, and assisting with ADLS  -Assess under medical devices   -Assess extremities for adequate circulation and sensation     Bed Management:  -Have minimal linens on bed & keep smooth, unwrinkled  -Change linens as needed when moist or perspiring      Toileting:  -Offer bedside commode  -Assess for incontinence  -Use incontinent care products after each incontinent episode    Skin Care:  -Avoid use of baby powder, tape, friction and shearing, hot water or constrictive clothing  -Relieve pressure over bony prominences  -Do not massage red bony areas    Outcome: Not Progressing  Goal: Incision(s), wounds(s) or drain site(s) healing without S/S of infection  Description: INTERVENTIONS  - Assess and document dressing, incision, wound bed, drain sites and surrounding  tissue  - Provide patient and family education  - Perform skin care/dressing changes  Outcome: Not Progressing  Goal: Pressure injury heals and does not worsen  Description: Interventions:  - Implement low air loss mattress or specialty surface (Criteria met)  - Apply silicone foam dressing  - Consider nutrition services referral as needed  Outcome: Not Progressing     Problem: HEMATOLOGIC - ADULT  Goal: Maintains hematologic stability  Description: INTERVENTIONS  - Assess for signs and symptoms of bleeding or hemorrhage  - Monitor labs  - Administer supportive blood products/factors as ordered and appropriate  Outcome: Not Progressing     Problem: MUSCULOSKELETAL - ADULT  Goal: Maintain or return mobility to safest level of function  Description: INTERVENTIONS:  - Assess patient's ability to carry out ADLs; assess patient's baseline for ADL function and identify physical deficits which impact ability to perform ADLs (bathing, care of mouth/teeth, toileting, grooming, dressing, etc.)  - Assess/evaluate cause of self-care deficits   - Assess range of motion  - Assess patient's mobility  - Assess patient's need for assistive devices and provide as appropriate  - Encourage maximum independence but intervene and supervise when necessary  - Involve family in performance of ADLs  - Assess for home care needs following discharge   - Consider OT consult to assist with ADL evaluation and planning for discharge  - Provide patient education as appropriate  Outcome: Not Progressing  Goal: Maintain proper alignment of affected body part  Description: INTERVENTIONS:  - Support, maintain and protect limb and body alignment  - Provide patient/ family with appropriate education  Outcome: Not Progressing

## 2024-10-28 NOTE — UTILIZATION REVIEW
Continued Stay Review    Date: 10/26                          Current Patient Class: Inpatient  Current Level of Care: ICU/Critical Care    HPI:44 y.o. male initially admitted on 10/13 for acute L SDH.     Assessment/Plan: Fever 102F overnight. This afternoon w/ fever 101.1 F. Already received tylenol, will give 400 mg motrin.  Na 148 this morning. LFTs stable. Fibrinogen up to 90 (from 78) after 1 unit cryoptt. CXR w/ persistent dense R lung base opacity. Exam: appears more awake and alert today, moves all extremities on command, greater strength in R extremities compared to L; intubated, NGT, incision CDI, flap soft w/ palpable fluid wave, diffuse anasarca, brown clear secretions from ETT, tachypnea, scleral yellowing. Pt remains w/ oozing from sacral decubitus ulcer, improved w/ silver nitrate and suture but w/ continued oozing. Plan: SBP goal 110-150 w/ MAP > 65, vent weaned as able, percussion vest, IV ABX - vanco q12h, follow tissue biopsy, local wound care to sacral wound; increase free water to 200 mL q4h, another dose of cryoprecipitate. Neuro checks. Goal normothermia prior to extubation trial. IV mag 2g x1 in addition to daily IV mag 2g. IVF isolyte 500 mL bolus x1. IV potassium phosphates 12 mmol x1. IV dilaudid 1 mg x1.     Medications:   Scheduled Medications:  chlorhexidine, 15 mL, Mouth/Throat, Q12H KAYLIE  Cholecalciferol, 5,000 Units, Oral, Daily  folic acid, 1 mg, Per NG Tube, Daily  heparin (porcine), 5,000 Units, Subcutaneous, Q8H KAYLIE  levothyroxine, 200 mcg, Oral, Early Morning  magnesium sulfate, 2 g, Intravenous, After Breakfast  modafinil, 200 mg, Per NG Tube, Daily  sodium chloride, 4 mL, Nebulization, Q6H  thiamine, 100 mg, Per NG Tube, Daily  vancomycin, 1,750 mg, Intravenous, Q12H KAYLIE    Continuous IV Infusions: none    PRN Meds:  acetaminophen, 650 mg, Per NG Tube, Q8H PRN; 10/26 x2  hydrALAZINE, 10 mg, Intravenous, Q6H PRN  HYDROmorphone, 1 mg, Intravenous, Q3H PRN; 10/26 x1  labetalol,  10 mg, Intravenous, Q6H PRN  magnesium sulfate, 2 g, Intravenous; 10/26 x1  ondansetron, 4 mg, Intravenous, Q8H PRN  polyethylene glycol, 17 g, Oral, Daily PRN    ibuprofen (MOTRIN) oral suspension 400 mg  Dose: 400 mg  Freq: Once Route: PO  Start: 10/26/24 1515 End: 10/26/24 1519  multi-electrolyte (ISOLYTE-S PH 7.4) bolus 500 mL  Dose: 500 mL  Freq: Once Route: IV  Last Dose: Stopped (10/26/24 0325)  Start: 10/26/24 0230 End: 10/26/24 0325  potassium phosphates 12 mmol in sodium chloride 0.9 % 250 mL infusion  Dose: 12 mmol  Freq: Once Route: IV  Last Dose: Stopped (10/27/24 0351)  Start: 10/26/24 1730 End: 10/27/24 0351    Discharge Plan: TBD, remains intubated    Vital Signs (last 3 days)       Date/Time Temp Pulse Resp BP Arterial Line BP MAP SpO2 FiO2 (%) Calculated FIO2 (%) - Nasal Cannula Nasal Cannula O2 Flow Rate (L/min) O2 Device GCS Pain    10/26/24 2300 -- 106 22 -- 116/64 84 mmHg 99 % -- -- -- -- -- --    10/26/24 2200 -- 104 22 -- 108/62 78 mmHg 99 % -- -- -- -- 10 --    10/26/24 2100 -- 104 22 -- 110/64 80 mmHg 99 % -- -- -- -- -- --    10/26/24 2000 97.4 °F (36.3 °C) 108 20 -- 124/78 96 mmHg 98 % -- 44 6 L/min Nasal cannula 10 --    10/26/24 1800 -- 106 27 -- 116/70 86 mmHg 99 % -- -- -- -- -- --    10/26/24 1700 97.8 °F (36.6 °C) 112 29 120/72 120/72 90 mmHg 98 % -- 44 6 L/min Nasal cannula -- --    10/26/24 1645 -- 112 20 -- 122/74 92 mmHg 98 % -- -- -- -- -- --    10/26/24 1630 97.5 °F (36.4 °C) 118 25 -- 116/72 88 mmHg 99 % -- -- -- -- -- --    10/26/24 1615 98.2 °F (36.8 °C) 122 25 -- 118/76 90 mmHg 99 % -- -- -- -- -- --    10/26/24 1610 98.2 °F (36.8 °C) 122 23 -- 112/72 86 mmHg 98 % -- -- -- -- -- --    10/26/24 1605 98.6 °F (37 °C) 122 26 -- 114/78 92 mmHg 98 % -- -- -- -- -- --    10/26/24 1601 -- -- -- -- -- -- -- 40 -- -- -- 10 --    10/26/24 1600 98.2 °F (36.8 °C) 124 23 -- 114/74 88 mmHg 98 % -- -- -- -- -- --    10/26/24 1555 98.6 °F (37 °C) 124 26 -- 112/74 88 mmHg 98 % -- -- -- --  -- --    10/26/24 1552 98.6 °F (37 °C) 126 26 112/75 -- -- -- -- -- -- -- -- --    10/26/24 1550 99.3 °F (37.4 °C) 128 24 -- 126/80 96 mmHg 99 % -- -- -- -- -- --    10/26/24 1514 -- -- -- -- -- -- 100 % -- -- -- -- -- --    10/26/24 1500 101.1 °F (38.4 °C) 122 28 -- 138/84 100 mmHg 99 % -- -- -- -- -- --    10/26/24 1449 -- -- -- -- -- -- 100 % -- -- -- -- -- --    10/26/24 1445 100 °F (37.8 °C) 118 22 -- 130/76 94 mmHg 100 % -- -- -- -- -- --    10/26/24 1400 99.7 °F (37.6 °C) 116 22 -- 136/78 98 mmHg 100 % -- -- -- -- 10 --    10/26/24 1300 99.3 °F (37.4 °C) 114 22 -- 120/76 92 mmHg 99 % -- -- -- -- -- --    10/26/24 1200 98.6 °F (37 °C) 116 23 -- 106/68 82 mmHg 98 % 40 -- -- -- 10 --    10/26/24 1137 -- -- -- -- -- -- 97 % -- -- -- -- -- --    10/26/24 1100 99.7 °F (37.6 °C) 122 22 -- 112/70 84 mmHg 97 % -- -- -- -- -- --    10/26/24 1000 100.8 °F (38.2 °C) 120 17 -- 138/68 90 mmHg 100 % -- -- -- -- 10 --    10/26/24 0900 100.8 °F (38.2 °C) 116 18 -- 132/66 88 mmHg 100 % -- -- -- -- -- --    10/26/24 0831 -- -- -- -- -- -- 100 % -- -- -- -- -- --    10/26/24 0800 100.8 °F (38.2 °C) 112 19 -- 84/70 78 mmHg 99 % -- -- -- -- 10 --    10/26/24 0700 100.4 °F (38 °C) 110 17 -- 114/60 78 mmHg 99 % -- -- -- -- -- --    10/26/24 0600 100.4 °F (38 °C) 106 17 -- 114/60 78 mmHg 100 % -- -- -- -- 10 --    10/26/24 0500 99.7 °F (37.6 °C) 108 18 -- 106/58 74 mmHg 98 % -- -- -- -- -- --    10/26/24 0430 99.3 °F (37.4 °C) 106 17 -- 102/56 74 mmHg 98 % -- -- -- Ventilator 10 --    10/26/24 0400 99.3 °F (37.4 °C) 106 15 -- 100/58 74 mmHg 100 % -- -- -- -- -- --    10/26/24 0325 99 °F (37.2 °C) 106 15 -- 102/60 76 mmHg 99 % -- -- -- -- -- --    10/26/24 0300 99.3 °F (37.4 °C) 110 17 -- 94/58 72 mmHg 98 % -- -- -- -- -- --    10/26/24 0240 99.3 °F (37.4 °C) 114 16 -- 88/54 66 mmHg 96 % -- -- -- -- -- --    10/26/24 0225 99.3 °F (37.4 °C) 114 18 -- 86/54 66 mmHg 95 % -- -- -- -- -- --    10/26/24 0200 99.7 °F (37.6 °C) 122 25 --  96/68 80 mmHg 95 % -- -- -- -- 10 --    10/26/24 0100 -- 122 21 -- 102/60 74 mmHg 98 % -- -- -- -- -- --    10/26/24 0030 102.1 °F (38.9 °C) 124 21 -- 132/58 84 mmHg 99 % -- -- -- Ventilator 10 --    10/25/24 2300 -- 118 21 -- 126/56 80 mmHg 97 % -- -- -- -- -- --    10/25/24 2200 -- 112 19 -- 114/58 76 mmHg -- -- -- -- -- 10 --    10/25/24 2100 -- 112 22 -- 130/70 90 mmHg -- -- -- -- -- -- --    10/25/24 2014 -- -- -- -- -- -- -- 40 -- -- Ventilator -- --    10/25/24 2000 99.5 °F (37.5 °C) 112 38 -- 108/64 80 mmHg 97 % -- -- -- Ventilator 10 --    10/25/24 1900 -- 120 28 -- 110/74 86 mmHg 97 % -- -- -- -- -- --    10/25/24 1815 -- 112 17 -- 116/62 84 mmHg 100 % -- -- -- -- -- --    10/25/24 1800 101.7 °F (38.7 °C) 114 18 -- 134/68 90 mmHg 100 % -- -- -- -- -- --    10/25/24 1726 -- -- -- -- -- -- 99 % -- -- -- -- -- --    10/25/24 1700 -- 108 25 -- 108/60 80 mmHg 100 % -- -- -- -- -- --    10/25/24 1600 100.2 °F (37.9 °C) 104 15 -- 136/58 80 mmHg 100 % -- -- -- -- 10 --    10/25/24 1500 -- 100 14 -- 128/58 80 mmHg 100 % -- -- -- -- -- --    10/25/24 1400 -- 98 13 -- 108/56 74 mmHg 98 % -- -- -- -- -- --    10/25/24 1300 -- 100 15 -- 94/54 68 mmHg 96 % -- -- -- -- -- --    10/25/24 1220 99 °F (37.2 °C) 106 18 -- 96/56 70 mmHg 93 % 40 -- -- Ventilator -- --    10/25/24 1210 -- -- -- -- -- -- 98 % -- -- -- -- -- --    10/25/24 1203 98.5 °F (36.9 °C) 106 17 101/59 100/58 72 mmHg 93 % -- -- -- -- -- --    10/25/24 1200 -- 106 17 -- 98/58 72 mmHg 92 % -- -- -- -- 10 --    10/25/24 1100 99.2 °F (37.3 °C) 110 22 -- 104/62 76 mmHg 99 % -- -- -- -- -- --    10/25/24 1000 -- 116 27 -- 118/80 90 mmHg 74 % -- -- -- -- -- --    10/25/24 0900 -- 118 40 -- 120/54 76 mmHg 93 % -- -- -- -- -- --    10/25/24 0800 101 °F (38.3 °C) 116 55 -- 106/48 68 mmHg 95 % -- -- -- Ventilator 10 --    10/25/24 0721 -- 124 18 -- -- -- 92 % -- -- -- -- -- --    10/25/24 0700 -- 112 12 -- 114/54 78 mmHg 99 % -- -- -- -- -- --    10/25/24 0600 -- 108  22 -- 118/54 74 mmHg 100 % -- -- -- -- 10 --    10/25/24 0500 -- 106 22 -- 104/50 72 mmHg 98 % -- -- -- -- -- --    10/25/24 0400 98.3 °F (36.8 °C) 110 22 -- 132/54 78 mmHg 100 % -- -- -- Ventilator 10 --    10/25/24 0322 -- -- -- -- -- -- 98 % -- -- -- -- -- --    10/25/24 0300 -- 114 12 -- 134/60 84 mmHg 97 % -- -- -- -- -- --    10/25/24 0200 -- 106 13 -- 104/50 72 mmHg 100 % -- -- -- -- 10 --    10/25/24 0100 -- 106 12 -- 106/48 72 mmHg 100 % -- -- -- -- -- --    10/25/24 0000 98.9 °F (37.2 °C) 112 12 -- 126/50 74 mmHg 98 % -- -- -- Ventilator 10 --          Weight (last 2 days)       Date/Time Weight    10/27/24 0551 111 (244.49)    10/26/24 0547 108 (238.54)            Pertinent Labs/Diagnostic Results:   Radiology:  XR chest portable ICU   Final Interpretation by Suzanne Ortiz MD (10/25 0903)      Persistent dense right lung base opacity which may represent a combination of effusion and parenchymal opacity.            Workstation performed: [id number]         VAS upper limb venous duplex scan, unilateral/limited   Final Interpretation by Jennifer Patel MD (10/26 1634)     RIGHT UPPER LIMB LIMITED:  Unable to evaluate due to central line.     LEFT UPPER LIMB:  There is evidence of acute non-occlusive deep vein thrombosis in the proximal  brachial vein and axillary vein.  No evidence of superficial thrombophlebitis noted.  Doppler evaluation shows a normal response to augmentation maneuvers.     Note: There are multiple vascularized hypoechoic structures noted in the  axillar suggestive of enlarged lymphatic channels. Largest measures 3.1 cm.      VAS VENOUS DUPLEX - LOWER LIMB BILATERAL   Final Interpretation by Jennifer Patel MD (10/26 1634)     RIGHT LOWER LIMB:  No evidence of acute or chronic deep vein thrombosis.  No evidence of superficial thrombophlebitis noted.  Doppler evaluation shows a normal response to augmentation maneuvers..  Popliteal, posterior tibial and anterior tibial arterial Doppler  waveforms are  triphasic.     LEFT LOWER LIMB:  No evidence of acute or chronic deep vein thrombosis.  Evidence of non-occlusive superficial thrombophlebitis noted in the great  saphenous vein from the proximal calf to the knee.  Doppler evaluation shows a normal response to augmentation maneuvers.  Popliteal, posterior tibial and anterior tibial arterial Doppler waveforms are  triphasic.     Technically difficult/limited study. Some segments poorly visualized on today's  exam.     CT head wo contrast   Final Interpretation by Lisa Cueto MD (10/24 3513)         1. Postsurgical changes from decompressive left hemicraniectomy again demonstrated, with decreased epidural/subgaleal blood products noted along the left scalp but still measuring approximately 1.6 cm in maximal thickness. Increased epidural fluid    density noted more inferiorly at the level of the left temporal lobe when compared to the prior study.   2. Trace left convexity subdural hematoma still likely present measuring 4-5 mm in maximal thickness.   3. Areas of low-attenuation involving the right thalamus, left parafalcine occipital and mesial left temporal lobe, compatible with the evolving recent infarcts in these regions. No intraparenchymal hemorrhage is identified.                  Workstation performed: [id number]         CT pe study w abdomen pelvis w contrast   Final Interpretation by Leonid Chase MD (10/24 1717)      Right lower lobe consolidation with right lower lobe bronchus debris suspicious for aspiration pneumonia.      Small quantity of acute segmental level left upper lobe pulmonary arterial embolism.      Small right and trace left pleural effusions.      Reidentified suspected hepatic splenic and osseous metastases.      More prominent diffuse chest and body wall edema/anasarca.      Unchanged sacral decubitus ulcer. A perirectal abscess is not identified.            I personally discussed this study with ARMANDO JOINER  on 10/24/2024 2:15 PM.            Workstation performed: [id number]         IR biopsy bone   Final Interpretation by Queta Velazco MD (10/25 0945)   Impression:      CT guided biopsy of left iliac bone lesion      Limited ultrasound revealing no good liver targets for percutaneous bedside biopsy   Easily identifiable splenic lesions which can be targeted by ultrasound-guided biopsy         Workstation performed: [id number]         XR chest portable ICU   Final Interpretation by Clayton Enriquez MD (10/23 1102)      Mild interstitial prominence which is probably exaggerated by very low lung volumes. No focal consolidation seen.            Workstation performed: [id number]         IR INPATIENT Paracentesis   Final Interpretation by Mann Duval MD (10/24 1536)   Impression:      Ultrasound-guided paracentesis.      Signed, performed, and dictated by TOM Rivera under the supervision of Dr. Mann Duval.            Workstation performed: [id number]           GI:  Bronchoscopy   Final Result by Annette Maria Palladino, DO (10/24 1532)   The trachea, main nadia, left main stem, MARCELLO and LLL appeared normal.   Moderate, thick, bloody and purulent secretions present in the right lung   Bronchoalveolar lavage was performed x1 in the bronchus intermedius, RML    and RLL         RECOMMENDATION:   There is no recommended follow-up for this procedure.                        Results from last 7 days   Lab Units 10/28/24  0549 10/27/24  0420 10/26/24  0518 10/25/24  0607 10/24/24  2149 10/24/24  1259   WBC Thousand/uL 7.20 7.71 9.96 7.61  --  9.23   HEMOGLOBIN g/dL 7.3* 7.4* 7.4* 7.8* 7.7* 6.9*   HEMATOCRIT % 24.8* 25.0* 24.4* 25.4* 24.2* 21.8*   PLATELETS Thousands/uL 127* 102* 113* 126*  --  145*   BANDS PCT %  --  7  --   --   --  11*     Results from last 7 days   Lab Units 10/24/24  1434   RETIC CT ABS  160,900*   RETIC CT PCT % 7.45*     Results from last 7 days   Lab Units 10/28/24  0549  10/27/24  0420 10/26/24  2130 10/26/24  0518 10/25/24  0607   SODIUM mmol/L 151* 148* 146 148* 146   POTASSIUM mmol/L 4.1 3.7 3.7 3.7 3.8   CHLORIDE mmol/L 118* 116* 116* 116* 113*   CO2 mmol/L 24 22 24 24 25   ANION GAP mmol/L 9 10 6 8 8   BUN mg/dL 47* 44* 45* 41* 37*   CREATININE mg/dL 0.78 0.70 0.77 0.74 0.69   EGFR ml/min/1.73sq m 109 113 109 111 114   CALCIUM mg/dL 8.0* 7.9* 7.7* 7.7* 8.0*   CALCIUM, IONIZED mmol/L  --  1.09* 1.16  --   --    MAGNESIUM mg/dL 2.2 2.4 2.7 2.0 1.9   PHOSPHORUS mg/dL 3.2 2.4* 2.7 2.2* 2.3*     Results from last 7 days   Lab Units 10/28/24  0549 10/26/24  0518 10/25/24  0607 10/24/24  1259 10/24/24  0440 10/23/24  0513 10/22/24  0504   AST U/L 253* 247* 289* 341* 391*   < > 340*   ALT U/L 113* 103* 118* 135* 155*   < > 146*   ALK PHOS U/L 1,097* 861* 742* 802* 917*   < > 1,176*   TOTAL PROTEIN g/dL 3.4* 3.3* 3.5* 3.6* 3.4*   < > 3.3*   ALBUMIN g/dL 2.4* 2.5* 2.6* 2.7* 2.4*   < > 2.0*   TOTAL BILIRUBIN mg/dL 6.21* 4.90* 4.85* 4.09* 3.51*   < > 2.98*   BILIRUBIN DIRECT mg/dL 3.86*  --   --   --   --   --  1.69*    < > = values in this interval not displayed.     Results from last 7 days   Lab Units 10/28/24  1153 10/28/24  0606 10/28/24  0021 10/27/24  1808 10/27/24  1147 10/27/24  0131 10/26/24  1807 10/26/24  1248 10/26/24  0517 10/26/24  0331 10/25/24  1705 10/25/24  1202   POC GLUCOSE mg/dl 82 114 84 88 87 118 128 121 130 123 81 93     Results from last 7 days   Lab Units 10/28/24  0549 10/27/24  0420 10/26/24  2130 10/26/24  0518 10/25/24  0607 10/24/24  1259 10/24/24  0440 10/23/24  0513 10/22/24  0504   GLUCOSE RANDOM mg/dL 103 118 124 127 84 92 98 115 92     Results from last 7 days   Lab Units 10/27/24  1217 10/24/24  0851   PH ART  7.509* 7.519*   PCO2 ART mm Hg 26.4* 30.2*   PO2 ART mm Hg 148.7* 63.1*   HCO3 ART mmol/L 20.5* 24.1   BASE EXC ART mmol/L -2.0 1.3   O2 CONTENT ART mL/dL 10.7* 9.7*   O2 HGB, ARTERIAL % 96.6 91.0*   ABG SOURCE  Line, Arterial Line, Arterial      Results from last 7 days   Lab Units 10/28/24  0549 10/24/24  0440 10/23/24  0513 10/22/24  0504   PROTIME seconds 16.9* 18.0* 20.7* 20.4*   INR  1.35* 1.47* 1.77* 1.73*   PTT seconds 43*  --   --  47*             Results from last 7 days   Lab Units 10/24/24  0058 10/23/24  2250   LACTIC ACID mmol/L 3.0* 3.5*                 Results from last 7 days   Lab Units 10/24/24  1434   FERRITIN ng/mL >7,500*   IRON ug/dL 67     Results from last 7 days   Lab Units 10/24/24  1529 10/24/24  0000 10/23/24  2309 10/23/24  2251 10/22/24  1438   BLOOD CULTURE   --   --  No Growth After 4 Days. No Growth After 4 Days.  --    GRAM STAIN RESULT  4+ Gram positive cocci in pairs*  Rare Polys* Rare Polys  No organisms seen  --   --  No Polys or Bacteria seen   BODY FLUID CULTURE, STERILE   --   --   --   --  No growth     Results from last 7 days   Lab Units 10/22/24  1438   TOTAL COUNTED  100   WBC FLUID /ul 112         Results from last 7 days   Lab Units 10/26/24  0518   VANCOMYCIN RM ug/mL 12.9       Network Utilization Review Department  ATTENTION: Please call with any questions or concerns to [phone] and carefully listen to the prompts so that you are directed to the right person. All voicemails are confidential.   For Discharge needs, contact Care Management DC Support Team at [phone] opt. [phone]  Send all requests for admission clinical reviews, approved or denied determinations and any other requests to dedicated fax number below belonging to the campus where the patient is receiving treatment. List of dedicated fax numbers for the Facilities:  FACILITY NAME UR FAX NUMBER   ADMISSION DENIALS (Administrative/Medical Necessity) [phone]   DISCHARGE SUPPORT TEAM (NETWORK) [phone]   PARENT CHILD HEALTH (Maternity/NICU/Pediatrics) [phone]   [facility] [phone]   [facility] [phone]   [facility] [phone]   [facility] [phone]   [facility] [phone]   [facility] [phone]   General acute hospital [phone]   [facility] [phone]   [facility] [phone]   [facility] [phone]   [facility] [phone]   [facility] [phone]

## 2024-10-28 NOTE — ASSESSMENT & PLAN NOTE
Patient has spontaneous drainage.  He is being followed by general surgery service, with no plan for surgical I&D previously.  However, at present, there is concern of persistent abscess.  Repeat CT imaging without obvious abscess.  Wound culture with growth of mixed alli, not helpful.  Patient has no history of MRSA, has negative MRSA screen and has MSSA growing in respiratory culture.  At I&D, there was no further abscess or purulence.  Patient completed 7-day course of IV Zosyn last week.  No further antibiotic for this indication  Wound care per surgery.

## 2024-10-28 NOTE — PLAN OF CARE
Problem: Nutrition/Hydration-ADULT  Goal: Nutrient/Hydration intake appropriate for improving, restoring or maintaining nutritional needs  Description: Monitor and assess patient's nutrition/hydration status for malnutrition. Collaborate with interdisciplinary team and initiate plan and interventions as ordered.  Monitor patient's weight and dietary intake as ordered or per policy. Utilize nutrition screening tool and intervene as necessary. Determine patient's food preferences and provide high-protein, high-caloric foods as appropriate.     INTERVENTIONS:  - Monitor oral intake, urinary output, labs, and treatment plans  - Assess nutrition and hydration status and recommend course of action  - Evaluate amount of meals eaten  - Assist patient with eating if necessary   - Allow adequate time for meals  - Recommend/ encourage appropriate diets, oral nutritional supplements, and vitamin/mineral supplements  - Order, calculate, and assess calorie counts as needed  - Recommend, monitor, and adjust tube feedings and TPN/PPN based on assessed needs  - Assess need for intravenous fluids  - Provide specific nutrition/hydration education as appropriate  - Include patient/family/caregiver in decisions related to nutrition  Outcome: Progressing   MD requested re-assessment with TF to aid with Na level, currently Na 151. Currently receiving TF Pivot 1.5@75mL/hr x22hrs, banatrol plus BID (mixed with 120mL for each packet) and water flushes 300mL every 4hrs provides total volume 1650mL, 2555cal, 155g pro, 3278mL, 2434mg of Na from TF and 40mg from banatrol.     Can adjust to Jevity 1.2@80mL/hr x 22hrs, add 1 pack of prosource TID (flush 30mL before and after each prosource), add 1 pack of micaela BID (mix 120mL water with each packet), d/c banatrol plus (will monitor bowels on higher fiber formula), provides total volume 1760mL, 2472cal, 148g pro, 1840mL (TF + prosurce flushes and water with micaela), Na 1923mg (TF +prosurces),  water flushes per critical or consider 120mL every 4hrs would provide total of 2560mL, continue to monitor Na and adjust flushes as needed.

## 2024-10-28 NOTE — PROGRESS NOTES
Progress Note - Palliative Care   Name: Clayton Duval 44 y.o. male I MRN: [id number]  Unit/Bed#: ICU 05 I Date of Admission: 10/13/2024   Date of Service: 10/28/2024 I Hospital Day: 15    Assessment & Plan  Non-traumatic acute L subdural hematoma (HCC)  Managed by Neuro ICU  Sacral wound  Managed by Surg-Gen  Large sacral wound which is suspected to be pressure induced  Potential perirectal fistula  OR on 10/21 for EUA  Palliative care by specialist  Goals of care  Level 1 code status  Disease focused care with no limits in place.   Concerns introduced include:  Spoke w/ patient's wife:  Patient will remain a Level 1 - Full Code  She would like to give him a fighting chance, she is afraid that certain decisions she will make will cut his time too short  Stated that she doesn't want to see him suffer but she isn't ready to lose him just yet  Has not informed her two children of their father's current medical status  She feels incredible guilt and is dealing w/ significant emotional/mental distress of current situation  Needs immense emotional support from team  [facility] SW involved     Social support:  Supportive listening provided  Normalized experience of patient/family  Provided anxiety containment  Provided anticipatory guidance  Encouraged self care    Follow up  Palliative Care will continue to follow and goals of care discussions will be ongoing.    Please reach out via [employer] Secure Chat if questions or concerns arise.    I have reviewed the patient's controlled substance dispensing history in the Prescription Drug Monitoring Program in compliance with the [facility] regulations before prescribing any controlled substances.  Last refills: N/A    Decisional apparatus:  Patient does not have capacity on exam today.  If capacity is lost, patient's substitute decision maker would default to spouse (Liane) by [place] Act 169.    Advance Directive/Living Will, POLST and POA Forms: None on file.    ER contacts:  Name  Relation Home Work Mobile   Liane Duval Spouse   [phone]     We appreciate the invitation to be involved in this patient's care.  We will continue to follow throughout this hospitalization.  Please do not hesitate to reach our on call provider through our clinic answering service at [phone] should you have acute symptom control concerns.      Subjective   Met w/ patient at the bedside. Patient has been extubated OTW. Eyes open, not responding to verbal stimuli. Non-communicative. Not answering questions. Discussed w/ RN.     Attempted to contact spouse, Liane, went to , left  w/ callback number.     Objective :  Temp:  [98.8 °F (37.1 °C)-101 °F (38.3 °C)] 100.9 °F (38.3 °C)  HR:  [116-136] 122  BP: (116-131)/(80-90) 116/80  Resp:  [24-30] 26  SpO2:  [93 %-100 %] 99 %  O2 Device: Nasal cannula  Nasal Cannula O2 Flow Rate (L/min):  [6 L/min] 6 L/min    Physical Exam  Constitutional:       General: He is awake. He is not in acute distress.     Appearance: He is ill-appearing.   HENT:      Head: Normocephalic and atraumatic.      Mouth/Throat:      Mouth: Mucous membranes are moist.   Eyes:      Pupils: Pupils are equal, round, and reactive to light.   Cardiovascular:      Rate and Rhythm: Tachycardia present.   Pulmonary:      Effort: Pulmonary effort is normal. Tachypnea present. No respiratory distress.   Skin:     General: Skin is warm and dry.      Coloration: Skin is pale.   Neurological:      Mental Status: He is alert.      Comments: Doesn't respond to questions, noncommunicative           Lab Results: I have reviewed the following results:  Lab Results   Component Value Date/Time    SODIUM 151 (H) 10/28/2024 05:49 AM    SODIUM 130 (L) 10/01/2024 02:47 PM    K 4.1 10/28/2024 05:49 AM    K 4.5 10/01/2024 02:47 PM    BUN 47 (H) 10/28/2024 05:49 AM    BUN 18 10/01/2024 02:47 PM    BUN 18 10/11/2019 03:21 PM    CREATININE 0.78 10/28/2024 05:49 AM    CREATININE 0.9 10/01/2024 02:47 PM    GLUC 103  10/28/2024 05:49 AM    GLUC 79 10/01/2024 02:47 PM    CALCIUM 8.0 (L) 10/28/2024 05:49 AM    CALCIUM 7.5 (L) 10/01/2024 02:47 PM     (H) 10/28/2024 05:49 AM     (H) 10/01/2024 02:47 PM     (H) 10/28/2024 05:49 AM     (H) 10/01/2024 02:47 PM    ALB 2.4 (L) 10/28/2024 05:49 AM    ALB 2.7 (L) 10/01/2024 02:47 PM    TP 3.4 (L) 10/28/2024 05:49 AM    TP 4.4 (L) 10/01/2024 02:47 PM    EGFR 109 10/28/2024 05:49 AM    EGFR >90 10/01/2024 02:47 PM    EGFR 81 10/11/2019 03:21 PM     Lab Results   Component Value Date/Time    HGB 7.3 (L) 10/28/2024 05:49 AM    WBC 7.20 10/28/2024 05:49 AM     (L) 10/28/2024 05:49 AM    INR 1.35 (H) 10/28/2024 05:49 AM    PTT 43 (H) 10/28/2024 05:49 AM     Lab Results   Component Value Date/Time    [id number] 23.267 (H) 10/12/2024 04:49 AM     Administrative Statements   I have spent a total time of 15 minutes in caring for this patient on the day of the visit/encounter including Counseling / Coordination of care, Documenting in the medical record, Reviewing / ordering tests, medicine, procedures  , Obtaining or reviewing history  , and Communicating with other healthcare professionals . Topics discussed with the patient / family include symptom assessment and management, medication review, supportive listening, and anticipatory guidance.    Nohemy Wyatt

## 2024-10-29 PROBLEM — I26.99 PULMONARY EMBOLISM (HCC): Status: ACTIVE | Noted: 2024-01-01

## 2024-10-29 PROBLEM — C79.9 MULTIPLE LESIONS OF METASTATIC MALIGNANCY (HCC): Status: ACTIVE | Noted: 2024-01-01

## 2024-10-29 PROBLEM — Z71.89 GOALS OF CARE, COUNSELING/DISCUSSION: Status: ACTIVE | Noted: 2024-01-01

## 2024-10-29 PROBLEM — R60.1 ANASARCA: Status: ACTIVE | Noted: 2024-01-01

## 2024-10-29 PROBLEM — E87.0 HYPERNATREMIA: Status: ACTIVE | Noted: 2024-01-01

## 2024-10-29 PROBLEM — J96.00 ACUTE RESPIRATORY FAILURE (HCC): Status: ACTIVE | Noted: 2024-01-01

## 2024-10-29 NOTE — PHYSICAL THERAPY NOTE
Physical Therapy Cancellation Note     10/29/24 3226   Note Type   Note Type Cancelled Session   Cancel Reasons Medical status  (pt encountered supine in bed with increased WOB & tachycardia.  Respiratory therapist present, followed by RN & SLIM.  Pt not appropriate for therapy at this time.)           Jovon García, PTA

## 2024-10-29 NOTE — ASSESSMENT & PLAN NOTE
Patient initially presented to [facility] 10/11 with complaints of intermittent fever, sweats, poor appetite, abdominal distention, lower extremity edema evolving over a 2-week period.  Workup revealed transaminitis, CT abdomen and pelvis showed splenic and lytic lesions, no prior history of malignancy.  He was a rapid response 10/13 where he was noted to have large left acute subdural hematoma with brain compression and left to right midline shift and left uncal herniation.  He was transferred to [place] for emergent craniectomy for hematoma evacuation. Extubated 10/26.  Patient is status post left [facility] for evacuation of [facility] 10/13.  Recent repeat CT head 10/24 stable.    Due for NSX f/u 11/1  MRI brain showed ischemia involving the brainstem.  He was evaluated by neurology.    Recommended aspirin 81 mg daily when cleared by neurosurgery.  STAT CTH for any neurological changes  Currently with NGT (placed 10/27)  in place for nutrition, will consult speech therapy. May need to consider PEG pending GOC/clinical course. Briefly mentioned to wife.   PT/OT recommending rehab  Rest of plan as below

## 2024-10-29 NOTE — ASSESSMENT & PLAN NOTE
Patient with multiple splenic, hepatic and osseous lesions, concerning for metastasis.  Patient is status post bone biopsy last week.  Histoplasma antigen negative.  Follow-up on pathology.

## 2024-10-29 NOTE — ASSESSMENT & PLAN NOTE
Patient noted to have fevers earlier this hospitalization, met sepsis criteria.  Has large sacral wound with concern for perirectal abscess.  This has been debrided by general surgery.  Completed course of Zosyn per infectious disease.  Continues to have intermittent fevers despite antibiotic without further obvious active infection as well as sinus tachycardia.   Currently being monitored off antibiotic  ID following

## 2024-10-29 NOTE — PROGRESS NOTES
Progress Note - Critical Care/ICU   Name: Clayton Duval 44 y.o. male I MRN: [id number]  [facility] Date of Admission: 10/13/2024   Date of Service: 10/29/2024 I Hospital Day: 16       Called to evaluate pt for increase work of breathing.  Evaluated pt at bedside. Pt more awake compared to when he was in the ICU, eye open, tracks and regards examined.  CXR reviewed blunt of the right lower lobe. Pt has been on multiple antibiotics. -reach out to ID about restarting antibiotics or not  -agree with lasix once  -dc fluid  -ABG wnl  -on high flow, comfortable  -oral care performed, significant dry oral material removed  -performed detailed oral care q4hr  -pt's wife updated bedside. Informed her if pt worsens respiratory status, that it wi;; be his 3 rd intubation and sets him back, lower on the healing/journey ladder. She verbalized understanding and will think about pt's code status  -please call with any questions or concerns.    Myranda White MD  Critical care time 30 minutes    Lab: [id number] Lab: [id number]

## 2024-10-29 NOTE — PLAN OF CARE
Problem: PAIN - ADULT  Goal: Verbalizes/displays adequate comfort level or baseline comfort level  Description: Interventions:  - Encourage patient to monitor pain and request assistance  - Assess pain using appropriate pain scale  - Administer analgesics based on type and severity of pain and evaluate response  - Implement non-pharmacological measures as appropriate and evaluate response  - Consider cultural and social influences on pain and pain management  - Notify physician/advanced practitioner if interventions unsuccessful or patient reports new pain  10/29/2024 0956 by Tracey Suero RN  Outcome: Progressing  10/29/2024 0749 by Tracey Suero RN  Outcome: Progressing     Problem: INFECTION - ADULT  Goal: Absence or prevention of progression during hospitalization  Description: INTERVENTIONS:  - Assess and monitor for signs and symptoms of infection  - Monitor lab/diagnostic results  - Monitor all insertion sites, i.e. indwelling lines, tubes, and drains  - Monitor endotracheal if appropriate and nasal secretions for changes in amount and color  - [place] appropriate cooling/warming therapies per order  - Administer medications as ordered  - Instruct and encourage patient and family to use good hand hygiene technique  - Identify and instruct in appropriate isolation precautions for identified infection/condition  10/29/2024 0956 by Tracey Suero RN  Outcome: Progressing  10/29/2024 0749 by Tracey Suero RN  Outcome: Progressing  Goal: Absence of fever/infection during neutropenic period  Description: INTERVENTIONS:  - Monitor WBC    10/29/2024 0956 by Tracey Suero RN  Outcome: Progressing  10/29/2024 0749 by Tracey Suero RN  Outcome: Progressing     Problem: SAFETY ADULT  Goal: Patient will remain free of falls  Description: INTERVENTIONS:  - Educate patient/family on patient safety including physical limitations  - Instruct patient to call for assistance with activity   -  Consult OT/PT to assist with strengthening/mobility   - Keep Call bell within reach  - Keep bed low and locked with side rails adjusted as appropriate  - Keep care items and personal belongings within reach  - Initiate and maintain comfort rounds  - Make Fall Risk Sign visible to staff  - Offer Toileting every  Hours, in advance of need  - Initiate/Maintain alarm  - Obtain necessary fall risk management equipment:   - Apply yellow socks and bracelet for high fall risk patients  - Consider moving patient to room near nurses station  10/29/2024 0956 by Tracey Suero RN  Outcome: Progressing  10/29/2024 0749 by Tracey Suero RN  Outcome: Progressing  Goal: Maintain or return to baseline ADL function  Description: INTERVENTIONS:  -  Assess patient's ability to carry out ADLs; assess patient's baseline for ADL function and identify physical deficits which impact ability to perform ADLs (bathing, care of mouth/teeth, toileting, grooming, dressing, etc.)  - Assess/evaluate cause of self-care deficits   - Assess range of motion  - Assess patient's mobility; develop plan if impaired  - Assess patient's need for assistive devices and provide as appropriate  - Encourage maximum independence but intervene and supervise when necessary  - Involve family in performance of ADLs  - Assess for home care needs following discharge   - Consider OT consult to assist with ADL evaluation and planning for discharge  - Provide patient education as appropriate  10/29/2024 0956 by Tracey Suero RN  Outcome: Progressing  10/29/2024 0749 by Tracey Suero RN  Outcome: Progressing  Goal: Maintains/Returns to pre admission functional level  Description: INTERVENTIONS:  - Perform AM-PAC 6 Click Basic Mobility/ Daily Activity assessment daily.  - Set and communicate daily mobility goal to care team and patient/family/caregiver.   - Collaborate with rehabilitation services on mobility goals if consulted  - Perform Range of Motion   times a day.  - Reposition patient every  hours.  - Dangle patient  times a day  - Stand patient  times a day  - Ambulate patient  times a day  - Out of bed to chair  times a day   - Out of bed for meals  times a day  - Out of bed for toileting  - Record patient progress and toleration of activity level   10/29/2024 0956 by Tracey Suero RN  Outcome: Progressing  10/29/2024 0749 by Tracey Suero RN  Outcome: Progressing     Problem: DISCHARGE PLANNING  Goal: Discharge to home or other facility with appropriate resources  Description: INTERVENTIONS:  - Identify barriers to discharge w/patient and caregiver  - Arrange for needed discharge resources and transportation as appropriate  - Identify discharge learning needs (meds, wound care, etc.)  - Arrange for interpretive services to assist at discharge as needed  - Refer to Case Management Department for coordinating discharge planning if the patient needs post-hospital services based on physician/advanced practitioner order or complex needs related to functional status, cognitive ability, or social support system  10/29/2024 0956 by Tracey Suero RN  Outcome: Progressing  10/29/2024 0749 by Tracey Suero RN  Outcome: Progressing     Problem: Knowledge Deficit  Goal: Patient/family/caregiver demonstrates understanding of disease process, treatment plan, medications, and discharge instructions  Description: Complete learning assessment and assess knowledge base.  Interventions:  - Provide teaching at level of understanding  - Provide teaching via preferred learning methods  10/29/2024 0956 by Tracey Suero RN  Outcome: Progressing  10/29/2024 0749 by Tracey Suero RN  Outcome: Progressing     Problem: Prexisting or High Potential for Compromised Skin Integrity  Goal: Skin integrity is maintained or improved  Description: INTERVENTIONS:  - Identify patients at risk for skin breakdown  - Assess and monitor skin integrity  - Assess and monitor  nutrition and hydration status  - Monitor labs   - Assess for incontinence   - Turn and reposition patient  - Assist with mobility/ambulation  - Relieve pressure over bony prominences  - Avoid friction and shearing  - Provide appropriate hygiene as needed including keeping skin clean and dry  - Evaluate need for skin moisturizer/barrier cream  - Collaborate with interdisciplinary team   - Patient/family teaching  - Consider wound care consult   10/29/2024 0956 by Tracey Suero RN  Outcome: Progressing  10/29/2024 0749 by Tracey Suero RN  Outcome: Progressing     Problem: Nutrition/Hydration-ADULT  Goal: Nutrient/Hydration intake appropriate for improving, restoring or maintaining nutritional needs  Description: Monitor and assess patient's nutrition/hydration status for malnutrition. Collaborate with interdisciplinary team and initiate plan and interventions as ordered.  Monitor patient's weight and dietary intake as ordered or per policy. Utilize nutrition screening tool and intervene as necessary. Determine patient's food preferences and provide high-protein, high-caloric foods as appropriate.     INTERVENTIONS:  - Monitor oral intake, urinary output, labs, and treatment plans  - Assess nutrition and hydration status and recommend course of action  - Evaluate amount of meals eaten  - Assist patient with eating if necessary   - Allow adequate time for meals  - Recommend/ encourage appropriate diets, oral nutritional supplements, and vitamin/mineral supplements  - Order, calculate, and assess calorie counts as needed  - Recommend, monitor, and adjust tube feedings and TPN/PPN based on assessed needs  - Assess need for intravenous fluids  - Provide specific nutrition/hydration education as appropriate  - Include patient/family/caregiver in decisions related to nutrition  10/29/2024 0956 by Tracey Suero RN  Outcome: Progressing  10/29/2024 0749 by Tracey Suero RN  Outcome: Progressing      Problem: SAFETY,RESTRAINT: NV/NON-SELF DESTRUCTIVE BEHAVIOR  Goal: Remains free of harm/injury (restraint for non violent/non self-detsructive behavior)  Description: INTERVENTIONS:  - Instruct patient/family regarding restraint use   - Assess and monitor physiologic and psychological status   - Provide interventions and comfort measures to meet assessed patient needs   - Identify and implement measures to help patient regain control  - Assess readiness for release of restraint   10/29/2024 0956 by Tracey Suero RN  Outcome: Progressing  10/29/2024 0749 by Tracey Suero RN  Outcome: Progressing  Goal: Returns to optimal restraint-free functioning  Description: INTERVENTIONS:  - Assess the patient's behavior and symptoms that indicate continued need for restraint  - Identify and implement measures to help patient regain control  - Assess readiness for release of restraint   10/29/2024 0956 by Tracey Suero RN  Outcome: Progressing  10/29/2024 0749 by Tracey Suero RN  Outcome: Progressing     Problem: NEUROSENSORY - ADULT  Goal: Achieves stable or improved neurological status  Description: INTERVENTIONS  - Monitor and report changes in neurological status  - Monitor vital signs such as temperature, blood pressure, glucose, and any other labs ordered   - Initiate measures to prevent increased intracranial pressure  - Monitor for seizure activity and implement precautions if appropriate      10/29/2024 0956 by Tracey Suero RN  Outcome: Progressing  10/29/2024 0749 by Tracey Suero RN  Outcome: Progressing  Goal: Remains free of injury related to seizures activity  Description: INTERVENTIONS  - Maintain airway, patient safety  and administer oxygen as ordered  - Monitor patient for seizure activity, document and report duration and description of seizure to physician/advanced practitioner  - If seizure occurs,  ensure patient safety during seizure  - Reorient patient post seizure  -  Seizure pads on all 4 side rails  - Instruct patient/family to notify RN of any seizure activity including if an aura is experienced  - Instruct patient/family to call for assistance with activity based on nursing assessment  - Administer anti-seizure medications if ordered    10/29/2024 0956 by Tracey Suero RN  Outcome: Progressing  10/29/2024 0749 by Tracey Suero RN  Outcome: Progressing  Goal: Achieves maximal functionality and self care  Description: INTERVENTIONS  - Monitor swallowing and airway patency with patient fatigue and changes in neurological status  - Encourage and assist patient to increase activity and self care.   - Encourage visually impaired, hearing impaired and aphasic patients to use assistive/communication devices  10/29/2024 0956 by Tracey Suero RN  Outcome: Progressing  10/29/2024 0749 by Tracey Suero RN  Outcome: Progressing     Problem: CARDIOVASCULAR - ADULT  Goal: Maintains optimal cardiac output and hemodynamic stability  Description: INTERVENTIONS:  - Monitor I/O, vital signs and rhythm  - Monitor for S/S and trends of decreased cardiac output  - Administer and titrate ordered vasoactive medications to optimize hemodynamic stability  - Assess quality of pulses, skin color and temperature  - Assess for signs of decreased coronary artery perfusion  - Instruct patient to report change in severity of symptoms  10/29/2024 0956 by Tracey Suero RN  Outcome: Progressing  10/29/2024 0749 by Tracey Suero RN  Outcome: Progressing  Goal: Absence of cardiac dysrhythmias or at baseline rhythm  Description: INTERVENTIONS:  - Continuous cardiac monitoring, vital signs, obtain 12 lead EKG if ordered  - Administer antiarrhythmic and heart rate control medications as ordered  - Monitor electrolytes and administer replacement therapy as ordered  10/29/2024 0956 by Traecy Suero RN  Outcome: Progressing  10/29/2024 0749 by rTacey Suero RN  Outcome:  Progressing     Problem: RESPIRATORY - ADULT  Goal: Achieves optimal ventilation and oxygenation  Description: INTERVENTIONS:  - Assess for changes in respiratory status  - Assess for changes in mentation and behavior  - Position to facilitate oxygenation and minimize respiratory effort  - Oxygen administered by appropriate delivery if ordered  - Initiate smoking cessation education as indicated  - Encourage broncho-pulmonary hygiene including cough, deep breathe, Incentive Spirometry  - Assess the need for suctioning and aspirate as needed  - Assess and instruct to report SOB or any respiratory difficulty  - Respiratory Therapy support as indicated  10/29/2024 0956 by Tracey Suero RN  Outcome: Progressing  10/29/2024 0749 by Tracey Suero RN  Outcome: Progressing     Problem: GASTROINTESTINAL - ADULT  Goal: Minimal or absence of nausea and/or vomiting  Description: INTERVENTIONS:  - Administer IV fluids if ordered to ensure adequate hydration  - Maintain NPO status until nausea and vomiting are resolved  - Nasogastric tube if ordered  - Administer ordered antiemetic medications as needed  - Provide nonpharmacologic comfort measures as appropriate  - Advance diet as tolerated, if ordered  - Consider nutrition services referral to assist patient with adequate nutrition and appropriate food choices  10/29/2024 0956 by Tracey Suero RN  Outcome: Progressing  10/29/2024 0749 by Tracey Suero RN  Outcome: Progressing  Goal: Maintains or returns to baseline bowel function  Description: INTERVENTIONS:  - Assess bowel function  - Encourage oral fluids to ensure adequate hydration  - Administer IV fluids if ordered to ensure adequate hydration  - Administer ordered medications as needed  - Encourage mobilization and activity  - Consider nutritional services referral to assist patient with adequate nutrition and appropriate food choices  10/29/2024 0956 by Tracey Suero RN  Outcome:  Progressing  10/29/2024 0749 by Tracey Suero RN  Outcome: Progressing  Goal: Maintains adequate nutritional intake  Description: INTERVENTIONS:  - Monitor percentage of each meal consumed  - Identify factors contributing to decreased intake, treat as appropriate  - Assist with meals as needed  - Monitor I&O, weight, and lab values if indicated  - Obtain nutrition services referral as needed  10/29/2024 0956 by Tracey Suero RN  Outcome: Progressing  10/29/2024 0749 by Tracey Suero RN  Outcome: Progressing  Goal: Establish and maintain optimal ostomy function  Description: INTERVENTIONS:  - Assess bowel function  - Encourage oral fluids to ensure adequate hydration  - Administer IV fluids if ordered to ensure adequate hydration   - Administer ordered medications as needed  - Encourage mobilization and activity  - Nutrition services referral to assist patient with appropriate food choices  - Assess stoma site  - Consider wound care consult   10/29/2024 0956 by Tracey Suero RN  Outcome: Progressing  10/29/2024 0749 by Tracey Suero RN  Outcome: Progressing  Goal: Oral mucous membranes remain intact  Description: INTERVENTIONS  - Assess oral mucosa and hygiene practices  - Implement preventative oral hygiene regimen  - Implement oral medicated treatments as ordered  - Initiate Nutrition services referral as needed  10/29/2024 0956 by Tracey Suero RN  Outcome: Progressing  10/29/2024 0749 by Tracey Suero RN  Outcome: Progressing     Problem: GENITOURINARY - ADULT  Goal: Maintains or returns to baseline urinary function  Description: INTERVENTIONS:  - Assess urinary function  - Encourage oral fluids to ensure adequate hydration if ordered  - Administer IV fluids as ordered to ensure adequate hydration  - Administer ordered medications as needed  - Offer frequent toileting  - Follow urinary retention protocol if ordered  10/29/2024 0956 by Tracey Suero RN  Outcome:  Progressing  10/29/2024 0749 by Tracey Suero RN  Outcome: Progressing  Goal: Absence of urinary retention  Description: INTERVENTIONS:  - Assess patient’s ability to void and empty bladder  - Monitor I/O  - Bladder scan as needed  - Discuss with physician/AP medications to alleviate retention as needed  - Discuss catheterization for long term situations as appropriate  10/29/2024 0956 by Tracey Suero RN  Outcome: Progressing  10/29/2024 0749 by Tracey Suero RN  Outcome: Progressing  Goal: Urinary catheter remains patent  Description: INTERVENTIONS:  - Assess patency of urinary catheter  - If patient has a chronic plasencia, consider changing catheter if non-functioning  - Follow guidelines for intermittent irrigation of non-functioning urinary catheter  10/29/2024 0956 by Tracey Suero RN  Outcome: Progressing  10/29/2024 0749 by Tracey Suero RN  Outcome: Progressing     Problem: METABOLIC, FLUID AND ELECTROLYTES - ADULT  Goal: Electrolytes maintained within normal limits  Description: INTERVENTIONS:  - Monitor labs and assess patient for signs and symptoms of electrolyte imbalances  - Administer electrolyte replacement as ordered  - Monitor response to electrolyte replacements, including repeat lab results as appropriate  - Instruct patient on fluid and nutrition as appropriate  10/29/2024 0956 by Tracey Suero RN  Outcome: Progressing  10/29/2024 0749 by Tracey Suero RN  Outcome: Progressing  Goal: Fluid balance maintained  Description: INTERVENTIONS:  - Monitor labs   - Monitor I/O and WT  - Instruct patient on fluid and nutrition as appropriate  - Assess for signs & symptoms of volume excess or deficit  10/29/2024 0956 by Tracey Suero RN  Outcome: Progressing  10/29/2024 0749 by Tracey Suero RN  Outcome: Progressing  Goal: Glucose maintained within target range  Description: INTERVENTIONS:  - Monitor Blood Glucose as ordered  - Assess for signs and symptoms of  hyperglycemia and hypoglycemia  - Administer ordered medications to maintain glucose within target range  - Assess nutritional intake and initiate nutrition service referral as needed  10/29/2024 0956 by Tracey Suero RN  Outcome: Progressing  10/29/2024 0749 by Tracey Suero RN  Outcome: Progressing     Problem: SKIN/TISSUE INTEGRITY - ADULT  Goal: Skin Integrity remains intact(Skin Breakdown Prevention)  Description: Assess:  -Perform Deon assessment every   -Clean and moisturize skin every   -Inspect skin when repositioning, toileting, and assisting with ADLS  -Assess under medical devices such as  every   -Assess extremities for adequate circulation and sensation     Bed Management:  -Have minimal linens on bed & keep smooth, unwrinkled  -Change linens as needed when moist or perspiring  -Avoid sitting or lying in one position for more than  hours while in bed  -Keep HOB at degrees     Toileting:  -Offer bedside commode  -Assess for incontinence every   -Use incontinent care products after each incontinent episode such as     Activity:  -Mobilize patient  times a day  -Encourage activity and walks on unit  -Encourage or provide ROM exercises   -Turn and reposition patient every  Hours  -Use appropriate equipment to lift or move patient in bed  -Instruct/ Assist with weight shifting every  when out of bed in chair  -Consider limitation of chair time  hour intervals    Skin Care:  -Avoid use of baby powder, tape, friction and shearing, hot water or constrictive clothing  -Relieve pressure over bony prominences using   -Do not massage red bony areas    Next Steps:  -Teach patient strategies to minimize risks such as    -Consider consults to  interdisciplinary teams such as   10/29/2024 0956 by Tracey Suero RN  Outcome: Progressing  10/29/2024 0749 by Tracey Suero RN  Outcome: Progressing  Goal: Incision(s), wounds(s) or drain site(s) healing without S/S of infection  Description:  INTERVENTIONS  - Assess and document dressing, incision, wound bed, drain sites and surrounding tissue  - Provide patient and family education  - Perform skin care/dressing changes every   10/29/2024 0956 by Tracey Suero RN  Outcome: Progressing  10/29/2024 0749 by Tracey Suero RN  Outcome: Progressing  Goal: Pressure injury heals and does not worsen  Description: Interventions:  - Implement low air loss mattress or specialty surface (Criteria met)  - Apply silicone foam dressing  - Instruct/assist with weight shifting every minutes when in chair   - Limit chair time to  hour intervals  - Use special pressure reducing interventions such as  when in chair   - Apply fecal or urinary incontinence containment device   - Perform passive or active ROM every   - Turn and reposition patient & offload bony prominences every  hours   - Utilize friction reducing device or surface for transfers   - Consider consults to  interdisciplinary teams such as   - Use incontinent care products after each incontinent episode such as   - Consider nutrition services referral as needed  10/29/2024 0956 by Tracey Suero RN  Outcome: Progressing  10/29/2024 0749 by Tracey Suero RN  Outcome: Progressing     Problem: HEMATOLOGIC - ADULT  Goal: Maintains hematologic stability  Description: INTERVENTIONS  - Assess for signs and symptoms of bleeding or hemorrhage  - Monitor labs  - Administer supportive blood products/factors as ordered and appropriate  10/29/2024 0956 by Tracey Suero RN  Outcome: Progressing  10/29/2024 0749 by Tracey Suero RN  Outcome: Progressing     Problem: MUSCULOSKELETAL - ADULT  Goal: Maintain or return mobility to safest level of function  Description: INTERVENTIONS:  - Assess patient's ability to carry out ADLs; assess patient's baseline for ADL function and identify physical deficits which impact ability to perform ADLs (bathing, care of mouth/teeth, toileting, grooming, dressing,  etc.)  - Assess/evaluate cause of self-care deficits   - Assess range of motion  - Assess patient's mobility  - Assess patient's need for assistive devices and provide as appropriate  - Encourage maximum independence but intervene and supervise when necessary  - Involve family in performance of ADLs  - Assess for home care needs following discharge   - Consider OT consult to assist with ADL evaluation and planning for discharge  - Provide patient education as appropriate  10/29/2024 0956 by Tracey Suero RN  Outcome: Progressing  10/29/2024 0749 by Tracey Suero RN  Outcome: Progressing  Goal: Maintain proper alignment of affected body part  Description: INTERVENTIONS:  - Support, maintain and protect limb and body alignment  - Provide patient/ family with appropriate education  10/29/2024 0956 by Tracey Suero RN  Outcome: Progressing  10/29/2024 0749 by Tracey Suero RN  Outcome: Progressing     Problem: COPING  Goal: Pt/Family able to verbalize concerns and demonstrate effective coping strategies  Description: INTERVENTIONS:  - Assist patient/family to identify coping skills, available support systems and cultural and spiritual values  - Provide emotional support, including active listening and acknowledgement of concerns of patient and caregivers  - Reduce environmental stimuli, as able  - Provide patient education  - Assess for spiritual pain/suffering and initiate spiritual care, including notification of Pastoral Care or [place] based community as needed  - Assess effectiveness of coping strategies  10/29/2024 0956 by Tracey Suero RN  Outcome: Progressing  10/29/2024 0749 by Tracey Suero RN  Outcome: Progressing

## 2024-10-29 NOTE — ASSESSMENT & PLAN NOTE
Patient with suspected metastatic cancer, patient with hepatosplenomegaly, hepatic lesions, splenic lesions, transaminitis.  Evaluated by oncology, tumor markers ordered, CEA, AFP, normal.  CA 19 9, 697, LDH elevated at 556.  Hepatitis panel negative.  Now status post iliac bone biopsy done 10/24, pathology pending.  Also status post paracentesis 10/22, fluid negative for malignancy.  Oncology notes reviewed, additional recommendations pending pathology.

## 2024-10-29 NOTE — ASSESSMENT & PLAN NOTE
Palliative care following.  There have been discussions with patient's wife, she would like patient to remain full code with no limits in place.  Spoke with wife about possibly needing to consider PEG, she will think on this.

## 2024-10-29 NOTE — ASSESSMENT & PLAN NOTE
Source of sepsis is most likely perirectal abscess.  Despite sepsis, patient remains systemically well, without toxicity and hemodynamically stable, without hypotension.  Patient had prolonged fever, most likely secondary to infected sacral ulcer but this has resolved with antibiotic and I&D of sacral ulcer.  Admission blood cultures have no growth.  Patient completed Zosyn course last week.  Temperature remains intermittently up throughout hospitalization, despite antibiotic, without further obvious active infection.  WBC remains in the normal range.  Consider tumor fever as etiology of persistent intermittent fever.  At present, patient is off antibiotic and temperature is trending down..  Observe off further antibiotic.  Monitor temperature/WBC.    Monitor hemodynamics.

## 2024-10-29 NOTE — ASSESSMENT & PLAN NOTE
S/P I&D with general surgery. Concern for adjacent perirectal abscess noted. CT scan 10/18 also noting auricle thinning of coccyx below the decubitus ulcer may suggest osteomyelitis.   Definitive diagnosis of osteomized would require a bone biopsy/culture. However even if OM was confirmed, long-term antibiotic therapy would be futile without a plan to aggressively debride the tissue and bone, send bone cultures and cover the wound with flap for closure.   LWC per general surgery  Strict and frequent turning and repositioning  Maintain Barclay catheter to avoid wound contamination  Needs diverting colostomy but holding off until biopsy results back for prognostication

## 2024-10-29 NOTE — CONSULTS
Consultation - Nephrology   Clayton Duval 44 y.o. male MRN: [id number]  Unit/Bed#: [facility] 627-01 Encounter: [id number]    ASSESSMENT/PLAN:   Assessment & Plan  Hypernatremia  Due to poor free water intake  Sodium currently 150  Currently on tube feeds with free water flushes 300 mL every 4 hours  Will add D5W at 100 cc/h  Non-traumatic acute L subdural hematoma (HCC)  S/p urgent craniectomy and hematoma evacuation 10/13  MRI brain showed ischemia involving the brainstem  Anasarca  Patient with extensive edema, suspect third spacing  I/O show +21L since admission   Albumin 2.3  Consider starting albumin + diuretics -- will d/w attending   Also with ascites and status post paracentesis on 10/22  Acute respiratory failure (HCC)  Now extubated  Sepsis (HCC)  Possibly related to large sacral wound with concern for perirectal abscess  Status postdebridement  Status post Zosyn  Multiple lesions of metastatic malignancy (HCC)  Suspected metastatic malignancy --presents with hepatosplenomegaly, hepatic lesion, splenic lesion, transaminitis  Status post iliac bone biopsy 10/24 --pathology pending  Pulmonary embolism (HCC)  Not on anticoagulation due to subdural hematoma  Goals of care, counseling/discussion  Ongoing goals of care discussion.  Planning for family meeting today  may need PEG tube      Summary of Plan:   D5W at 100 cc/h  Consider albumin with diuretics, will discuss with attending  Repeat BMP at 3pm     HISTORY OF PRESENT ILLNESS:  Requesting Physician: Jean Claude Foley MD  Reason for Consult: Hypernatremia     Clayton Duval is a 44 y.o. year old male who was admitted to [facility] on 10/11 with intermittent fever, poor appetite, abdominal distention, lower extremity edema.  Workup showed splenic and lytic lesions and he underwent iliac bone biopsy for which pathology is pending.  Had a rapid response called 10/13 and found to have a large left acute subdural hematoma with brain compression and extensive  shift.  He was transferred to [facility] and underwent emergent craniectomy for hematoma evacuation.  He was eventually extubated on 10/26.  He had a follow-up MRI which showed ischemia in the brainstem.  Hospital stay complicated by sepsis likely due to perirectal abscess status post Zosyn and PE.  He has an NG tube in place for nutrition and has been getting tube feeds.  Now with hypernatremia so nephrology was consulted.    Due to patient's altered mental status history comes from the chart.      PAST MEDICAL HISTORY:  History reviewed. No pertinent past medical history.    PAST SURGICAL HISTORY:  Past Surgical History:   Procedure Laterality Date    BRAIN HEMATOMA EVACUATION Left 10/13/2024    Procedure: CRANIOTOMY FOR SUBDURAL HEMATOMA;  Surgeon: Son Mendoza MD;  Location: [facility];  Service: Neurosurgery    EXAMINATION UNDER ANESTHESIA N/A 10/21/2024    Procedure: EXAM UNDER ANESTHESIA (EUA), sacral wound debridement;  Surgeon: Danny Paulino DO;  Location: [facility];  Service: General    GASTRIC BYPASS  2012    GASTRIC BYPASS      IR BIOPSY BONE  10/24/2024    IR PARACENTESIS  10/22/2024       ALLERGIES:  No Known Allergies    SOCIAL HISTORY:  Social History     Substance and Sexual Activity   Alcohol Use Never     Social History     Substance and Sexual Activity   Drug Use Never     Social History     Tobacco Use   Smoking Status Never   Smokeless Tobacco Current       FAMILY HISTORY:  History reviewed. No pertinent family history.    MEDICATIONS:  Scheduled Meds:  Current Facility-Administered Medications   Medication Dose Route Frequency Provider Last Rate    acetaminophen  650 mg Per NG Tube Q8H PRN Bradley Quinteros MD      chlorhexidine  15 mL Mouth/Throat Q12H KAYLIE Quinteros MD      Cholecalciferol  5,000 Units Oral Daily Bradley Quinteros MD      folic acid  1 mg Per NG Tube Daily Bradley Quinteros MD      heparin (porcine)  5,000 Units Subcutaneous Q8H KAYLIE Quinteros MD      hydrALAZINE   10 mg Intravenous Q6H PRN Bradley Quinteros MD      HYDROmorphone  1 mg Intravenous Q3H PRN Bradley Quinteros MD      labetalol  10 mg Intravenous Q6H PRN Bradley Quinteros MD      levothyroxine  200 mcg Oral Early Morning Bradley Quinteros MD      magnesium sulfate  2 g Intravenous After Breakfast Bradley Quinteros MD 2 g (10/29/24 0933)    modafinil  200 mg Per NG Tube Daily Bradley Quinteros MD      ondansetron  4 mg Intravenous Q8H PRN Bradley Quinteros MD      polyethylene glycol  17 g Oral Daily PRN Bradley Quinteros MD      sodium chloride  4 mL Nebulization TID Richa Sood      thiamine  100 mg Per NG Tube Daily Bradley Quinteros MD         PRN Meds:.  acetaminophen    hydrALAZINE    HYDROmorphone    labetalol    ondansetron    polyethylene glycol    REVIEW OF SYSTEMS:  A complete review of systems was done. Pertinent positives and negatives noted in the HPI but otherwise the review of systems is negative.    PHYSICAL EXAM:  Current Weight: Weight - Scale: (!) 139 kg (306 lb 14.1 oz)  First Weight: Weight - Scale: 108 kg (237 lb 10.5 oz)  Vitals:    10/29/24 0812   BP: 119/77   Pulse: (!) 127   Resp: (!) 28   Temp: 98.2 °F (36.8 °C)   SpO2: 95%       Intake/Output Summary (Last 24 hours) at 10/29/2024 1027  Last data filed at 10/29/2024 0606  Gross per 24 hour   Intake 2985 ml   Output 1115 ml   Net 1870 ml     General:  ill appearing   Skin: jaundice   Eyes:  Scleral icterus, no periorbital edema   ENT:  NG tube   Neck:  Trachea midline, symmetric   Chest:  Clear to auscultation bilaterally with no wheezes, rales or rhonchi  CVS:  Regular rate and rhythm  Abdomen:  Soft, ascites   Neuro:  Awake and alert  Psych:  Appropriate affect  Extremities: anasarca    Lab Results:   Results from last 7 days   Lab Units 10/29/24  0931 10/28/24  1539 10/28/24  0549 10/27/24  0420 10/26/24  2130 10/26/24  0518 10/25/24  0607 10/24/24  2149 10/24/24  1259 10/24/24  0440 10/23/24  0513   WBC Thousand/uL 6.33  --  7.20 7.71  --  9.96 7.61  --   9.23 11.09* 13.36*   HEMOGLOBIN g/dL 7.2*  --  7.3* 7.4*  --  7.4* 7.8* 7.7* 6.9* 6.3* 7.8*   HEMATOCRIT % 24.8*  --  24.8* 25.0*  --  24.4* 25.4* 24.2* 21.8* 21.1* 24.4*   PLATELETS Thousands/uL 156  --  127* 102*  --  113* 126*  --  145* 164 224   SODIUM mmol/L 150* 150* 151* 148* 146 148* 146  --  144 144 143   POTASSIUM mmol/L 4.3 4.0 4.1 3.7 3.7 3.7 3.8  --  3.9 3.9 4.1   CHLORIDE mmol/L 119* 117* 118* 116* 116* 116* 113*  --  112* 112* 111*   CO2 mmol/L 24 25 24 22 24 24 25  --  26 23 21   BUN mg/dL 53* 48* 47* 44* 45* 41* 37*  --  40* 44* 45*   CREATININE mg/dL 0.88 0.79 0.78 0.70 0.77 0.74 0.69  --  0.79 0.78 1.16   CALCIUM mg/dL 8.0* 8.2* 8.0* 7.9* 7.7* 7.7* 8.0*  --  7.9* 8.0* 7.7*   MAGNESIUM mg/dL  --   --  2.2 2.4 2.7 2.0 1.9  --   --  2.0 2.0   PHOSPHORUS mg/dL  --   --  3.2 2.4* 2.7 2.2* 2.3*  --   --  2.3* 3.2       Radiology Results:   XR chest portable   Final Result by Wai Lim MD (10/28 1040)      Successful repositioning of enteric tube with its tip now below the left hemidiaphragm.      No significant interval change in right basilar opacity.         Resident: BIN Jaimes I, the attending radiologist, have reviewed the images and agree with the final report above.      Workstation performed: [id number]         XR chest portable   Final Result by Wai Lim MD (10/28 1035)      Enteric tube tip traversing the left mainstem bronchus and distally overlying the left lung base. Recommend repositioning with follow-up radiograph to confirm position.      Right-sided pleural effusion with unchanged right basilar opacification.            Resident: BIN Jaimes I, the attending radiologist, have reviewed the images and agree with the final report above.      Workstation performed: [id number]         XR chest portable ICU   Final Result by Tristian Jerez MD (10/28 0834)      1.  Stable right basilar consolidation/effusion.      2.  Right internal jugular catheter projects  over the inferior right atrium. Consider retraction of approximately 3-5 cm.      The study was marked in EPIC for immediate notification.      Workstation performed: [id number]         XR chest portable ICU   Final Result by Suzanne Ortiz MD (10/25 0903)      Persistent dense right lung base opacity which may represent a combination of effusion and parenchymal opacity.            Workstation performed: [id number]         VAS upper limb venous duplex scan, unilateral/limited   Final Result by Jennifer Patel MD (10/26 1634)       VAS VENOUS DUPLEX - LOWER LIMB BILATERAL   Final Result by Jennifer Patel MD (10/26 1634)      CT head wo contrast   Final Result by Lisa Cueto MD (10/24 3887)         1. Postsurgical changes from decompressive left hemicraniectomy again demonstrated, with decreased epidural/subgaleal blood products noted along the left scalp but still measuring approximately 1.6 cm in maximal thickness. Increased epidural fluid    density noted more inferiorly at the level of the left temporal lobe when compared to the prior study.   2. Trace left convexity subdural hematoma still likely present measuring 4-5 mm in maximal thickness.   3. Areas of low-attenuation involving the right thalamus, left parafalcine occipital and mesial left temporal lobe, compatible with the evolving recent infarcts in these regions. No intraparenchymal hemorrhage is identified.                  Workstation performed: [id number]         CT pe study w abdomen pelvis w contrast   Final Result by Leonid Chase MD (10/24 2330)      Right lower lobe consolidation with right lower lobe bronchus debris suspicious for aspiration pneumonia.      Small quantity of acute segmental level left upper lobe pulmonary arterial embolism.      Small right and trace left pleural effusions.      Reidentified suspected hepatic splenic and osseous metastases.      More prominent diffuse chest and body wall edema/anasarca.       Unchanged sacral decubitus ulcer. A perirectal abscess is not identified.            I personally discussed this study with ARMANDO JOINER on 10/24/2024 2:15 PM.            Workstation performed: [id number]         IR biopsy bone   Final Result by Queta Velazco MD (10/25 3173)   Impression:      CT guided biopsy of left iliac bone lesion      Limited ultrasound revealing no good liver targets for percutaneous bedside biopsy   Easily identifiable splenic lesions which can be targeted by ultrasound-guided biopsy         Workstation performed: [id number]         XR chest portable ICU   Final Result by Clayton Enriquez MD (10/23 1102)      Mild interstitial prominence which is probably exaggerated by very low lung volumes. No focal consolidation seen.            Workstation performed: [id number]         IR INPATIENT Paracentesis   Final Result by Mann Duval MD (10/24 1536)   Impression:      Ultrasound-guided paracentesis.      Signed, performed, and dictated by TOM Rivera under the supervision of Dr. Mann Duval.            Workstation performed: [id number]         VAS carotid complete study   Final Result by Wai Garcia MD (10/20 1536)      CT abdomen pelvis w contrast   Final Result by Katerine Mak MD (10/18 2082)      1) Decubitus ulcer overlying the coccyx with suggestion of cortical thinning in the inferior most coccygeal segment, concerning for osteomyelitis. No associated rim-enhancing fluid collection to suggest an abscess.      2) Incompletely imaged perineal region. Air seen to the right of the anus, unclear whether along the gluteal cleft or within the subcutaneous tissues. No subcutaneous emphysema elsewhere.      3) Findings concerning for metastatic disease in the bones, spleen, and liver, as on the recent studies, as discussed above. These were better assessed on the prior MRI.      4) Moderate abdominal and pelvic ascites, increased from 10/10/2024, however  no organized fluid collections. Significant anasarca.      5) Small bilateral pleural effusions and bibasilar atelectasis, right greater than left, increased since 10/10/2024.      6) Additional findings as above.      The study was marked in EPIC for immediate notification.            Workstation performed: [id number]         MRI brain w wo contrast   Final Result by E. Alec Schoenberger, MD (10/18 0725)      No significant change. Stable evolving recent infarcts with petechial hemorrhage.   Status post left hemispheric craniectomy for subdural drainage. Stable subgaleal epidural hematoma with small residual subdurals. Stable small volume of intraventricular hemorrhage.   Stable mass effect with 4 mm of left-to-right shift.      Workstation performed: [id number]         CT head wo contrast   Final Result by Trenton Stanley MD (10/17 0805)      1.  No significant change in the left subdural hemorrhage or evolving right thalamic and left temporo-occipital infarctions.                  Workstation performed: [id number]         XR abdomen 1 view kub   Final Result by Gen Mims MD (10/16 0723)      Nasogastric tube terminates in the stomach.               Workstation performed: [id number]         CT head wo contrast   Final Result by Fabricio Almeida MD (10/15 1324)      Status post decompressive left-sided hemicraniectomy with mild interval increase in the degree of mixed attenuation subdural as well as subgaleal hemorrhage along the craniectomy site. Grossly stable mild rightward midline shift.      Thin subdural hemorrhage tracking along the right tentorium likely represents redistributed blood products. Small amount of hemorrhage also noted layering in the occipital horns.      Evolving areas of ischemia as described above with punctate areas seen to better advantage on recent MRI. No definite CT evidence for new large acute vascular distribution infarct.      The study was marked in EPIC for immediate  "notification.                  Workstation performed: [id number]         MRI brain wo contrast   Final Result by Wil Brennan DO (10/14 4898)      Multifocal early ischemia including right anterior thalamus, anterior occipital lobes left greater than right. On the left this tracks into the posterior medial temporal lobe. There is also a punctate focus of cortical ischemia within the posterior    parafalcine frontal lobe. Cortical petechial hemorrhage noted within the central aspect of the right thalamus and left anterior occipital lobe. No sofía hemorrhagic transformation. Evaluation of the cervical and intracranial vasculature is recommended.      Patient is status post left hemicraniectomy for surgical decompression of acute subdural hemorrhage. Mixed signal subdural hemorrhage, fluid and air remains within the periphery of the left hemisphere with mild mass effect and 4 mm of left to right    shift.      There is a very small subdural hemorrhage noted within the right middle cranial fossa extending posteriorly lateral to the occipital lobe, less than 2 mm in thickness.      Focal intraventricular hemorrhage within the occipital horn of the right greater than left. There is increased T2 and FLAIR signal change surrounding the occipital horn of the lateral ventricle suggesting focal transependymal flow of CSF.      This examination was marked \"immediate notification\" in Epic in order to begin the standard process by which the radiology reading room liaison alerts the referring practitioner.      Workstation performed: [id number]         CT head wo contrast   Final Result by Pradip Lovett MD (10/13 1475)      -New area of hypodensity within the left thalamus and basal ganglia compared to earlier day exam, suggestive of acute infarct.      -Expected postsurgical changes from left hemispheric craniectomy with decreased size of mixed density subdural hematoma, improved 4 mm left to right midline shift and " improved effacement of the basilar cisterns.      -Improved mass effect on the ventricular system and dilatation of the right temporal horn of the lateral ventricle as detailed above.      I personally communicated the findings via telephone with Son Dover at 12:56 pm. on 10/13/2024.                  Workstation performed: [id number]         CT head wo contrast    (Results Pending)

## 2024-10-29 NOTE — ASSESSMENT & PLAN NOTE
Possibly related to large sacral wound with concern for perirectal abscess  Status postdebridement  Status post Zosyn

## 2024-10-29 NOTE — ASSESSMENT & PLAN NOTE
Likely hypercoagulable state in setting of underlying malignancy.  Anticoagulation not pursued due to recent subdural hematoma

## 2024-10-29 NOTE — PLAN OF CARE
Problem: COPING  Goal: Pt/Family able to verbalize concerns and demonstrate effective coping strategies  Description: INTERVENTIONS:  - Assist patient/family to identify coping skills, available support systems and cultural and spiritual values  - Provide emotional support, including active listening and acknowledgement of concerns of patient and caregivers  - Reduce environmental stimuli, as able  - Provide patient education  - Assess for spiritual pain/suffering and initiate spiritual care, including notification of Pastoral Care or azeb based community as needed  - Assess effectiveness of coping strategies  Outcome: Progressing

## 2024-10-29 NOTE — UTILIZATION REVIEW
Continued Stay Review    Date: 10/29                          Current Patient Class: Inpatient  Current Level of Care: Stepdown    HPI:44 y.o. male initially admitted on 10/13     Assessment/Plan:   S/p iliac bone biopsy 10/24, pathology pending.  Currently on O2 4L NC, wean O2 as tolerated.  Sodium level up to 150. Repeat CMP. Nephrology consult. Barclay cath.  Sacral wound, S/p I&D. Needs diverting colostomy but holding off until biopsy results back for prognostication   Palliative care following.  There have been discussions with patient's wife, she would like patient to remain full code with no limits in place.  Spoke with wife about possibly needing to consider PEG, she will think on this.   On exam; Tachycardia. Decreased breath sounds. Barclay cath with dark effie urine.  Left side flaccid    Nephrology cons; Hypernatremia likely due to decreased free water intake in the setting of encephalopathy   Serum sodium up to 150, total free water deficit calculated to be 6 L  Remains on tube feeds with free water flushes 300 mL every 4 hours which provides 1.8 L fluid in a day  Monitor serial BMP on D5W at 100 mL/h  D5W is free water which will likely go intracellular. Note that the patient has significant anasarca, likely third spacing  Anasarca in the setting of hypoalbuminemia and third spacing-patient is net +21 L since admission, albumin 2.3.  Recent ascites and s/p 2L paracentesis 10/22. Recommend albumin plus IV diuretic once serum sodium closer to 145 to avoid ongoing need for D5W while managing volume status   Anemia - Hgb 7.2, monitor CBC, transfuse prn, avoid BECCA in the setting of metastatic malignancy and PE  Multiple lesions of metastatic malignancy-status post iliac bone biopsy October 24 with path pending    Quick note; Pt with increased WOB/tachycardia. Give Albumin/lasix. Respiratory at bedside, upgrading to high flow--would not be ideal candidate for BIPAP given crani and NGT.   Critical Care attending  to evaluate pt.    Medications:   Scheduled Medications:  Albumin 25%, 25 g, Intravenous, Once  chlorhexidine, 15 mL, Mouth/Throat, Q12H KAYLIE  Cholecalciferol, 5,000 Units, Oral, Daily  folic acid, 1 mg, Per NG Tube, Daily  heparin (porcine), 5,000 Units, Subcutaneous, Q8H KAYLIE  levothyroxine, 200 mcg, Oral, Early Morning  magnesium sulfate, 2 g, Intravenous, After Breakfast  modafinil, 200 mg, Per NG Tube, Daily  sodium chloride, 4 mL, Nebulization, TID  thiamine, 100 mg, Per NG Tube, Daily      Continuous IV Infusions:     dextrose 5 % infusion  Rate: 100 mL/hr Dose: 100 mL/hr  Freq: Continuous Route: IV  Last Dose: 100 mL/hr (10/29/24 1049)  Start: 10/29/24 1045 End: 10/29/24 1453     PRN Meds:  acetaminophen, 650 mg, Per NG Tube, Q8H PRN  hydrALAZINE, 10 mg, Intravenous, Q6H PRN  HYDROmorphone, 1 mg, Intravenous, Q3H PRN  labetalol, 10 mg, Intravenous, Q6H PRN  ondansetron, 4 mg, Intravenous, Q8H PRN  polyethylene glycol, 17 g, Oral, Daily PRN      Discharge Plan:     Vital Signs (last 3 days)       Date/Time Temp Pulse Resp BP MAP (mmHg) Arterial Line BP MAP SpO2 FiO2 (%) Calculated FIO2 (%) - Nasal Cannula Nasal Cannula O2 Flow Rate (L/min) O2 Device O2 Interface Device Patient Position - Orthostatic VS Charisma Coma Scale Score Pain    10/29/24 1432 -- -- -- -- -- -- -- 98 % -- -- -- -- HFNC prongs -- -- --    10/29/24 14:28:55 99 °F (37.2 °C) 119 -- 118/76 90 -- -- 96 % -- -- -- -- -- -- -- --    10/29/24 1401 -- 118 -- -- -- -- -- 96 % -- 36 4 L/min Nasal cannula -- -- -- --    10/29/24 13:55:30 -- 121 -- 117/78 91 -- -- 96 % -- -- -- -- -- -- -- --    10/29/24 13:07:38 101.1 °F (38.4 °C) 122 -- -- -- -- -- 94 % -- -- -- -- -- -- -- --    10/29/24 1219 -- -- -- -- -- -- -- -- -- -- -- -- -- -- -- Med Not Given for Pain - for MAR use only    10/29/24 12:13:18 102 °F (38.9 °C) 128 40 -- -- -- -- 94 % -- -- -- -- -- -- -- --    10/29/24 1202 102.4 °F (39.1 °C) 129 40 124/77 93 -- -- 94 % -- -- -- -- -- -- -- --     10/29/24 08:12:09 98.2 °F (36.8 °C) 127 28 119/77 91 -- -- 95 % -- -- -- -- -- -- -- --    10/29/24 0719 -- -- -- -- -- -- -- 95 % -- 36 4 L/min Nasal cannula -- -- -- --    10/29/24 0622 -- -- -- -- -- -- -- -- -- -- -- -- -- -- 11 --    10/29/24 0421 -- -- -- -- -- -- -- -- -- -- -- -- -- -- 11 --    10/29/24 02:42:31 97.7 °F (36.5 °C) 122 -- -- -- -- -- 96 % -- -- -- -- -- -- -- --    10/29/24 0234 -- -- -- -- -- -- -- -- -- -- -- -- -- -- 11 --    10/29/24 0233 -- 122 36 -- -- -- -- 96 % -- 40 5 L/min Nasal cannula -- -- -- --    10/29/24 0045 -- -- -- -- -- -- -- -- -- -- -- -- -- -- 11 --    10/29/24 0028 99.5 °F (37.5 °C) -- -- -- -- -- -- -- -- -- -- -- -- -- -- --    10/28/24 2238 -- -- -- -- -- -- -- 96 % -- 40 5 L/min Nasal cannula -- -- 11 --    10/28/24 2049 -- -- -- -- -- -- -- 96 % -- 40 5 L/min Nasal cannula -- -- 11 --    10/28/24 20:12:10 100.3 °F (37.9 °C) 127 -- 146/88 107 -- -- 98 % -- -- -- -- -- -- -- --    10/28/24 18:21:17 99.8 °F (37.7 °C) 125 32 129/84 99 -- -- 99 % -- 44 6 L/min Nasal cannula -- -- -- --    10/28/24 1815 -- -- -- -- -- -- -- -- -- -- -- -- -- -- 11 --    10/28/24 1600 98 °F (36.7 °C) 121 30 117/76 88 -- -- 98 % -- 44 6 L/min Nasal cannula -- Lying 11 --    10/28/24 1400 -- 124 30 133/82 97 116/104 110 mmHg 99 % -- 44 6 L/min None (Room air) -- Lying 11 --    10/28/24 1341 -- -- -- -- -- -- -- 99 % -- -- -- -- -- -- -- --    10/28/24 1218 -- -- -- -- -- -- -- -- -- -- -- -- -- -- -- Med Not Given for Pain - for MAR use only    10/28/24 1200 102.5 °F (39.2 °C) 124 26 129/61 84 120/58 78 mmHg 100 % -- 44 6 L/min Nasal cannula -- Lying 11 --    10/28/24 1000 -- 122 27 133/80 96 118/62 80 mmHg 99 % -- 44 6 L/min Nasal cannula -- -- 11 --    10/28/24 0800 98.8 °F (37.1 °C) 124 28 125/82 95 96/60 72 mmHg 96 % -- 44 6 L/min Nasal cannula -- Lying 11 --    10/28/24 0700 -- 122 26 -- -- 94/58 74 mmHg 99 % -- -- -- -- -- -- -- --    10/28/24 0656 -- -- -- -- -- -- -- 97 % -- 44 6  L/min Nasal cannula -- -- -- --    10/28/24 0630 -- 122 27 116/80 91 94/56 70 mmHg 98 % -- -- -- -- -- -- -- --    10/28/24 0610 -- 122 28 131/80 94 98/60 74 mmHg 93 % -- -- -- -- -- -- -- --    10/28/24 0600 -- 118 27 -- -- 102/60 74 mmHg 93 % -- -- -- -- -- -- 11 --    10/28/24 0500 100.9 °F (38.3 °C) 120 27 -- -- 98/60 74 mmHg 96 % -- -- -- -- -- -- -- --    10/28/24 0400 98.8 °F (37.1 °C) 122 29 -- -- 98/60 74 mmHg 98 % -- -- -- -- -- -- 11 --    10/28/24 0300 -- 126 30 -- -- 94/60 74 mmHg 98 % -- -- -- -- -- -- -- --    10/28/24 0212 -- -- -- -- -- -- -- -- -- -- -- -- -- -- -- Med Not Given for Pain - for MAR use only    10/28/24 0200 -- 128 30 -- -- 100/74 84 mmHg 100 % -- -- -- -- -- -- 11 --    10/28/24 0100 -- 130 30 -- -- 102/76 86 mmHg 100 % -- -- -- -- -- -- -- --    10/28/24 0000 99.4 °F (37.4 °C) 130 29 -- -- 112/82 92 mmHg 100 % -- -- -- -- -- -- 11 --    10/27/24 2300 -- 136 28 126/90 110 126/96 106 mmHg 100 % -- -- -- -- -- -- -- --    10/27/24 2200 -- 136 -- -- -- 154/84 108 mmHg -- -- -- -- -- -- -- 11 --    10/27/24 2100 -- 126 25 -- -- 120/62 80 mmHg 99 % -- -- -- -- -- -- -- --    10/27/24 2000 100.2 °F (37.9 °C) 120 25 -- -- 122/64 84 mmHg 100 % -- -- -- -- -- -- 11 --    10/27/24 1904 -- -- -- -- -- -- -- 99 % -- 44 6 L/min Nasal cannula -- -- -- --    10/27/24 1900 -- 120 25 -- -- 126/64 84 mmHg 100 % -- -- -- -- -- -- -- --    10/27/24 1800 98.8 °F (37.1 °C) 120 26 -- -- 132/68 90 mmHg 100 % -- -- -- -- -- -- 11 --    10/27/24 1700 -- 118 24 -- -- 128/66 86 mmHg 100 % -- -- -- -- -- -- -- --    10/27/24 1600 -- 116 24 -- -- 114/74 88 mmHg 100 % -- -- -- -- -- -- 10 --    10/27/24 1500 100.7 °F (38.2 °C) 116 25 -- -- 112/68 84 mmHg 98 % -- -- -- -- -- -- -- --    10/27/24 1400 -- 126 24 -- -- 112/72 88 mmHg 99 % -- -- -- -- -- -- 11 --    10/27/24 1340 -- 126 30 -- -- 112/70 84 mmHg 99 % -- -- -- -- -- -- -- --    10/27/24 1300 -- 128 27 -- -- 134/66 88 mmHg 99 % -- -- -- -- -- -- -- --     10/27/24 1200 101 °F (38.3 °C) 124 26 -- -- 140/66 90 mmHg 99 % -- -- -- -- -- -- 10 --    10/27/24 1100 -- 120 27 -- -- 136/64 88 mmHg 100 % -- -- -- -- -- -- -- --    10/27/24 1000 -- 118 26 -- -- 136/62 86 mmHg 100 % -- -- -- -- -- -- 11 --    10/27/24 0900 -- 122 27 -- -- 134/64 88 mmHg 99 % -- -- -- -- -- -- -- --    10/27/24 0800 99.7 °F (37.6 °C) 114 28 -- -- 144/74 100 mmHg 100 % -- -- -- -- -- -- -- --    10/27/24 0743 98.7 °F (37.1 °C) 110 25 -- -- 134/70 92 mmHg 100 % -- 44 6 L/min Nasal cannula -- -- -- --    10/27/24 0705 -- -- -- -- -- -- -- -- -- -- -- -- -- -- 10 --    10/27/24 0700 -- 112 26 -- -- 126/70 90 mmHg 100 % -- -- -- -- -- -- -- --    10/27/24 0600 -- 112 25 -- -- 128/70 90 mmHg 100 % -- -- -- -- -- -- 10 --    10/27/24 0500 -- 112 26 -- -- 132/72 94 mmHg 100 % -- -- -- -- -- -- 10 --    10/27/24 0400 -- 112 24 -- -- 124/70 90 mmHg 99 % -- -- -- -- -- -- 10 --    10/27/24 0300 -- 110 23 -- -- 126/74 92 mmHg 99 % -- -- -- -- -- -- -- --    10/27/24 0200 -- 106 24 -- -- 132/74 94 mmHg 100 % -- -- -- -- -- -- 10 --    10/27/24 0100 -- 108 27 -- -- 124/66 86 mmHg 100 % -- -- -- -- -- -- -- --    10/27/24 0000 98.6 °F (37 °C) 106 22 -- -- 118/64 82 mmHg 99 % -- -- -- -- -- -- 10 --    10/26/24 2300 -- 106 22 -- -- 116/64 84 mmHg 99 % -- -- -- -- -- -- -- --    10/26/24 2200 -- 104 22 -- -- 108/62 78 mmHg 99 % -- -- -- -- -- -- 10 --    10/26/24 2100 -- 104 22 -- -- 110/64 80 mmHg 99 % -- -- -- -- -- -- -- --    10/26/24 2000 97.4 °F (36.3 °C) 108 20 -- -- 124/78 96 mmHg 98 % -- 44 6 L/min Nasal cannula -- -- 10 --    10/26/24 1800 -- 106 27 -- -- 116/70 86 mmHg 99 % -- -- -- -- -- -- -- --    10/26/24 1700 97.8 °F (36.6 °C) 112 29 120/72 -- 120/72 90 mmHg 98 % -- 44 6 L/min Nasal cannula -- -- -- --    10/26/24 1645 -- 112 20 -- -- 122/74 92 mmHg 98 % -- -- -- -- -- -- -- --    10/26/24 1630 97.5 °F (36.4 °C) 118 25 -- -- 116/72 88 mmHg 99 % -- -- -- -- -- -- -- --    10/26/24 1615 98.2 °F  (36.8 °C) 122 25 -- -- 118/76 90 mmHg 99 % -- -- -- -- -- -- -- --    10/26/24 1610 98.2 °F (36.8 °C) 122 23 -- -- 112/72 86 mmHg 98 % -- -- -- -- -- -- -- --    10/26/24 1605 98.6 °F (37 °C) 122 26 -- -- 114/78 92 mmHg 98 % -- -- -- -- -- -- -- --    10/26/24 1601 -- -- -- -- -- -- -- -- 40 -- -- -- -- -- 10 --    10/26/24 1600 98.2 °F (36.8 °C) 124 23 -- -- 114/74 88 mmHg 98 % -- -- -- -- -- -- -- --    10/26/24 1555 98.6 °F (37 °C) 124 26 -- -- 112/74 88 mmHg 98 % -- -- -- -- -- -- -- --    10/26/24 1552 98.6 °F (37 °C) 126 26 112/75 -- -- -- -- -- -- -- -- -- -- -- --    10/26/24 1550 99.3 °F (37.4 °C) 128 24 -- -- 126/80 96 mmHg 99 % -- -- -- -- -- -- -- --    10/26/24 1519 -- -- -- -- -- -- -- -- -- -- -- -- -- -- -- Med Not Given for Pain - for MAR use only    10/26/24 1514 -- -- -- -- -- -- -- 100 % -- -- -- -- -- -- -- --    10/26/24 1500 101.1 °F (38.4 °C) 122 28 -- -- 138/84 100 mmHg 99 % -- -- -- -- -- -- -- --    10/26/24 1449 -- -- -- -- -- -- -- 100 % -- -- -- -- -- -- -- --    10/26/24 1445 100 °F (37.8 °C) 118 22 -- -- 130/76 94 mmHg 100 % -- -- -- -- -- -- -- --    10/26/24 1400 99.7 °F (37.6 °C) 116 22 -- -- 136/78 98 mmHg 100 % -- -- -- -- -- -- 10 --    10/26/24 1300 99.3 °F (37.4 °C) 114 22 -- -- 120/76 92 mmHg 99 % -- -- -- -- -- -- -- --    10/26/24 1200 98.6 °F (37 °C) 116 23 -- -- 106/68 82 mmHg 98 % 40 -- -- -- -- -- 10 --    10/26/24 1137 -- -- -- -- -- -- -- 97 % -- -- -- -- -- -- -- --    10/26/24 1100 99.7 °F (37.6 °C) 122 22 -- -- 112/70 84 mmHg 97 % -- -- -- -- -- -- -- --    10/26/24 1023 -- -- -- -- -- -- -- -- -- -- -- -- -- -- -- Med Not Given for Pain - for MAR use only    10/26/24 1000 100.8 °F (38.2 °C) 120 17 -- -- 138/68 90 mmHg 100 % -- -- -- -- -- -- 10 --    10/26/24 0900 100.8 °F (38.2 °C) 116 18 -- -- 132/66 88 mmHg 100 % -- -- -- -- -- -- -- --    10/26/24 0831 -- -- -- -- -- -- -- 100 % -- -- -- -- -- -- -- --    10/26/24 0800 100.8 °F (38.2 °C) 112 19 -- -- 84/70 78  mmHg 99 % -- -- -- -- -- -- 10 --    10/26/24 0700 100.4 °F (38 °C) 110 17 -- -- 114/60 78 mmHg 99 % -- -- -- -- -- -- -- --    10/26/24 0600 100.4 °F (38 °C) 106 17 -- -- 114/60 78 mmHg 100 % -- -- -- -- -- -- 10 --    10/26/24 0500 99.7 °F (37.6 °C) 108 18 -- -- 106/58 74 mmHg 98 % -- -- -- -- -- -- -- --    10/26/24 0430 99.3 °F (37.4 °C) 106 17 -- -- 102/56 74 mmHg 98 % -- -- -- Ventilator -- Lying 10 --    10/26/24 0400 99.3 °F (37.4 °C) 106 15 -- -- 100/58 74 mmHg 100 % -- -- -- -- -- -- -- --    10/26/24 0325 99 °F (37.2 °C) 106 15 -- -- 102/60 76 mmHg 99 % -- -- -- -- -- -- -- --    10/26/24 0300 99.3 °F (37.4 °C) 110 17 -- -- 94/58 72 mmHg 98 % -- -- -- -- -- -- -- --    10/26/24 0240 99.3 °F (37.4 °C) 114 16 -- -- 88/54 66 mmHg 96 % -- -- -- -- -- -- -- --    10/26/24 0225 99.3 °F (37.4 °C) 114 18 -- -- 86/54 66 mmHg 95 % -- -- -- -- -- -- -- --    10/26/24 0200 99.7 °F (37.6 °C) 122 25 -- -- 96/68 80 mmHg 95 % -- -- -- -- -- -- 10 --    10/26/24 0155 -- -- -- -- -- -- -- -- -- -- -- -- -- -- -- Med Not Given for Pain - for MAR use only    10/26/24 0100 -- 122 21 -- -- 102/60 74 mmHg 98 % -- -- -- -- -- -- -- --    10/26/24 0033 -- -- -- -- -- -- -- -- -- -- -- -- -- -- -- Med Not Given for Pain - for MAR use only    10/26/24 0030 102.1 °F (38.9 °C) 124 21 -- -- 132/58 84 mmHg 99 % -- -- -- Ventilator -- -- 10 --          Weight (last 2 days)       Date/Time Weight    10/29/24 0600 139 (306.88)    10/27/24 0551 111 (244.49)            Pertinent Labs/Diagnostic Results:   Radiology:  XR chest portable   Final Interpretation by Wai Lim MD (10/28 1040)      Successful repositioning of enteric tube with its tip now below the left hemidiaphragm.      No significant interval change in right basilar opacity.         Resident: BIN Jaimes I, the attending radiologist, have reviewed the images and agree with the final report above.      Workstation performed: [id number]         XR chest portable    Final Interpretation by Wai Lim MD (10/28 0820)      Enteric tube tip traversing the left mainstem bronchus and distally overlying the left lung base. Recommend repositioning with follow-up radiograph to confirm position.      Right-sided pleural effusion with unchanged right basilar opacification.            Resident: BIN Jaimes I, the attending radiologist, have reviewed the images and agree with the final report above.      Workstation performed: [id number]         XR chest portable ICU   Final Interpretation by Tristian Jerez MD (10/28 7134)      1.  Stable right basilar consolidation/effusion.      2.  Right internal jugular catheter projects over the inferior right atrium. Consider retraction of approximately 3-5 cm.      The study was marked in EPIC for immediate notification.      Workstation performed: [id number]         XR chest portable ICU   Final Interpretation by Suzanne Ortiz MD (10/25 0903)      Persistent dense right lung base opacity which may represent a combination of effusion and parenchymal opacity.            Workstation performed: [id number]         VAS upper limb venous duplex scan, unilateral/limited   Final Interpretation by Jennifer Patel MD (10/26 163)       VAS VENOUS DUPLEX - LOWER LIMB BILATERAL   Final Interpretation by Jennifer Patel MD (10/26 1634)      CT head wo contrast   Final Interpretation by Lisa Cueto MD (10/24 4051)         1. Postsurgical changes from decompressive left hemicraniectomy again demonstrated, with decreased epidural/subgaleal blood products noted along the left scalp but still measuring approximately 1.6 cm in maximal thickness. Increased epidural fluid    density noted more inferiorly at the level of the left temporal lobe when compared to the prior study.   2. Trace left convexity subdural hematoma still likely present measuring 4-5 mm in maximal thickness.   3. Areas of low-attenuation involving the right thalamus, left  parafalcine occipital and mesial left temporal lobe, compatible with the evolving recent infarcts in these regions. No intraparenchymal hemorrhage is identified.                  Workstation performed: [id number]         CT pe study w abdomen pelvis w contrast   Final Interpretation by Leonid Chase MD (10/24 9292)      Right lower lobe consolidation with right lower lobe bronchus debris suspicious for aspiration pneumonia.      Small quantity of acute segmental level left upper lobe pulmonary arterial embolism.      Small right and trace left pleural effusions.      Reidentified suspected hepatic splenic and osseous metastases.      More prominent diffuse chest and body wall edema/anasarca.      Unchanged sacral decubitus ulcer. A perirectal abscess is not identified.            I personally discussed this study with ARMANDO JOINER on 10/24/2024 2:15 PM.            Workstation performed: [id number]         IR biopsy bone   Final Interpretation by Queta Velazco MD (10/25 9629)   Impression:      CT guided biopsy of left iliac bone lesion      Limited ultrasound revealing no good liver targets for percutaneous bedside biopsy   Easily identifiable splenic lesions which can be targeted by ultrasound-guided biopsy         Workstation performed: [id number]         XR chest portable ICU   Final Interpretation by Clayton Enriquez MD (10/23 1102)      Mild interstitial prominence which is probably exaggerated by very low lung volumes. No focal consolidation seen.            Workstation performed: [id number]         IR INPATIENT Paracentesis   Final Interpretation by Mann Duval MD (10/24 1536)   Impression:      Ultrasound-guided paracentesis.      Signed, performed, and dictated by TOM Rivera under the supervision of Dr. Mann Duval.            Workstation performed: [id number]         VAS carotid complete study   Final Interpretation by Wai Garcia MD (10/20 1536)       CT abdomen pelvis w contrast   Final Interpretation by Katerine Mak MD (10/18 5018)      1) Decubitus ulcer overlying the coccyx with suggestion of cortical thinning in the inferior most coccygeal segment, concerning for osteomyelitis. No associated rim-enhancing fluid collection to suggest an abscess.      2) Incompletely imaged perineal region. Air seen to the right of the anus, unclear whether along the gluteal cleft or within the subcutaneous tissues. No subcutaneous emphysema elsewhere.      3) Findings concerning for metastatic disease in the bones, spleen, and liver, as on the recent studies, as discussed above. These were better assessed on the prior MRI.      4) Moderate abdominal and pelvic ascites, increased from 10/10/2024, however no organized fluid collections. Significant anasarca.      5) Small bilateral pleural effusions and bibasilar atelectasis, right greater than left, increased since 10/10/2024.      6) Additional findings as above.      The study was marked in EPIC for immediate notification.            Workstation performed: [id number]         MRI brain w wo contrast   Final Interpretation by E. Alec Schoenberger, MD (10/18 1025)      No significant change. Stable evolving recent infarcts with petechial hemorrhage.   Status post left hemispheric craniectomy for subdural drainage. Stable subgaleal epidural hematoma with small residual subdurals. Stable small volume of intraventricular hemorrhage.   Stable mass effect with 4 mm of left-to-right shift.      Workstation performed: [id number]         CT head wo contrast   Final Interpretation by Trenton Stanley MD (10/17 0805)      1.  No significant change in the left subdural hemorrhage or evolving right thalamic and left temporo-occipital infarctions.                  Workstation performed: [id number]         XR abdomen 1 view kub   Final Interpretation by Gen Mims MD (10/16 0723)      Nasogastric tube terminates in the  stomach.               Workstation performed: [id number]         CT head wo contrast   Final Interpretation by Fabricio Almeida MD (10/15 1324)      Status post decompressive left-sided hemicraniectomy with mild interval increase in the degree of mixed attenuation subdural as well as subgaleal hemorrhage along the craniectomy site. Grossly stable mild rightward midline shift.      Thin subdural hemorrhage tracking along the right tentorium likely represents redistributed blood products. Small amount of hemorrhage also noted layering in the occipital horns.      Evolving areas of ischemia as described above with punctate areas seen to better advantage on recent MRI. No definite CT evidence for new large acute vascular distribution infarct.      The study was marked in EPIC for immediate notification.                  Workstation performed: [id number]         MRI brain wo contrast   Final Interpretation by Wil Brennan DO (10/14 3414)      Multifocal early ischemia including right anterior thalamus, anterior occipital lobes left greater than right. On the left this tracks into the posterior medial temporal lobe. There is also a punctate focus of cortical ischemia within the posterior    parafalcine frontal lobe. Cortical petechial hemorrhage noted within the central aspect of the right thalamus and left anterior occipital lobe. No sofía hemorrhagic transformation. Evaluation of the cervical and intracranial vasculature is recommended.      Patient is status post left hemicraniectomy for surgical decompression of acute subdural hemorrhage. Mixed signal subdural hemorrhage, fluid and air remains within the periphery of the left hemisphere with mild mass effect and 4 mm of left to right    shift.      There is a very small subdural hemorrhage noted within the right middle cranial fossa extending posteriorly lateral to the occipital lobe, less than 2 mm in thickness.      Focal intraventricular hemorrhage within  "the occipital horn of the right greater than left. There is increased T2 and FLAIR signal change surrounding the occipital horn of the lateral ventricle suggesting focal transependymal flow of CSF.      This examination was marked \"immediate notification\" in Epic in order to begin the standard process by which the radiology reading room liaison alerts the referring practitioner.      Workstation performed: [id number]         CT head wo contrast   Final Interpretation by Pradip Lovett MD (10/13 1323)      -New area of hypodensity within the left thalamus and basal ganglia compared to earlier day exam, suggestive of acute infarct.      -Expected postsurgical changes from left hemispheric craniectomy with decreased size of mixed density subdural hematoma, improved 4 mm left to right midline shift and improved effacement of the basilar cisterns.      -Improved mass effect on the ventricular system and dilatation of the right temporal horn of the lateral ventricle as detailed above.      I personally communicated the findings via telephone with Son Dover at 12:56 pm. on 10/13/2024.                  Workstation performed: [id number]         CT head wo contrast    (Results Pending)   XR chest portable    (Results Pending)     Cardiology:  ECG 12 lead   Final Result by Anjel Cummins MD (10/28 0518)   Sinus tachycardia   Septal infarct (cited on or before 27-OCT-2024)   Cannot rule out Inferior infarct (cited on or before 27-OCT-2024)   Abnormal ECG   When compared with ECG of 27-OCT-2024 11:24,   Questionable change in initial forces of Anteroseptal leads   Confirmed by Anjel Cummins ([id number]) on 10/28/2024 5:18:19 AM      ECG 12 lead   Final Result by Fabricio Navarrete MD (10/27 1141)   Sinus tachycardia   Low voltage QRS   Cannot rule out Anterior infarct (cited on or before 27-OCT-2024)   Cannot rule out Inferior infarct (cited on or before 27-OCT-2024)   Abnormal ECG   When compared with ECG of 27-OCT-2024 08:44, " (unconfirmed)   No significant change was found   Confirmed by Fabricio Navarrete ([id number]) on 10/27/2024 11:41:19 AM      ECG 12 lead   Final Result by Fabricio Navarrete MD (10/27 1127)   Sinus tachycardia   Low voltage QRS   Septal infarct (cited on or before 27-OCT-2024)   Cannot rule out Inferior infarct , age undetermined   Abnormal ECG   When compared with ECG of 27-OCT-2024 03:17, (unconfirmed)   Questionable change in initial forces of Anteroseptal leads   Confirmed by Fabricio Navarrete ([id number]) on 10/27/2024 11:27:15 AM      ECG 12 lead   Final Result by Fabricio Navarrete MD (10/27 1126)   Possible Sinus tachycardia with short [place]   Low voltage QRS   RSR' or QR pattern in V1 suggests right ventricular conduction delay   Cannot rule out Anterior infarct , age undetermined   Nonspecific T wave abnormality   Abnormal ECG   Confirmed by Fabricio Navarrete ([id number]) on 10/27/2024 11:26:35 AM      ECG 12 lead   Final Result by Fabricio Navarrete MD (10/27 1125)   Supraventricular tachycardia   Low voltage QRS   RSR' or QR pattern in V1 suggests right ventricular conduction delay   Nonspecific T wave abnormality   Abnormal ECG   When compared with ECG of 21-OCT-2024 22:19,   Criteria for Septal infarct are no longer Present   No significant change was found   Confirmed by Fabricio Navarrete ([id number]) on 10/27/2024 11:25:42 AM      ECG 12 lead   Final Result by Fabricio Navarrete MD (10/22 0454)   Sinus tachycardia   Low voltage QRS   Septal infarct (cited on or before 21-OCT-2024)   Abnormal ECG   When compared with ECG of 21-OCT-2024 22:17, (unconfirmed)   No significant change was found   Confirmed by Fabricio Navarrete ([id number]) on 10/22/2024 4:54:23 AM      ECG 12 lead   Final Result by Fabricio Navarrete MD (10/22 0454)   Sinus tachycardia   Low voltage QRS   Septal infarct , age undetermined   Abnormal ECG   When compared with ECG of 14-OCT-2024 21:19,   Septal infarct is now Present   Nonspecific T wave abnormality, worse in Lateral leads   Confirmed by Fabricio Navarrete ([id number])  on 10/22/2024 4:54:12 AM      Echo follow up/limited w/ contrast if indicated   Final Result by Tj Costa MD (10/16 1726)        Left Ventricle: Left ventricular cavity size is normal. Wall thickness    is normal. The left ventricular ejection fraction is 60%. Systolic    function is normal. Wall motion is normal.     Atrial Septum: There is a patent foramen ovale confirmed with    provocation (Valsalva) with predominant left to right shunting using    saline contrast.         ECG 12 lead   Final Result by Brooklyn Carter MD (10/15 1724)   Sinus tachycardia   Nonspecific T wave abnormality   Abnormal ECG   When compared with ECG of 14-OCT-2024 20:48, (unconfirmed)   No significant change was found   Confirmed by Brooklyn Carter ([id number]) on 10/15/2024 5:24:13 PM      ECG 12 lead   Final Result by Brooklyn Carter MD (10/15 1722)   Sinus tachycardia   Low voltage QRS   Nonspecific T wave abnormality   Abnormal ECG   When compared with ECG of 14-OCT-2024 04:41,   Vent. rate has increased BY  61 BPM   Confirmed by Brooklyn Carter ([id number]) on 10/15/2024 5:22:36 PM      ECG 12 lead   Final Result by Blair Bhandari MD (10/14 6309)   Marked sinus bradycardia   Low voltage QRS   Abnormal ECG   Confirmed by Blair Bhandari ([id number]) on 10/14/2024 1:29:31 PM      ECG 12 lead   Final Result by Valentin Washington MD (10/13 1851)   Sinus bradycardia   Septal infarct (cited on or before 15-MAR-2024)   Lateral infarct (cited on or before 15-MAR-2024)   Inferior infarct , age undetermined   Abnormal ECG      Confirmed by Valentin Washington ([id number]) on 10/13/2024 6:51:18 PM        GI:  Bronchoscopy   Final Result by Annette Maria Palladino, DO (10/24 1532)   The trachea, main nadia, left main stem, MARCELLO and LLL appeared normal.   Moderate, thick, bloody and purulent secretions present in the right lung   Bronchoalveolar lavage was performed x1 in the bronchus intermedius, RML    and RLL         RECOMMENDATION:   There is no recommended  follow-up for this procedure.                        Results from last 7 days   Lab Units 10/29/24  0931 10/28/24  0549 10/27/24  0420 10/26/24  0518 10/25/24  0607 10/24/24  2149 10/24/24  1259   WBC Thousand/uL 6.33 7.20 7.71 9.96 7.61  --  9.23   HEMOGLOBIN g/dL 7.2* 7.3* 7.4* 7.4* 7.8*   < > 6.9*   HEMATOCRIT % 24.8* 24.8* 25.0* 24.4* 25.4*   < > 21.8*   PLATELETS Thousands/uL 156 127* 102* 113* 126*  --  145*   BANDS PCT %  --   --  7  --   --   --  11*    < > = values in this interval not displayed.     Results from last 7 days   Lab Units 10/24/24  1434   RETIC CT ABS  160,900*   RETIC CT PCT % 7.45*     Results from last 7 days   Lab Units 10/29/24  0931 10/28/24  1539 10/28/24  0549 10/27/24  0420 10/26/24  2130 10/26/24  0518 10/25/24  0607   SODIUM mmol/L 150* 150* 151* 148* 146 148* 146   POTASSIUM mmol/L 4.3 4.0 4.1 3.7 3.7 3.7 3.8   CHLORIDE mmol/L 119* 117* 118* 116* 116* 116* 113*   CO2 mmol/L 24 25 24 22 24 24 25   ANION GAP mmol/L 7 8 9 10 6 8 8   BUN mg/dL 53* 48* 47* 44* 45* 41* 37*   CREATININE mg/dL 0.88 0.79 0.78 0.70 0.77 0.74 0.69   EGFR ml/min/1.73sq m 103 108 109 113 109 111 114   CALCIUM mg/dL 8.0* 8.2* 8.0* 7.9* 7.7* 7.7* 8.0*   CALCIUM, IONIZED mmol/L  --   --   --  1.09* 1.16  --   --    MAGNESIUM mg/dL  --   --  2.2 2.4 2.7 2.0 1.9   PHOSPHORUS mg/dL  --   --  3.2 2.4* 2.7 2.2* 2.3*     Results from last 7 days   Lab Units 10/29/24  0931 10/28/24  0549 10/26/24  0518 10/25/24  0607 10/24/24  1259   AST U/L 256* 253* 247* 289* 341*   ALT U/L 106* 113* 103* 118* 135*   ALK PHOS U/L 1,107* 1,097* 861* 742* 802*   TOTAL PROTEIN g/dL 3.3* 3.4* 3.3* 3.5* 3.6*   ALBUMIN g/dL 2.3* 2.4* 2.5* 2.6* 2.7*   TOTAL BILIRUBIN mg/dL 6.54* 6.21* 4.90* 4.85* 4.09*   BILIRUBIN DIRECT mg/dL  --  3.86*  --   --   --      Results from last 7 days   Lab Units 10/29/24  1411 10/29/24  0619 10/29/24  0003 10/28/24  1742 10/28/24  1153 10/28/24  0606 10/28/24  0021 10/27/24  1808 10/27/24  1147 10/27/24  0131  "10/26/24  1807 10/26/24  1248   POC GLUCOSE mg/dl 101 83 97 94 82 114 84 88 87 118 128 121     Results from last 7 days   Lab Units 10/29/24  0931 10/28/24  1539 10/28/24  0549 10/27/24  0420 10/26/24  2130 10/26/24  0518 10/25/24  0607 10/24/24  1259 10/24/24  0440 10/23/24  0513   GLUCOSE RANDOM mg/dL 89 102 103 118 124 127 84 92 98 115             No results found for: \"BETA-HYDROXYBUTYRATE\"   Results from last 7 days   Lab Units 10/27/24  1217 10/24/24  0851   PH ART  7.509* 7.519*   PCO2 ART mm Hg 26.4* 30.2*   PO2 ART mm Hg 148.7* 63.1*   HCO3 ART mmol/L 20.5* 24.1   BASE EXC ART mmol/L -2.0 1.3   O2 CONTENT ART mL/dL 10.7* 9.7*   O2 HGB, ARTERIAL % 96.6 91.0*   ABG SOURCE  Line, Arterial Line, Arterial                         Results from last 7 days   Lab Units 10/28/24  0549 10/24/24  0440 10/23/24  0513   PROTIME seconds 16.9* 18.0* 20.7*   INR  1.35* 1.47* 1.77*   PTT seconds 43*  --   --              Results from last 7 days   Lab Units 10/24/24  0058 10/23/24  2250   LACTIC ACID mmol/L 3.0* 3.5*                 Results from last 7 days   Lab Units 10/24/24  1434   FERRITIN ng/mL >7,500*   IRON ug/dL 67         Results from last 7 days   Lab Units 10/27/24  0555 10/26/24  0555 10/25/24  0555   UNIT PRODUCT CODE  [id number] [id number]  [id number]   UNIT NUMBER  [id number]  [id number]   UNITABO  A A A  A   UNITRH  POS POS POS  POS   CROSSMATCH   --   --  Compatible  Compatible   UNIT DISPENSE STATUS  Presumed Trans Presumed Trans Presumed Trans  Presumed Trans   UNIT PRODUCT VOL ml 125 125 350  350       Results from last 7 days   Lab Units 10/24/24  1529 10/24/24  0000 10/23/24  2309 10/23/24  2251   BLOOD CULTURE   --   --  No Growth After 5 Days. No Growth After 5 Days.   GRAM STAIN RESULT  4+ Gram positive cocci in pairs*  Rare Polys* Rare Polys  No organisms seen  --   --              Results from last 7 days   Lab Units 10/26/24  0518   VANCOMYCIN " RM ug/mL 12.9       Network Utilization Review Department  ATTENTION: Please call with any questions or concerns to [phone] and carefully listen to the prompts so that you are directed to the right person. All voicemails are confidential.   For Discharge needs, contact Care Management DC Support Team at [phone] opt. 2  Send all requests for admission clinical reviews, approved or denied determinations and any other requests to dedicated fax number below belonging to the campus where the patient is receiving treatment. List of dedicated fax numbers for the Facilities:  FACILITY NAME UR FAX NUMBER   ADMISSION DENIALS (Administrative/Medical Necessity) [phone]   DISCHARGE SUPPORT TEAM (NETWORK) [phone]   PARENT CHILD HEALTH (Maternity/NICU/Pediatrics) [phone]   [facility] [phone]   [facility] [phone]   [facility] [phone]   [facility] [phone]   [facility] [phone]   [facility] [phone]   [facility] [phone]   [facility] [phone]   [facility] [phone]   [facility] [phone]   [facility] [phone]   [facility] [phone]

## 2024-10-29 NOTE — PROGRESS NOTES
Progress Note - Hospitalist   Name: Clayton Duval 44 y.o. male I MRN: [id number]  Unit/Bed#: [facility] 627-01 I Date of Admission: 10/13/2024   Date of Service: 10/29/2024 I Hospital Day: 16     Assessment & Plan  Non-traumatic acute L subdural hematoma (HCC)  Patient initially presented to [facility] 10/11 with complaints of intermittent fever, sweats, poor appetite, abdominal distention, lower extremity edema evolving over a 2-week period.  Workup revealed transaminitis, CT abdomen and pelvis showed splenic and lytic lesions, no prior history of malignancy.  He was a rapid response 10/13 where he was noted to have large left acute subdural hematoma with brain compression and left to right midline shift and left uncal herniation.  He was transferred to [place] for emergent craniectomy for hematoma evacuation. Extubated 10/26.  [facility].  Recent repeat CT head 10/24 stable.    Due for NSX f/u 11/1  MRI brain showed ischemia involving the brainstem.  He was evaluated by neurology.    Recommended aspirin 81 mg daily when cleared by neurosurgery.  STAT CTH for any neurological changes  Currently with NGT (placed 10/27)  in place for nutrition, will consult speech therapy. May need to consider PEG pending GOC/clinical course. Briefly mentioned to wife.   PT/OT recommending rehab  Rest of plan as below   Sepsis (HCC)  Patient noted to have fevers earlier this hospitalization, met sepsis criteria.  Has large sacral wound with concern for perirectal abscess.  This has been debrided by general surgery.  Completed course of Zosyn per infectious disease.  Continues to have intermittent fevers despite antibiotic without further obvious active infection as well as sinus tachycardia.   Currently being monitored off antibiotic  ID following   Stroke (HCC)  Initial MRI brain 10/13 showed Multifocal early ischemia including right anterior thalamus, anterior occipital lobes left  greater than right. On the left this tracks into the posterior medial temporal lobe. There is also a punctate focus of cortical ischemia within the posterior parafalcine frontal lobe. Cortical petechial hemorrhage noted within the central aspect of the right thalamus and left anterior occipital lobe.  He was evaluated by neurology, recommending ASA when cleared by neurology.   Noted to have PFO on echo, appears CHRIS was considered for further work up but unclear utility of this.   Sacral wound  S/P I&D with general surgery. Concern for adjacent perirectal abscess noted. CT scan 10/18 also noting auricle thinning of coccyx below the decubitus ulcer may suggest osteomyelitis.   Definitive diagnosis of osteomized would require a bone biopsy/culture. However even if OM was confirmed, long-term antibiotic therapy would be futile without a plan to aggressively debride the tissue and bone, send bone cultures and cover the wound with flap for closure.   LWC per general surgery  Strict and frequent turning and repositioning  Maintain Barclay catheter to avoid wound contamination  Needs diverting colostomy but holding off until biopsy results back for prognostication  Perirectal abscess  As above   Multiple lesions of metastatic malignancy (HCC)  Patient with suspected metastatic cancer, patient with hepatosplenomegaly, hepatic lesions, splenic lesions, transaminitis.  Evaluated by oncology, tumor markers ordered, CEA, AFP, normal.  CA 19 9, 697, LDH elevated at 556.  Hepatitis panel negative.  Now status post iliac bone biopsy done 10/24, pathology pending.  Also status post paracentesis 10/22, fluid negative for malignancy.  Oncology notes reviewed, additional recommendations pending pathology.  Acute respiratory failure (HCC)  Extubated 10/26, currently on 4L NC. Completed course of abx for PNA.   Pulmonary toilet  Wean oxygen as tolerated   Hypernatremia  NA levels up to 150  Repeat CMP  Consult  nephrology  Hypothyroidism  Continue levothyroxine  Goals of care, counseling/discussion  Palliative care following.  There have been discussions with patient's wife, she would like patient to remain full code with no limits in place.  Spoke with wife about possibly needing to consider PEG, she will think on this.   Pulmonary embolism (HCC)  Likely hypercoagulable state in setting of underlying malignancy.  Anticoagulation not pursued due to recent subdural hematoma      Extensive review of hospital course done including ICU notes, specialty notes, historic labs, spent > 60 minutes.       VTE Pharmacologic Prophylaxis:   Moderate Risk (Score 3-4) - Pharmacological DVT Prophylaxis Ordered: heparin.    Mobility:   Basic Mobility Inpatient Raw Score: 6  JH-HLM Goal: 2: Bed activities/Dependent transfer  JH-HLM Achieved: 2: Bed activities/Dependent transfer  JH-HLM Goal achieved. Continue to encourage appropriate mobility.    Patient Centered Rounds: I performed bedside rounds with nursing staff today.     Discussions with Specialists or Other Care Team Provider: nursing, case management , palliative care    Education and Discussions with Family / Patient: Updated  (wife) via phone.    Current Length of Stay: 16 day(s)    Current Patient Status: Inpatient     Certification Statement: The patient will continue to require additional inpatient hospital stay due to goals of care, wound care, awaiting pathology    Discharge Plan: Anticipate discharge in >72 hrs to rehab facility.    Code Status: Level 1 - Full Code    Subjective   Patient is non-verbal. Very Ill appearing. Opens eyes slightly to verbal stimuli however does not follow commands.     Objective :  Temp:  [97.7 °F (36.5 °C)-102.5 °F (39.2 °C)] 98.2 °F (36.8 °C)  HR:  [121-127] 127  BP: (117-146)/(61-88) 119/77  Resp:  [26-36] 28  SpO2:  [95 %-100 %] 95 %  O2 Device: Nasal cannula  Nasal Cannula O2 Flow Rate (L/min):  [4 L/min-6 L/min] 4  L/min    Body mass index is 40.49 kg/m².     Input and Output Summary (last 24 hours):     Intake/Output Summary (Last 24 hours) at 10/29/2024 0900  Last data filed at 10/29/2024 0606  Gross per 24 hour   Intake 3135 ml   Output 1265 ml   Net 1870 ml       Physical Exam  Vitals and nursing note reviewed.   Constitutional:       Appearance: He is ill-appearing.      Interventions: Nasal cannula in place.   HENT:      Head:      Comments: Left sided scalp sunken in from craniotomy  Cardiovascular:      Rate and Rhythm: Tachycardia present.   Pulmonary:      Effort: Tachypnea present. No respiratory distress.      Breath sounds: Decreased breath sounds present.   Abdominal:      General: There is no distension.      Palpations: Abdomen is soft.   Genitourinary:     Comments: Barclay catheter with dark effie urine  Musculoskeletal:         General: Swelling present.   Skin:     General: Skin is warm.      Coloration: Skin is pale.   Neurological:      Comments: Left side is flaccid           Lines/Drains:  Lines/Drains/Airways       Active Status       Name Placement date Placement time Site Days    PICC Line 10/28/24 10/28/24  1735  --  less than 1    NG/OG/Enteral Tube Nasogastric 12 Fr Right nare 10/27/24  2115  Right nare  1    Urethral Catheter Latex 16 Fr. 10/22/24  0000  Latex  7                  Urinary Catheter:  Goal for removal: N/A- Discharging with Barclay         Central Line:  Goal for removal: Will discontinue when peripheral access obtained.                Lab Results: I have reviewed the following results:   Results from last 7 days   Lab Units 10/28/24  0549 10/27/24  0420   WBC Thousand/uL 7.20 7.71   HEMOGLOBIN g/dL 7.3* 7.4*   HEMATOCRIT % 24.8* 25.0*   PLATELETS Thousands/uL 127* 102*   BANDS PCT %  --  7   LYMPHO PCT %  --  0*   MONO PCT %  --  0*   EOS PCT %  --  1     Results from last 7 days   Lab Units 10/28/24  1539 10/28/24  0549   SODIUM mmol/L 150* 151*   POTASSIUM mmol/L 4.0 4.1   CHLORIDE  mmol/L 117* 118*   CO2 mmol/L 25 24   BUN mg/dL 48* 47*   CREATININE mg/dL 0.79 0.78   ANION GAP mmol/L 8 9   CALCIUM mg/dL 8.2* 8.0*   ALBUMIN g/dL  --  2.4*   TOTAL BILIRUBIN mg/dL  --  6.21*   ALK PHOS U/L  --  1,097*   ALT U/L  --  113*   AST U/L  --  253*   GLUCOSE RANDOM mg/dL 102 103     Results from last 7 days   Lab Units 10/28/24  0549   INR  1.35*     Results from last 7 days   Lab Units 10/29/24  0619 10/29/24  0003 10/28/24  1742 10/28/24  1153 10/28/24  0606 10/28/24  0021 10/27/24  1808 10/27/24  1147 10/27/24  0131 10/26/24  1807 10/26/24  1248 10/26/24  0517   POC GLUCOSE mg/dl 83 97 94 82 114 84 88 87 118 128 121 130         Results from last 7 days   Lab Units 10/24/24  0058 10/23/24  2250   LACTIC ACID mmol/L 3.0* 3.5*       Recent Cultures (last 7 days):   Results from last 7 days   Lab Units 10/24/24  1529 10/24/24  0000 10/23/24  2309 10/23/24  2251 10/22/24  1438   BLOOD CULTURE   --   --  No Growth After 5 Days. No Growth After 5 Days.  --    GRAM STAIN RESULT  4+ Gram positive cocci in pairs*  Rare Polys* Rare Polys  No organisms seen  --   --  No Polys or Bacteria seen   BODY FLUID CULTURE, STERILE   --   --   --   --  No growth       Imaging Results Review: No pertinent imaging studies reviewed.  Other Study Results Review: No additional pertinent studies reviewed.    Last 24 Hours Medication List:     Current Facility-Administered Medications:     acetaminophen (TYLENOL) tablet 650 mg, Q8H PRN    chlorhexidine (PERIDEX) 0.12 % oral rinse 15 mL, Q12H KAYLIE    Cholecalciferol (VITAMIN D3) tablet 5,000 Units, Daily    folic acid (FOLVITE) tablet 1 mg, Daily    heparin (porcine) subcutaneous injection 5,000 Units, Q8H KAYLIE    hydrALAZINE (APRESOLINE) injection 10 mg, Q6H PRN    HYDROmorphone (DILAUDID) injection 1 mg, Q3H PRN    labetalol (NORMODYNE) injection 10 mg, Q6H PRN    levothyroxine tablet 200 mcg, Early Morning    magnesium sulfate 2 g/50 mL IVPB (premix) 2 g, After Breakfast, Last  Rate: 2 g (10/28/24 0846)    modafinil (PROVIGIL) tablet 200 mg, Daily    ondansetron (ZOFRAN) injection 4 mg, Q8H PRN    polyethylene glycol (MIRALAX) packet 17 g, Daily PRN    sodium chloride 3 % inhalation solution 4 mL, TID    thiamine tablet 100 mg, Daily    Administrative Statements   Today, Patient Was Seen By: TOM Barrow      **Please Note: This note may have been constructed using a voice recognition system.**

## 2024-10-29 NOTE — OCCUPATIONAL THERAPY NOTE
Occupational Therapy Treatment Note:      10/29/24 9814   Note Type   Note Type Cancelled Session   Cancel Reasons   (pt with medical status issues and high resp this pm. family also having family meeting for goals of care. will follow as appropriate)

## 2024-10-29 NOTE — SPEECH THERAPY NOTE
Speech-Language Pathology Bedside Swallow Evaluation    Patient Name: Clayton Duval    Today's Date: 10/29/2024     Problem List  Principal Problem:    Non-traumatic acute L subdural hematoma (HCC)  Active Problems:    Hypothyroidism    Acute respiratory failure (HCC)    Sepsis (HCC)    Stroke (HCC)    Sacral wound    Perirectal abscess    Splenic lesion    Palliative care by specialist    Pneumonia of right lower lobe due to infectious organism    Multiple lesions of metastatic malignancy (HCC)    Goals of care, counseling/discussion    Pulmonary embolism (HCC)    Hypernatremia    Anasarca    Past Medical History  History reviewed. No pertinent past medical history.    Past Surgical History  Past Surgical History:   Procedure Laterality Date    BRAIN HEMATOMA EVACUATION Left 10/13/2024    Procedure: CRANIOTOMY FOR SUBDURAL HEMATOMA;  Surgeon: Son Mendoza MD;  Location: [facility];  Service: Neurosurgery    EXAMINATION UNDER ANESTHESIA N/A 10/21/2024    Procedure: EXAM UNDER ANESTHESIA (EUA), sacral wound debridement;  Surgeon: Danny Paulino DO;  Location: [facility];  Service: General    GASTRIC BYPASS  2012    GASTRIC BYPASS      IR BIOPSY BONE  10/24/2024    IR PARACENTESIS  10/22/2024       Summary  Pt presented with severe oropharyngeal dysphagia. Oral cavity was significantly dry initially, improving with oral care but moist breath sounds could not be cleared with yankauer suction. Reflexive swallows noted x3 with oral care. He was offered min trials to assess potential to resume swallow therapy (ice chip x1, tsp HTL x1), both of which were removed from oral cavity d/t inability to transfer/swallow.   Pt is currently not appropriate for PO trials d/t high risk for aspiration. ST will f/u for oral stimulation and advance as appropriate pending overall condition, alertness, and participation.    Risk/s for Aspiration: high    Recommended Diet: NPO, consider PEG pending goals of  care  Recommended Form of Meds: non-oral if possible   Aspiration precautions and swallowing strategies: TF aspiration precautions  Other Recommendations: Continue frequent oral care        Current Medical Status  Presented to [facility] initially 10/11/24, non traumatic L subdural hematoma. Transferred to [facility] 10/13 for crani. Intubated 10/24-10/26, 10/15-10/18, 10/13-10/15.    Current Precautions:  Aspiration, L crani    Special Studies:  CXR 10/27/24 IMPRESSION:  Successful repositioning of enteric tube with its tip now below the left hemidiaphragm.  No significant interval change in right basilar opacity.    Social/Education/Vocational Hx:  Pt lives  with spouse    Swallow Information   Current Risks for Dysphagia & Aspiration: recent intubation and AMS  Current Diet: NPO       Baseline Assessment   Behavior/Cognition: lethargic and decreased attention  Speech/Language Status: limited verbal output and limited command following  Patient Positioning: upright in bed  Pain Status/Interventions/Response to Interventions: No report of or nonverbal indications of pain.       Swallow Mechanism Exam  Facial: masked facies  Labial: unable to test 2/2 limited command following  Lingual: unable to test 2/2 limited command following  Velum: symmetrical  Mandible:  decreased ROM  Dentition: adequate  Vocal quality:weak   Volitional Cough: unable to initiate volitional cough   Respiratory Status: on 4L O2    Consistencies Assessed and Performance   Consistencies Administered: ice chips and honey thick    Oral Stage: severe and decreased bolus acceptance and manipulation    Pharyngeal Stage: severe and min swallows (reflexive x3)    Esophageal Concerns: none reported      Summary and Recommendations (see above)    Results Reviewed with: RN     Treatment Recommended: pending goals of care 3-5x/week

## 2024-10-29 NOTE — PROGRESS NOTES
Progress Note - Infectious Disease   Name: Clayton Duval 44 y.o. male I MRN: [id number]  [facility] I Date of Admission: 10/13/2024   Date of Service: 10/29/2024 I Hospital Day: 16    Assessment & Plan  Sepsis (HCC)  Source of sepsis is most likely perirectal abscess.  Despite sepsis, patient remains systemically well, without toxicity and hemodynamically stable, without hypotension.  Patient had prolonged fever, most likely secondary to infected sacral ulcer but this has resolved with antibiotic and I&D of sacral ulcer.  Admission blood cultures have no growth.  Patient completed Zosyn course last week.  Temperature remains intermittently up throughout hospitalization, despite antibiotic, without further obvious active infection.  WBC remains in the normal range.  Consider tumor fever as etiology of persistent intermittent fever.  At present, patient is off antibiotic and temperature is trending down..  Observe off further antibiotic.  Monitor temperature/WBC.    Monitor hemodynamics.    Perirectal abscess  Patient has spontaneous drainage.  He is being followed by general surgery service, with no plan for surgical I&D previously.  However, at present, there is concern of persistent abscess.  Repeat CT imaging without obvious abscess.  Wound culture with growth of mixed alli, not helpful.  Patient has no history of MRSA, has negative MRSA screen and has MSSA growing in respiratory culture.  At I&D, there was no further abscess or purulence.  Patient completed 7-day course of IV Zosyn last week.  No further antibiotic for this indication  Wound care per surgery.  Sacral wound  Patient is status post bedside I&D and being followed by general surgery.  Concern for adjacent perirectal abscess noted.  CT scan 10/18 also noting auricle thinning of coccyx below the decubitus ulcer may suggest osteomyelitis.  However they are all bone remodeling beneath the sacral ulcer can also appear similarly.   Definitive diagnosis of osteomized would require a bone biopsy/culture.  Even if osteomyelitis was confirmed however, long-term antibiotic therapy would be futile without a plan to aggressively debride the tissue and bone, send bone cultures and cover the wound with flap for closure.  Patient complete antibiotic course.  Wound care per surgery.  Pneumonia of right lower lobe due to infectious organism  Possible pneumonia, with CXR showing right basilar infiltrate.  Patient is status post bronchoscopy last week, with finding of airway purulence.  BAL Gram stain had 4+ GPC but culture only grew mixed respiratory alli.  With recurrent fever overnight, IV vancomycin was started for presumptive pneumonia.  Despite IV vancomycin, and recent Zosyn, patient continued to have intermittent fever.  Given persistent fever despite antibiotic, doubt pneumonia and doubt active infection.  Discontinue IV vancomycin and monitor off antibiotic.  Monitor temperature.  Non-traumatic acute L subdural hematoma (HCC)  Patient is status post craniectomy and evacuation of subdural hematoma.  He also has ischemia in the brainstem.  Neurosurgery follow-up.  Stroke (HCC)  Head MRI showed ischemia involving brainstem.  Patient had decreased responsiveness on presentation.  Mental status with some improvement.  Neurology follow-up.  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  Patient is status post bone biopsy last week.  Histoplasma antigen negative.  Follow-up on pathology.    I have discussed with TOM Ybarra from primary service regarding the above plan to observe patient off antibiotic as long as temperature pattern is unchanged.  She agrees with the plan.    Antibiotics:  Off antibiotic    Subjective   Patient with stable mental status.  He is arousable but not greatly communicative.  Temperature is back down.    Objective :  Temp:  [97.7 °F (36.5 °C)-102.5 °F (39.2 °C)]  98.2 °F (36.8 °C)  HR:  [121-127] 127  BP: (117-146)/(61-88) 119/77  Resp:  [26-36] 28  SpO2:  [95 %-100 %] 95 %  O2 Device: Nasal cannula  Nasal Cannula O2 Flow Rate (L/min):  [4 L/min-6 L/min] 4 L/min    Physical Exam:     General: Arousable, opens eyes to command and mumbles a few words but not greatly communicative.   Neck:  Supple. No mass.  No lymphadenopathy.   Lungs: Expansion symmetric, no rales, no wheezing, respirations unlabored.   Heart:  Regular rate and rhythm, S1 and S2 normal, no murmur.   Abdomen: Soft, nondistended, non-tender, bowel sounds active all four quadrants, no masses, no organomegaly.   Extremities: No leg edema. No erythema/warmth. No draining wound. Nontender to palpation.   Skin:  No rash.   Neuro: Difficult to assess.        Lab Results: I have reviewed the following results:  Results from last 7 days   Lab Units 10/29/24  0931 10/28/24  0549 10/27/24  0420   WBC Thousand/uL 6.33 7.20 7.71   HEMOGLOBIN g/dL 7.2* 7.3* 7.4*   PLATELETS Thousands/uL 156 127* 102*     Results from last 7 days   Lab Units 10/29/24  0931 10/28/24  1539 10/28/24  0549 10/26/24  2130 10/26/24  0518   SODIUM mmol/L 150* 150* 151*   < > 148*   POTASSIUM mmol/L 4.3 4.0 4.1   < > 3.7   CHLORIDE mmol/L 119* 117* 118*   < > 116*   CO2 mmol/L 24 25 24   < > 24   BUN mg/dL 53* 48* 47*   < > 41*   CREATININE mg/dL 0.88 0.79 0.78   < > 0.74   EGFR ml/min/1.73sq m 103 108 109   < > 111   CALCIUM mg/dL 8.0* 8.2* 8.0*   < > 7.7*   AST U/L 256*  --  253*  --  247*   ALT U/L 106*  --  113*  --  103*   ALK PHOS U/L 1,107*  --  1,097*  --  861*   ALBUMIN g/dL 2.3*  --  2.4*  --  2.5*    < > = values in this interval not displayed.     Results from last 7 days   Lab Units 10/24/24  1529 10/24/24  0000 10/23/24  2309 10/23/24  2251 10/22/24  1438   BLOOD CULTURE   --   --  No Growth After 5 Days. No Growth After 5 Days.  --    GRAM STAIN RESULT  4+ Gram positive cocci in pairs*  Rare Polys* Rare Polys  No organisms seen  --    --  No Polys or Bacteria seen   BODY FLUID CULTURE, STERILE   --   --   --   --  No growth             Results from last 7 days   Lab Units 10/24/24  1434   FERRITIN ng/mL >7,500*

## 2024-10-29 NOTE — ASSESSMENT & PLAN NOTE
Initial MRI brain 10/13 showed Multifocal early ischemia including right anterior thalamus, anterior occipital lobes left greater than right. On the left this tracks into the posterior medial temporal lobe. There is also a punctate focus of cortical ischemia within the posterior parafalcine frontal lobe. Cortical petechial hemorrhage noted within the central aspect of the right thalamus and left anterior occipital lobe.  He was evaluated by neurology, recommending ASA when cleared by neurology.   Noted to have PFO on echo, appears CHRIS was considered for further work up but unclear utility of this.

## 2024-10-29 NOTE — ASSESSMENT & PLAN NOTE
Suspected metastatic malignancy --presents with hepatosplenomegaly, hepatic lesion, splenic lesion, transaminitis  Status post iliac bone biopsy 10/24 --pathology pending

## 2024-10-29 NOTE — ACP (ADVANCE CARE PLANNING)
Record of Family Meeting - Palliative and Supportive Care   Clayton Duval 44 y.o. male [id number]      Recommendations and Plan:  - Remain Level 1 - Full Code  - Spouse plans to discuss further w/ her family   - Will need ongoing GoC discussions    A family meeting was held for Calyton Duval. This meeting was necessary for determine the appropriate course of treatment.  Time of Meetin:00  Meeting Location: [address]  Participants: [facility]SC team, Diane SANTOS and spouse (Liane)  TOM Baker     Patient Participation: Unable due to current medical status    Patient Support System: Spouse (Liane)     Advanced Directive of POLST available: no    Topics of Discussion:  - Medical updates:  - Prognosis, current medical status and possible long-term needs   - Nutrition status   - Worsening respiratory status     Other Content of Meeting:  - Answered spouse's questions and concerns     Time Involved in Meetin minutes, beginning at approximately 2:00 and ending at approximately 2:30.     TOM Baker   Palliative and Supportive Care  Clinic/Answering Service: [phone]  You can find me on [employer] Secure Chat!

## 2024-10-29 NOTE — QUICK NOTE
Called to room to evaluate by primary RN, patient with increased WOB/tachycardia. BP stable. Will give albumin/lasix. Repeat CXR. Respiratory at bedside, upgrading to high flow--would not be ideal candidate for BIPAP given crani and NGT. Asked critical care attending Dr. White to evaluate patient.     Goals of care discussed with wife along with palliative care TOM Slater.  Medical update provided.  Patient with overall poor prognosis. Wife plans to speak with rest of family regarding decisions moving forward but is clear at this time that patient is to remain full code, she would accept intubation if needed.        TOM Ybarra  SLIM

## 2024-10-29 NOTE — RESPIRATORY THERAPY NOTE
10/29/24 1401   Respiratory Assessment   Assessment Type Assess only   General Appearance Awake   Respiratory Pattern Dyspnea at rest   Chest Assessment Chest expansion symmetrical   Bilateral Breath Sounds Diminished;Coarse   Resp Comments upon assessment pt with increased WOB. SpO2 WNL on 4L NC Saline tx and vest not completed. Spoke with nurseNICOLE called to bedisde. Per verbal xopenex udn given at this time.   Additional Assessments   Pulse (!) 118   SpO2 96 %

## 2024-10-29 NOTE — ASSESSMENT & PLAN NOTE
Extubated 10/26, currently on 4L NC. Completed course of abx for PNA.   Pulmonary toilet  Wean oxygen as tolerated

## 2024-10-29 NOTE — ASSESSMENT & PLAN NOTE
S/p urgent craniectomy and hematoma evacuation 10/13  MRI brain showed ischemia involving the brainstem

## 2024-10-29 NOTE — ASSESSMENT & PLAN NOTE
Due to poor free water intake  Sodium currently 150  Currently on tube feeds with free water flushes 300 mL every 4 hours  Will add D5W at 100 cc/h

## 2024-10-29 NOTE — ASSESSMENT & PLAN NOTE
Patient with extensive edema, suspect third spacing  I/O show +21L since admission   Albumin 2.3  Consider starting albumin + diuretics -- will d/w attending   Also with ascites and status post paracentesis on 10/22

## 2024-10-30 PROBLEM — D64.9 ANEMIA: Status: ACTIVE | Noted: 2024-01-01

## 2024-10-30 PROBLEM — Z51.5 END OF LIFE CARE: Status: ACTIVE | Noted: 2024-01-01

## 2024-10-30 NOTE — PROGRESS NOTES
Progress Note - Hospitalist   Name: Clayton Duval 44 y.o. male I MRN: [id number]  [facility] Date of Admission: 10/13/2024   Date of Service: 10/30/2024 I Hospital Day: 17  { ?Quick Links I Problem List I DIOGO PAK Billing Tip:22166}  Assessment & Plan  Non-traumatic acute L subdural hematoma (HCC)  Patient initially presented to [facility] 10/11 with complaints of intermittent fever, sweats, poor appetite, abdominal distention, lower extremity edema evolving over a 2-week period.  Workup revealed transaminitis, CT abdomen and pelvis showed splenic and lytic lesions, no prior history of malignancy.  He was a rapid response 10/13 where he was noted to have large left acute subdural hematoma with brain compression and left to right midline shift and left uncal herniation.  He was transferred to [place] for emergent craniectomy for hematoma evacuation. Extubated 10/26.  Patient is status post left [facility] for evacuation of SDH 10/13.  Recent repeat CT head 10/24 stable.    Due for NSX f/u 11/1  MRI brain showed ischemia involving the brainstem.  He was evaluated by neurology.    Recommended aspirin 81 mg daily when cleared by neurosurgery.  STAT CTH for any neurological changes  Currently with NGT (placed 10/27)  in place for nutrition, will consult speech therapy. May need to consider PEG pending GOC/clinical course. This was discussed yesterday with the patient's wife.  Noted increased swelling of the craniectomy site today, with concerns of pupillary changes noted. STAT CT head ordered.   PT/OT recommending rehab  Rest of plan as below   Sepsis (HCC)  Patient noted to have fevers earlier this hospitalization, met sepsis criteria.  Has large sacral wound with concern for perirectal abscess.  This has been debrided by general surgery.  Completed course of Zosyn per infectious disease.  Continues to have intermittent fevers despite antibiotic without further obvious active infection as well as sinus  tachycardia.   Was started on IV ceftriaxone overnight due to concerns of aspiration.  ID following   Stroke (HCC)  Initial MRI brain 10/13 showed Multifocal early ischemia including right anterior thalamus, anterior occipital lobes left greater than right. On the left this tracks into the posterior medial temporal lobe. There is also a punctate focus of cortical ischemia within the posterior parafalcine frontal lobe. Cortical petechial hemorrhage noted within the central aspect of the right thalamus and left anterior occipital lobe.  He was evaluated by neurology, recommending ASA when cleared by neurology.   Noted to have PFO on echo, appears CHRIS was considered for further work up, but the utility of this is unclear.  Sacral wound  S/P I&D with general surgery. Concern for adjacent perirectal abscess noted. CT scan 10/18 also noting auricle thinning of coccyx below the decubitus ulcer may suggest osteomyelitis.   Definitive diagnosis of osteomized would require a bone biopsy/culture. However even if OM was confirmed, long-term antibiotic therapy would be futile without a plan to aggressively debride the tissue and bone, send bone cultures and cover the wound with flap for closure.   LWC per general surgery  Strict and frequent turning and repositioning  Maintain Barclay catheter to avoid wound contamination  Needs diverting colostomy but holding off until biopsy results back for prognostication  Perirectal abscess  As above   Multiple lesions of metastatic malignancy (HCC)  Patient with suspected metastatic cancer, patient with hepatosplenomegaly, hepatic lesions, splenic lesions, transaminitis.  Evaluated by oncology, tumor markers ordered, CEA, AFP, normal.  CA 19 9, 697, LDH elevated at 556.  Hepatitis panel negative.  Now status post iliac bone biopsy done 10/24, pathology pending.  Also status post paracentesis 10/22, fluid negative for malignancy.  Oncology notes reviewed, additional recommendations pending  pathology.  Acute respiratory failure (HCC)  Extubated 10/26, currently on high flow nasal cannula, with increased work of breathing noted. Completed course of abx for PNA.   Pulmonary toilet  Goals of care discussion as detailed below: the patient may require escalated oxygen therapy.   Hypernatremia  Sodium levels remain elevated at 150.   Nephrology was consulted, and D5W was initiated, but then discontinued as the patient's respiratory status deteriorated, requireing diuretics.    Consider albumin and diuretics once sodium improves to 145.   Repeat CMP  Hypothyroidism  Continue levothyroxine  Goals of care, counseling/discussion  Palliative care following. Discussions to date have been to maintain full code status.  Following deterioration overnight, discussions were again initiated with the patient's wife, who is now en route. Plan is for further goals of care discussion. The patient's wife had stated she does not want him transferred to intensive care again.   Pulmonary embolism (HCC)  Likely hypercoagulable state in setting of underlying malignancy.  Anticoagulation not pursued due to recent subdural hematoma    VTE Pharmacologic Prophylaxis:   Moderate Risk (Score 3-4) - Pharmacological DVT Prophylaxis Ordered: heparin.    Mobility:   Basic Mobility Inpatient Raw Score: 6  JH-HLM Goal: 2: Bed activities/Dependent transfer  JH-HLM Achieved: 2: Bed activities/Dependent transfer  JH-HLM Goal achieved. Continue to encourage appropriate mobility.    Patient Centered Rounds: I performed bedside rounds with nursing staff today.   Discussions with Specialists or Other Care Team Provider: Case Management    Education and Discussions with Family / Patient: Updated  (wife) via phone.    Current Length of Stay: 17 day(s)  Current Patient Status: Inpatient   Certification Statement: The patient will continue to require additional inpatient hospital stay due to acute respiratory failure, goals of care, wound  care, awaiting pathology.   Discharge Plan: Anticipate discharge in >72 hrs to rehab facility.    Code Status: Level 1 - Full Code    Subjective   The patient remains non-verbal, but does follow some commands intermittently. He is very ill-appearing, with an increased work of breathing.     Objective :{?Quick Links I ICU Summary I Vitals I I/Os I LDAs I Mobility (PT/OT) I Code Status / ACP   ?Quick Links I Active Meds I Pain Meds I Antibiotics I Anticoagulants:32012}  Temp:  [99 °F (37.2 °C)-102.6 °F (39.2 °C)] 102.6 °F (39.2 °C)  HR:  [114-131] 131  BP: (111-128)/(74-85) 111/74  Resp:  [16-96] 40  SpO2:  [94 %-100 %] 94 %  O2 Device: High flow nasal cannula  Nasal Cannula O2 Flow Rate (L/min):  [4 L/min] 4 L/min  FiO2 (%):  [30-33] 32    Body mass index is 38.04 kg/m².     Input and Output Summary (last 24 hours):     Intake/Output Summary (Last 24 hours) at 10/30/2024 0942  Last data filed at 10/30/2024 0900  Gross per 24 hour   Intake 2747 ml   Output 1925 ml   Net 822 ml       Physical Exam  Constitutional:       General: He is in acute distress.      Appearance: He is obese. He is ill-appearing and toxic-appearing.      Interventions: Nasal cannula in place.   Eyes:      Pupils: Pupils are unequal.      Left eye: Pupil is not round.   Cardiovascular:      Rate and Rhythm: Tachycardia present.      Pulses: Normal pulses.   Pulmonary:      Effort: Respiratory distress present.      Breath sounds: Rhonchi present.   Abdominal:      General: Bowel sounds are decreased. There is distension.   Genitourinary:     Comments: Urinary Catheter in place  Musculoskeletal:         General: Swelling present.      Right lower le+ Edema present.      Left lower le+ Edema present.   Skin:     Coloration: Skin is pale.   Neurological:      Mental Status: He is lethargic.      GCS: GCS eye subscore is 4. GCS verbal subscore is 1. GCS motor subscore is 6.      Comments: Left Side Flaccid            Lines/Drains:  Lines/Drains/Airways       Active Status       Name Placement date Placement time Site Days    PICC Line 10/28/24 10/28/24  1735  --  1    NG/OG/Enteral Tube Nasogastric 12 Fr Right nare 10/27/24  2115  Right nare  2    Urethral Catheter Latex 16 Fr. 10/22/24  0000  Latex  8                  Urinary Catheter:  Goal for removal: N/A- Discharging with Barclay         Central Line:  Goal for removal: Will discontinue when hemodynamically stable.             {?Quick Links I Lab Review I Micro Results I Radiology I Cardiology:37950}  Lab Results: I have reviewed the following results:   Results from last 7 days   Lab Units 10/30/24  0514   WBC Thousand/uL 7.06   HEMOGLOBIN g/dL 7.6*   HEMATOCRIT % 26.4*   PLATELETS Thousands/uL 178   BANDS PCT % 10*   LYMPHO PCT % 0*   MONO PCT % 0*   EOS PCT % 0     Results from last 7 days   Lab Units 10/30/24  0514   SODIUM mmol/L 150*   POTASSIUM mmol/L 4.3   CHLORIDE mmol/L 116*   CO2 mmol/L 23   BUN mg/dL 59*   CREATININE mg/dL 1.02   ANION GAP mmol/L 11   CALCIUM mg/dL 8.5   ALBUMIN g/dL 2.5*   TOTAL BILIRUBIN mg/dL 6.44*   ALK PHOS U/L 1,013*   ALT U/L 108*   AST U/L 259*   GLUCOSE RANDOM mg/dL 81     Results from last 7 days   Lab Units 10/28/24  0549   INR  1.35*     Results from last 7 days   Lab Units 10/30/24  0740 10/30/24  0647 10/30/24  0039 10/29/24  2104 10/29/24  1749 10/29/24  1411 10/29/24  0619 10/29/24  0003 10/28/24  1742 10/28/24  1153 10/28/24  0606 10/28/24  0021   POC GLUCOSE mg/dl 82 72 94 97 82 101 83 97 94 82 114 84         Results from last 7 days   Lab Units 10/30/24  0514 10/24/24  0058 10/23/24  2250   LACTIC ACID mmol/L  --  3.0* 3.5*   PROCALCITONIN ng/ml 2.18*  --   --        Recent Cultures (last 7 days):   Results from last 7 days   Lab Units 10/24/24  1529 10/24/24  0000 10/23/24  2309 10/23/24  2259   BLOOD CULTURE   --   --  No Growth After 5 Days. No Growth After 5 Days.   GRAM STAIN RESULT  4+ Gram positive cocci in  pairs*  Rare Polys* Rare Polys  No organisms seen  --   --        Imaging Results Review: No pertinent imaging studies reviewed.  Other Study Results Review: No additional pertinent studies reviewed.    Last 24 Hours Medication List:     Current Facility-Administered Medications:     acetaminophen (Ofirmev) injection 1,000 mg, Q6H PRN, Last Rate: 1,000 mg (10/30/24 0836)    cefTRIAXone (ROCEPHIN) 2,000 mg in dextrose 5 % 50 mL IVPB, Q24H, Last Rate: Stopped (10/30/24 0548)    chlorhexidine (PERIDEX) 0.12 % oral rinse 15 mL, Q12H KAYLIE    Cholecalciferol (VITAMIN D3) tablet 5,000 Units, Daily    folic acid (FOLVITE) tablet 1 mg, Daily    heparin (porcine) subcutaneous injection 5,000 Units, Q8H KAYLIE    hydrALAZINE (APRESOLINE) injection 10 mg, Q6H PRN    HYDROmorphone (DILAUDID) injection 1 mg, Q3H PRN    labetalol (NORMODYNE) injection 10 mg, Q6H PRN    levothyroxine tablet 200 mcg, Early Morning    magnesium sulfate 2 g/50 mL IVPB (premix) 2 g, After Breakfast, Last Rate: 2 g (10/29/24 0933)    modafinil (PROVIGIL) tablet 200 mg, Daily    ondansetron (ZOFRAN) injection 4 mg, Q8H PRN    polyethylene glycol (MIRALAX) packet 17 g, Daily PRN    sodium chloride 3 % inhalation solution 4 mL, TID    thiamine tablet 100 mg, Daily    Administrative Statements   Today, Patient Was Seen By: Steve Donovan RN  I have spent a total time of 90 minutes in caring for this patient on the day of the visit/encounter including Prognosis, Risks and benefits of tx options, Impressions, Counseling / Coordination of care, Documenting in the medical record, Reviewing / ordering tests, medicine, procedures  , Obtaining or reviewing history  , and Communicating with other healthcare professionals . Topics discussed with the patient / family include goals of care.    **Please Note: This note may have been constructed using a voice recognition system.**

## 2024-10-30 NOTE — ASSESSMENT & PLAN NOTE
Likely in setting of chronic illness/underlying malignancy.  Has received multiple units of PRBC with last 1 being 10/24.  Monitor

## 2024-10-30 NOTE — ASSESSMENT & PLAN NOTE
Initial MRI brain 10/13 showed Multifocal early ischemia including right anterior thalamus, anterior occipital lobes left greater than right. On the left this tracks into the posterior medial temporal lobe. There is also a punctate focus of cortical ischemia within the posterior parafalcine frontal lobe. Cortical petechial hemorrhage noted within the central aspect of the right thalamus and left anterior occipital lobe.  He was evaluated by neurology, recommending ASA when cleared by neurology.   Noted to have PFO on echo, appears CHRIS was considered for further work up, but the utility of this is unclear.

## 2024-10-30 NOTE — PLAN OF CARE
Problem: PAIN - ADULT  Goal: Verbalizes/displays adequate comfort level or baseline comfort level  Description: Interventions:  - Encourage patient to monitor pain and request assistance  - Assess pain using appropriate pain scale  - Administer analgesics based on type and severity of pain and evaluate response  - Implement non-pharmacological measures as appropriate and evaluate response  - Consider cultural and social influences on pain and pain management  - Notify physician/advanced practitioner if interventions unsuccessful or patient reports new pain  Outcome: Progressing     Problem: INFECTION - ADULT  Goal: Absence or prevention of progression during hospitalization  Description: INTERVENTIONS:  - Assess and monitor for signs and symptoms of infection  - Monitor lab/diagnostic results  - Monitor all insertion sites, i.e. indwelling lines, tubes, and drains  - Monitor endotracheal if appropriate and nasal secretions for changes in amount and color  - [place] appropriate cooling/warming therapies per order  - Administer medications as ordered  - Instruct and encourage patient and family to use good hand hygiene technique  - Identify and instruct in appropriate isolation precautions for identified infection/condition  Outcome: Progressing  Goal: Absence of fever/infection during neutropenic period  Description: INTERVENTIONS:  - Monitor WBC    Outcome: Progressing     Problem: SAFETY ADULT  Goal: Patient will remain free of falls  Description: INTERVENTIONS:  - Educate patient/family on patient safety including physical limitations  - Instruct patient to call for assistance with activity   - Consult OT/PT to assist with strengthening/mobility   - Keep Call bell within reach  - Keep bed low and locked with side rails adjusted as appropriate  - Keep care items and personal belongings within reach  - Initiate and maintain comfort rounds  - Make Fall Risk Sign visible to staff  - Offer Toileting every  Hours,  in advance of need  - Initiate/Maintain alarm  - Obtain necessary fall risk management equipment:   - Apply yellow socks and bracelet for high fall risk patients  - Consider moving patient to room near nurses station  Outcome: Progressing  Goal: Maintain or return to baseline ADL function  Description: INTERVENTIONS:  -  Assess patient's ability to carry out ADLs; assess patient's baseline for ADL function and identify physical deficits which impact ability to perform ADLs (bathing, care of mouth/teeth, toileting, grooming, dressing, etc.)  - Assess/evaluate cause of self-care deficits   - Assess range of motion  - Assess patient's mobility; develop plan if impaired  - Assess patient's need for assistive devices and provide as appropriate  - Encourage maximum independence but intervene and supervise when necessary  - Involve family in performance of ADLs  - Assess for home care needs following discharge   - Consider OT consult to assist with ADL evaluation and planning for discharge  - Provide patient education as appropriate  Outcome: Progressing  Goal: Maintains/Returns to pre admission functional level  Description: INTERVENTIONS:  - Perform AM-PAC 6 Click Basic Mobility/ Daily Activity assessment daily.  - Set and communicate daily mobility goal to care team and patient/family/caregiver.   - Collaborate with rehabilitation services on mobility goals if consulted  - Perform Range of Motion  times a day.  - Reposition patient every  hours.  - Dangle patient  times a day  - Stand patient  times a day  - Ambulate patient  times a day  - Out of bed to chair  times a day   - Out of bed for meals  times a day  - Out of bed for toileting  - Record patient progress and toleration of activity level   Outcome: Progressing     Problem: DISCHARGE PLANNING  Goal: Discharge to home or other facility with appropriate resources  Description: INTERVENTIONS:  - Identify barriers to discharge w/patient and caregiver  - Arrange for  needed discharge resources and transportation as appropriate  - Identify discharge learning needs (meds, wound care, etc.)  - Arrange for interpretive services to assist at discharge as needed  - Refer to Case Management Department for coordinating discharge planning if the patient needs post-hospital services based on physician/advanced practitioner order or complex needs related to functional status, cognitive ability, or social support system  Outcome: Progressing     Problem: Knowledge Deficit  Goal: Patient/family/caregiver demonstrates understanding of disease process, treatment plan, medications, and discharge instructions  Description: Complete learning assessment and assess knowledge base.  Interventions:  - Provide teaching at level of understanding  - Provide teaching via preferred learning methods  Outcome: Progressing     Problem: Prexisting or High Potential for Compromised Skin Integrity  Goal: Skin integrity is maintained or improved  Description: INTERVENTIONS:  - Identify patients at risk for skin breakdown  - Assess and monitor skin integrity  - Assess and monitor nutrition and hydration status  - Monitor labs   - Assess for incontinence   - Turn and reposition patient  - Assist with mobility/ambulation  - Relieve pressure over bony prominences  - Avoid friction and shearing  - Provide appropriate hygiene as needed including keeping skin clean and dry  - Evaluate need for skin moisturizer/barrier cream  - Collaborate with interdisciplinary team   - Patient/family teaching  - Consider wound care consult   Outcome: Progressing     Problem: Nutrition/Hydration-ADULT  Goal: Nutrient/Hydration intake appropriate for improving, restoring or maintaining nutritional needs  Description: Monitor and assess patient's nutrition/hydration status for malnutrition. Collaborate with interdisciplinary team and initiate plan and interventions as ordered.  Monitor patient's weight and dietary intake as ordered or  per policy. Utilize nutrition screening tool and intervene as necessary. Determine patient's food preferences and provide high-protein, high-caloric foods as appropriate.     INTERVENTIONS:  - Monitor oral intake, urinary output, labs, and treatment plans  - Assess nutrition and hydration status and recommend course of action  - Evaluate amount of meals eaten  - Assist patient with eating if necessary   - Allow adequate time for meals  - Recommend/ encourage appropriate diets, oral nutritional supplements, and vitamin/mineral supplements  - Order, calculate, and assess calorie counts as needed  - Recommend, monitor, and adjust tube feedings and TPN/PPN based on assessed needs  - Assess need for intravenous fluids  - Provide specific nutrition/hydration education as appropriate  - Include patient/family/caregiver in decisions related to nutrition  Outcome: Progressing     Problem: SAFETY,RESTRAINT: NV/NON-SELF DESTRUCTIVE BEHAVIOR  Goal: Remains free of harm/injury (restraint for non violent/non self-detsructive behavior)  Description: INTERVENTIONS:  - Instruct patient/family regarding restraint use   - Assess and monitor physiologic and psychological status   - Provide interventions and comfort measures to meet assessed patient needs   - Identify and implement measures to help patient regain control  - Assess readiness for release of restraint   Outcome: Progressing  Goal: Returns to optimal restraint-free functioning  Description: INTERVENTIONS:  - Assess the patient's behavior and symptoms that indicate continued need for restraint  - Identify and implement measures to help patient regain control  - Assess readiness for release of restraint   Outcome: Progressing     Problem: NEUROSENSORY - ADULT  Goal: Achieves stable or improved neurological status  Description: INTERVENTIONS  - Monitor and report changes in neurological status  - Monitor vital signs such as temperature, blood pressure, glucose, and any other  labs ordered   - Initiate measures to prevent increased intracranial pressure  - Monitor for seizure activity and implement precautions if appropriate      Outcome: Progressing  Goal: Remains free of injury related to seizures activity  Description: INTERVENTIONS  - Maintain airway, patient safety  and administer oxygen as ordered  - Monitor patient for seizure activity, document and report duration and description of seizure to physician/advanced practitioner  - If seizure occurs,  ensure patient safety during seizure  - Reorient patient post seizure  - Seizure pads on all 4 side rails  - Instruct patient/family to notify RN of any seizure activity including if an aura is experienced  - Instruct patient/family to call for assistance with activity based on nursing assessment  - Administer anti-seizure medications if ordered    Outcome: Progressing  Goal: Achieves maximal functionality and self care  Description: INTERVENTIONS  - Monitor swallowing and airway patency with patient fatigue and changes in neurological status  - Encourage and assist patient to increase activity and self care.   - Encourage visually impaired, hearing impaired and aphasic patients to use assistive/communication devices  Outcome: Progressing     Problem: CARDIOVASCULAR - ADULT  Goal: Maintains optimal cardiac output and hemodynamic stability  Description: INTERVENTIONS:  - Monitor I/O, vital signs and rhythm  - Monitor for S/S and trends of decreased cardiac output  - Administer and titrate ordered vasoactive medications to optimize hemodynamic stability  - Assess quality of pulses, skin color and temperature  - Assess for signs of decreased coronary artery perfusion  - Instruct patient to report change in severity of symptoms  Outcome: Progressing  Goal: Absence of cardiac dysrhythmias or at baseline rhythm  Description: INTERVENTIONS:  - Continuous cardiac monitoring, vital signs, obtain 12 lead EKG if ordered  - Administer antiarrhythmic  and heart rate control medications as ordered  - Monitor electrolytes and administer replacement therapy as ordered  Outcome: Progressing     Problem: RESPIRATORY - ADULT  Goal: Achieves optimal ventilation and oxygenation  Description: INTERVENTIONS:  - Assess for changes in respiratory status  - Assess for changes in mentation and behavior  - Position to facilitate oxygenation and minimize respiratory effort  - Oxygen administered by appropriate delivery if ordered  - Initiate smoking cessation education as indicated  - Encourage broncho-pulmonary hygiene including cough, deep breathe, Incentive Spirometry  - Assess the need for suctioning and aspirate as needed  - Assess and instruct to report SOB or any respiratory difficulty  - Respiratory Therapy support as indicated  Outcome: Progressing     Problem: GASTROINTESTINAL - ADULT  Goal: Minimal or absence of nausea and/or vomiting  Description: INTERVENTIONS:  - Administer IV fluids if ordered to ensure adequate hydration  - Maintain NPO status until nausea and vomiting are resolved  - Nasogastric tube if ordered  - Administer ordered antiemetic medications as needed  - Provide nonpharmacologic comfort measures as appropriate  - Advance diet as tolerated, if ordered  - Consider nutrition services referral to assist patient with adequate nutrition and appropriate food choices  Outcome: Progressing  Goal: Maintains or returns to baseline bowel function  Description: INTERVENTIONS:  - Assess bowel function  - Encourage oral fluids to ensure adequate hydration  - Administer IV fluids if ordered to ensure adequate hydration  - Administer ordered medications as needed  - Encourage mobilization and activity  - Consider nutritional services referral to assist patient with adequate nutrition and appropriate food choices  Outcome: Progressing  Goal: Maintains adequate nutritional intake  Description: INTERVENTIONS:  - Monitor percentage of each meal consumed  - Identify  factors contributing to decreased intake, treat as appropriate  - Assist with meals as needed  - Monitor I&O, weight, and lab values if indicated  - Obtain nutrition services referral as needed  Outcome: Progressing  Goal: Establish and maintain optimal ostomy function  Description: INTERVENTIONS:  - Assess bowel function  - Encourage oral fluids to ensure adequate hydration  - Administer IV fluids if ordered to ensure adequate hydration   - Administer ordered medications as needed  - Encourage mobilization and activity  - Nutrition services referral to assist patient with appropriate food choices  - Assess stoma site  - Consider wound care consult   Outcome: Progressing  Goal: Oral mucous membranes remain intact  Description: INTERVENTIONS  - Assess oral mucosa and hygiene practices  - Implement preventative oral hygiene regimen  - Implement oral medicated treatments as ordered  - Initiate Nutrition services referral as needed  Outcome: Progressing     Problem: GENITOURINARY - ADULT  Goal: Maintains or returns to baseline urinary function  Description: INTERVENTIONS:  - Assess urinary function  - Encourage oral fluids to ensure adequate hydration if ordered  - Administer IV fluids as ordered to ensure adequate hydration  - Administer ordered medications as needed  - Offer frequent toileting  - Follow urinary retention protocol if ordered  Outcome: Progressing  Goal: Absence of urinary retention  Description: INTERVENTIONS:  - Assess patient’s ability to void and empty bladder  - Monitor I/O  - Bladder scan as needed  - Discuss with physician/AP medications to alleviate retention as needed  - Discuss catheterization for long term situations as appropriate  Outcome: Progressing  Goal: Urinary catheter remains patent  Description: INTERVENTIONS:  - Assess patency of urinary catheter  - If patient has a chronic plasencia, consider changing catheter if non-functioning  - Follow guidelines for intermittent irrigation of  non-functioning urinary catheter  Outcome: Progressing     Problem: METABOLIC, FLUID AND ELECTROLYTES - ADULT  Goal: Electrolytes maintained within normal limits  Description: INTERVENTIONS:  - Monitor labs and assess patient for signs and symptoms of electrolyte imbalances  - Administer electrolyte replacement as ordered  - Monitor response to electrolyte replacements, including repeat lab results as appropriate  - Instruct patient on fluid and nutrition as appropriate  Outcome: Progressing  Goal: Fluid balance maintained  Description: INTERVENTIONS:  - Monitor labs   - Monitor I/O and WT  - Instruct patient on fluid and nutrition as appropriate  - Assess for signs & symptoms of volume excess or deficit  Outcome: Progressing  Goal: Glucose maintained within target range  Description: INTERVENTIONS:  - Monitor Blood Glucose as ordered  - Assess for signs and symptoms of hyperglycemia and hypoglycemia  - Administer ordered medications to maintain glucose within target range  - Assess nutritional intake and initiate nutrition service referral as needed  Outcome: Progressing     Problem: SKIN/TISSUE INTEGRITY - ADULT  Goal: Skin Integrity remains intact(Skin Breakdown Prevention)  Description: Assess:  -Perform [place] assessment every   -Clean and moisturize skin every   -Inspect skin when repositioning, toileting, and assisting with ADLS  -Assess under medical devices such as  every   -Assess extremities for adequate circulation and sensation     Bed Management:  -Have minimal linens on bed & keep smooth, unwrinkled  -Change linens as needed when moist or perspiring  -Avoid sitting or lying in one position for more than  hours while in bed  -Keep HOB at degrees     Toileting:  -Offer bedside commode  -Assess for incontinence every   -Use incontinent care products after each incontinent episode such as     Activity:  -Mobilize patient  times a day  -Encourage activity and walks on unit  -Encourage or provide ROM  exercises   -Turn and reposition patient every  Hours  -Use appropriate equipment to lift or move patient in bed  -Instruct/ Assist with weight shifting every  when out of bed in chair  -Consider limitation of chair time  hour intervals    Skin Care:  -Avoid use of baby powder, tape, friction and shearing, hot water or constrictive clothing  -Relieve pressure over bony prominences using   -Do not massage red bony areas    Next Steps:  -Teach patient strategies to minimize risks such as    -Consider consults to  interdisciplinary teams such as   Outcome: Progressing  Goal: Incision(s), wounds(s) or drain site(s) healing without S/S of infection  Description: INTERVENTIONS  - Assess and document dressing, incision, wound bed, drain sites and surrounding tissue  - Provide patient and family education  - Perform skin care/dressing changes every   Outcome: Progressing  Goal: Pressure injury heals and does not worsen  Description: Interventions:  - Implement low air loss mattress or specialty surface (Criteria met)  - Apply silicone foam dressing  - Instruct/assist with weight shifting every minutes when in chair   - Limit chair time to  hour intervals  - Use special pressure reducing interventions such as  when in chair   - Apply fecal or urinary incontinence containment device   - Perform passive or active ROM every   - Turn and reposition patient & offload bony prominences every  hours   - Utilize friction reducing device or surface for transfers   - Consider consults to  interdisciplinary teams such as   - Use incontinent care products after each incontinent episode such as   - Consider nutrition services referral as needed  Outcome: Progressing     Problem: HEMATOLOGIC - ADULT  Goal: Maintains hematologic stability  Description: INTERVENTIONS  - Assess for signs and symptoms of bleeding or hemorrhage  - Monitor labs  - Administer supportive blood products/factors as ordered and appropriate  Outcome: Progressing      Problem: MUSCULOSKELETAL - ADULT  Goal: Maintain or return mobility to safest level of function  Description: INTERVENTIONS:  - Assess patient's ability to carry out ADLs; assess patient's baseline for ADL function and identify physical deficits which impact ability to perform ADLs (bathing, care of mouth/teeth, toileting, grooming, dressing, etc.)  - Assess/evaluate cause of self-care deficits   - Assess range of motion  - Assess patient's mobility  - Assess patient's need for assistive devices and provide as appropriate  - Encourage maximum independence but intervene and supervise when necessary  - Involve family in performance of ADLs  - Assess for home care needs following discharge   - Consider OT consult to assist with ADL evaluation and planning for discharge  - Provide patient education as appropriate  Outcome: Progressing  Goal: Maintain proper alignment of affected body part  Description: INTERVENTIONS:  - Support, maintain and protect limb and body alignment  - Provide patient/ family with appropriate education  Outcome: Progressing     Problem: COPING  Goal: Pt/Family able to verbalize concerns and demonstrate effective coping strategies  Description: INTERVENTIONS:  - Assist patient/family to identify coping skills, available support systems and cultural and spiritual values  - Provide emotional support, including active listening and acknowledgement of concerns of patient and caregivers  - Reduce environmental stimuli, as able  - Provide patient education  - Assess for spiritual pain/suffering and initiate spiritual care, including notification of Pastoral Care or [place] based community as needed  - Assess effectiveness of coping strategies  Outcome: Progressing

## 2024-10-30 NOTE — PROGRESS NOTES
Progress Note - Hospitalist   Name: Clayton Duval 44 y.o. male I MRN: [id number]  Unit/Bed#: [facility] 627-01 I Date of Admission: 10/13/2024   Date of Service: 10/30/2024 I Hospital Day: 17     Assessment & Plan  Non-traumatic acute L subdural hematoma (HCC)  Patient initially presented to [facility] 10/11 with complaints of intermittent fever, sweats, poor appetite, abdominal distention, lower extremity edema evolving over a 2-week period.  Workup revealed transaminitis, CT abdomen and pelvis showed splenic and lytic lesions, no prior history of malignancy.  He was a rapid response 10/13 where he was noted to have large left acute subdural hematoma with brain compression and left to right midline shift and left uncal herniation.  He was transferred to [place] for emergent craniectomy for hematoma evacuation. Extubated 10/26.  Patient is status post left [facility] for evacuation of SDH 10/13.  Recent repeat CT head 10/24 stable.    10/30 with concern for swelling at crani site, going for STAT CTH.   MRI brain showed ischemia involving the brainstem.  He was evaluated by neurology.    Recommended aspirin 81 mg daily when cleared by neurosurgery.  STAT CTH for any neurological changes  Currently with NGT (placed 10/27)  in place for nutrition. Possible aspiration event overnight 10/30 was a RR. TF currently held pending additional GOC discussions. KUB confirmed that NGT in in proper position. May need to consider PEG pending GOC/clinical course however would be poor candidate for this. Speech following, recommending NPO.   PT/OT recommending rehab  Rest of plan as below   Sepsis (HCC)  Patient noted to have fevers earlier this hospitalization, met sepsis criteria.  Has large sacral wound with concern for perirectal abscess.  This has been debrided by general surgery.  Completed course of Zosyn per infectious disease.  Continues to have intermittent fevers despite antibiotic without further obvious active infection as well  as sinus tachycardia.   ID following, was being monitored of IV abx despite persistent fevers as they are felt to be more related to tumor fever however given aspiration event overnight, was restarted on IV ceftriaxone.   Appreciate further ID input  GOC discussions as above  Stroke (HCC)  Initial MRI brain 10/13 showed Multifocal early ischemia including right anterior thalamus, anterior occipital lobes left greater than right. On the left this tracks into the posterior medial temporal lobe. There is also a punctate focus of cortical ischemia within the posterior parafalcine frontal lobe. Cortical petechial hemorrhage noted within the central aspect of the right thalamus and left anterior occipital lobe.  He was evaluated by neurology, recommending ASA when cleared by neurology.   Noted to have PFO on echo, appears CHRIS was considered for further work up but unclear utility of this.   Sacral wound  S/P I&D with general surgery. Concern for adjacent perirectal abscess noted. CT scan 10/18 also noting auricle thinning of coccyx below the decubitus ulcer may suggest osteomyelitis.   Definitive diagnosis of osteomized would require a bone biopsy/culture. However even if OM was confirmed, long-term antibiotic therapy would be futile without a plan to aggressively debride the tissue and bone, send bone cultures and cover the wound with flap for closure.   LWC per general surgery  Strict and frequent turning and repositioning  Maintain Barclay catheter to avoid wound contamination  Needs diverting colostomy but holding off until biopsy results back for prognostication, regardless patient too unstable for this at this time.   Perirectal abscess  As above   Multiple lesions of metastatic malignancy (HCC)  Patient with suspected metastatic cancer, patient with hepatosplenomegaly, hepatic lesions, splenic lesions, transaminitis.  Evaluated by oncology, tumor markers ordered, CEA, AFP, normal.  CA 19 9, 697, LDH elevated at 556.   Hepatitis panel negative.  Now status post iliac bone biopsy done 10/24, pathology pending.  Also status post paracentesis 10/22, fluid negative for malignancy.  Oncology notes reviewed, additional recommendations pending pathology.  Acute respiratory failure (HCC)  Extubated 10/26. . Completed course of abx for PNA. Upgraded to high flow 10/29 given increased WOB and poor candidate for BIPAP given crani and NGT.   ICU team advising against intubation  GOC discussions in progress   Remains full code at this time  Hypernatremia  NA levels up to 150  Nephrology following, recommended D5 however this was stopped overnight by ICU team during RRT.   Nephrology following  GOC as above  Trend labs  Hypothyroidism  Continue levothyroxine  Pulmonary embolism (HCC)  PE and  LUE DVT. Likely hypercoagulable state in setting of underlying malignancy.  Anticoagulation not pursued due to recent subdural hematoma  Goals of care, counseling/discussion  Palliative care following.  Family meeting 10/29, no limits set.   Decompensated overnight 10/30, additional GOC discussions in progress.   ICU team re-evaluated, advised against re-intubation.     Anasarca  In setting of hypoalbuminemia.  Also with recent ascites status post paracentesis for 2 L on 10/22.  Can consider Lasix/albumin once sodium levels stabilized  Anemia  Likely in setting of chronic illness/underlying malignancy.  Has received multiple units of PRBC with last 1 being 10/24.  Monitor     VTE Pharmacologic Prophylaxis:   Moderate Risk (Score 3-4) - Pharmacological DVT Prophylaxis Ordered: heparin.    Mobility:   Basic Mobility Inpatient Raw Score: 6  JH-HLM Goal: 2: Bed activities/Dependent transfer  JH-HLM Achieved: 2: Bed activities/Dependent transfer  JH-HLM Goal achieved. Continue to encourage appropriate mobility.    Patient Centered Rounds: I performed bedside rounds with nursing staff today.   Discussions with Specialists or Other Care Team Provider: nursing,  palliative care TOM Slater    Education and Discussions with Family / Patient: Updated  (wife) at bedside. Patient with additional family coming in today, will speak with them as well.     Current Length of Stay: 17 day(s)  Current Patient Status: Inpatient   Certification Statement: The patient will continue to require additional inpatient hospital stay due to GOC discussions  Discharge Plan: Anticipate discharge in >72 hrs to TBD    Code Status: Level 1 - Full Code    Subjective   Patient has labored breathing and appears SOB. He does not respond to verbal stimuli and does not follow commands.     Objective :  Temp:  [99 °F (37.2 °C)-102.6 °F (39.2 °C)] 101.3 °F (38.5 °C)  HR:  [114-131] 122  BP: (111-128)/(74-85) 111/74  Resp:  [16-96] 40  SpO2:  [94 %-100 %] 99 %  O2 Device: High flow nasal cannula  Nasal Cannula O2 Flow Rate (L/min):  [4 L/min] 4 L/min  FiO2 (%):  [30-33] 32    Body mass index is 38.04 kg/m².     Input and Output Summary (last 24 hours):     Intake/Output Summary (Last 24 hours) at 10/30/2024 1109  Last data filed at 10/30/2024 0900  Gross per 24 hour   Intake 2747 ml   Output 1925 ml   Net 822 ml       Physical Exam  Vitals and nursing note reviewed.   Constitutional:       Appearance: He is ill-appearing and diaphoretic.   Cardiovascular:      Rate and Rhythm: Tachycardia present.   Pulmonary:      Effort: Tachypnea and accessory muscle usage present.   Genitourinary:     Comments: Barclay in place, dark effie urine noted   Musculoskeletal:         General: Swelling present.   Skin:     Coloration: Skin is pale.   Neurological:      Mental Status: He is unresponsive.      Motor: Weakness (left side) present.           Lines/Drains:  Lines/Drains/Airways       Active Status       Name Placement date Placement time Site Days    PICC Line 10/28/24 10/28/24  1735  --  1    NG/OG/Enteral Tube Nasogastric 12 Fr Right nare 10/27/24  2115  Right nare  2    Urethral Catheter Latex 16 Fr.  10/22/24  0000  Latex  8                  Urinary Catheter:  Goal for removal: N/A- Discharging with Barclay         Central Line:  Goal for removal: Will discontinue when meds requiring line are completed.               Lab Results: I have reviewed the following results:   Results from last 7 days   Lab Units 10/30/24  0514   WBC Thousand/uL 7.06   HEMOGLOBIN g/dL 7.6*   HEMATOCRIT % 26.4*   PLATELETS Thousands/uL 178   BANDS PCT % 10*   LYMPHO PCT % 0*   MONO PCT % 0*   EOS PCT % 0     Results from last 7 days   Lab Units 10/30/24  0514   SODIUM mmol/L 150*   POTASSIUM mmol/L 4.3   CHLORIDE mmol/L 116*   CO2 mmol/L 23   BUN mg/dL 59*   CREATININE mg/dL 1.02   ANION GAP mmol/L 11   CALCIUM mg/dL 8.5   ALBUMIN g/dL 2.5*   TOTAL BILIRUBIN mg/dL 6.44*   ALK PHOS U/L 1,013*   ALT U/L 108*   AST U/L 259*   GLUCOSE RANDOM mg/dL 81     Results from last 7 days   Lab Units 10/28/24  0549   INR  1.35*     Results from last 7 days   Lab Units 10/30/24  0740 10/30/24  0647 10/30/24  0039 10/29/24  2104 10/29/24  1749 10/29/24  1411 10/29/24  0619 10/29/24  0003 10/28/24  1742 10/28/24  1153 10/28/24  0606 10/28/24  0021   POC GLUCOSE mg/dl 82 72 94 97 82 101 83 97 94 82 114 84         Results from last 7 days   Lab Units 10/30/24  0514 10/24/24  0058 10/23/24  2250   LACTIC ACID mmol/L  --  3.0* 3.5*   PROCALCITONIN ng/ml 2.18*  --   --        Recent Cultures (last 7 days):   Results from last 7 days   Lab Units 10/24/24  1529 10/24/24  0000 10/23/24  2309 10/23/24  2251   BLOOD CULTURE   --   --  No Growth After 5 Days. No Growth After 5 Days.   GRAM STAIN RESULT  4+ Gram positive cocci in pairs*  Rare Polys* Rare Polys  No organisms seen  --   --        Imaging Results Review: I reviewed radiology reports from this admission including: xray(s).  Other Study Results Review: No additional pertinent studies reviewed.    Last 24 Hours Medication List:     Current Facility-Administered Medications:     acetaminophen (Ofirmev)  injection 1,000 mg, Q6H PRN, Last Rate: 1,000 mg (10/30/24 0836)    cefTRIAXone (ROCEPHIN) 2,000 mg in dextrose 5 % 50 mL IVPB, Q24H, Last Rate: Stopped (10/30/24 0548)    chlorhexidine (PERIDEX) 0.12 % oral rinse 15 mL, Q12H KAYLIE    Cholecalciferol (VITAMIN D3) tablet 5,000 Units, Daily    folic acid (FOLVITE) tablet 1 mg, Daily    heparin (porcine) subcutaneous injection 5,000 Units, Q8H KAYLIE    hydrALAZINE (APRESOLINE) injection 10 mg, Q6H PRN    HYDROmorphone (DILAUDID) injection 1 mg, Q3H PRN    labetalol (NORMODYNE) injection 10 mg, Q6H PRN    levothyroxine tablet 200 mcg, Early Morning    magnesium sulfate 2 g/50 mL IVPB (premix) 2 g, After Breakfast, Last Rate: 2 g (10/29/24 0933)    modafinil (PROVIGIL) tablet 200 mg, Daily    ondansetron (ZOFRAN) injection 4 mg, Q8H PRN    polyethylene glycol (MIRALAX) packet 17 g, Daily PRN    sodium chloride 3 % inhalation solution 4 mL, TID    thiamine tablet 100 mg, Daily    Administrative Statements   Today, Patient Was Seen By: TOM Barrow    **Please Note: This note may have been constructed using a voice recognition system.**

## 2024-10-30 NOTE — ASSESSMENT & PLAN NOTE
Possible pneumonia, with CXR showing right basilar infiltrate.  Patient is status post bronchoscopy last week, with finding of airway purulence.  BAL Gram stain had 4+ GPC but culture only grew mixed respiratory alli.  With recurrent fever overnight, IV vancomycin was started for presumptive pneumonia.  Despite IV vancomycin, and recent Zosyn, patient continued to have intermittent fever.  Given persistent fever despite antibiotic, doubt pneumonia and doubt active infection.  However, it appears that patient aspirated overnight with recurrent fever, decreased mentation and respiratory difficulties.  It is unclear whether this is aspiration pneumonitis or pneumonia.  Ceftriaxone was restarted overnight.  Continue IV ceftriaxone for now.  Monitor respiratory symptoms.  Monitor temperature.

## 2024-10-30 NOTE — ASSESSMENT & PLAN NOTE
Patient noted to have fevers earlier this hospitalization, met sepsis criteria.  Has large sacral wound with concern for perirectal abscess.  This has been debrided by general surgery.  Completed course of Zosyn per infectious disease.  Continues to have intermittent fevers despite antibiotic without further obvious active infection as well as sinus tachycardia.   Was started on IV ceftriaxone overnight due to concerns of aspiration.  ID following

## 2024-10-30 NOTE — ASSESSMENT & PLAN NOTE
Sodium levels remain elevated at 150.   Nephrology was consulted, and D5W was initiated, but then discontinued as the patient's respiratory status deteriorated, requireing diuretics.    Consider albumin and diuretics once sodium improves to 145.   Repeat CMP

## 2024-10-30 NOTE — ASSESSMENT & PLAN NOTE
Palliative care following. Discussions to date have been to maintain full code status.  Following deterioration overnight, discussions were again initiated with the patient's wife, who is now en route. Plan is for further goals of care discussion. The patient's wife had stated she does not want him transferred to intensive care again.

## 2024-10-30 NOTE — QUICK NOTE
Notified by nursing 0323 patient aspirating tube feed. At bedside nurse reports white liquid output from patient nose resembling tube feed. NG tube placement manually checked and found to be in place; also evidenced on most recent CXR 10/29 1500. Instructed nursing to hold tube feed and resume at 0600 at decreased rate of 30 ml/hr.     Will order xray to confirm NGT placement and procalcitonin. Initiated IV ceftriaxone.

## 2024-10-30 NOTE — ASSESSMENT & PLAN NOTE
Patient is status post craniectomy and evacuation of subdural hematoma.  He also has ischemia in the brainstem.  Prognosis for meaningful neurological recovery is poor.  Ongoing discussion with family regarding level of care.  Neurosurgery follow-up.

## 2024-10-30 NOTE — PROGRESS NOTES
Pastoral Care Progress Note       responded to RRT. Upon arrival pt was being assessed by medical team & no family was present. No needs expressed at this time.    Chaplains still remain available.        10/29/24 2100   Clinical Encounter Type   Visited With Patient not available   Crisis Visit Code  (RRT)

## 2024-10-30 NOTE — SOCIAL WORK
Palliative LSW saw patient at the bedside today. LSW appreciates the opportunity to provider patient/family with inpatient emotional support and guidance while patient continues to receive medical attention from the medical team.     Palliative SW briefly met with Spouse Liane at Pt's bedside. Also present were Liane's Mother and Palliative SW Eber.    Liane was tearful, actively grieving and remianed focused on her .   SW left contact information with Liane's Mother, should Liane want to reach out for support in the future.   It is evident that Liane's Mother is a significant support to Liane.     After leaving Pt and his family, SW circled back to CM who reports anticipation of Pt's discharge to [facility].     I have spent 10 minutes with Patient and family today in which greater than 50% of this time was spent in counseling/coordination of care regarding  Emotional support, calming presence,  Communicating with other healthcare professionals and Documenting in the medical record.     LSW will continue to follow as requested by the medical team, patient, or family

## 2024-10-30 NOTE — ASSESSMENT & PLAN NOTE
Patient initially presented to [facility] 10/11 with complaints of intermittent fever, sweats, poor appetite, abdominal distention, lower extremity edema evolving over a 2-week period.  Workup revealed transaminitis, CT abdomen and pelvis showed splenic and lytic lesions, no prior history of malignancy.  He was a rapid response 10/13 where he was noted to have large left acute subdural hematoma with brain compression and left to right midline shift and left uncal herniation.  He was transferred to [place] for emergent craniectomy for hematoma evacuation. Extubated 10/26.  Patient is status post left [facility] for evacuation of SDH 10/13.  Recent repeat CT head 10/24 stable.    10/30 with concern for swelling at crani site, going for STAT CTH.   MRI brain showed ischemia involving the brainstem.  He was evaluated by neurology.    Recommended aspirin 81 mg daily when cleared by neurosurgery.  STAT CTH for any neurological changes  Currently with NGT (placed 10/27)  in place for nutrition. Possible aspiration event overnight 10/30 was a RR. TF currently held pending additional GOC discussions. KUB confirmed that NGT in in proper position. May need to consider PEG pending GOC/clinical course however would be poor candidate for this. Speech following, recommending NPO.   PT/OT recommending rehab  Rest of plan as below

## 2024-10-30 NOTE — ASSESSMENT & PLAN NOTE
In setting of hypoalbuminemia.  Also with recent ascites status post paracentesis for 2 L on 10/22.  Can consider Lasix/albumin once sodium levels stabilized

## 2024-10-30 NOTE — ASSESSMENT & PLAN NOTE
Extubated 10/26. . Completed course of abx for PNA. Upgraded to high flow 10/29 given increased WOB and poor candidate for BIPAP given crani and NGT.   ICU team advising against intubation  GOC discussions in progress   Remains full code at this time

## 2024-10-30 NOTE — PROGRESS NOTES
Progress Note - Infectious Disease   Name: Clayton Duval 44 y.o. male I MRN: [id number]  [facility] I Date of Admission: 10/13/2024   Date of Service: 10/30/2024 I Hospital Day: 17    Assessment & Plan  Sepsis (HCC)  Source of sepsis is most likely perirectal abscess.  Despite sepsis, patient remains systemically well, without toxicity and hemodynamically stable, without hypotension.  Patient had prolonged fever, most likely secondary to infected sacral ulcer but this has resolved with antibiotic and I&D of sacral ulcer.  Admission blood cultures have no growth.  Patient completed Zosyn course last week.  Temperature remains intermittently up throughout hospitalization, despite antibiotic, without further obvious active infection.  WBC remains in the normal range.  Consider tumor fever as etiology of persistent intermittent fever.  Overnight, patient developed fever, decreased mentation and respiratory difficulties, with concern for aspiration and possible aspiration pneumonia.  IV ceftriaxone was started..  Continue IV ceftriaxone.  Monitor temperature/WBC.    Monitor hemodynamics.    Monitor respiratory symptoms.  Perirectal abscess  Patient has spontaneous drainage.  He is being followed by general surgery service, with no plan for surgical I&D previously.  However, at present, there is concern of persistent abscess.  Repeat CT imaging without obvious abscess.  Wound culture with growth of mixed alli, not helpful.  Patient has no history of MRSA, has negative MRSA screen and has MSSA growing in respiratory culture.  At I&D, there was no further abscess or purulence.  Patient completed 7-day course of IV Zosyn last week.  No further antibiotic for this indication  Wound care per surgery.  Sacral wound  Patient is status post bedside I&D and being followed by general surgery.  Concern for adjacent perirectal abscess noted.  CT scan 10/18 also noting auricle thinning of coccyx below the  decubitus ulcer may suggest osteomyelitis.  However they are all bone remodeling beneath the sacral ulcer can also appear similarly.  Definitive diagnosis of osteomized would require a bone biopsy/culture.  Even if osteomyelitis was confirmed however, long-term antibiotic therapy would be futile without a plan to aggressively debride the tissue and bone, send bone cultures and cover the wound with flap for closure.  Patient complete antibiotic course.  Wound care per surgery.  Pneumonia of right lower lobe due to infectious organism  Possible pneumonia, with CXR showing right basilar infiltrate.  Patient is status post bronchoscopy last week, with finding of airway purulence.  BAL Gram stain had 4+ GPC but culture only grew mixed respiratory alli.  With recurrent fever overnight, IV vancomycin was started for presumptive pneumonia.  Despite IV vancomycin, and recent Zosyn, patient continued to have intermittent fever.  Given persistent fever despite antibiotic, doubt pneumonia and doubt active infection.  However, it appears that patient aspirated overnight with recurrent fever, decreased mentation and respiratory difficulties.  It is unclear whether this is aspiration pneumonitis or pneumonia.  Ceftriaxone was restarted overnight.  Continue IV ceftriaxone for now.  Monitor respiratory symptoms.  Monitor temperature.  Non-traumatic acute L subdural hematoma (HCC)  Patient is status post craniectomy and evacuation of subdural hematoma.  He also has ischemia in the brainstem.  Prognosis for meaningful neurological recovery is poor.  Ongoing discussion with family regarding level of care.  Neurosurgery follow-up.  Stroke (HCC)  Head MRI showed ischemia involving brainstem.  Patient had decreased responsiveness on presentation.  Mental status with some improvement but deteriorated over last 24 hours.  Ongoing discussion with family regarding level of care noted.  Neurology follow-up.  Splenic lesion  Patient with  multiple splenic, hepatic and osseous lesions, concerning for metastasis.  Patient is status post bone biopsy last week.  Histoplasma antigen negative.  Follow-up on pathology.    Discussed with patient's wife.  I have discussed with TOM Ybarra from primary service regarding the above plan to continue current antibiotic plan.  She agrees with the plan.  She is discussing with patient's family regarding level of care and goals of care.    Antibiotics:  Ceftriaxone started overnight    Subjective   Patient was seen earlier.  Events overnight noted.  Patient with decreased mentation and fever again, with respiratory difficulties.  Ceftriaxone was started for possible aspiration pneumonia.  At present, patient is unresponsive.    Objective :  Temp:  [99 °F (37.2 °C)-102.6 °F (39.2 °C)] 101.3 °F (38.5 °C)  HR:  [114-131] 122  BP: (111-128)/(74-85) 111/74  Resp:  [16-96] 40  SpO2:  [94 %-100 %] 99 %  O2 Device: High flow nasal cannula  Nasal Cannula O2 Flow Rate (L/min):  [4 L/min] 4 L/min  FiO2 (%):  [30-33] 32    Physical Exam:     General: Unresponsive to verbal or tactile stimuli.   Neck:  Supple. No mass.  No lymphadenopathy.   Lungs: Expansion symmetric, diffuse rhonchi, , respirations labored.   Heart:  Tachycardic with regular rhythm, S1 and S2 normal, no murmur.   Abdomen: Soft, nondistended, non-tender, bowel sounds active all four quadrants, no masses, no organomegaly.   Extremities: Stable leg edema. No erythema/warmth. No ulcer. Nontender to palpation.   Skin:  No rash.   Neuro: Not assessable.        Lab Results: I have reviewed the following results:  Results from last 7 days   Lab Units 10/30/24  0514 10/29/24  0931 10/28/24  0549   WBC Thousand/uL 7.06 6.33 7.20   HEMOGLOBIN g/dL 7.6* 7.2* 7.3*   PLATELETS Thousands/uL 178 156 127*     Results from last 7 days   Lab Units 10/30/24  0514 10/29/24  1750 10/29/24  1508 10/29/24  0931 10/28/24  1539 10/28/24  0549   SODIUM mmol/L 150* 151* 149* 150*    < > 151*   POTASSIUM mmol/L 4.3 4.1 4.1 4.3   < > 4.1   CHLORIDE mmol/L 116* 117* 117* 119*   < > 118*   CO2 mmol/L 23 24 24 24   < > 24   BUN mg/dL 59* 54* 55* 53*   < > 47*   CREATININE mg/dL 1.02 1.00 0.98 0.88   < > 0.78   EGFR ml/min/1.73sq m 88 90 92 103   < > 109   CALCIUM mg/dL 8.5 8.4 8.3* 8.0*   < > 8.0*   AST U/L 259*  --   --  256*  --  253*   ALT U/L 108*  --   --  106*  --  113*   ALK PHOS U/L 1,013*  --   --  1,107*  --  1,097*   ALBUMIN g/dL 2.5*  --   --  2.3*  --  2.4*    < > = values in this interval not displayed.     Results from last 7 days   Lab Units 10/24/24  1529 10/24/24  0000 10/23/24  2309 10/23/24  2251   BLOOD CULTURE   --   --  No Growth After 5 Days. No Growth After 5 Days.   GRAM STAIN RESULT  4+ Gram positive cocci in pairs*  Rare Polys* Rare Polys  No organisms seen  --   --      Results from last 7 days   Lab Units 10/30/24  0514   PROCALCITONIN ng/ml 2.18*         Results from last 7 days   Lab Units 10/24/24  1434   FERRITIN ng/mL >7,500*

## 2024-10-30 NOTE — ASSESSMENT & PLAN NOTE
Patient initially presented to [facility] 10/11 with complaints of intermittent fever, sweats, poor appetite, abdominal distention, lower extremity edema evolving over a 2-week period.  Workup revealed transaminitis, CT abdomen and pelvis showed splenic and lytic lesions, no prior history of malignancy.  He was a rapid response 10/13 where he was noted to have large left acute subdural hematoma with brain compression and left to right midline shift and left uncal herniation.  He was transferred to [place] for emergent craniectomy for hematoma evacuation. Extubated 10/26.  Patient is status post left [facility] for evacuation of [facility] 10/13.  Recent repeat CT head 10/24 stable.    Due for NSX f/u 11/1  MRI brain showed ischemia involving the brainstem.  He was evaluated by neurology.    Recommended aspirin 81 mg daily when cleared by neurosurgery.  STAT CTH for any neurological changes  Currently with NGT (placed 10/27)  in place for nutrition, will consult speech therapy. May need to consider PEG pending GOC/clinical course. This was discussed yesterday with the patient's wife.  Noted increased swelling of the craniectomy site today, with concerns of pupillary changes noted. STAT CT head ordered.   PT/OT recommending rehab  Rest of plan as below

## 2024-10-30 NOTE — ASSESSMENT & PLAN NOTE
Goals of care  Level 4 Code Status - Comfort Care  Meeting held w/ patient's spouse, Diane Johnson CRNP and PSC team   Discussed patient's poor prognosis and worsening medical status  After talking w/ some family, spouse stated that she does not want the patient to suffer and agreed to make him comfort care  Liane agreeable to stopping all non-comforting medications/interventions   She verbally expressed an understanding of comfort care and that it will allow for a natural death   Stated that she would like to stay in the hospital until he passes     Social support:  Supportive listening provided  Normalized experience of patient/family  Provided anxiety containment  Provided anticipatory guidance  Encouraged self care    Follow up  Palliative Care will continue to follow and goals of care discussions will be ongoing.    Please reach out via [employer] Secure Chat if questions or concerns arise.    I have reviewed the patient's controlled substance dispensing history in the Prescription Drug Monitoring Program in compliance with the [facility] regulations before prescribing any controlled substances.  Last refills: N/A    Decisional apparatus:  Patient does not have capacity on exam today.  If capacity is lost, patient's substitute decision maker would default to spouse (Liane) by [place] Act 169.    Advance Directive/Living Will, POLST and POA Forms: None on file.    ER contacts:  Name Relation Home Work Mobile   Liane Duval Spouse   [phone]     We appreciate the invitation to be involved in this patient's care.  We will continue to follow throughout this hospitalization.  Please do not hesitate to reach our on call provider through our clinic answering service at [phone] should you have acute symptom control concerns.

## 2024-10-30 NOTE — QUICK NOTE
Called back to room by wife. Myself and Nohemy Edmundo present from palliative care. Wife expressed wishes to transition to comfort care. She verbally confirmed she would not want CPR or re-intubation and to allow for natural death.      Clayton will be supported by comfort based medications. NGT will be removed and he will be transitioned off high flow NC.    I will continue to follow the chart for results of bone biopsy and relay information to family once known.     Hospice consult placed, may be candidate for inpatient hospice.     Prognosis poor--hours to short days.       TOM Ybarra  SLIM

## 2024-10-30 NOTE — ASSESSMENT & PLAN NOTE
PE and  LUE DVT. Likely hypercoagulable state in setting of underlying malignancy.  Anticoagulation not pursued due to recent subdural hematoma

## 2024-10-30 NOTE — ASSESSMENT & PLAN NOTE
Extubated 10/26, currently on high flow nasal cannula, with increased work of breathing noted. Completed course of abx for PNA.   Pulmonary toilet  Goals of care discussion as detailed below: the patient may require escalated oxygen therapy.

## 2024-10-30 NOTE — ASSESSMENT & PLAN NOTE
Patient noted to have fevers earlier this hospitalization, met sepsis criteria.  Has large sacral wound with concern for perirectal abscess.  This has been debrided by general surgery.  Completed course of Zosyn per infectious disease.  Continues to have intermittent fevers despite antibiotic without further obvious active infection as well as sinus tachycardia.   ID following, was being monitored of IV abx despite persistent fevers as they are felt to be more related to tumor fever however given aspiration event overnight, was restarted on IV ceftriaxone.   Appreciate further ID input  GOC discussions as above

## 2024-10-30 NOTE — ASSESSMENT & PLAN NOTE
NA levels up to 150  Nephrology following, recommended D5 however this was stopped overnight by ICU team during RRT.   Nephrology following  GOC as above  Trend labs

## 2024-10-30 NOTE — ASSESSMENT & PLAN NOTE
Source of sepsis is most likely perirectal abscess.  Despite sepsis, patient remains systemically well, without toxicity and hemodynamically stable, without hypotension.  Patient had prolonged fever, most likely secondary to infected sacral ulcer but this has resolved with antibiotic and I&D of sacral ulcer.  Admission blood cultures have no growth.  Patient completed Zosyn course last week.  Temperature remains intermittently up throughout hospitalization, despite antibiotic, without further obvious active infection.  WBC remains in the normal range.  Consider tumor fever as etiology of persistent intermittent fever.  Overnight, patient developed fever, decreased mentation and respiratory difficulties, with concern for aspiration and possible aspiration pneumonia.  IV ceftriaxone was started..  Continue IV ceftriaxone.  Monitor temperature/WBC.    Monitor hemodynamics.    Monitor respiratory symptoms.

## 2024-10-30 NOTE — QUICK NOTE
Spoke to for me at bedside.  The family understands that the patient would benefit from comfort care measures and have opted to do so.  Patient now level for comfort care.  Nephrology will sign off.

## 2024-10-30 NOTE — PROGRESS NOTES
Progress Note - Palliative Care   Name: Clayton Duval 44 y.o. male I MRN: [id number]  Unit/Bed#: [facility] 627-01 I Date of Admission: 10/13/2024   Date of Service: 10/30/2024 I Hospital Day: 17    Assessment & Plan  Non-traumatic acute L subdural hematoma (HCC)  Managed by Neuro ICU  Sacral wound  Managed by Surg-Gen  Large sacral wound which is suspected to be pressure induced  Potential perirectal fistula  OR on 10/21 for EUA  Palliative care by specialist  Goals of care  Level 4 Code Status - Comfort Care  Meeting held w/ patient's spouse, Diane Johnson CRJOANNA and PSC team   Discussed patient's poor prognosis and worsening medical status  After talking w/ some family, spouse stated that she does not want the patient to suffer and agreed to make him comfort care  Liane agreeable to stopping all non-comforting medications/interventions   She verbally expressed an understanding of comfort care and that it will allow for a natural death   Stated that she would like to stay in the hospital until he passes     Social support:  Supportive listening provided  Normalized experience of patient/family  Provided anxiety containment  Provided anticipatory guidance  Encouraged self care    Follow up  Palliative Care will continue to follow and goals of care discussions will be ongoing.    Please reach out via [employer] Secure Chat if questions or concerns arise.    I have reviewed the patient's controlled substance dispensing history in the Prescription Drug Monitoring Program in compliance with the [facility] regulations before prescribing any controlled substances.  Last refills: N/A    Decisional apparatus:  Patient does not have capacity on exam today.  If capacity is lost, patient's substitute decision maker would default to spouse (Liane) by [place] Act 169.    Advance Directive/Living Will, POLST and POA Forms: None on file.    ER contacts:  Name Relation Home Work Mobile   Liane Duval Spouse   [phone]     We appreciate the  invitation to be involved in this patient's care.  We will continue to follow throughout this hospitalization.  Please do not hesitate to reach our on call provider through our clinic answering service at [phone] should you have acute symptom control concerns.    Acute respiratory failure (HCC)  Increasing O2 requirments  ICU not recommending intubation  Multiple lesions of metastatic malignancy (HCC)      Subjective   Met w/ patient and spouse at bedside. Patient on HFNC, unresponsive. Meeting held as stated above, Liane agreed to Level 4 - Comfort Care. Stated that she does not want to continue to let him suffer and is agreeable to making him comfortable.     Objective :  Temp:  [99 °F (37.2 °C)-102.6 °F (39.2 °C)] 101.3 °F (38.5 °C)  HR:  [114-131] 122  BP: (111-128)/(74-85) 111/74  Resp:  [16-96] 40  SpO2:  [94 %-100 %] 99 %  O2 Device: High flow nasal cannula  Nasal Cannula O2 Flow Rate (L/min):  [4 L/min] 4 L/min  FiO2 (%):  [30-33] 32    Physical Exam  Constitutional:       Appearance: He is ill-appearing.      Comments: HFNC   HENT:      Mouth/Throat:      Mouth: Mucous membranes are dry.   Cardiovascular:      Rate and Rhythm: Tachycardia present.   Pulmonary:      Effort: Tachypnea present.      Comments: HFNC  Skin:     General: Skin is warm and dry.      Coloration: Skin is pale.   Neurological:      Mental Status: He is unresponsive.          Lab Results: I have reviewed the following results:  Lab Results   Component Value Date/Time    SODIUM 150 (H) 10/30/2024 05:14 AM    SODIUM 130 (L) 10/01/2024 02:47 PM    K 4.3 10/30/2024 05:14 AM    K 4.5 10/01/2024 02:47 PM    BUN 59 (H) 10/30/2024 05:14 AM    BUN 18 10/01/2024 02:47 PM    BUN 18 10/11/2019 03:21 PM    CREATININE 1.02 10/30/2024 05:14 AM    CREATININE 0.9 10/01/2024 02:47 PM    GLUC 81 10/30/2024 05:14 AM    GLUC 79 10/01/2024 02:47 PM    CALCIUM 8.5 10/30/2024 05:14 AM    CALCIUM 7.5 (L) 10/01/2024 02:47 PM     (H) 10/30/2024 05:14  AM     (H) 10/01/2024 02:47 PM     (H) 10/30/2024 05:14 AM     (H) 10/01/2024 02:47 PM    ALB 2.5 (L) 10/30/2024 05:14 AM    ALB 2.7 (L) 10/01/2024 02:47 PM    TP 3.6 (L) 10/30/2024 05:14 AM    TP 4.4 (L) 10/01/2024 02:47 PM    EGFR 88 10/30/2024 05:14 AM    EGFR >90 10/01/2024 02:47 PM    EGFR 81 10/11/2019 03:21 PM     Lab Results   Component Value Date/Time    HGB 7.6 (L) 10/30/2024 05:14 AM    WBC 7.06 10/30/2024 05:14 AM     10/30/2024 05:14 AM    INR 1.35 (H) 10/28/2024 05:49 AM    PTT 43 (H) 10/28/2024 05:49 AM     Lab Results   Component Value Date/Time    NKQ6DFCYSNDM 23.267 (H) 10/12/2024 04:49 AM     Administrative Statements   I have spent a total time of 60 minutes in caring for this patient on the day of the visit/encounter including Diagnostic results, Prognosis, Risks and benefits of tx options, Instructions for management, Patient and family education, Importance of tx compliance, Risk factor reductions, Impressions, Counseling / Coordination of care, Documenting in the medical record, Reviewing / ordering tests, medicine, procedures  , Obtaining or reviewing history  , and Communicating with other healthcare professionals . Topics discussed with the patient / family include symptom assessment and management, medication review, medication adjustment, psychosocial support, goals of care, hospice services, opioid titration, supportive listening, and anticipatory guidance.    Nohemy Wyatt

## 2024-10-30 NOTE — ASSESSMENT & PLAN NOTE
Palliative care following.  Family meeting 10/29, no limits set.   Decompensated overnight 10/30, additional GOC discussions in progress.   ICU team re-evaluated, advised against re-intubation.

## 2024-10-30 NOTE — QUICK NOTE
RR called at 2102 due to suspected change in neuro status. Per critical care, patient neurologically at baseline. Notified by nursing 2240 that patient is febrile 101F s/p 650mg tylenol at 2113. 325mg tylenol and covid/flu/rsv swab ordered. Instructed nurse to pack patient with ice.

## 2024-10-31 NOTE — ASSESSMENT & PLAN NOTE
NA levels up to 150 as of most recent check   Nephrology following, recommended D5 however this was stopped by ICU team during RRT 10/30  Now comfort, no further labs

## 2024-10-31 NOTE — ASSESSMENT & PLAN NOTE
"Patient with suspected metastatic cancer, patient with hepatosplenomegaly, hepatic lesions, splenic lesions, transaminitis.  Evaluated by oncology, tumor markers ordered, CEA, AFP, normal.  CA 19 9, 697, LDH elevated at 556.  Hepatitis panel negative.  Now status post iliac bone biopsy done 10/24, preliminary findings with \"intramedullary spindle cell population associated with T-cell predominant lymphoid population\", finals pending  Also status post paracentesis 10/22, fluid negative for malignancy.  Oncology plans to d/w family 10/31 per their request even though would not change outcome  "

## 2024-10-31 NOTE — ASSESSMENT & PLAN NOTE
S/P I&D with general surgery. Concern for adjacent perirectal abscess noted. CT scan 10/18 also noting auricle thinning of coccyx below the decubitus ulcer may suggest osteomyelitis.   Definitive diagnosis of osteomized would require a bone biopsy/culture. However even if OM was confirmed, long-term antibiotic therapy would be futile without a plan to aggressively debride the tissue and bone, send bone cultures and cover the wound with flap for closure.   LWC per general surgery  Strict and frequent turning and repositioning  Maintain Barclay catheter to avoid wound contamination

## 2024-10-31 NOTE — ASSESSMENT & PLAN NOTE
Patient initially presented to [facility] 10/11 with complaints of intermittent fever, sweats, poor appetite, abdominal distention, lower extremity edema evolving over a 2-week period.  Workup revealed transaminitis, CT abdomen and pelvis showed splenic and lytic lesions, no prior history of malignancy.  He was a rapid response 10/13 where he was noted to have large left acute subdural hematoma with brain compression and left to right midline shift and left uncal herniation.  He was transferred to [place] for emergent craniectomy for hematoma evacuation. Extubated 10/26.  Patient is status post left [facility] for evacuation of [facility]H 10/13.  Recent repeat CT head 10/24 stable.    10/30 with concern for swelling at crani site, going for STAT CTH.   MRI brain showed ischemia involving the brainstem.  He was evaluated by neurology.    Recommended aspirin 81 mg daily when cleared by neurosurgery.  STAT CTH for any neurological changes  Had NGT however with possible aspiration event overnight 10/30 was a RR. TF held, Speech saw and recommended NPO--now comfort care of 10/30--peacefully passed 10/31 at 10:13 AM.

## 2024-10-31 NOTE — DEATH NOTE
INPATIENT DEATH NOTE  Clayton Duval 44 y.o. male MRN: [id number]  [facility] Encounter: [id number]    Date, Time and Cause of Death    Date of Death: 10/31/24  Time of Death: 10:13 AM  Preliminary Cause of Death: Metastatic cancer (HCC)          Patient's Information  Pronounced by: Terrie Ramsay PA-C  Did the patient's death occur in the ED?: No  Did the patient's death occur in the OR?: No  Did the patient's death occur less than 10 days post-op?: No  Did the patient's death occur within 24 hours of admission?: No  Was code status DNR at the time of death?: Yes    PHYSICAL EXAM:  Unresponsive to noxious stimuli, Spontaneous respirations absent, Breath sounds absent, Carotid pulse absent, Heart sounds absent, Pupillary light reflex absent, and Corneal blink reflex absent    Medical Examiner notification criteria:  NA   Medical Examiner's office notified?:  Yes, by RN  Medical Examiner accepted case?:  No  Name of Medical Examiner: Unknown    Family Notification  Was the family notified?: Yes  Date Notified: 10/31/24  Time Notified: 1200  Notified by: Terrie Ramsay (SLIM) and Marisol Wyatt (palliative)  Name of Family Notified of Death: Wife Liane Duval   Relationship to Patient: Spouse  Family Notification Route: At bedside    Autopsy Options:  Post-mortem examination declined by next of kin    Primary Service Attending Physician notified?:  yes - Attending:  Jean Claude Foley MD    Physician/Resident responsible for completing Discharge Summary:  Terrie Ramsay PA-C

## 2024-10-31 NOTE — ASSESSMENT & PLAN NOTE
Initial MRI brain 10/13 showed Multifocal early ischemia including right anterior thalamus, anterior occipital lobes left greater than right. On the left this tracks into the posterior medial temporal lobe. There is also a punctate focus of cortical ischemia within the posterior parafalcine frontal lobe. Cortical petechial hemorrhage noted within the central aspect of the right thalamus and left anterior occipital lobe.  He was evaluated by neurology, recommending ASA when cleared by NSx however now hospice  Noted to have PFO on echo, appears CHRIS was considered for further work up but unclear utility of this.

## 2024-10-31 NOTE — DISCHARGE SUMMARY
Discharge Summary - Hospitalist   Name: Clayton Duval 44 y.o. male I MRN: [id number]  Unit/Bed#: [facility] 627-01 I Date of Admission: 10/13/2024   Date of Service: 10/31/2024 I Hospital Day: 18     Assessment & Plan  Non-traumatic acute L subdural hematoma (HCC)  Patient initially presented to [facility] 10/11 with complaints of intermittent fever, sweats, poor appetite, abdominal distention, lower extremity edema evolving over a 2-week period.  Workup revealed transaminitis, CT abdomen and pelvis showed splenic and lytic lesions, no prior history of malignancy.  He was a rapid response 10/13 where he was noted to have large left acute subdural hematoma with brain compression and left to right midline shift and left uncal herniation.  He was transferred to [place] for emergent craniectomy for hematoma evacuation. Extubated 10/26.  Patient is status post left [facility] for evacuation of SDH 10/13.  Recent repeat CT head 10/24 stable.    10/30 with concern for swelling at crani site, going for STAT CTH.   MRI brain showed ischemia involving the brainstem.  He was evaluated by neurology.    Recommended aspirin 81 mg daily when cleared by neurosurgery.  STAT CTH for any neurological changes  Had NGT however with possible aspiration event overnight 10/30 was a RR. TF held, Speech saw and recommended NPO--now comfort care of 10/30--peacefully passed 10/31 at 10:13 AM.     Sepsis (HCC)  Patient noted to have fevers earlier this hospitalization, met sepsis criteria.  Has large sacral wound with concern for perirectal abscess.  This has been debrided by general surgery.  Completed course of Zosyn per infectious disease.  Continued to have intermittent fevers despite antibiotic without further obvious active infection as well as sinus tachycardia.   ID following, was being monitored of IV abx despite persistent fevers as they are felt to be more related to tumor fever  Comfort care   Stroke (HCC)  Initial MRI brain 10/13 showed  "Multifocal early ischemia including right anterior thalamus, anterior occipital lobes left greater than right. On the left this tracks into the posterior medial temporal lobe. There is also a punctate focus of cortical ischemia within the posterior parafalcine frontal lobe. Cortical petechial hemorrhage noted within the central aspect of the right thalamus and left anterior occipital lobe.  He was evaluated by neurology, recommending ASA when cleared by NSx however now hospice  Noted to have PFO on echo, appears CHRIS was considered for further work up but unclear utility of this.   Sacral wound  S/P I&D with general surgery. Concern for adjacent perirectal abscess noted. CT scan 10/18 also noting auricle thinning of coccyx below the decubitus ulcer may suggest osteomyelitis.   Definitive diagnosis of osteomized would require a bone biopsy/culture. However even if OM was confirmed, long-term antibiotic therapy would be futile without a plan to aggressively debride the tissue and bone, send bone cultures and cover the wound with flap for closure.   LWC per general surgery  Strict and frequent turning and repositioning  Maintain Barclay catheter to avoid wound contamination  Perirectal abscess  As above   Multiple lesions of metastatic malignancy (HCC)  Patient with suspected metastatic cancer, patient with hepatosplenomegaly, hepatic lesions, splenic lesions, transaminitis.  Evaluated by oncology, tumor markers ordered, CEA, AFP, normal.  CA 19 9, 697, LDH elevated at 556.  Hepatitis panel negative.  Now status post iliac bone biopsy done 10/24, preliminary findings with \"intramedullary spindle cell population associated with T-cell predominant lymphoid population\", finals pending  Also status post paracentesis 10/22, fluid negative for malignancy.  Oncology plans to d/w family 10/31 per their request even though would not change outcome  Acute respiratory failure (HCC)  Extubated 10/26. . Completed course of abx for " PNA. Upgraded to high flow 10/29 given increased WOB and poor candidate for BIPAP given crani and NGT.   ICU team advised against intubation  Now is comfort care as of 10/30  Hypernatremia  NA levels up to 150 as of most recent check   Nephrology following, recommended D5 however this was stopped by ICU team during RRT 10/30  Now comfort, no further labs   Pulmonary embolism (HCC)  PE and  LUE DVT. Likely hypercoagulable state in setting of underlying malignancy.  Anticoagulation not pursued due to recent subdural hematoma  End of life care  Palliative care following  Decompensated overnight 10/30, additional GOC discussions had, now comfort care as of 10/30  Anasarca  In setting of hypoalbuminemia.  Also with recent ascites status post paracentesis for 2 L on 10/22.    Anemia  Likely in setting of chronic illness/underlying malignancy.  Has received multiple units of PRBC with last 1 being 10/24.  Monitor      Discharging Physician / Practitioner: Terrie Ramsay PA-C  PCP: Marcio Ann DO  Admission Date:   Admission Orders (From admission, onward)       Ordered        10/13/24 1201  INPATIENT ADMISSION  Once                          Discharge Date: 10/31/24    Medical Problems       Resolved Problems  Date Reviewed: 10/31/2024   None         Consultations During Hospital Stay:  Renal  Surgery  ID  Palliative Care  Neurology  Oncology  Neurosurgery  GI    Test Results Pending: final bone marrow biopsy results     Complications:  several    Reason for Admission: cough, nausea    Hospital Course:     Clayton Duval is a 44 y.o. male patient who originally presented to the hospital on 10/13/2024 as a transfer from [facility] where he initially presented on 10/11 with cough, nausea and feeling unwell for several weeks. The patient was found to have increased LFTs and an elevated TSH as well. The patient was also found to be hyponatremic with a sodium level of 127 in the ED. The patient was admitted and had imaging  completed of his abdomen. The abdominal imaging including MRI with and without contrast revealed multiple lesions on the liver, spleen, and lytic lesions in the spine. The patient reportedly became acutely altered on 10/13/2024. Stat CT head was completed which showed a left-sided subdural hematoma. [facility] on 10/13.     Please see above list of diagnoses and related plan for additional information. Ultimately made comfort care on 10/30 given several complications and passed away 10/31.       Discharge Day Visit / Exam:     * Please refer to separate progress note for these details *    Discussion with Family: wife at bedside       Disposition:        Discharge Statement:  I spent 3 minutes discharging the patient. This time was spent on the day of discharge. I had direct contact with the patient on the day of discharge. Greater than 50% of the total time was spent examining patient, answering all patient questions, arranging and discussing plan of care with patient as well as directly providing post-discharge instructions.  Additional time then spent on discharge activities.    Discharge Medications:  See after visit summary for reconciled discharge medications provided to patient and family.      ** Please Note: This note has been constructed using a voice recognition system **

## 2024-10-31 NOTE — ASSESSMENT & PLAN NOTE
Goals of care  Level 4 Code Status - Comfort Care  Meeting held w/ patient's spouse, Diane Johnson CRNP and PSC team   Discussed patient's poor prognosis and worsening medical status  After talking w/ some family, spouse stated that she does not want the patient to suffer and agreed to make him comfort care  Liane agreeable to stopping all non-comforting medications/interventions   She verbally expressed an understanding of comfort care and that it will allow for a natural death   Stated that she would like to stay in the hospital until he passes     Social support:  Supportive listening provided  Normalized experience of patient/family  Provided anxiety containment  Provided anticipatory guidance  Encouraged self care    Follow up  Palliative Care will continue to follow and goals of care discussions will be ongoing.    Please reach out via [employer] Secure Chat if questions or concerns arise.    I have reviewed the patient's controlled substance dispensing history in the Prescription Drug Monitoring Program in compliance with the [facility] regulations before prescribing any controlled substances.  Last refills: N/A    Decisional apparatus:  Patient does not have capacity on exam today.  If capacity is lost, patient's substitute decision maker would default to spouse (Liane) by [place] Act 169.    Advance Directive/Living Will, POLST and POA Forms: None on file.    ER contacts:  Name Relation Home Work Mobile   Liane Duval Spouse   [phone]     We appreciate the invitation to be involved in this patient's care.  We will continue to follow throughout this hospitalization.  Please do not hesitate to reach our on call provider through our clinic answering service at [phone] should you have acute symptom control concerns.

## 2024-10-31 NOTE — PROGRESS NOTES
Spoke with Katerine from [employer]. Reference number [id number]. She will reach out to family regarding candidacy for corneal donation.

## 2024-10-31 NOTE — ASSESSMENT & PLAN NOTE
Extubated 10/26. . Completed course of abx for PNA. Upgraded to high flow 10/29 given increased WOB and poor candidate for BIPAP given crani and NGT.   ICU team advised against intubation  Now is comfort care as of 10/30

## 2024-10-31 NOTE — PLAN OF CARE
Problem: Nutrition/Hydration-ADULT  Goal: Nutrient/Hydration intake appropriate for improving, restoring or maintaining nutritional needs  Description: Monitor and assess patient's nutrition/hydration status for malnutrition. Collaborate with interdisciplinary team and initiate plan and interventions as ordered.  Monitor patient's weight and dietary intake as ordered or per policy. Utilize nutrition screening tool and intervene as necessary. Determine patient's food preferences and provide high-protein, high-caloric foods as appropriate.     INTERVENTIONS:  - Monitor oral intake, urinary output, labs, and treatment plans  - Assess nutrition and hydration status and recommend course of action  - Evaluate amount of meals eaten  - Assist patient with eating if necessary   - Allow adequate time for meals  - Recommend/ encourage appropriate diets, oral nutritional supplements, and vitamin/mineral supplements  - Order, calculate, and assess calorie counts as needed  - Recommend, monitor, and adjust tube feedings and TPN/PPN based on assessed needs  - Assess need for intravenous fluids  - Provide specific nutrition/hydration education as appropriate  - Include patient/family/caregiver in decisions related to nutrition  Outcome: Completed  Level 4 Comfort Care - Nutrition Services signing off at this time.

## 2024-10-31 NOTE — PROGRESS NOTES
Progress Note - Palliative Care   Name: Clayton Duval 44 y.o. male I MRN: [id number]  Unit/Bed#: [facility] 627-01 I Date of Admission: 10/13/2024   Date of Service: 10/31/2024 I Hospital Day: 18    Assessment & Plan  Non-traumatic acute L subdural hematoma (HCC)  Managed by Neuro ICU  Sacral wound  Managed by Surg-Gen  Large sacral wound which is suspected to be pressure induced  Potential perirectal fistula  OR on 10/21 for EUA  Palliative care by specialist  Goals of care  Level 4 Code Status - Comfort Care  Meeting held w/ patient's spouse, Diane Johnson CRJOANNA and PSC team   Discussed patient's poor prognosis and worsening medical status  After talking w/ some family, spouse stated that she does not want the patient to suffer and agreed to make him comfort care  Liane agreeable to stopping all non-comforting medications/interventions   She verbally expressed an understanding of comfort care and that it will allow for a natural death   Stated that she would like to stay in the hospital until he passes     Social support:  Supportive listening provided  Normalized experience of patient/family  Provided anxiety containment  Provided anticipatory guidance  Encouraged self care    Follow up  Palliative Care will continue to follow and goals of care discussions will be ongoing.    Please reach out via [employer] Secure Chat if questions or concerns arise.    I have reviewed the patient's controlled substance dispensing history in the Prescription Drug Monitoring Program in compliance with the [facility] regulations before prescribing any controlled substances.  Last refills: N/A    Decisional apparatus:  Patient does not have capacity on exam today.  If capacity is lost, patient's substitute decision maker would default to spouse (Liane) by [place] Act 169.    Advance Directive/Living Will, POLST and POA Forms: None on file.    ER contacts:  Name Relation Home Work Mobile   Liane Duval Spouse   [phone]     We appreciate the  invitation to be involved in this patient's care.  We will continue to follow throughout this hospitalization.  Please do not hesitate to reach our on call provider through our clinic answering service at [phone] should you have acute symptom control concerns.    Acute respiratory failure (HCC)  Increasing O2 requirments  ICU not recommending intubation    Subjective   Patient required increasing comfort medication overnight. During evaluation this AM, patient appears comfortable, having prolonged periods of apnea and then when completely apneic. No palpable pulses. RN and Terrie Ramsay PA-C at bedside.     Briefly met w/ spouse Liaen and other family member with Terrie Ramsay PA-C. Provided support to spouse/family member. Spouse/family member requested some privacy.     Objective :  Temp:  [101.3 °F (38.5 °C)] 101.3 °F (38.5 °C)  HR:  [122-128] 124  Resp:  [44] 44  SpO2:  [78 %-99 %] 78 %  O2 Device: Nasal cannula  Nasal Cannula O2 Flow Rate (L/min):  [5 L/min-6 L/min] 6 L/min    Physical Exam  Constitutional:       General: He is not in acute distress.     Appearance: He is ill-appearing.      Interventions: Nasal cannula in place.   HENT:      Mouth/Throat:      Mouth: Mucous membranes are dry.   Cardiovascular:      Comments: Unable to palpate pulse  Pulmonary:      Comments: Apneic   Skin:     General: Skin is warm and dry.      Coloration: Skin is jaundiced.   Neurological:      Mental Status: He is unresponsive.          Lab Results: I have reviewed the following results:  Lab Results   Component Value Date/Time    SODIUM 150 (H) 10/30/2024 05:14 AM    SODIUM 130 (L) 10/01/2024 02:47 PM    K 4.3 10/30/2024 05:14 AM    K 4.5 10/01/2024 02:47 PM    BUN 59 (H) 10/30/2024 05:14 AM    BUN 18 10/01/2024 02:47 PM    BUN 18 10/11/2019 03:21 PM    CREATININE 1.02 10/30/2024 05:14 AM    CREATININE 0.9 10/01/2024 02:47 PM    GLUC 81 10/30/2024 05:14 AM    GLUC 79 10/01/2024 02:47 PM    CALCIUM 8.5 10/30/2024 05:14  AM    CALCIUM 7.5 (L) 10/01/2024 02:47 PM     (H) 10/30/2024 05:14 AM     (H) 10/01/2024 02:47 PM     (H) 10/30/2024 05:14 AM     (H) 10/01/2024 02:47 PM    ALB 2.5 (L) 10/30/2024 05:14 AM    ALB 2.7 (L) 10/01/2024 02:47 PM    TP 3.6 (L) 10/30/2024 05:14 AM    TP 4.4 (L) 10/01/2024 02:47 PM    EGFR 88 10/30/2024 05:14 AM    EGFR >90 10/01/2024 02:47 PM    EGFR 81 10/11/2019 03:21 PM     Lab Results   Component Value Date/Time    HGB 7.6 (L) 10/30/2024 05:14 AM    WBC 7.06 10/30/2024 05:14 AM     10/30/2024 05:14 AM    INR 1.35 (H) 10/28/2024 05:49 AM    PTT 43 (H) 10/28/2024 05:49 AM     Lab Results   Component Value Date/Time    FHU5APNGFPAW 23.267 (H) 10/12/2024 04:49 AM     Administrative Statements   I have spent a total time of 15 minutes in caring for this patient on the day of the visit/encounter including Counseling / Coordination of care, Documenting in the medical record, Reviewing / ordering tests, medicine, procedures  , Obtaining or reviewing history  , and Communicating with other healthcare professionals . Topics discussed with the patient / family include symptom assessment and management, medication review, psychosocial support, supportive listening, and anticipatory guidance.    Nohemy Wyatt

## 2024-10-31 NOTE — ASSESSMENT & PLAN NOTE
In setting of hypoalbuminemia.  Also with recent ascites status post paracentesis for 2 L on 10/22.

## 2024-10-31 NOTE — ASSESSMENT & PLAN NOTE
Palliative care following  Decompensated overnight 10/30, additional GOC discussions had, now comfort care as of 10/30

## 2024-10-31 NOTE — ASSESSMENT & PLAN NOTE
Patient noted to have fevers earlier this hospitalization, met sepsis criteria.  Has large sacral wound with concern for perirectal abscess.  This has been debrided by general surgery.  Completed course of Zosyn per infectious disease.  Continued to have intermittent fevers despite antibiotic without further obvious active infection as well as sinus tachycardia.   ID following, was being monitored of IV abx despite persistent fevers as they are felt to be more related to tumor fever  Comfort care

## 2024-10-31 NOTE — ASSESSMENT & PLAN NOTE
Patient initially presented to [facility] 10/11 with complaints of intermittent fever, sweats, poor appetite, abdominal distention, lower extremity edema evolving over a 2-week period.  Workup revealed transaminitis, CT abdomen and pelvis showed splenic and lytic lesions, no prior history of malignancy.  He was a rapid response 10/13 where he was noted to have large left acute subdural hematoma with brain compression and left to right midline shift and left uncal herniation.  He was transferred to [place] for emergent craniectomy for hematoma evacuation. Extubated 10/26.  Patient is status post left [facility] for evacuation of SDH 10/13.  Recent repeat CT head 10/24 stable.    10/30 with concern for swelling at crani site, going for STAT CTH.   MRI brain showed ischemia involving the brainstem.  He was evaluated by neurology.    Recommended aspirin 81 mg daily when cleared by neurosurgery.  STAT CTH for any neurological changes  Currently with NGT (placed 10/27)  in place for nutrition. Possible aspiration event overnight 10/30 was a RR. TF held, Speech saw and recommended NPO--now comfort care of 10/30

## 2024-10-31 NOTE — PROGRESS NOTES
Progress Note - Hospitalist   Name: Clayton Duval 44 y.o. male I MRN: [id number]  Unit/Bed#: [facility] 627-01 I Date of Admission: 10/13/2024   Date of Service: 10/31/2024 I Hospital Day: 18    Assessment & Plan  Non-traumatic acute L subdural hematoma (HCC)  Patient initially presented to [facility] 10/11 with complaints of intermittent fever, sweats, poor appetite, abdominal distention, lower extremity edema evolving over a 2-week period.  Workup revealed transaminitis, CT abdomen and pelvis showed splenic and lytic lesions, no prior history of malignancy.  He was a rapid response 10/13 where he was noted to have large left acute subdural hematoma with brain compression and left to right midline shift and left uncal herniation.  He was transferred to [place] for emergent craniectomy for hematoma evacuation. Extubated 10/26.  Patient is status post left [facility] for evacuation of SDH 10/13.  Recent repeat CT head 10/24 stable.    10/30 with concern for swelling at crani site, going for STAT CTH.   MRI brain showed ischemia involving the brainstem.  He was evaluated by neurology.    Recommended aspirin 81 mg daily when cleared by neurosurgery.  STAT CTH for any neurological changes  Currently with NGT (placed 10/27)  in place for nutrition. Possible aspiration event overnight 10/30 was a RR. TF held, Speech saw and recommended NPO--now comfort care of 10/30    Sepsis (HCC)  Patient noted to have fevers earlier this hospitalization, met sepsis criteria.  Has large sacral wound with concern for perirectal abscess.  This has been debrided by general surgery.  Completed course of Zosyn per infectious disease.  Continued to have intermittent fevers despite antibiotic without further obvious active infection as well as sinus tachycardia.   ID following, was being monitored of IV abx despite persistent fevers as they are felt to be more related to tumor fever  Comfort care   Stroke ([facility])  Initial MRI brain 10/13 showed  "Multifocal early ischemia including right anterior thalamus, anterior occipital lobes left greater than right. On the left this tracks into the posterior medial temporal lobe. There is also a punctate focus of cortical ischemia within the posterior parafalcine frontal lobe. Cortical petechial hemorrhage noted within the central aspect of the right thalamus and left anterior occipital lobe.  He was evaluated by neurology, recommending ASA when cleared by NSx however now hospice  Noted to have PFO on echo, appears CHRIS was considered for further work up but unclear utility of this.   Sacral wound  S/P I&D with general surgery. Concern for adjacent perirectal abscess noted. CT scan 10/18 also noting auricle thinning of coccyx below the decubitus ulcer may suggest osteomyelitis.   Definitive diagnosis of osteomized would require a bone biopsy/culture. However even if OM was confirmed, long-term antibiotic therapy would be futile without a plan to aggressively debride the tissue and bone, send bone cultures and cover the wound with flap for closure.   LWC per general surgery  Strict and frequent turning and repositioning  Maintain Barclay catheter to avoid wound contamination  Perirectal abscess  As above   Multiple lesions of metastatic malignancy (HCC)  Patient with suspected metastatic cancer, patient with hepatosplenomegaly, hepatic lesions, splenic lesions, transaminitis.  Evaluated by oncology, tumor markers ordered, CEA, AFP, normal.  CA 19 9, 697, LDH elevated at 556.  Hepatitis panel negative.  Now status post iliac bone biopsy done 10/24, preliminary findings with \"intramedullary spindle cell population associated with T-cell predominant lymphoid population\", finals pending  Also status post paracentesis 10/22, fluid negative for malignancy.  Oncology plans to d/w family 10/31 per their request even though would not change outcome  Acute respiratory failure (HCC)  Extubated 10/26. . Completed course of abx for " PNA. Upgraded to high flow 10/29 given increased WOB and poor candidate for BIPAP given crani and NGT.   ICU team advised against intubation  Now is comfort care as of 10/30  Hypernatremia  NA levels up to 150 as of most recent check   Nephrology following, recommended D5 however this was stopped by ICU team during RRT 10/30  Now comfort, no further labs   Pulmonary embolism (HCC)  PE and  LUE DVT. Likely hypercoagulable state in setting of underlying malignancy.  Anticoagulation not pursued due to recent subdural hematoma  End of life care  Palliative care following  Decompensated overnight 10/30, additional GOC discussions had, now comfort care as of 10/30  Anasarca  In setting of hypoalbuminemia.  Also with recent ascites status post paracentesis for 2 L on 10/22.    Anemia  Likely in setting of chronic illness/underlying malignancy.  Has received multiple units of PRBC with last 1 being 10/24.  Monitor     VTE Pharmacologic Prophylaxis:    comfort care    Mobility:   Basic Mobility Inpatient Raw Score: 6  JH-HLM Goal: 2: Bed activities/Dependent transfer  JH-HLM Achieved: 2: Bed activities/Dependent transfer  JH-HLM Goal achieved. Continue to encourage appropriate mobility.    Patient Centered Rounds: I performed bedside rounds with nursing staff today.   Discussions with Specialists or Other Care Team Provider: appreciate palliative care and CM input     Education and Discussions with Family / Patient:  will touch base with wife today at some point .     Current Length of Stay: 18 day(s)  Current Patient Status: Inpatient   Certification Statement: The patient will continue to require additional inpatient hospital stay due to dispo planning  Discharge Plan:  TBD    Code Status: Level 4 - Comfort Care    Subjective   Does not respond to verbal or tactile stimuli. He appears comfortable though is using accessory muscles to breathe. No family currently at bedside.     Objective :  Temp:  [101.3 °F (38.5 °C)] 101.3  °F (38.5 °C)  HR:  [122-128] 124  Resp:  [44] 44  SpO2:  [78 %-99 %] 78 %  O2 Device: Nasal cannula  Nasal Cannula O2 Flow Rate (L/min):  [5 L/min-6 L/min] 6 L/min    Body mass index is 38.04 kg/m².     Input and Output Summary (last 24 hours):     Intake/Output Summary (Last 24 hours) at 10/31/2024 0851  Last data filed at 10/31/2024 0826  Gross per 24 hour   Intake 0 ml   Output 925 ml   Net -925 ml       Physical Exam  Vitals and nursing note reviewed.   Constitutional:       Appearance: He is morbidly obese. He is ill-appearing.      Comments: On nasal cannula, belly breathing noted    Cardiovascular:      Rate and Rhythm: Tachycardia present.   Pulmonary:      Effort: Accessory muscle usage present.   Genitourinary:     Comments: Barclay in place   Skin:     Coloration: Skin is pale.      Comments: Diffuse anasarca noted    Neurological:      Mental Status: He is lethargic.       \F2    Lines/Drains:  Lines/Drains/Airways       Active Status       Name Placement date Placement time Site Days    PICC Line 10/28/24 10/28/24  1735  --  2    NG/OG/Enteral Tube Nasogastric 12 Fr Right nare 10/27/24  2115  Right nare  3    Urethral Catheter Latex 16 Fr. 10/22/24  0000  Latex  9                  Urinary Catheter:  Goal for removal:  maintain for comfort          Central Line:  Goal for removal:  maintain for meds               Lab Results: I have reviewed the following results:   Results from last 7 days   Lab Units 10/30/24  0514   WBC Thousand/uL 7.06   HEMOGLOBIN g/dL 7.6*   HEMATOCRIT % 26.4*   PLATELETS Thousands/uL 178   BANDS PCT % 10*   LYMPHO PCT % 0*   MONO PCT % 0*   EOS PCT % 0     Results from last 7 days   Lab Units 10/30/24  0514   SODIUM mmol/L 150*   POTASSIUM mmol/L 4.3   CHLORIDE mmol/L 116*   CO2 mmol/L 23   BUN mg/dL 59*   CREATININE mg/dL 1.02   ANION GAP mmol/L 11   CALCIUM mg/dL 8.5   ALBUMIN g/dL 2.5*   TOTAL BILIRUBIN mg/dL 6.44*   ALK PHOS U/L 1,013*   ALT U/L 108*   AST U/L 259*   GLUCOSE  RANDOM mg/dL 81     Results from last 7 days   Lab Units 10/28/24  0549   INR  1.35*     Results from last 7 days   Lab Units 10/30/24  0740 10/30/24  0647 10/30/24  0039 10/29/24  2104 10/29/24  1749 10/29/24  1411 10/29/24  0619 10/29/24  0003 10/28/24  1742 10/28/24  1153 10/28/24  0606 10/28/24  0021   POC GLUCOSE mg/dl 82 72 94 97 82 101 83 97 94 82 114 84         Results from last 7 days   Lab Units 10/30/24  0514   PROCALCITONIN ng/ml 2.18*       Recent Cultures (last 7 days):   Results from last 7 days   Lab Units 10/24/24  1529   GRAM STAIN RESULT  4+ Gram positive cocci in pairs*  Rare Polys*       Imaging Results Review: No pertinent imaging studies reviewed.  Other Study Results Review: No additional pertinent studies reviewed.    Last 24 Hours Medication List:     Current Facility-Administered Medications:     acetaminophen (Ofirmev) injection 1,000 mg, Q6H PRN, Last Rate: 1,000 mg (10/30/24 0836)    glycopyrrolate (ROBINUL) injection 0.2 mg, Q4H PRN    haloperidol lactate (HALDOL) injection 0.5 mg, Q2H PRN    HYDROmorphone (DILAUDID) injection 1 mg, Q1H PRN    LORazepam (ATIVAN) injection 1 mg, Q1H PRN    ondansetron (ZOFRAN) injection 4 mg, Q8H PRN    polyethylene glycol (MIRALAX) packet 17 g, Daily PRN    Administrative Statements   Today, Patient Was Seen By: Terrie Ramsay PA-C      **Please Note: This note may have been constructed using a voice recognition system.**

## 2024-11-01 VITALS
HEART RATE: 58 BPM | HEIGHT: 73 IN | OXYGEN SATURATION: 95 % | RESPIRATION RATE: 16 BRPM | DIASTOLIC BLOOD PRESSURE: 84 MMHG | BODY MASS INDEX: 38.22 KG/M2 | TEMPERATURE: 97.7 F | SYSTOLIC BLOOD PRESSURE: 149 MMHG | WEIGHT: 288.36 LBS

## 2024-11-01 NOTE — UTILIZATION REVIEW
NOTIFICATION OF ADMISSION DISCHARGE   This is a Notification of Discharge from [facility]. Please be advised that this patient has been discharge from our facility. Below you will find the admission and discharge date and time including the patient’s disposition.   UTILIZATION REVIEW CONTACT:  Mayo Rodriguez  Utilization   Network Utilization Review Department  Phone: [phone] x carefully listen to the prompts. All voicemails are confidential.  Email: [email or web address]     ADMISSION INFORMATION  PRESENTATION DATE: 10/13/2024  8:25 AM  OBERVATION ADMISSION DATE: N/A  INPATIENT ADMISSION DATE: 10/13/24 11:58 AM   DISCHARGE DATE: 10/31/2024  2:56 PM   DISPOSITION:    Network Utilization Review Department  ATTENTION: Please call with any questions or concerns to [phone] and carefully listen to the prompts so that you are directed to the right person. All voicemails are confidential.   For Discharge needs, contact Care Management DC Support Team at [phone] opt. 2  Send all requests for admission clinical reviews, approved or denied determinations and any other requests to dedicated fax number below belonging to the campus where the patient is receiving treatment. List of dedicated fax numbers for the Facilities:  FACILITY NAME UR FAX NUMBER   ADMISSION DENIALS (Administrative/Medical Necessity) [phone]   DISCHARGE SUPPORT TEAM ([facility]) [phone]   PARENT CHILD HEALTH (Maternity/NICU/Pediatrics) [phone]   [facility] [phone]   [facility] [phone]   [facility] [phone]   [facility] [phone]   [facility] [phone]   [facility] [phone]   [facility] [phone]   [facility] [phone]   [facility]  [facility] [phone]   [facility] [phone]   [facility] [phone]   [facility] [phone]

## 2024-11-02 VITALS
SYSTOLIC BLOOD PRESSURE: 141 MMHG | BODY MASS INDEX: 38.22 KG/M2 | RESPIRATION RATE: 17 BRPM | HEART RATE: 46 BPM | DIASTOLIC BLOOD PRESSURE: 97 MMHG | TEMPERATURE: 97.6 F | WEIGHT: 288.36 LBS | OXYGEN SATURATION: 97 % | HEIGHT: 73 IN

## 2024-11-04 LAB
FUNGAL BLOOD CULTURE: NORMAL
FUNGUS SPEC CULT: NORMAL
FUNGUS SPEC CULT: NORMAL

## 2024-11-05 LAB
MYCOBACTERIUM SPEC CULT: NORMAL
MYCOBACTERIUM SPEC CULT: NORMAL
RHODAMINE-AURAMINE STN SPEC: NORMAL

## 2024-11-11 LAB
FUNGAL BLOOD CULTURE: NORMAL
FUNGUS SPEC CULT: NORMAL
FUNGUS SPEC CULT: NORMAL
MYCOBACTERIUM SPEC CULT: NORMAL
RHODAMINE-AURAMINE STN SPEC: NORMAL

## 2024-11-12 LAB — MYCOBACTERIUM SPEC CULT: NORMAL

## 2024-11-18 LAB
FUNGAL BLOOD CULTURE: NORMAL
FUNGUS SPEC CULT: NORMAL
FUNGUS SPEC CULT: NORMAL

## 2024-11-19 LAB
MYCOBACTERIUM SPEC CULT: NORMAL
RHODAMINE-AURAMINE STN SPEC: NORMAL

## 2024-11-25 LAB
FUNGUS SPEC CULT: NORMAL
FUNGUS SPEC CULT: NORMAL

## 2024-11-26 LAB
FUNGAL BLOOD CULTURE: NORMAL
MYCOBACTERIUM SPEC CULT: NORMAL
RHODAMINE-AURAMINE STN SPEC: NORMAL

## 2024-12-02 LAB
MYCOBACTERIUM SPEC CULT: NORMAL
RHODAMINE-AURAMINE STN SPEC: NORMAL

## 2024-12-03 LAB — FUNGAL BLOOD CULTURE: NORMAL

## 2024-12-10 LAB
MYCOBACTERIUM SPEC CULT: NORMAL
RHODAMINE-AURAMINE STN SPEC: NORMAL

## 2025-06-01 NOTE — TELEPHONE ENCOUNTER
A user error has taken place: encounter opened in error, closed for administrative reasons.        Dysphagia Dysphagia Dysphagia Dysphagia Dysphagia

## (undated) DEVICE — ABDOMINAL PAD: Brand: DERMACEA

## (undated) DEVICE — GLOVE INDICATOR PI UNDERGLOVE SZ 9 BLUE

## (undated) DEVICE — 3M™ IOBAN™ 2 ANTIMICROBIAL INCISE DRAPE 6650EZ: Brand: IOBAN™ 2

## (undated) DEVICE — PREMIUM DRY TRAY LF: Brand: MEDLINE INDUSTRIES, INC.

## (undated) DEVICE — ELECTRODE BLADE MOD E-Z CLEAN 2.5IN 6.4CM -0012M

## (undated) DEVICE — TELFA NON-ADHERENT ABSORBENT DRESSING: Brand: TELFA

## (undated) DEVICE — GLOVE SRG BIOGEL ECLIPSE 8.5

## (undated) DEVICE — JACKSON-PRATT 100CC BULB RESERVOIR: Brand: CARDINAL HEALTH

## (undated) DEVICE — GLOVE SRG BIOGEL 7.5

## (undated) DEVICE — LIGHT HANDLE COVER SLEEVE DISP BLUE STELLAR

## (undated) DEVICE — TOOL MR8-F2/7TA23 MR8 F2/7CM TAPER 2.3MM: Brand: MIDAS REX MR8

## (undated) DEVICE — INTENDED FOR TISSUE SEPARATION, AND OTHER PROCEDURES THAT REQUIRE A SHARP SURGICAL BLADE TO PUNCTURE OR CUT.: Brand: BARD-PARKER ® CARBON RIB-BACK BLADES

## (undated) DEVICE — SUT NUROLON 4-0 TF CR/8 18 IN C584D

## (undated) DEVICE — SYRINGE 10ML LL

## (undated) DEVICE — GAUZE SPONGES,16 PLY: Brand: CURITY

## (undated) DEVICE — TUBING SUCTION 5MM X 12 FT

## (undated) DEVICE — RANEY SCALP CLIP STERILE: Brand: AESCULAP

## (undated) DEVICE — DRAPE SHEET THREE QUARTER

## (undated) DEVICE — 3000CC GUARDIAN II: Brand: GUARDIAN

## (undated) DEVICE — PERFORATOR CRANIAL 14MM

## (undated) DEVICE — SKIN MARKER DUAL TIP WITH RULER CAP, FLEXIBLE RULER AND LABELS: Brand: DEVON

## (undated) DEVICE — BETHLEHEM UNIVERSAL OUTPATIENT: Brand: CARDINAL HEALTH

## (undated) DEVICE — HEMOSTATIC MATRIX SURGIFLO 8ML W/THROMBIN

## (undated) DEVICE — INTENDED FOR TISSUE SEPARATION, AND OTHER PROCEDURES THAT REQUIRE A SHARP SURGICAL BLADE TO PUNCTURE OR CUT.: Brand: BARD-PARKER SAFETY BLADES SIZE 10, STERILE

## (undated) DEVICE — KERLIX BANDAGE ROLL: Brand: KERLIX

## (undated) DEVICE — ASTOUND STANDARD SURGICAL GOWN, XXL: Brand: CONVERTORS

## (undated) DEVICE — PACK CRANIOTOMY PBDS RF

## (undated) DEVICE — JP PERF DRN SIL FLT 7MM FULL: Brand: CARDINAL HEALTH

## (undated) DEVICE — NEEDLE 25G X 1 1/2

## (undated) DEVICE — COBAN 4 IN STERILE

## (undated) DEVICE — GLOVE INDICATOR PI UNDERGLOVE SZ 7.5 BLUE

## (undated) DEVICE — BULB SYRINGE,IRRIGATION WITH PROTECTIVE CAP: Brand: DOVER

## (undated) DEVICE — TIBURON SPLIT SHEET: Brand: CONVERTORS

## (undated) DEVICE — HYDROGEN PEROXIDE 3 PCT 16 OZ

## (undated) DEVICE — Device

## (undated) DEVICE — PAD GROUNDING DUAL ADULT

## (undated) DEVICE — ANTIBACTERIAL VIOLET BRAIDED (POLYGLACTIN 910), SYNTHETIC ABSORBABLE SUTURE: Brand: COATED VICRYL

## (undated) DEVICE — PLUMEPEN PRO 10FT

## (undated) DEVICE — POV-IOD SOLUTION 4OZ BT

## (undated) DEVICE — TRAY FOLEY 16FR URIMETER SURESTEP

## (undated) DEVICE — CHLORAPREP HI-LITE 26ML ORANGE

## (undated) DEVICE — DRAPE INTESTINAL ISOLATION BAG

## (undated) DEVICE — SPONGE LAP 18 X 18 IN STRL RFD

## (undated) DEVICE — SPONGE SCRUB 4 PCT CHLORHEXIDINE

## (undated) DEVICE — PROXIMATE SKIN STAPLERS (35 WIDE) CONTAINS 35 STAINLESS STEEL STAPLES (FIXED HEAD): Brand: PROXIMATE